# Patient Record
Sex: FEMALE | Race: BLACK OR AFRICAN AMERICAN | Employment: UNEMPLOYED | URBAN - METROPOLITAN AREA
[De-identification: names, ages, dates, MRNs, and addresses within clinical notes are randomized per-mention and may not be internally consistent; named-entity substitution may affect disease eponyms.]

---

## 2017-01-26 PROBLEM — M47.816 SPONDYLOSIS OF LUMBAR REGION WITHOUT MYELOPATHY OR RADICULOPATHY: Status: ACTIVE | Noted: 2017-01-26

## 2017-01-28 ENCOUNTER — HOSPITAL ENCOUNTER (EMERGENCY)
Age: 32
Discharge: HOME | End: 2017-01-28
Attending: EMERGENCY MEDICINE
Payer: COMMERCIAL

## 2017-01-28 ENCOUNTER — APPOINTMENT (OUTPATIENT)
Dept: GENERAL RADIOLOGY | Age: 32
End: 2017-01-28
Payer: COMMERCIAL

## 2017-01-28 VITALS
BODY MASS INDEX: 43.19 KG/M2 | RESPIRATION RATE: 16 BRPM | DIASTOLIC BLOOD PRESSURE: 78 MMHG | OXYGEN SATURATION: 100 % | TEMPERATURE: 97.4 F | HEART RATE: 87 BPM | HEIGHT: 68 IN | SYSTOLIC BLOOD PRESSURE: 116 MMHG | WEIGHT: 285 LBS

## 2017-01-28 DIAGNOSIS — R07.89 CHEST WALL PAIN: ICD-10-CM

## 2017-01-28 DIAGNOSIS — Z76.0 ENCOUNTER FOR MEDICATION REFILL: ICD-10-CM

## 2017-01-28 DIAGNOSIS — J45.21 MILD INTERMITTENT ASTHMA WITH ACUTE EXACERBATION: Primary | ICD-10-CM

## 2017-01-28 PROCEDURE — 6370000000 HC RX 637 (ALT 250 FOR IP): Performed by: PHYSICIAN ASSISTANT

## 2017-01-28 PROCEDURE — 99285 EMERGENCY DEPT VISIT HI MDM: CPT

## 2017-01-28 PROCEDURE — 94640 AIRWAY INHALATION TREATMENT: CPT

## 2017-01-28 PROCEDURE — 93005 ELECTROCARDIOGRAM TRACING: CPT

## 2017-01-28 PROCEDURE — 71020 XR CHEST STANDARD TWO VW: CPT

## 2017-01-28 RX ORDER — IPRATROPIUM BROMIDE AND ALBUTEROL SULFATE 2.5; .5 MG/3ML; MG/3ML
1 SOLUTION RESPIRATORY (INHALATION) CONTINUOUS PRN
Status: DISCONTINUED | OUTPATIENT
Start: 2017-01-28 | End: 2017-01-29 | Stop reason: HOSPADM

## 2017-01-28 RX ORDER — ALBUTEROL SULFATE 90 UG/1
AEROSOL, METERED RESPIRATORY (INHALATION)
Qty: 1 INHALER | Refills: 1 | Status: SHIPPED | OUTPATIENT
Start: 2017-01-28 | End: 2018-02-05

## 2017-01-28 RX ORDER — PREDNISONE 20 MG/1
40 TABLET ORAL ONCE
Status: COMPLETED | OUTPATIENT
Start: 2017-01-28 | End: 2017-01-28

## 2017-01-28 RX ORDER — ALBUTEROL SULFATE 2.5 MG/3ML
2.5 SOLUTION RESPIRATORY (INHALATION) EVERY 6 HOURS PRN
Qty: 20 EACH | Refills: 0 | Status: SHIPPED | OUTPATIENT
Start: 2017-01-28 | End: 2017-08-03 | Stop reason: SDUPTHER

## 2017-01-28 RX ORDER — PREDNISONE 10 MG/1
40 TABLET ORAL DAILY
Qty: 16 TABLET | Refills: 0 | Status: SHIPPED | OUTPATIENT
Start: 2017-01-28 | End: 2017-02-01

## 2017-01-28 RX ADMIN — PREDNISONE 40 MG: 20 TABLET ORAL at 23:13

## 2017-01-28 RX ADMIN — IPRATROPIUM BROMIDE AND ALBUTEROL SULFATE 1 AMPULE: 2.5; .5 SOLUTION RESPIRATORY (INHALATION) at 21:55

## 2017-01-28 ASSESSMENT — PAIN DESCRIPTION - DESCRIPTORS: DESCRIPTORS: TIGHTNESS

## 2017-01-28 ASSESSMENT — ENCOUNTER SYMPTOMS
VOICE CHANGE: 0
VOMITING: 0
PHOTOPHOBIA: 0
ANAL BLEEDING: 0
ABDOMINAL DISTENTION: 0
CHEST TIGHTNESS: 1
COUGH: 1
SHORTNESS OF BREATH: 1
ABDOMINAL PAIN: 0
EYE DISCHARGE: 0
APNEA: 0
NAUSEA: 0

## 2017-01-28 ASSESSMENT — PAIN DESCRIPTION - FREQUENCY: FREQUENCY: INTERMITTENT

## 2017-01-28 ASSESSMENT — PAIN DESCRIPTION - PAIN TYPE: TYPE: ACUTE PAIN

## 2017-01-28 ASSESSMENT — PAIN DESCRIPTION - LOCATION: LOCATION: CHEST

## 2017-01-28 ASSESSMENT — PAIN SCALES - GENERAL: PAINLEVEL_OUTOF10: 5

## 2017-01-28 ASSESSMENT — PULMONARY FUNCTION TESTS: PEFR_L/MIN: 250

## 2017-01-30 LAB
EKG ATRIAL RATE: 99 BPM
EKG P AXIS: 45 DEGREES
EKG P-R INTERVAL: 154 MS
EKG Q-T INTERVAL: 344 MS
EKG QRS DURATION: 74 MS
EKG QTC CALCULATION (BAZETT): 441 MS
EKG R AXIS: 18 DEGREES
EKG T AXIS: -9 DEGREES
EKG VENTRICULAR RATE: 99 BPM

## 2017-02-02 DIAGNOSIS — Z11.4 SCREENING FOR HIV (HUMAN IMMUNODEFICIENCY VIRUS): ICD-10-CM

## 2017-02-04 LAB — HIV-1 AND HIV-2 ANTIBODIES: NEGATIVE

## 2017-02-15 ENCOUNTER — HOSPITAL ENCOUNTER (OUTPATIENT)
Dept: SLEEP CENTER | Age: 32
Discharge: HOME | End: 2017-02-15
Payer: COMMERCIAL

## 2017-02-15 PROCEDURE — 95810 POLYSOM 6/> YRS 4/> PARAM: CPT

## 2017-02-22 PROBLEM — F40.240 CLAUSTROPHOBIA: Status: ACTIVE | Noted: 2017-02-22

## 2017-02-22 PROBLEM — Q76.49 TRANSITIONAL VERTEBRA: Status: ACTIVE | Noted: 2017-02-22

## 2017-02-24 ENCOUNTER — HOSPITAL ENCOUNTER (OUTPATIENT)
Dept: NUTRITION | Age: 32
Discharge: HOME | End: 2017-02-24
Payer: COMMERCIAL

## 2017-02-24 ENCOUNTER — HOSPITAL ENCOUNTER (OUTPATIENT)
Dept: GENERAL RADIOLOGY | Age: 32
Discharge: HOME | End: 2017-02-24
Payer: COMMERCIAL

## 2017-02-24 DIAGNOSIS — M54.2 NECK PAIN: ICD-10-CM

## 2017-02-24 PROCEDURE — 97802 MEDICAL NUTRITION INDIV IN: CPT

## 2017-02-24 PROCEDURE — 72052 X-RAY EXAM NECK SPINE 6/>VWS: CPT

## 2017-03-02 ENCOUNTER — HOSPITAL ENCOUNTER (OUTPATIENT)
Dept: MRI IMAGING | Age: 32
Discharge: HOME | End: 2017-03-02
Payer: COMMERCIAL

## 2017-03-02 DIAGNOSIS — Q76.49 TRANSITIONAL VERTEBRA: ICD-10-CM

## 2017-03-02 DIAGNOSIS — M54.50 CHRONIC BILATERAL LOW BACK PAIN WITHOUT SCIATICA: ICD-10-CM

## 2017-03-02 DIAGNOSIS — G89.29 CHRONIC BILATERAL LOW BACK PAIN WITHOUT SCIATICA: ICD-10-CM

## 2017-03-02 PROCEDURE — 72148 MRI LUMBAR SPINE W/O DYE: CPT

## 2017-03-14 PROBLEM — M48.061 LUMBAR CANAL STENOSIS: Status: ACTIVE | Noted: 2017-03-14

## 2017-03-27 ENCOUNTER — OFFICE VISIT (OUTPATIENT)
Dept: INTERNAL MEDICINE | Age: 32
End: 2017-03-27

## 2017-03-27 VITALS
WEIGHT: 284.2 LBS | TEMPERATURE: 98.1 F | DIASTOLIC BLOOD PRESSURE: 82 MMHG | SYSTOLIC BLOOD PRESSURE: 118 MMHG | HEART RATE: 84 BPM | HEIGHT: 68 IN | BODY MASS INDEX: 43.07 KG/M2 | RESPIRATION RATE: 12 BRPM | OXYGEN SATURATION: 97 %

## 2017-03-27 DIAGNOSIS — E66.01 MORBID (SEVERE) OBESITY DUE TO EXCESS CALORIES (HCC): Primary | ICD-10-CM

## 2017-03-27 DIAGNOSIS — D22.9 ATYPICAL MOLE: ICD-10-CM

## 2017-03-27 DIAGNOSIS — E66.01 MORBID OBESITY DUE TO EXCESS CALORIES (HCC): Primary | ICD-10-CM

## 2017-03-27 PROCEDURE — 99213 OFFICE O/P EST LOW 20 MIN: CPT | Performed by: FAMILY MEDICINE

## 2017-03-27 RX ORDER — DIAZEPAM 5 MG/1
TABLET ORAL
Refills: 0 | COMMUNITY
Start: 2017-02-28 | End: 2017-06-06 | Stop reason: ALTCHOICE

## 2017-05-18 DIAGNOSIS — E66.9 OBESITY, UNSPECIFIED OBESITY SEVERITY, UNSPECIFIED OBESITY TYPE: ICD-10-CM

## 2017-05-18 DIAGNOSIS — E61.1 IRON DEFICIENCY: ICD-10-CM

## 2017-05-18 DIAGNOSIS — E55.9 VITAMIN D DEFICIENCY: ICD-10-CM

## 2017-05-18 LAB
BASOPHILS ABSOLUTE: 0 K/UL (ref 0–0.2)
BASOPHILS RELATIVE PERCENT: 0.3 %
EOSINOPHILS ABSOLUTE: 0.2 K/UL (ref 0–0.7)
EOSINOPHILS RELATIVE PERCENT: 1.5 %
FERRITIN: 84.7 NG/ML (ref 13–150)
HBA1C MFR BLD: 6.2 % (ref 4.8–5.9)
HCT VFR BLD CALC: 40.2 % (ref 37–47)
HEMOGLOBIN: 13.1 G/DL (ref 12–16)
IRON SATURATION: 12 % (ref 11–46)
IRON: 38 UG/DL (ref 37–145)
LYMPHOCYTES ABSOLUTE: 2.3 K/UL (ref 1–4.8)
LYMPHOCYTES RELATIVE PERCENT: 21.2 %
MCH RBC QN AUTO: 28 PG (ref 27–31.3)
MCHC RBC AUTO-ENTMCNC: 32.7 % (ref 33–37)
MCV RBC AUTO: 85.7 FL (ref 82–100)
MONOCYTES ABSOLUTE: 0.4 K/UL (ref 0.2–0.8)
MONOCYTES RELATIVE PERCENT: 3.7 %
NEUTROPHILS ABSOLUTE: 8 K/UL (ref 1.4–6.5)
NEUTROPHILS RELATIVE PERCENT: 73.3 %
PDW BLD-RTO: 14.6 % (ref 11.5–14.5)
PLATELET # BLD: 283 K/UL (ref 130–400)
RBC # BLD: 4.69 M/UL (ref 4.2–5.4)
TOTAL IRON BINDING CAPACITY: 315 UG/DL (ref 178–450)
VITAMIN D 25-HYDROXY: 24.9 NG/ML (ref 30–100)
WBC # BLD: 10.9 K/UL (ref 4.8–10.8)

## 2017-07-10 ENCOUNTER — TELEPHONE (OUTPATIENT)
Dept: ADMINISTRATIVE | Age: 32
End: 2017-07-10

## 2017-09-07 DIAGNOSIS — R73.09 ABNORMAL BLOOD SUGAR: ICD-10-CM

## 2017-09-07 DIAGNOSIS — N93.9 ABNORMAL VAGINAL BLEEDING: ICD-10-CM

## 2017-09-07 LAB
BASOPHILS ABSOLUTE: 0.1 K/UL (ref 0–0.2)
BASOPHILS RELATIVE PERCENT: 0.5 %
EOSINOPHILS ABSOLUTE: 0.2 K/UL (ref 0–0.7)
EOSINOPHILS RELATIVE PERCENT: 2.3 %
FOLLICLE STIMULATING HORMONE: 6.6 MIU/ML
GONADOTROPIN, CHORIONIC (HCG) QUANT: <0.1 MIU/ML
HBA1C MFR BLD: 5.7 % (ref 4.8–5.9)
HCT VFR BLD CALC: 40.5 % (ref 37–47)
HEMOGLOBIN: 13.4 G/DL (ref 12–16)
LUTEINIZING HORMONE: 8.6 MIU/ML
LYMPHOCYTES ABSOLUTE: 2.5 K/UL (ref 1–4.8)
LYMPHOCYTES RELATIVE PERCENT: 25.2 %
MCH RBC QN AUTO: 28 PG (ref 27–31.3)
MCHC RBC AUTO-ENTMCNC: 33 % (ref 33–37)
MCV RBC AUTO: 84.8 FL (ref 82–100)
MONOCYTES ABSOLUTE: 0.3 K/UL (ref 0.2–0.8)
MONOCYTES RELATIVE PERCENT: 3.3 %
NEUTROPHILS ABSOLUTE: 6.8 K/UL (ref 1.4–6.5)
NEUTROPHILS RELATIVE PERCENT: 68.7 %
PDW BLD-RTO: 15.6 % (ref 11.5–14.5)
PLATELET # BLD: 287 K/UL (ref 130–400)
RBC # BLD: 4.78 M/UL (ref 4.2–5.4)
WBC # BLD: 9.9 K/UL (ref 4.8–10.8)

## 2017-09-19 ENCOUNTER — HOSPITAL ENCOUNTER (OUTPATIENT)
Dept: ULTRASOUND IMAGING | Age: 32
Discharge: HOME | End: 2017-09-19
Payer: COMMERCIAL

## 2017-09-19 DIAGNOSIS — N93.9 ABNORMAL VAGINAL BLEEDING: ICD-10-CM

## 2017-09-19 PROCEDURE — 76830 TRANSVAGINAL US NON-OB: CPT

## 2017-09-19 PROCEDURE — 76856 US EXAM PELVIC COMPLETE: CPT

## 2017-10-01 ENCOUNTER — APPOINTMENT (OUTPATIENT)
Dept: GENERAL RADIOLOGY | Age: 32
DRG: 059 | End: 2017-10-01
Payer: COMMERCIAL

## 2017-10-01 ENCOUNTER — HOSPITAL ENCOUNTER (INPATIENT)
Age: 32
LOS: 1 days | Discharge: HOME | DRG: 059 | End: 2017-10-04
Attending: INTERNAL MEDICINE | Admitting: INTERNAL MEDICINE
Payer: COMMERCIAL

## 2017-10-01 DIAGNOSIS — G89.29 CHRONIC LEFT-SIDED LOW BACK PAIN WITH LEFT-SIDED SCIATICA: ICD-10-CM

## 2017-10-01 DIAGNOSIS — M54.5 CHRONIC MIDLINE LOW BACK PAIN, WITH SCIATICA PRESENCE UNSPECIFIED: ICD-10-CM

## 2017-10-01 DIAGNOSIS — G89.29 CHRONIC MIDLINE LOW BACK PAIN, WITH SCIATICA PRESENCE UNSPECIFIED: ICD-10-CM

## 2017-10-01 DIAGNOSIS — R47.81 SLURRED SPEECH: ICD-10-CM

## 2017-10-01 DIAGNOSIS — R20.2 PARESTHESIAS: ICD-10-CM

## 2017-10-01 DIAGNOSIS — M54.42 CHRONIC LEFT-SIDED LOW BACK PAIN WITH LEFT-SIDED SCIATICA: ICD-10-CM

## 2017-10-01 DIAGNOSIS — G45.9 TRANSIENT CEREBRAL ISCHEMIA, UNSPECIFIED TYPE: Primary | ICD-10-CM

## 2017-10-01 LAB
BASOPHILS ABSOLUTE: 0 K/UL (ref 0–0.2)
BASOPHILS RELATIVE PERCENT: 0.3 %
BILIRUBIN URINE: NEGATIVE
BLOOD, URINE: NEGATIVE
CHP ED QC CHECK: YES
CLARITY: CLEAR
COLOR: YELLOW
EOSINOPHILS ABSOLUTE: 0.3 K/UL (ref 0–0.7)
EOSINOPHILS RELATIVE PERCENT: 2.3 %
GLUCOSE URINE: NEGATIVE MG/DL
HCT VFR BLD CALC: 40 % (ref 37–47)
HEMOGLOBIN: 12.9 G/DL (ref 12–16)
KETONES, URINE: NEGATIVE MG/DL
LEUKOCYTE ESTERASE, URINE: NEGATIVE
LYMPHOCYTES ABSOLUTE: 4.9 K/UL (ref 1–4.8)
LYMPHOCYTES RELATIVE PERCENT: 36.4 %
MCH RBC QN AUTO: 27.8 PG (ref 27–31.3)
MCHC RBC AUTO-ENTMCNC: 32.2 % (ref 33–37)
MCV RBC AUTO: 86.4 FL (ref 82–100)
MONOCYTES ABSOLUTE: 0.7 K/UL (ref 0.2–0.8)
MONOCYTES RELATIVE PERCENT: 5 %
NEUTROPHILS ABSOLUTE: 7.6 K/UL (ref 1.4–6.5)
NEUTROPHILS RELATIVE PERCENT: 56 %
NITRITE, URINE: NEGATIVE
PDW BLD-RTO: 15.6 % (ref 11.5–14.5)
PH UA: 5.5 (ref 5–9)
PLATELET # BLD: 274 K/UL (ref 130–400)
PREGNANCY TEST URINE, POC: NEGATIVE
PROTEIN UA: NEGATIVE MG/DL
RBC # BLD: 4.63 M/UL (ref 4.2–5.4)
SPECIFIC GRAVITY UA: 1.02 (ref 1–1.03)
URINE REFLEX TO CULTURE: NORMAL
UROBILINOGEN, URINE: 0.2 E.U./DL
WBC # BLD: 13.5 K/UL (ref 4.8–10.8)

## 2017-10-01 PROCEDURE — 82553 CREATINE MB FRACTION: CPT

## 2017-10-01 PROCEDURE — G0480 DRUG TEST DEF 1-7 CLASSES: HCPCS

## 2017-10-01 PROCEDURE — 80053 COMPREHEN METABOLIC PANEL: CPT

## 2017-10-01 PROCEDURE — 71010 XR CHEST PORTABLE: CPT

## 2017-10-01 PROCEDURE — 2580000003 HC RX 258: Performed by: PERSONAL EMERGENCY RESPONSE ATTENDANT

## 2017-10-01 PROCEDURE — 80307 DRUG TEST PRSMV CHEM ANLYZR: CPT

## 2017-10-01 PROCEDURE — 99285 EMERGENCY DEPT VISIT HI MDM: CPT

## 2017-10-01 PROCEDURE — 93005 ELECTROCARDIOGRAM TRACING: CPT

## 2017-10-01 PROCEDURE — 83880 ASSAY OF NATRIURETIC PEPTIDE: CPT

## 2017-10-01 PROCEDURE — 85025 COMPLETE CBC W/AUTO DIFF WBC: CPT

## 2017-10-01 PROCEDURE — 36415 COLL VENOUS BLD VENIPUNCTURE: CPT

## 2017-10-01 PROCEDURE — 84443 ASSAY THYROID STIM HORMONE: CPT

## 2017-10-01 PROCEDURE — 84484 ASSAY OF TROPONIN QUANT: CPT

## 2017-10-01 PROCEDURE — 82550 ASSAY OF CK (CPK): CPT

## 2017-10-01 PROCEDURE — 81003 URINALYSIS AUTO W/O SCOPE: CPT

## 2017-10-01 RX ORDER — 0.9 % SODIUM CHLORIDE 0.9 %
1000 INTRAVENOUS SOLUTION INTRAVENOUS ONCE
Status: COMPLETED | OUTPATIENT
Start: 2017-10-01 | End: 2017-10-02

## 2017-10-01 RX ADMIN — SODIUM CHLORIDE 1000 ML: 9 INJECTION, SOLUTION INTRAVENOUS at 23:57

## 2017-10-01 ASSESSMENT — ENCOUNTER SYMPTOMS
DIARRHEA: 0
SHORTNESS OF BREATH: 0
SORE THROAT: 0
VOMITING: 0
ABDOMINAL PAIN: 0
NAUSEA: 0
RHINORRHEA: 0
BLOOD IN STOOL: 0
COUGH: 0
COLOR CHANGE: 0

## 2017-10-01 NOTE — IP AVS SNAPSHOT
After Visit Summary  (Discharge Instructions)    Medication List for Home    Based on the information you provided to us as well as any changes during this visit, the following is your updated medication list.  Compare this with your prescription bottles at home. If you have any questions or concerns, contact your primary care physician's office. Daily Medication List (This medication list can be shared with any healthcare provider who is helping you manage your medications)      There are NEW medications for you. START taking them after you leave the hospital        Last Dose    Next Dose Due AM NOON PM NIGHT    aspirin 81 MG EC tablet   Take 1 tablet by mouth daily                81 mg on 10/4/2017 10:56 AM     10/5/17                            You told us you were taking these medications at home, but the amount or how often you take this medication has CHANGED        Last Dose    Next Dose Due AM NOON PM NIGHT    naproxen 500 MG tablet   Commonly known as:  NAPROSYN   Take 1 tablet by mouth 2 times daily as needed for Pain As needed   What changed:    - when to take this  - reasons to take this                  Take as directed                         These are medications you told us you were taking at home, CONTINUE taking them after you leave the hospital        Last Dose    Next Dose Due AM NOON PM NIGHT    albuterol sulfate  (90 Base) MCG/ACT inhaler   Commonly known as:  PROAIR HFA   Use every 4 hours while awake for 7-10 days then PRN wheezing  May sub Ventolin or Proventil as needed per [employer].                   Take as directed                       albuterol (2.5 MG/3ML) 0.083% nebulizer solution   Commonly known as:  PROVENTIL   Take 3 mLs by nebulization every 6 hours as needed for Wheezing or Shortness of Breath                  Take as directed                       clonazePAM 0.5 MG tablet   Commonly known as:  Iesha Goncalves · Instructions about your medications including a list of your home medications  · A summary of your hospital visit  · Follow-up appointments once you have left the hospital  · Your care plan at home      You may receive a survey regarding the care you received during your stay. Your input is valuable to us. We encourage you to complete and return your survey in the envelope provided. We hope you will choose us in the future for your healthcare needs. Patient Information     Patient Name KAUSHIK Rdzs 1985      Care Provided at:     Name Address Phone       [address]  [place] [place] [address] [phone]            Your Visit    Here you will find information about your visit, including the reason for your visit. Please take this sheet with you when you visit your doctor or other health care provider in the future. It will help determine the best possible medical care for you at that time. If you have any questions once you leave the hospital, please call the department phone number listed below. Why you were here     Your primary diagnosis was:  Not on File      Visit Information     Date & Time Provider Department Dept. Phone    10/1/2017 MD SOBIA Montes Miriam.[facility] [phone]       Follow-up Appointments    Below is a list of your follow-up and future appointments. This may not be a complete list as you may have made appointments directly with providers that we are not aware of or your providers may have made some for you. Please call your providers to confirm appointments. It is important to keep your appointments. Please bring your current insurance card, photo ID, co-pay, and all medication bottles to your appointment. If self-pay, payment is expected at the time of service. Follow-up Information     Follow up with Rivas Odom PA-C .     Specialty:  Family Medicine    Contact information:    [address] [facility] [address]  [phone]          Follow up with Golden Long MD.    Specialty:  Neurology    Why:  November 6th 10.45, has appt    Contact information:    [address] [place] [id number] [facility] [place]        Future Appointments     10/6/2017 8:30 AM     Appointment with Aide Hodges PA-C at [address] [facility] ([phone])   Please arrive 15 minutes prior to appointment, bring photo ID and insurance card. [address]  [address] [facility]         Preventive Care        Date Due    Tetanus Combination Vaccine (1 - Tdap) 9/8/2004    Pap Smear 9/8/2006    Yearly Flu Vaccine (1) 9/7/2018 (Originally 9/1/2017)    Pneumococcal Vaccine - Pneumovax for adults aged 19-64 years with: chronic heart disease, chronic lung disease, diabetes mellitus, alcoholism, chronic liver disease, or cigarette smoking. (1 of 1 - PPSV23) 9/7/2018 (Originally 9/8/2004)                 Care Plan Once You Return Home    This section includes instructions you will need to follow once you leave the hospital.  Your care team will discuss these with you, so you and those caring for you know how to best care for your health needs at home. This section may also include educational information about certain health topics that may be of help to you. Discharge Instructions       Follow up with Dr. Aide Hodges PA-C in the next 7 days or sooner if needed. Please return to ER or call 911 if you develop any significant signs or symptoms. I may not have addressed all of your medical illnesses or the abnormal blood work or imaging therefore please ask your PCP to obtain [phone] [facility] record to follow up on all of the abnormal labs, imaging and findings that I have and have not addressed during your hospitalization. Discharging you from the hospital does not mean that your medical care ends here and now.  You may still need additional work up, investigation, If you use naproxen long-term, you may need frequent medical tests. This medicine can cause unusual results with certain medical tests. Tell any doctor who treats you that you are using naproxen. Store at room temperature away from moisture, heat, and light. Keep the bottle tightly closed when not in use. Read all patient information, medication guides, and instruction sheets provided to you. Ask your doctor or pharmacist if you have any questions. What happens if I miss a dose? Since naproxen is sometimes used only when needed, you may not be on a dosing schedule. If you are on a schedule, use the missed dose as soon as you remember. Skip the missed dose if it is almost time for your next scheduled dose. Do not use extra medicine to make up the missed dose. What happens if I overdose? Seek emergency medical attention or call the Poison Help line at 1-657.418.6047. What should I avoid while taking naproxen? Avoid drinking alcohol. It may increase your risk of stomach bleeding. Avoid taking aspirin while you are taking naproxen. Ask a doctor or pharmacist before using any cold, allergy, or pain medicine. Many medicines available over the counter contain aspirin or other medicines similar to naproxen. Taking certain products together can cause you to get too much of this type of medication. Check the label to see if a medicine contains aspirin, ibuprofen, ketoprofen, or naproxen. Ask your doctor before using an antacid, and use only the type your doctor recommends. Some antacids can make it harder for your body to absorb naproxen. What are the possible side effects of naproxen? Get emergency medical help if you have signs of an allergic reaction: sneezing, runny or stuffy nose; wheezing or trouble breathing; hives; swelling of your face, lips, tongue, or throat.   Get emergency medical help if you have signs of a heart attack or stroke: patient. [facility] does not assume any responsibility for any aspect of healthcare administered with the aid of information [facility] provides. The information contained herein is not intended to cover all possible uses, directions, precautions, warnings, drug interactions, allergic reactions, or adverse effects. If you have questions about the drugs you are taking, check with your doctor, nurse or pharmacist.  Copyright 2058-1477 [address]: 13.02. Revision date: 9/23/2015. Care instructions adapted under license by [facility] ([facility]). If you have questions about a medical condition or this instruction, always ask your healthcare professional. [place] [address] any warranty or liability for your use of this information.

## 2017-10-01 NOTE — IP AVS SNAPSHOT
Patient Information     Patient Name DOB Charmayne Scrape 1985         This is your updated medication list to keep with you all times      TAKE these medications     * albuterol sulfate  (90 Base) MCG/ACT inhaler   Commonly known as:  PROAIR HFA   Use every 4 hours while awake for 7-10 days then PRN wheezing  May sub Ventolin or Proventil as needed per [employer]. * albuterol (2.5 MG/3ML) 0.083% nebulizer solution   Commonly known as:  PROVENTIL   Take 3 mLs by nebulization every 6 hours as needed for Wheezing or Shortness of Breath       aspirin 81 MG EC tablet   Take 1 tablet by mouth daily       clonazePAM 0.5 MG tablet   Commonly known as:  KLONOPIN   Take 1 tablet by mouth 2 times daily as needed for Anxiety       lidocaine-prilocaine 2.5-2.5 % cream   Commonly known as:  EMLA   Apply half dollar sized amount topically up to twice a day to intact skin as needed for pain. metFORMIN 500 MG tablet   Commonly known as:  GLUCOPHAGE   Take 1 tablet by mouth 2 times daily (with meals)       naproxen 500 MG tablet   Commonly known as:  NAPROSYN   Take 1 tablet by mouth 2 times daily as needed for Pain As needed       vitamin D 46598 units Caps capsule   Commonly known as:  ERGOCALCIFEROL   Take 1 capsule by mouth once a week for 8 doses       * Notice: This list has 2 medication(s) that are the same as other medications prescribed for you. Read the directions carefully, and ask your doctor or other care provider to review them with you.

## 2017-10-02 ENCOUNTER — APPOINTMENT (OUTPATIENT)
Dept: MRI IMAGING | Age: 32
DRG: 059 | End: 2017-10-02
Payer: COMMERCIAL

## 2017-10-02 ENCOUNTER — APPOINTMENT (OUTPATIENT)
Dept: CT IMAGING | Age: 32
DRG: 059 | End: 2017-10-02
Payer: COMMERCIAL

## 2017-10-02 LAB
ALBUMIN SERPL-MCNC: 3.3 G/DL (ref 3.9–4.9)
ALBUMIN SERPL-MCNC: 3.6 G/DL (ref 3.9–4.9)
ALP BLD-CCNC: 108 U/L (ref 40–130)
ALP BLD-CCNC: 95 U/L (ref 40–130)
ALT SERPL-CCNC: 10 U/L (ref 0–33)
ALT SERPL-CCNC: 9 U/L (ref 0–33)
AMPHETAMINE SCREEN, URINE: NORMAL
ANION GAP SERPL CALCULATED.3IONS-SCNC: 13 MEQ/L (ref 7–13)
ANION GAP SERPL CALCULATED.3IONS-SCNC: 18 MEQ/L (ref 7–13)
AST SERPL-CCNC: 18 U/L (ref 0–35)
AST SERPL-CCNC: 20 U/L (ref 0–35)
BARBITURATE SCREEN URINE: NORMAL
BASOPHILS ABSOLUTE: 0 K/UL (ref 0–0.2)
BASOPHILS RELATIVE PERCENT: 0.3 %
BENZODIAZEPINE SCREEN, URINE: NORMAL
BILIRUB SERPL-MCNC: 0.2 MG/DL (ref 0–1.2)
BILIRUB SERPL-MCNC: 0.3 MG/DL (ref 0–1.2)
BUN BLDV-MCNC: 8 MG/DL (ref 6–20)
BUN BLDV-MCNC: 9 MG/DL (ref 6–20)
CALCIUM SERPL-MCNC: 8.6 MG/DL (ref 8.6–10.2)
CALCIUM SERPL-MCNC: 8.9 MG/DL (ref 8.6–10.2)
CANNABINOID SCREEN URINE: NORMAL
CHLORIDE BLD-SCNC: 100 MEQ/L (ref 98–107)
CHLORIDE BLD-SCNC: 103 MEQ/L (ref 98–107)
CHOLESTEROL, TOTAL: 151 MG/DL (ref 0–199)
CK MB: 2.8 NG/ML (ref 0–3.8)
CO2: 23 MEQ/L (ref 22–29)
CO2: 24 MEQ/L (ref 22–29)
COCAINE METABOLITE SCREEN URINE: NORMAL
CREAT SERPL-MCNC: 0.64 MG/DL (ref 0.5–0.9)
CREAT SERPL-MCNC: 0.7 MG/DL (ref 0.5–0.9)
CREATINE KINASE-MB INDEX: 1 % (ref 0–3.5)
EOSINOPHILS ABSOLUTE: 0.3 K/UL (ref 0–0.7)
EOSINOPHILS RELATIVE PERCENT: 2.7 %
ETHANOL PERCENT: NORMAL G/DL
ETHANOL: <10 MG/DL (ref 0–0.08)
GFR AFRICAN AMERICAN: >60
GFR AFRICAN AMERICAN: >60
GFR NON-AFRICAN AMERICAN: >60
GFR NON-AFRICAN AMERICAN: >60
GLOBULIN: 2.2 G/DL (ref 2.3–3.5)
GLOBULIN: 2.5 G/DL (ref 2.3–3.5)
GLUCOSE BLD-MCNC: 105 MG/DL (ref 60–115)
GLUCOSE BLD-MCNC: 107 MG/DL (ref 60–115)
GLUCOSE BLD-MCNC: 111 MG/DL (ref 60–115)
GLUCOSE BLD-MCNC: 130 MG/DL (ref 74–109)
GLUCOSE BLD-MCNC: 131 MG/DL (ref 74–109)
GLUCOSE BLD-MCNC: 91 MG/DL (ref 60–115)
HCT VFR BLD CALC: 37.7 % (ref 37–47)
HDLC SERPL-MCNC: 48 MG/DL (ref 40–59)
HEMOGLOBIN: 12 G/DL (ref 12–16)
LDL CHOLESTEROL CALCULATED: 81 MG/DL (ref 0–129)
LYMPHOCYTES ABSOLUTE: 3.9 K/UL (ref 1–4.8)
LYMPHOCYTES RELATIVE PERCENT: 33.7 %
Lab: NORMAL
MCH RBC QN AUTO: 27.5 PG (ref 27–31.3)
MCHC RBC AUTO-ENTMCNC: 31.8 % (ref 33–37)
MCV RBC AUTO: 86.2 FL (ref 82–100)
MONOCYTES ABSOLUTE: 0.7 K/UL (ref 0.2–0.8)
MONOCYTES RELATIVE PERCENT: 5.7 %
NEUTROPHILS ABSOLUTE: 6.7 K/UL (ref 1.4–6.5)
NEUTROPHILS RELATIVE PERCENT: 57.6 %
OPIATE SCREEN URINE: NORMAL
PDW BLD-RTO: 15.7 % (ref 11.5–14.5)
PERFORMED ON: NORMAL
PHENCYCLIDINE SCREEN URINE: NORMAL
PLATELET # BLD: 251 K/UL (ref 130–400)
POTASSIUM SERPL-SCNC: 4.2 MEQ/L (ref 3.5–5.1)
POTASSIUM SERPL-SCNC: 4.2 MEQ/L (ref 3.5–5.1)
PRO-BNP: 13 PG/ML
RBC # BLD: 4.38 M/UL (ref 4.2–5.4)
SODIUM BLD-SCNC: 137 MEQ/L (ref 132–144)
SODIUM BLD-SCNC: 144 MEQ/L (ref 132–144)
TOTAL CK: 288 U/L (ref 0–170)
TOTAL PROTEIN: 5.5 G/DL (ref 6.4–8.1)
TOTAL PROTEIN: 6.1 G/DL (ref 6.4–8.1)
TRIGL SERPL-MCNC: 108 MG/DL (ref 0–200)
TROPONIN: <0.01 NG/ML (ref 0–0.01)
TSH SERPL DL<=0.05 MIU/L-ACNC: 1.78 UIU/ML (ref 0.27–4.2)
WBC # BLD: 11.6 K/UL (ref 4.8–10.8)

## 2017-10-02 PROCEDURE — G0378 HOSPITAL OBSERVATION PER HR: HCPCS

## 2017-10-02 PROCEDURE — G8979 MOBILITY GOAL STATUS: HCPCS

## 2017-10-02 PROCEDURE — 6360000002 HC RX W HCPCS: Performed by: INTERNAL MEDICINE

## 2017-10-02 PROCEDURE — 2580000003 HC RX 258: Performed by: INTERNAL MEDICINE

## 2017-10-02 PROCEDURE — 97165 OT EVAL LOW COMPLEX 30 MIN: CPT

## 2017-10-02 PROCEDURE — 6370000000 HC RX 637 (ALT 250 FOR IP): Performed by: INTERNAL MEDICINE

## 2017-10-02 PROCEDURE — 70450 CT HEAD/BRAIN W/O DYE: CPT

## 2017-10-02 PROCEDURE — 36415 COLL VENOUS BLD VENIPUNCTURE: CPT

## 2017-10-02 PROCEDURE — 80061 LIPID PANEL: CPT

## 2017-10-02 PROCEDURE — G8978 MOBILITY CURRENT STATUS: HCPCS

## 2017-10-02 PROCEDURE — 96374 THER/PROPH/DIAG INJ IV PUSH: CPT

## 2017-10-02 PROCEDURE — 6360000002 HC RX W HCPCS: Performed by: PSYCHIATRY & NEUROLOGY

## 2017-10-02 PROCEDURE — 80053 COMPREHEN METABOLIC PANEL: CPT

## 2017-10-02 PROCEDURE — 96372 THER/PROPH/DIAG INJ SC/IM: CPT

## 2017-10-02 PROCEDURE — 93010 ELECTROCARDIOGRAM REPORT: CPT | Performed by: INTERNAL MEDICINE

## 2017-10-02 PROCEDURE — 97161 PT EVAL LOW COMPLEX 20 MIN: CPT

## 2017-10-02 PROCEDURE — G8988 SELF CARE GOAL STATUS: HCPCS

## 2017-10-02 PROCEDURE — A9579 GAD-BASE MR CONTRAST NOS,1ML: HCPCS | Performed by: RADIOLOGY

## 2017-10-02 PROCEDURE — G8987 SELF CARE CURRENT STATUS: HCPCS

## 2017-10-02 PROCEDURE — 6360000004 HC RX CONTRAST MEDICATION: Performed by: RADIOLOGY

## 2017-10-02 PROCEDURE — 85025 COMPLETE CBC W/AUTO DIFF WBC: CPT

## 2017-10-02 PROCEDURE — 70553 MRI BRAIN STEM W/O & W/DYE: CPT

## 2017-10-02 RX ORDER — LORAZEPAM 2 MG/ML
1 INJECTION INTRAMUSCULAR
Status: COMPLETED | OUTPATIENT
Start: 2017-10-02 | End: 2017-10-02

## 2017-10-02 RX ORDER — NICOTINE POLACRILEX 4 MG
15 LOZENGE BUCCAL PRN
Status: DISCONTINUED | OUTPATIENT
Start: 2017-10-02 | End: 2017-10-04 | Stop reason: HOSPADM

## 2017-10-02 RX ORDER — ONDANSETRON 2 MG/ML
4 INJECTION INTRAMUSCULAR; INTRAVENOUS EVERY 6 HOURS PRN
Status: DISCONTINUED | OUTPATIENT
Start: 2017-10-02 | End: 2017-10-04 | Stop reason: HOSPADM

## 2017-10-02 RX ORDER — DEXTROSE MONOHYDRATE 25 G/50ML
12.5 INJECTION, SOLUTION INTRAVENOUS PRN
Status: DISCONTINUED | OUTPATIENT
Start: 2017-10-02 | End: 2017-10-04 | Stop reason: HOSPADM

## 2017-10-02 RX ORDER — SODIUM CHLORIDE 0.9 % (FLUSH) 0.9 %
10 SYRINGE (ML) INJECTION PRN
Status: DISCONTINUED | OUTPATIENT
Start: 2017-10-02 | End: 2017-10-04 | Stop reason: HOSPADM

## 2017-10-02 RX ORDER — LORAZEPAM 2 MG/ML
0.5 INJECTION INTRAMUSCULAR ONCE
Status: DISCONTINUED | OUTPATIENT
Start: 2017-10-02 | End: 2017-10-04 | Stop reason: HOSPADM

## 2017-10-02 RX ORDER — FAMOTIDINE 20 MG/1
20 TABLET, FILM COATED ORAL 2 TIMES DAILY
Status: DISCONTINUED | OUTPATIENT
Start: 2017-10-02 | End: 2017-10-04 | Stop reason: HOSPADM

## 2017-10-02 RX ORDER — CLONAZEPAM 0.5 MG/1
0.5 TABLET ORAL 2 TIMES DAILY PRN
Status: DISCONTINUED | OUTPATIENT
Start: 2017-10-02 | End: 2017-10-04 | Stop reason: HOSPADM

## 2017-10-02 RX ORDER — SODIUM CHLORIDE 0.9 % (FLUSH) 0.9 %
10 SYRINGE (ML) INJECTION EVERY 12 HOURS SCHEDULED
Status: DISCONTINUED | OUTPATIENT
Start: 2017-10-02 | End: 2017-10-04 | Stop reason: HOSPADM

## 2017-10-02 RX ORDER — ACETAMINOPHEN 325 MG/1
650 TABLET ORAL EVERY 4 HOURS PRN
Status: DISCONTINUED | OUTPATIENT
Start: 2017-10-02 | End: 2017-10-04 | Stop reason: HOSPADM

## 2017-10-02 RX ORDER — DEXTROSE MONOHYDRATE 50 MG/ML
100 INJECTION, SOLUTION INTRAVENOUS PRN
Status: DISCONTINUED | OUTPATIENT
Start: 2017-10-02 | End: 2017-10-04 | Stop reason: HOSPADM

## 2017-10-02 RX ADMIN — ASPIRIN 325 MG: 325 TABLET, DELAYED RELEASE ORAL at 18:39

## 2017-10-02 RX ADMIN — ENOXAPARIN SODIUM 40 MG: 40 INJECTION SUBCUTANEOUS at 10:07

## 2017-10-02 RX ADMIN — FAMOTIDINE 20 MG: 20 TABLET ORAL at 21:03

## 2017-10-02 RX ADMIN — Medication 10 ML: at 21:03

## 2017-10-02 RX ADMIN — GADOTERIDOL 24 ML: 279.3 INJECTION, SOLUTION INTRAVENOUS at 14:59

## 2017-10-02 RX ADMIN — LORAZEPAM 1 MG: 2 INJECTION INTRAMUSCULAR; INTRAVENOUS at 13:51

## 2017-10-02 RX ADMIN — FAMOTIDINE 20 MG: 20 TABLET ORAL at 10:06

## 2017-10-02 ASSESSMENT — PAIN DESCRIPTION - LOCATION: LOCATION: BACK

## 2017-10-02 ASSESSMENT — PAIN SCALES - GENERAL
PAINLEVEL_OUTOF10: 6
PAINLEVEL_OUTOF10: 0
PAINLEVEL_OUTOF10: 0

## 2017-10-02 ASSESSMENT — PAIN DESCRIPTION - ORIENTATION: ORIENTATION: LOWER

## 2017-10-02 ASSESSMENT — PAIN DESCRIPTION - PAIN TYPE: TYPE: CHRONIC PAIN

## 2017-10-02 NOTE — PROGRESS NOTES
A consult was placed by the patient's doctor regarding Advanced Directives. The patient expressed no desire for these documents. This  explained the documents to the patient and gave them to her. This  recommended contacting [facility] if further assistance is required.

## 2017-10-02 NOTE — PROGRESS NOTES
[facility] OCCUPATIONAL THERAPY EVALUATION - ACUTE     Date: 10/2/2017  Patient Name: Suze Damon        MRN: [id number]  Account: [de-identified]   : 1985  (28 y.o.)  Room: [place] [address]    Chart Review:  Diagnosis:  The primary encounter diagnosis was Transient cerebral ischemia, unspecified type. Diagnoses of Paresthesias and Slurred speech were also pertinent to this visit. Past Medical History:   Diagnosis Date    Asthma     Chronic back pain     Headache     Osteoarthritis      Past Surgical History:   Procedure Laterality Date    CHOLECYSTECTOMY       Precautions:  N/A       Evaluation and Pt. rights have been reviewed: [x]Yes   [] No   If no why not:   Falls safety interventions in place  []Yes   [x] No    Comments:     Subjective: Pt seen alone    Prior living arrangement:      Support contact: Self    Pt lives: [] Alone   [] With spouse   [x] Other   Comment:  Family  Home: [] Single level   []  Two level   []  Split level     []  Apartment:   3 level home  Entrance:  Stairs: 7-8 Hand rails 1,    Inside: Stairs: 10+10 Hand rails: 1  Bathroom: [] Bath tub   [x] Tub/Shower combo   []  Shower stall    Location:      DME: [] W/W   [] Chloe España   [] Rollator   []  W/C   [] Cele Foss   [] Shower Chair   [] [facility]  [] Dressing  AE  [] Other:      Previous Functional Status:  Pt reports that she was independent with adl and homemaking tasks. Pt reports that she was also driving.     Pain:   Start of session: 6/10  Description: ache  Location: back  End of session: 6/10  Action: [] No Action Necessary    [x] Patient reports pain at acceptable level for treatment  [] Nursing notified    [] Other      Objective:  Observation:  Pt seated on edge of bed    Orientation: Oriented to  [x] Person   [x] Place  [x]Time    Vision:   [x]  [facility]   [] Impaired  Comments:      Hearing:  [x] WFL   [] Impaired  Comments:    Sensation:   [x] WFL   []  Impaired   Comments:      Cognition:   [] WFL   [x] Impaired  Comments: Decreased problem solving  Communication:   [] WFL   [x] Impaired  Comments:Slurred speech  Range of Motion:  R UE AROM/PROM: [x]  WFL [] Impaired  Comments:   L UE AROM/PROM:  [x]  WFL [] Impaired  Comments:     Strength:   R UE Strength: []1    [] 2   [] 2+   [] 3   [] 3+   [] 4   [] 4+  [x] 5  Comments:   L UE Strength:  []1    [] 2   [] 2+   [] 3   [] 3+  [x] 4   [] 4+   [] 5  Comments:     Quality of Movement:  [x] Good   [] Fair   [] Poor     Coordination:  Gross motor: [x] WFL   [] Impaired   Fine motor: [x] WFL   [] Impaired     Functional Mobility:  Toilet Transfers:  N/T  Bed Transfer:  independent  Sit to stand: supervision  Bed to Chair:  Supervision    Seated Balance:      Static: [x] Good  [] Fair   [] Poor   Dynamic: [x]  Good  [] Fair   [] Poor     Standing Balance:     Static: [x] Good   [] Fair  [] Poor   Dynamic: [x] Good -  [] Fair   [] Poor     Functional Endurance: [] Good  [x] Fair  [] Poor     ADLs  Feeding:   Positive hand to mouth  UE Dressing:  independent  LB Dressing:  independent  Bathing:  N/T  Toileting: N/T  Grooming: independent    Treatment Plan will consist of:   [] ADL Training   [x] Strengthening   [] Endurance   [] Transfer Training   []  DME ed      [x] HEP  [] Manual Therapy   [] AROM/PROM    [] Coordination   [] Cognitive Training   []Safety training   [] Other :    Goals:   Patient will:   [x]  Improve functional endurance to tolerate/complete 20-30 mins of ADL's   []  Be   in UB ADLs    []  Be   in LB ADLs   [x]  Be independent in ADL transfers without LOB   []  Be   in toileting tasks   []  Improve   hand fine motor coordination to   in order to manage clothing fasteners/self-care containers in a timely manner   []  Improve   UE Function (AROM, strength, motor control, tone normalization) to complete ADLs as projected. [x]  Improve left UE strength and endurance to Fair+ in order to participate in self-care activities as projected.    [x]  Access appropriate D/C site

## 2017-10-02 NOTE — ED TRIAGE NOTES
Pt presents from home with c/o facial numbness. Onset approx 7am. Pt reports hx anxiety. Pt reports self admin clonopin 830pm with relief from anxiety, no change in facial numbness. Pt a&o x4. Pt self amb to room with steady gait. All neuro checks intact. Strong/equal bilaterally. Pt reports slurred speech with facial numbness. Pupils equal reactive. Smile symmetrical, tongue midline.

## 2017-10-02 NOTE — H&P
History and Physical    Patient's Name/Date of Birth: Armin Pitts / 1985 (69 y.o.)    Date: October 2, 2017     Chief Complaint: Numbness and tingling    HPI:   48years old female with past medical history of diabetes on metformin, anxiety, hyperlipidemia, presented to the ED because of numbness and tingling. Patient's symptoms started on the left side of her body. However, she told the nursing staff that her symptoms started on the right side of the body. She said she was anxious when her symptoms started and she also felt some heaviness in her chest. She denied any weakness or any other neurological symptoms. Patient mother was in the room and she mentioned that in the family had the mother and the grandmother has history of stroke. Patient never had a history of stroke. Patient had slurred speech in the ER however her symptoms resolved. She was admitted for observation for concern of TIA. Past Medical History:   Diagnosis Date    Asthma     Chronic back pain     Headache     Osteoarthritis        Past Surgical History:   Procedure Laterality Date    CHOLECYSTECTOMY         Prior to Admission medications    Medication Sig Start Date End Date Taking? Authorizing Provider   ibuprofen (ADVIL;MOTRIN) 800 MG tablet TAKE 1 T PO TID WITH FOOD 6/20/17  Yes Historical Provider, MD   metFORMIN (GLUCOPHAGE) 500 MG tablet Take 1 tablet by mouth 2 times daily (with meals) 9/7/17  Yes Leticia Villa PA-C   albuterol (PROVENTIL) (2.5 MG/3ML) 0.083% nebulizer solution Take 3 mLs by nebulization every 6 hours as needed for Wheezing or Shortness of Breath 8/3/17  Yes Leticia Villa PA-C   clonazePAM (KLONOPIN) 0.5 MG tablet Take 1 tablet by mouth 2 times daily as needed for Anxiety 6/6/17  Yes Leticia Villa PA-C   albuterol sulfate HFA (PROAIR HFA) 108 (90 BASE) MCG/ACT inhaler Use every 4 hours while awake for 7-10 days then PRN wheezing  May sub Ventolin or Proventil as needed per [employer].  1/28/17  Yes Hakeem Brewer PA-C   naproxen (NAPROSYN) 500 MG tablet Take 1 tablet by mouth 2 times daily (with meals) As needed 11/30/16  Yes Jesus Manuel Medina Sr., RICHARD   lidocaine-prilocaine (EMLA) 2.5-2.5 % cream Apply half dollar sized amount topically up to twice a day to intact skin as needed for pain. 11/11/16  Yes Charlie Hernandez MD   ibuprofen (ADVIL;MOTRIN) 800 MG tablet Take 0.5 tablets by mouth every 8 hours as needed for Pain 5/18/17 6/6/17  Yusef Rock MD   vitamin D (ERGOCALCIFEROL) 18613 UNITS CAPS capsule Take 1 capsule by mouth once a week for 8 doses 5/18/17 7/7/17  Yusef Rock MD       Allergies   Allergen Reactions    Pcn [Penicillins] Hives       Family History   Problem Relation Age of Onset    Cancer Mother     Arthritis Mother     Diabetes Father     Heart Disease Father     Stroke Father     High Blood Pressure Father        Social History     Social History    Marital status: Single     Spouse name: N/A    Number of children: N/A    Years of education: N/A     Occupational History    Not on file.      Social History Main Topics    Smoking status: Former Smoker     Packs/day: 0.50     Years: 10.00     Types: Cigarettes     Quit date: 1/27/2017    Smokeless tobacco: Never Used    Alcohol use Yes      Comment: socially    Drug use: No    Sexual activity: Yes     Partners: Male     Other Topics Concern    Not on file     Social History Narrative       Review of Systems:   CONSTITUTIONAL:  negative for  fevers, chills, sweats, fatigue, malaise, anorexia and weight loss  EYES:  negative for  contacts, glasses, double vision, blurred vision, dry eyes, blind spots, eye discharge, visual disturbance, irritation, redness, icterus and color blindness  HEENT:  negative for  hearing loss, tinnitus, ear drainage, earaches, nasal congestion, epistaxis, snoring, sore mouth, sore throat, hoarseness and voice change  RESPIRATORY:  negative for  dry cough, cough with sputum, dyspnea, wheezing, hemoptysis, chest pain, pleuritic pain and cyanosis  CARDIOVASCULAR:  negative for  chest pain, dyspnea, palpitations, orthopnea, PND, exertional chest pressure/discomfort, fatigue, early saiety, edema, syncope  GASTROINTESTINAL:  negative for nausea, vomiting, change in bowel habits, diarrhea, constipation, abdominal pain, pruritus, abdominal mass, abdominal distention, jaundice, dysphagia, reflux, regurgitation, odynophagia, hematemesis and hemtochezia  GENITOURINARY:  negative for frequency, dysuria, nocturia, urinary incontinence, hesitancy, decreased stream and hematuria  INTEGUMENT/BREAST:  negative for rash, skin lesion(s), dryness, skin color change, changes in lesion, pruritus, changes in hair, changes in nails, breast lump, nipple discharge and breast tenderness  HEMATOLOGIC/LYMPHATIC:  negative for easy bruising, bleeding, lymphadenopathy, petechiae and swelling/edema  ALLERGIC/IMMUNOLOGIC:  negative for recurrent infections, urticaria, hay fever, angioedema, anaphylaxis and drug reactions  ENDOCRINE:  negative for heat intolerance, cold intolerance, tremor, weight changes, change in bowel habits, hair loss and diabetic symptoms including neither polyuria nor polydipsia nor blurred vision nor foot ulcerations nor polyphagia nor skin dryness nor pruritus nor neuropathy nor nausea nor vomiting nor diarrhea  MUSCULOSKELETAL:  negative for  myalgias, arthralgias, pain, joint swelling, stiff joints, decreased range of motion, muscle weakness and bone pain  NEUROLOGICAL: Tingling and numbness left arm mainly now with some residual in the left leg.   BEHAVIOR/PSYCH:  negative for poor appetite, increased appetite, decreased sleep, increased sleep, decreased energy level, increased energy level, poor concentration, depressed mood, elated mood, fatigue, agitated, increased agitation and anxiety    Physical Exam:  Vitals:    10/01/17 2322 10/02/17 0124 10/02/17 0212   BP: 109/69 119/83 114/66   Pulse: 94 67 73 Resp: 18 17 16   Temp: 97.8 °F (36.6 °C)  97.6 °F (36.4 °C)   TempSrc: Oral  Oral   SpO2: 96% 99%    Weight: 267 lb (121.1 kg)  264 lb 1.8 oz (119.8 kg)   Height:   5' 8\" (1.727 m)       CONSTITUTIONAL:  awake, alert, cooperative, no apparent distress, and appears stated age  EYES:  Lids and lashes normal, pupils equal, round and reactive to light, extra ocular muscles intact, sclera clear, conjunctiva normal  ENT:  Normocephalic, without obvious abnormality, atraumatic, sinuses nontender on palpation, external ears without lesions, oral pharynx with moist mucus membranes, tonsils without erythema or exudates, gums normal and good dentition. NECK:  Supple, symmetrical, trachea midline, no adenopathy, thyroid symmetric, not enlarged and no tenderness, skin normal  HEMATOLOGIC/LYMPHATICS:  no cervical lymphadenopathy and no supraclavicular lymphadenopathy  BACK:  Symmetric, no curvature, spinous processes are non-tender on palpation, paraspinous muscles are non-tender on palpation, no costal vertebral tenderness  LUNGS:  No increased work of breathing, good air exchange, clear to auscultation bilaterally, no crackles or wheezing  CARDIOVASCULAR:  Normal apical impulse, regular rate and rhythm, normal S1 and S2, no S3 or S4, and no murmur noted  ABDOMEN:  No scars, normal bowel sounds, soft, non-distended, non-tender, no masses palpated, no hepatosplenomegally  CHEST: no masses palpated, no axillary or supraclavicular adenopathy  MUSCULOSKELETAL:  There is no redness, warmth, or swelling of the joints. Full range of motion noted. Motor strength is 5 out of 5 all extremities bilaterally. Tone is normal.  NEUROLOGIC:  Awake, alert, oriented to name, place and time. Cranial nerves II-XII are grossly intact. Motor is 5 out of 5 bilaterally. Cerebellar finger to nose, heel to shin intact. Sensory is intact.     SKIN:  no bruising or bleeding, normal skin color, texture, turgor, no redness, warmth, or swelling, no rashes, no lesions, no abnormal moles, nails normal without discoloration or clubbing and no jaundice    Labs:  Recent Results (from the past 24 hour(s))   Brain Natriuretic Peptide    Collection Time: 10/01/17 11:45 PM   Result Value Ref Range    Pro-BNP 13 pg/mL   CBC Auto Differential    Collection Time: 10/01/17 11:45 PM   Result Value Ref Range    WBC 13.5 (H) 4.8 - 10.8 K/uL    RBC 4.63 4.20 - 5.40 M/uL    Hemoglobin 12.9 12.0 - 16.0 g/dL    Hematocrit 40.0 37.0 - 47.0 %    MCV 86.4 82.0 - 100.0 fL    MCH 27.8 27.0 - 31.3 pg    MCHC 32.2 (L) 33.0 - 37.0 %    RDW 15.6 (H) 11.5 - 14.5 %    Platelets 829 655 - 944 K/uL    Neutrophils % 56.0 %    Lymphocytes % 36.4 %    Monocytes % 5.0 %    Eosinophils % 2.3 %    Basophils % 0.3 %    Neutrophils # 7.6 (H) 1.4 - 6.5 K/uL    Lymphocytes # 4.9 (H) 1.0 - 4.8 K/uL    Monocytes # 0.7 0.2 - 0.8 K/uL    Eosinophils # 0.3 0.0 - 0.7 K/uL    Basophils # 0.0 0.0 - 0.2 K/uL   CK    Collection Time: 10/01/17 11:45 PM   Result Value Ref Range    Total  (H) 0 - 170 U/L   Comprehensive Metabolic Panel    Collection Time: 10/01/17 11:45 PM   Result Value Ref Range    Sodium 137 132 - 144 mEq/L    Potassium 4.2 3.5 - 5.1 mEq/L    Chloride 100 98 - 107 mEq/L    CO2 24 22 - 29 mEq/L    Anion Gap 13 7 - 13 mEq/L    Glucose 130 (H) 74 - 109 mg/dL    BUN 9 6 - 20 mg/dL    CREATININE 0.64 0.50 - 0.90 mg/dL    GFR Non-African American >60.0 >60    GFR  >60.0 >60    Calcium 8.9 8.6 - 10.2 mg/dL    Total Protein 6.1 (L) 6.4 - 8.1 g/dL    Alb 3.6 (L) 3.9 - 4.9 g/dL    Total Bilirubin 0.3 0.0 - 1.2 mg/dL    Alkaline Phosphatase 108 40 - 130 U/L    ALT 10 0 - 33 U/L    AST 20 0 - 35 U/L    Globulin 2.5 2.3 - 3.5 g/dL   Ethanol    Collection Time: 10/01/17 11:45 PM   Result Value Ref Range    Ethanol Lvl <10 mg/dL    Ethanol percent Not indicated G/dL   Troponin    Collection Time: 10/01/17 11:45 PM   Result Value Ref Range    Troponin <0.010 0.000 - 0.010 ng/mL   TSH without Reflex    Collection Time: 10/01/17 11:45 PM   Result Value Ref Range    TSH 1.780 0.270 - 4.200 uIU/mL   CKMB & RELATIVE PERCENT    Collection Time: 10/01/17 11:45 PM   Result Value Ref Range    CK-MB 2.8 0.0 - 3.8 ng/mL    CK-MB Index 1.0 0.0 - 3.5 %   EKG 12 Lead    Collection Time: 10/01/17 11:51 PM   Result Value Ref Range    Ventricular Rate 83 BPM    Atrial Rate 83 BPM    P-R Interval 150 ms    QRS Duration 80 ms    Q-T Interval 370 ms    QTc Calculation (Bazett) 434 ms    P Axis 32 degrees    R Axis 10 degrees    T Axis -1 degrees   Urine Drug Screen    Collection Time: 10/01/17 11:53 PM   Result Value Ref Range    Amphetamine Screen, Urine Neg Negative <1000 ng/mL    Barbiturate Screen, Ur Neg Negative < 200 ng/mL    Benzodiazepine Screen, Urine Neg Negative < 200 ng/mL    Cannabinoid Scrn, Ur Neg Negative < 50 ng/mL    COCAINE METABOLITE SCREEN URINE Neg Negative < 300 ng/mL    Opiate Scrn, Ur Neg Negative < 300 ng/mL    PCP Scrn, Ur Neg Negative < 25 ng/mL    Drug Screen Comment: see below    Urinalysis Reflex to Culture    Collection Time: 10/01/17 11:53 PM   Result Value Ref Range    Color, UA Yellow Straw/Yellow    Clarity, UA Clear Clear    Glucose, Ur Negative Negative mg/dL    Bilirubin Urine Negative Negative    Ketones, Urine Negative Negative mg/dL    Specific Gravity, UA 1.019 1.005 - 1.030    Blood, Urine Negative Negative    pH, UA 5.5 5.0 - 9.0    Protein, UA Negative Negative mg/dL    Urobilinogen, Urine 0.2 <2.0 E.U./dL    Nitrite, Urine Negative Negative    Leukocyte Esterase, Urine Negative Negative    Urine Reflex to Culture Not Indicated    POCT urine pregnancy    Collection Time: 10/01/17 11:53 PM   Result Value Ref Range    Preg Test, Ur negative     QC OK? yes      Radiology:  CT Head WO Contrast    (Results Pending)   XR Chest Portable    (Results Pending)       Assessment/Plan:  1.  Tingling and numbness   CT scan negative   Symptoms questionable given changes in the story

## 2017-10-02 NOTE — CONSULTS
[facility] [address]                     [address], [address] [facility]                                 CONSULTATION    PATIENT NAME: Ayan Rosas                    :             1985  MED REC NO:   [id number]                           ROOM:           S943  ACCOUNT NO:   [de-identified]                          ADMISSION DATE:  10/01/2017  PROVIDER:     Imelda Delacruz MD      DATE OF CONSULTATION:  10/02/2017    ATTENDING PROVIDER:  Dr. Yue De Los Santos. HISTORY OF PRESENT ILLNESS:  This is a 80-year-old right-handed  female, admitted with a history of feeling weird. She was not exactly  able to describe what specifically the feeling was, but felt tingling  all over. She had intermittent slurred speech throughout the day. She was anxious, took Klonopin that made her relax. She was  lightheaded. There were no chills or rigors. She does complain of  back pain. She is supposed to have surgical intervention done, she is  not quite sure regarding the same. She did not have any shortness of  breath. No chest pain, vomiting, diarrhea, recent fever, falls,  injuries, trauma, bleeding, bruising, choking, drooling. The patient  had alcohol use prior to this, but did not have hangover. She has not  had any recent head injury or trauma. PAST MEDICAL HISTORY:  Notable for asthma, chronic back pain,  headache, osteoarthritis. PAST SURGICAL HISTORY:  She has history of cholecystectomy. MEDICATIONS:  Albuterol nebulizer, clonazepam, likely representing  that she has anxiety. She is on metformin, Naprosyn. PHYSICAL EXAMINATION:  GENERAL:  She is in no acute distress. General examination reveals no  skin lesions or skin carlos. There is no pedal edema. There is no  cyanosis or clubbing. NECK:  Supple. There are no meningeal signs. CARDIOVASCULAR SYSTEM:  S1 and S2 are normal.  No murmurs appreciated. No carotid bruits. RESPIRATORY SYSTEM:  Clear to auscultation.   CNS:  She is awake and alert, and oriented to self, place, month, and  year. Pupils are equal and reactive. Eye movements are conjugate. There is no facial asymmetry. Speech is normal.  Tongue is midline. Palate moves symmetrically. She is hard of hearing. Examination of  extremities, no pronator drift. Fine finger movements are  symmetrical.  Strength is 5/5. Reflexes are 2+. Gait and stance are  normal.    LABORATORY DATA:  Initial CT scan did not reveal anything significant. Her laboratory examination is therefore reviewed. Her comprehensive  metabolic profile, sodium 005, potassium 4.2, BUN of 8, creatinine  0.70. Her WBC count 11.6, hemoglobin 12.0, hematocrit 37.5, platelets  of 621,188. Urine drug screen is negative. Thyroid function tests  are normal.    IMPRESSION:  Paresthesias of unclear etiology. This may represent  hyperventilation anxiety syndrome causing paresthesias. She feels  weird but was not able to explain more than this. She continues to  have chronic low back pain. She has moderate spinal stenosis. She is  followed by Dr. Louisa Armijo of Pain Management. PLAN:  Given her findings and her age, we will arrange for an MRI of  the brain to rule out demyelination. We will arrange for an EEG as  well. Other laboratory tests will be done. She takes Klonopin for  anxiety, but anxiety was not listed as a diagnosis. We will follow her with you. Thank you Dr. Jalyn Katz for asking us to participate in the care of the  patient.         Liza Nair MD    D: 10/02/2017 12:42:05       T: 10/02/2017 14:12:09     [email or web address]  Job#: [id number]     Doc#: [id number]

## 2017-10-02 NOTE — PLAN OF CARE
Problem: NEUROLOGICAL DEFICIT  Goal: Neurological status is stable or improving  Outcome: Ongoing    Problem: HEMODYNAMIC STATUS  Goal: Patient has stable vital signs and fluid balance  Outcome: Ongoing    Problem: ACTIVITY INTOLERANCE/IMPAIRED MOBILITY  Goal: Mobility/activity is maintained at optimum level for patient  Outcome: Ongoing    Problem: COMMUNICATION IMPAIRMENT  Goal: Ability to express needs and understand communication  Outcome: Ongoing    Problem: NUTRITION  Goal: Nutritional status is improving  Outcome: Ongoing    Problem: ASPIRATION PRECAUTIONS  Goal: Patients risk of aspiration is minimized  Outcome: Ongoing

## 2017-10-02 NOTE — PROGRESS NOTES
WFL    Strength:  Strength RLE  Comment: 4-/5 strength hip, knee, and ankle  Strength LLE  Comment: 4-/5 strength hip, knee, and ankle    Neuro:  Balance  Sitting - Static: Good  Sitting - Dynamic: Good  Standing - Static: Good  Standing - Dynamic: Good;-        Sensation  Overall Sensation Status: WNL    Bed mobility  Supine to Sit: Independent  Sit to Supine: Independent    Transfers  Sit to Stand: Supervision  Stand to sit: Supervision    Ambulation  Ambulation?: Yes  Ambulation 1  Surface: level tile  Device: No Device  Assistance: Supervision  Quality of Gait: decreased bilateral step length, decreased pelvic rotation during gait  Distance: 50 ft         ASSESSMENT:   Body structures, Functions, Activity limitations: Decreased balance;Decreased strength;Decreased endurance  Decision Making: Low Complexity  History: asthma, back pain, OA  Exam: see above (low)  Clinical Presentation: stable    Prognosis: Excellent  Patient Education: goals of PT    DISCHARGE RECOMMENDATIONS:  Discharge Recommendations: Continue to assess pending progress    Assessment: Further inpatient PT recommended for 1-2 visit to keep working on balance and ambulation for return to prior level of function. REQUIRES PT FOLLOW UP: Yes      PLAN OF CARE:  Plan  Times per week: 1-2 visits  Current Treatment Recommendations: Strengthening, Balance Training, Gait Training, Stair training, Neuromuscular Re-education, Home Exercise Program, Safety Education & Training, Patient/Caregiver Education & Training, Equipment Evaluation, Education, & procurement  Safety Devices  Type of devices: All fall risk precautions in place, Call light within reach    G-Code:  PT G-Codes  Functional Assessment Tool Used: clinical judgement  Functional Limitation: Mobility: Walking and moving around  Mobility: Walking and Moving Around Current Status ():  At least 1 percent but less than 20 percent impaired, limited or restricted  Mobility: Walking and Moving

## 2017-10-02 NOTE — PLAN OF CARE
Problem: IP BALANCE  Goal: LTG - Patient will maintain balance to allow for safe/functional mobility  Outcome: Ongoing  Therapy evaluation completed. Please see daily notes and/or progress notes for details related to planned treatment interventions, goals and functional abilities.

## 2017-10-02 NOTE — PLAN OF CARE
Problem: IP DRESSINGS LOWER EXTREMITIES  Goal: LTG - patient will dress lower body with or without assistive device  Outcome: Ongoing  See OT evaluation

## 2017-10-02 NOTE — ED PROVIDER NOTES
Negative for dizziness, syncope, weakness and headaches. PAST MEDICAL HISTORY     Past Medical History:   Diagnosis Date    Asthma     Chronic back pain     Headache     Osteoarthritis          SURGICAL HISTORY       Past Surgical History:   Procedure Laterality Date    CHOLECYSTECTOMY           CURRENT MEDICATIONS       Previous Medications    ALBUTEROL (PROVENTIL) (2.5 MG/3ML) 0.083% NEBULIZER SOLUTION    Take 3 mLs by nebulization every 6 hours as needed for Wheezing or Shortness of Breath    ALBUTEROL SULFATE HFA (PROAIR HFA) 108 (90 BASE) MCG/ACT INHALER    Use every 4 hours while awake for 7-10 days then PRN wheezing  May sub Ventolin or Proventil as needed per [employer]. CLONAZEPAM (KLONOPIN) 0.5 MG TABLET    Take 1 tablet by mouth 2 times daily as needed for Anxiety    IBUPROFEN (ADVIL;MOTRIN) 800 MG TABLET    Take 0.5 tablets by mouth every 8 hours as needed for Pain    IBUPROFEN (ADVIL;MOTRIN) 800 MG TABLET    TAKE 1 T PO TID WITH FOOD    LIDOCAINE-PRILOCAINE (EMLA) 2.5-2.5 % CREAM    Apply half dollar sized amount topically up to twice a day to intact skin as needed for pain.     METFORMIN (GLUCOPHAGE) 500 MG TABLET    Take 1 tablet by mouth 2 times daily (with meals)    NAPROXEN (NAPROSYN) 500 MG TABLET    Take 1 tablet by mouth 2 times daily (with meals) As needed    VITAMIN D (ERGOCALCIFEROL) 74939 UNITS CAPS CAPSULE    Take 1 capsule by mouth once a week for 8 doses       ALLERGIES     Pcn [penicillins]    FAMILY HISTORY       Family History   Problem Relation Age of Onset    Cancer Mother     Arthritis Mother     Diabetes Father     Heart Disease Father     Stroke Father     High Blood Pressure Father           SOCIAL HISTORY       Social History     Social History    Marital status: Single     Spouse name: N/A    Number of children: N/A    Years of education: N/A     Social History Main Topics    Smoking status: Former Smoker     Packs/day: 0.50     Years: 10.00

## 2017-10-02 NOTE — PROGRESS NOTES
103   CO2  24  23   BUN  9  8   CREATININE  0.64  0.70   CALCIUM  8.9  8.6     Recent Labs      10/01/17   2345  10/02/17   0652   AST  20  18   ALT  10  9   BILITOT  0.3  0.2   ALKPHOS  108  95     No results for input(s): INR in the last 72 hours. Recent Labs      10/01/17   2345   CKTOTAL  288*   TROPONINI  <0.010       Urinalysis:      Lab Results   Component Value Date    NITRU Negative 10/01/2017    WBCUA 3-5 01/07/2016    BACTERIA Few 01/07/2016    RBCUA 0-2 01/07/2016    BLOODU Negative 10/01/2017    SPECGRAV 1.019 10/01/2017    GLUCOSEU Negative 10/01/2017    GLUCOSEU NEG 09/17/2011       Radiology:  XR Chest Portable   Final Result   NO ACUTE ACTIVE CARDIOPULMONARY PROCESS      CT Head WO Contrast   Final Result      MRI BRAIN W WO CONTRAST    (Results Pending)           Assessment/Plan:    #1 episode of tingling and the numbing sensation involving her face, trunk, upper and lower extremities indeterminate etiology. #2 diabetes mellitus. #3 obesity. Plan continue observation. MRI of the brain with and without contrast to rule out any vascular accident or demyelination. Any sliding scale coverage. Aspirin 325 mg daily orally. Check lipid profile. Further workup and the plan will be determined based on the clinical progression and a follow-up test result. Additional work up or/and treatment plan may be added today or then after based on clinical progression. I am managing a portion of pt care. Some medical issues are handled by other specialists. Additional work up and treatment should be done in out pt setting by pt PCP and other out pt providers. In addition to examining and evaluating pt, I spent additional time explaining care, normal and abnormal findings, and treatment plan. All of pt questions were answered. Counseling, diet and education were  provided. Case will be discussed with nursing staff when appropriate. Family will be updated if and when appropriate.       Diet: DIET CARB CONTROL;    Code Status: Full Code    PT/OT Eval           Electronically signed by Aura Gleason MD on 10/2/2017 at 10:59 AM

## 2017-10-02 NOTE — PROGRESS NOTES
[facility] Occupational Therapy      Date: 10/2/2017  Patient Name: Ramirez Bean        MRN: [id number]  Account: [de-identified]   : 1985  (28 y.o.)  Room: [place] [address]    Attempted OT tx at 2:09 PM, but pt. unavailable 2° to:    [] Hold per nsg request    [] Pt declined     [x] Pt. . off floor for test/procedure. MRI  Will attempt again when able.     Electronically signed by GABY Monatnez on 10/2/2017 at 2:09 PM

## 2017-10-03 LAB
ANION GAP SERPL CALCULATED.3IONS-SCNC: 15 MEQ/L (ref 7–13)
BASOPHILS ABSOLUTE: 0 K/UL (ref 0–0.2)
BASOPHILS RELATIVE PERCENT: 0.3 %
BUN BLDV-MCNC: 10 MG/DL (ref 6–20)
CALCIUM SERPL-MCNC: 8.7 MG/DL (ref 8.6–10.2)
CHLORIDE BLD-SCNC: 105 MEQ/L (ref 98–107)
CO2: 21 MEQ/L (ref 22–29)
CREAT SERPL-MCNC: 0.73 MG/DL (ref 0.5–0.9)
EOSINOPHILS ABSOLUTE: 0.3 K/UL (ref 0–0.7)
EOSINOPHILS RELATIVE PERCENT: 2.6 %
GFR AFRICAN AMERICAN: >60
GFR NON-AFRICAN AMERICAN: >60
GLUCOSE BLD-MCNC: 103 MG/DL (ref 60–115)
GLUCOSE BLD-MCNC: 104 MG/DL (ref 60–115)
GLUCOSE BLD-MCNC: 106 MG/DL (ref 74–109)
GLUCOSE BLD-MCNC: 109 MG/DL (ref 60–115)
GLUCOSE BLD-MCNC: 119 MG/DL (ref 60–115)
HCT VFR BLD CALC: 38 % (ref 37–47)
HEMOGLOBIN: 12.4 G/DL (ref 12–16)
LYMPHOCYTES ABSOLUTE: 3.1 K/UL (ref 1–4.8)
LYMPHOCYTES RELATIVE PERCENT: 26.7 %
MCH RBC QN AUTO: 28.2 PG (ref 27–31.3)
MCHC RBC AUTO-ENTMCNC: 32.6 % (ref 33–37)
MCV RBC AUTO: 86.5 FL (ref 82–100)
MONOCYTES ABSOLUTE: 0.6 K/UL (ref 0.2–0.8)
MONOCYTES RELATIVE PERCENT: 5.2 %
NEUTROPHILS ABSOLUTE: 7.6 K/UL (ref 1.4–6.5)
NEUTROPHILS RELATIVE PERCENT: 65.2 %
PDW BLD-RTO: 15.7 % (ref 11.5–14.5)
PERFORMED ON: ABNORMAL
PERFORMED ON: NORMAL
PLATELET # BLD: 257 K/UL (ref 130–400)
POTASSIUM SERPL-SCNC: 4.6 MEQ/L (ref 3.5–5.1)
RBC # BLD: 4.39 M/UL (ref 4.2–5.4)
SODIUM BLD-SCNC: 141 MEQ/L (ref 132–144)
WBC # BLD: 11.6 K/UL (ref 4.8–10.8)

## 2017-10-03 PROCEDURE — 96372 THER/PROPH/DIAG INJ SC/IM: CPT

## 2017-10-03 PROCEDURE — 2580000003 HC RX 258: Performed by: INTERNAL MEDICINE

## 2017-10-03 PROCEDURE — 1210000000 HC MED SURG R&B

## 2017-10-03 PROCEDURE — 6370000000 HC RX 637 (ALT 250 FOR IP): Performed by: INTERNAL MEDICINE

## 2017-10-03 PROCEDURE — 97116 GAIT TRAINING THERAPY: CPT

## 2017-10-03 PROCEDURE — 6360000002 HC RX W HCPCS: Performed by: INTERNAL MEDICINE

## 2017-10-03 PROCEDURE — 85025 COMPLETE CBC W/AUTO DIFF WBC: CPT

## 2017-10-03 PROCEDURE — 36415 COLL VENOUS BLD VENIPUNCTURE: CPT

## 2017-10-03 PROCEDURE — 80048 BASIC METABOLIC PNL TOTAL CA: CPT

## 2017-10-03 RX ORDER — ASPIRIN 81 MG/1
81 TABLET ORAL DAILY
Status: DISCONTINUED | OUTPATIENT
Start: 2017-10-04 | End: 2017-10-04 | Stop reason: HOSPADM

## 2017-10-03 RX ADMIN — CLONAZEPAM 0.5 MG: 0.5 TABLET ORAL at 14:51

## 2017-10-03 RX ADMIN — FAMOTIDINE 20 MG: 20 TABLET ORAL at 21:07

## 2017-10-03 RX ADMIN — Medication 10 ML: at 21:00

## 2017-10-03 RX ADMIN — ASPIRIN 325 MG: 325 TABLET, DELAYED RELEASE ORAL at 10:53

## 2017-10-03 RX ADMIN — ENOXAPARIN SODIUM 40 MG: 40 INJECTION SUBCUTANEOUS at 10:53

## 2017-10-03 ASSESSMENT — PAIN SCALES - GENERAL
PAINLEVEL_OUTOF10: 2
PAINLEVEL_OUTOF10: 0

## 2017-10-03 ASSESSMENT — PAIN DESCRIPTION - DESCRIPTORS: DESCRIPTORS: TIGHTNESS

## 2017-10-03 ASSESSMENT — PAIN DESCRIPTION - ORIENTATION: ORIENTATION: RIGHT

## 2017-10-03 ASSESSMENT — PAIN DESCRIPTION - LOCATION: LOCATION: FACE

## 2017-10-03 NOTE — PROGRESS NOTES
Neurology Follow up    SUBJECTIVE:  NO Headache, double vision,blurry vision,difficulty with speech,difficulty with swallowing,weakness,pain,nausea,vomitting,chocking,neck pain,dizziness,  fels better but still some numbness,    PHYSICAL EXAM:    BP 99/68  Pulse 73  Temp 97.7 °F (36.5 °C) (Oral)   Resp 16  Ht 5' 8\" (1.727 m)  Wt 264 lb 1.8 oz (119.8 kg)  LMP 06/30/2017 (Approximate)  SpO2 98%  BMI 40.16 kg/m2  General Appearance:      Mental Status Exam:             Level of Alertness:   awake            Orientation:   person, place, time            Memory:   normal                    Attention/Concentration:  normal            Language:  normal      Funduscopic Exam:     Cranial Nerves        Cranial nerve II           Visual acuity:  normal           Visual fields:  normal      Cranial nerve III           Pupils:  equal, round, reactive to light      Cranial nerves III, IV, VI           Extraocular Movements: intact      Cranial nerve V           Facial sensation:  intact      Cranial nerve VII           Facial strength: intact      Cranial nerve VIII           Hearing:  intact      Cranial nerve IX           Palate:  intact      Cranial nerve XI         Shoulder shrug:  intact      Cranial nerve XII          Tongue movement:  normal    Motor:    Drift:  absent  Motor exam is symmetrical 5 out of 5 all extremities bilaterally  Tone:  normal  Abnormal Movements:  absent            Sensory:        Pinprick             Right Upper Extremity:  normal             Left Upper Extremity:  normal             Right Lower Extremity:  normal             Left Lower Extremity:  normal           Vibration                         Touch            Proprioception                 Coordination:           Finger/Nose   Right:  normal              Left:  normal          Heel-Knee-Shin                Right:  normal              Left:  normal          Rapid Alternating Movements              Right:  normal              Left: normal          Gait:                       Casual:  normal                         Romberg:  normal            Reflexes:             Deep Tendon Reflexes:             Reflexes are 2 +             Plantar response:                Right:  downgoing               Left:  downgoing    Vascular:  Cardiac Exam:  normal         Ct Head Wo Contrast    Result Date: 10/2/2017  CT HEAD WO CONTRAST CLINICAL HISTORY:   bleed COMPARISON:  JANUARY 7, 2016 TECHNIQUE: Multiple unenhanced serial axial images of the brain from the vertex of the skull to the base of the skull were performed. FINDINGS:  The ventricles are of normal size and configuration. No mass or midline shift. The cisterns are unremarkable. No acute intra-axial or extra-axial findings. The visualized osseous structures are unremarkable. The visualized paranasal sinuses and mastoids are unremarkable. IMPRESSION NO ACUTE INTRA-AXIAL OR EXTRA AXIAL FINDINGS. All CT scans at this facility use dose modulation, iterative reconstruction, and/or weight based dosing when appropriate to reduce radiation dose to as low as reasonably achievable. Xr Chest Portable    Result Date: 10/2/2017  EXAMINATION: CHEST PORTABLE VIEW  CLINICAL HISTORY: Facial numbness COMPARISONS: [facility] 20/8/2017  FINDINGS: Single  views of the chest is submitted. The cardiac silhouette is of normal size configuration. The mediastinum is unremarkable. Pulmonary vascular unremarkable. Right sided trachea. No focal infiltrates. No Pneumothoraces.                                                                                    NO ACUTE ACTIVE CARDIOPULMONARY PROCESS      Recent Labs      10/01/17   2345  10/02/17   0652  10/03/17   0627   WBC  13.5*  11.6*  11.6*   HGB  12.9  12.0  12.4   PLT  274  251  257     Recent Labs      10/01/17   2345  10/02/17   0652  10/03/17   0627   NA  137  144  141   K  4.2  4.2  4.6   CL  100  103  105   CO2  24  23  21*   BUN  9  8  10   CREATININE  0.64  0.70 0. 73   GLUCOSE  130*  131*  106     Recent Labs      10/01/17   2345  10/02/17   0652   BILITOT  0.3  0.2   ALKPHOS  108  95   AST  20  18   ALT  10  9     Lab Results   Component Value Date    PROTIME 11.5 01/07/2016    INR 1.1 01/07/2016     No results found for: LITHIUM, DILFRTOT, VALPROATE    ASSESSMENT AND PLAN  Paresthesia,   Non specific  MRI for demyelination pending  If negative , ok d/c  Lumbar stenosis, seen neurosurgery  Labs normal

## 2017-10-03 NOTE — PROGRESS NOTES
The patient is doing much better. Her scattered tingling and numbing sensation involving her face, trunk and the extremities have significantly subsided. She denies any focal weakness. She denies any chest pain, palpitation, abdominal pain, nausea or vomiting. No reported this seizure or loss of consciousness. Review of system: A 12 system review is negative for acute signs or symptoms otherwise THE LAST 24 HOURS. Exam  Patient is awake and oriented in no apparent distress. She is morbidly obese. She is calmer today than yesterday. HEENT: Pupils equal, round, and reactive to light. Conjunctivae/corneas clear. Neck: Supple, with full range of motion. No jugular venous distention. Trachea midline. Respiratory:  Normal respiratory effort. Clear to auscultation, bilaterally without Rales/Wheezes/Rhonchi. Cardiovascular: Regular rate and rhythm with normal S1/S2 without murmurs, rubs or gallops. Abdomen: Soft, non-tender, non-distended with normal bowel sounds. Musculoskeletal: No clubbing, cyanosis or edema bilaterally. Full range of motion without deformity. Skin: Skin color, texture, turgor normal.  No rashes or lesions. Neuro: Non Focal. Symetrical motor and tone. Nl Comprehension, Alert,awake and oriented. NL CN. Symetrical tone and reflexes. Indeterminate but there are scattered sensory loss to palpation throughout her face, trunk and extremities. Psychiatric: Alert and oriented, thought content appropriate, normal insight    #1 episode of tingling and the numbing sensation involving her face, trunk, upper and lower extremities indeterminate etiology. MRI of the head that showed the scattered area of the demyelination suggestive of multiple sclerosis.     #2 diabetes mellitus. Fair control, continue metformin.     #3 obesity. Diet and lifestyle modification including but not limited to exercise. I had discussed her case with the neurologist Dr. Екатерина Neumann.   He recommended for patient to stay another 24 hours. He will again evaluated the patient tomorrow morning and provide her with additional advice and recommendations as far as workup and treatment options.   Subsequently the patient will be discharged tomorrow if cleared by Dr. Jerzy Price.

## 2017-10-03 NOTE — PROGRESS NOTES
Physical Therapy Med Surg Daily Treatment Note  Facility/Department: [facility]  Room: [place] [id number]       NAME: Froilan Delgado  : 1985 (28 y.o.)  MRN: [id number]  CODE STATUS: Full Code    Date of Service: 10/3/2017    Patient Diagnosis(es): Numbness [R20.0]   Chief Complaint   Patient presents with    Numbness     Patient Active Problem List    Diagnosis Date Noted    Lumbar canal stenosis 2017    Transitional vertebra 2017    Claustrophobia 2017    Spondylosis of lumbar region without myelopathy or radiculopathy 2017    Osteoarthritis 10/18/2015    Obesity 10/18/2015        Past Medical History:   Diagnosis Date    Asthma     Chronic back pain     Headache     Osteoarthritis      Past Surgical History:   Procedure Laterality Date    CHOLECYSTECTOMY           Restrictions  Restrictions/Precautions: General Precautions    Subjective   General  Chart Reviewed: Yes  Family / Caregiver Present: Yes  Pre Treatment Pain Screening  Pain at present: 0  Scale Used: Numeric Score  Intervention List: Patient able to continue with treatment    Pain Screening  Patient Currently in Pain: Yes     Pain Reassessment:   Pain Assessment  Pain Assessment: 0-10  Pain Level:  (no pain just)  Pain Location: Face  Pain Orientation: Right  Pain Descriptors: Tightness       Orientation  Orientation  Overall Orientation Status: Within Normal Limits    Objective   Bed mobility  Supine to Sit: Independent  Sit to Supine: Independent    Transfers  Sit to Stand: Independent  Stand to sit:  Independent    Ambulation  Ambulation?: Yes  Ambulation 1  Surface: level tile  Device: No Device  Assistance: Supervision  Quality of Gait: reciprocal flat steps, decreased pelvic rotation, no LOB  Distance: 200'  Comments: no LOB  Stairs/Curb  Stairs?: No       Balance  Standing - Static: Fair;Good  Standing - Dynamic: Good  Comments: pt able to reach outside of BALTAZAR in standing with no LOB

## 2017-10-04 VITALS
RESPIRATION RATE: 16 BRPM | HEIGHT: 68 IN | DIASTOLIC BLOOD PRESSURE: 71 MMHG | TEMPERATURE: 97.7 F | WEIGHT: 264.11 LBS | SYSTOLIC BLOOD PRESSURE: 119 MMHG | BODY MASS INDEX: 40.03 KG/M2 | OXYGEN SATURATION: 93 % | HEART RATE: 88 BPM

## 2017-10-04 PROCEDURE — 6370000000 HC RX 637 (ALT 250 FOR IP): Performed by: INTERNAL MEDICINE

## 2017-10-04 RX ORDER — ASPIRIN 81 MG/1
81 TABLET ORAL DAILY
Qty: 30 TABLET | Refills: 3 | Status: SHIPPED | OUTPATIENT
Start: 2017-10-04 | End: 2017-11-06 | Stop reason: SDUPTHER

## 2017-10-04 RX ORDER — NAPROXEN 500 MG/1
500 TABLET ORAL 2 TIMES DAILY PRN
Refills: 0 | COMMUNITY
Start: 2017-10-04 | End: 2017-10-19 | Stop reason: ALTCHOICE

## 2017-10-04 RX ADMIN — FAMOTIDINE 20 MG: 20 TABLET ORAL at 10:56

## 2017-10-04 RX ADMIN — ASPIRIN 81 MG: 81 TABLET, COATED ORAL at 10:56

## 2017-10-04 RX ADMIN — METFORMIN HYDROCHLORIDE 500 MG: 500 TABLET ORAL at 10:56

## 2017-10-04 NOTE — PROGRESS NOTES
Pt A&Ox4. Pt denies pain, sob or N&V. Pt states that she does feel tightness in face but not painful. Pt denies any needs. Friend at bedside. Will continue to monitor.  Electronically signed by Ahmet Baumann RN on 10/4/2017 at 12:14 PM

## 2017-10-04 NOTE — PROGRESS NOTES
normal          Gait:                       Casual:  normal                         Romberg:  normal            Reflexes:             Deep Tendon Reflexes:             Reflexes are 2 +             Plantar response:                Right:  downgoing               Left:  downgoing    Vascular:  Cardiac Exam:  normal         Ct Head Wo Contrast    Result Date: 10/2/2017  CT HEAD WO CONTRAST CLINICAL HISTORY:   bleed COMPARISON:  JANUARY 7, 2016 TECHNIQUE: Multiple unenhanced serial axial images of the brain from the vertex of the skull to the base of the skull were performed. FINDINGS:  The ventricles are of normal size and configuration. No mass or midline shift. The cisterns are unremarkable. No acute intra-axial or extra-axial findings. The visualized osseous structures are unremarkable. The visualized paranasal sinuses and mastoids are unremarkable. IMPRESSION NO ACUTE INTRA-AXIAL OR EXTRA AXIAL FINDINGS. All CT scans at this facility use dose modulation, iterative reconstruction, and/or weight based dosing when appropriate to reduce radiation dose to as low as reasonably achievable. Xr Chest Portable    Result Date: 10/2/2017  EXAMINATION: CHEST PORTABLE VIEW  CLINICAL HISTORY: Facial numbness COMPARISONS: [facility] 20/8/2017  FINDINGS: Single  views of the chest is submitted. The cardiac silhouette is of normal size configuration. The mediastinum is unremarkable. Pulmonary vascular unremarkable. Right sided trachea. No focal infiltrates. No Pneumothoraces.                                                                                    NO ACUTE ACTIVE CARDIOPULMONARY PROCESS      Recent Labs      10/01/17   2345  10/02/17   0652  10/03/17   0627   WBC  13.5*  11.6*  11.6*   HGB  12.9  12.0  12.4   PLT  274  251  257     Recent Labs      10/01/17   2345  10/02/17   0652  10/03/17   0627   NA  137  144  141   K  4.2  4.2  4.6   CL  100  103  105   CO2  24  23  21*   BUN  9  8  10   CREATININE  0.64  0.70 0. 73   GLUCOSE  130*  131*  106     Recent Labs      10/01/17   2345  10/02/17   0652   BILITOT  0.3  0.2   ALKPHOS  108  95   AST  20  18   ALT  10  9     Lab Results   Component Value Date    PROTIME 11.5 01/07/2016    INR 1.1 01/07/2016     No results found for: LITHIUM, DILFRTOT, VALPROATE    ASSESSMENT AND PLAN  Paresthesia,   Non specific  MRI  Shows evidence c/w MS  ILumbar stenosis, seen neurosurgery  Labs normal  Discussed with Pt In details  and will f/u OP for trreatments  Has leonardo November 6th 10.45

## 2017-10-04 NOTE — DISCHARGE SUMMARY
Follow up with Dr. Sonia Sinha PA-C in the next 7 days or sooner if needed. Please return to ER or call 911 if you develop any significant signs or symptoms. I may not have addressed all of your medical illnesses or the abnormal blood work or imaging therefore please ask your PCP to obtain [facility] record to follow up on all of the abnormal labs, imaging and findings that I have and have not addressed during your hospitalization. Discharging you from the hospital does not mean that your medical care ends here and now. You may still need additional work up, investigation, monitoring, and treatment to be handled from this point on by outside providers including your PCP, Specialists and other healthcare providers. For medication questions, contact your retail pharmacy and your PCP. Your medical team at [facility] ([facility]) appreciates the opportunity to work with you to get well! Montse Singh MD   [facility] Discharge Summary    Jayleen Gauthier  :  1985  MRN:  [id number]    Admit date:  10/1/2017  Discharge date:  10/4/2017    Admitting Physician: Tigist Jordan MD  Primary Care Physician:  Sonia Sinha PA-C      Discharge Diagnoses: 1.MS suspected. 2.  Diabetes mellitus type 2.  3.  Morbid obesity. Hospital Course: Jayleen Gauthier is a 28 y.o. female that was admitted and treated at [facility] for the following medical issues: Patient came in with the numbing sensation involving her face, trunk and extremities. Her symptoms resolved. The patient had an MRI which showed evidence of the probable demyelination. Patient was seen by Dr. Ruba Santos who feels that the patient does have radiological evidence of multiple sclerosis. He recommended the patient to be discharged home and follow-up the with him in the office to discuss treatment option and initiate that. Today the patient is essentially asymptomatic. Vital signs are stable. She is in no distress.   She is very anxious to go home. Her chest is clear and her heart is regular. Abdomen is soft and benign. Patient was seen by the following consultants while admitted to [facility]:   Consults:  Nikole Arrieta HOSPITALIST  IP CONSULT TO [address] TO NEUROLOGY    Significant Diagnostic Studies:    Ct Head Wo Contrast    Result Date: 10/2/2017  CT HEAD WO CONTRAST CLINICAL HISTORY:   bleed COMPARISON:  JANUARY 7, 2016 TECHNIQUE: Multiple unenhanced serial axial images of the brain from the vertex of the skull to the base of the skull were performed. FINDINGS:  The ventricles are of normal size and configuration. No mass or midline shift. The cisterns are unremarkable. No acute intra-axial or extra-axial findings. The visualized osseous structures are unremarkable. The visualized paranasal sinuses and mastoids are unremarkable. IMPRESSION NO ACUTE INTRA-AXIAL OR EXTRA AXIAL FINDINGS. All CT scans at this facility use dose modulation, iterative reconstruction, and/or weight based dosing when appropriate to reduce radiation dose to as low as reasonably achievable. Xr Chest Portable    Result Date: 10/2/2017  EXAMINATION: CHEST PORTABLE VIEW  CLINICAL HISTORY: Facial numbness COMPARISONS: [facility] 20/8/2017  FINDINGS: Single  views of the chest is submitted. The cardiac silhouette is of normal size configuration. The mediastinum is unremarkable. Pulmonary vascular unremarkable. Right sided trachea. No focal infiltrates. No Pneumothoraces. NO ACUTE ACTIVE CARDIOPULMONARY PROCESS    Mri Brain W Wo Contrast    Result Date: 10/3/2017  HEAD MRI: 10/2/2017 CLINICAL HISTORY: Lightheadedness, whole body tingling, blurred vision, slurred speech, right facial numbness, left lower and upper extremity numbness and tightness. COMPARISON: Noncontrast head CT from earlier 10/2/2017.  TECHNIQUE: Multiplanar MR imaging of the head was performed before and after intravenous administration of approximately 24 mL of ProHance gadolinium contrast. FINDINGS: Several small periventricular and subcortical supratentorial white matter changes are present, as well as minimal patchy change in the patrick. A small white matter lesion in the posterior left frontal lobe extends into the corpus callosum, which is suggestive of multiple sclerosis. No discrete lesions are identified within the cerebral peduncles or visualized superior cervical spinal cord. There is no abnormal enhancement identified. There is no infarct, intracranial hemorrhage, mass effect, midline shift, extra-axial collection, or hydrocephalus. SEVERAL SMALL NONSPECIFIC WHITE MATTER LESIONS WITH INVOLVEMENT OF THE CORPUS CALLOSUM IN THE DEEP LEFT FRONTAL LOBE SUGGESTING MULTIPLE SCLEROSIS. NO ABNORMAL ENHANCEMENT, OR OTHER ACUTE INTRACRANIAL PROCESS IDENTIFIED. Discharge Medications:       [facility]   Home Medication Instructions ATX:[id number]    Printed on:10/04/17 1027   Medication Information                      albuterol (PROVENTIL) (2.5 MG/3ML) 0.083% nebulizer solution  Take 3 mLs by nebulization every 6 hours as needed for Wheezing or Shortness of Breath             albuterol sulfate HFA (PROAIR HFA) 108 (90 BASE) MCG/ACT inhaler  Use every 4 hours while awake for 7-10 days then PRN wheezing  May sub Ventolin or Proventil as needed per [employer]. clonazePAM (KLONOPIN) 0.5 MG tablet  Take 1 tablet by mouth 2 times daily as needed for Anxiety             ibuprofen (ADVIL;MOTRIN) 800 MG tablet  Take 0.5 tablets by mouth every 8 hours as needed for Pain             ibuprofen (ADVIL;MOTRIN) 800 MG tablet  TAKE 1 T PO TID WITH FOOD             lidocaine-prilocaine (EMLA) 2.5-2.5 % cream  Apply half dollar sized amount topically up to twice a day to intact skin as needed for pain.              metFORMIN (GLUCOPHAGE) 500 MG tablet  Take 1 tablet by mouth 2 times daily (with meals)             naproxen (NAPROSYN) 500 MG tablet  Take 1 tablet by mouth 2 times daily (with meals) As needed             vitamin D (ERGOCALCIFEROL) 52582 UNITS CAPS capsule  Take 1 capsule by mouth once a week for 8 doses                 Disposition:   Discharged to Home. Any [facility] needs that were indicated and/or required as been addressed and set up by Social Work. Condition at discharge: Pt was medically stable at the time of discharge. Activity: activity as tolerated    Total time taken for discharging this patient: 40 minutes. Greater than 70% of time was spent focused exclusively on this patient. Time was taken to review chart, discuss plans with consultants, reconciling medications, discussing plan answering questions with patient. Jd Hernándezr  10/4/2017, 10:29 AM  ----------------------------------------------------------------------------------------------------------------------    Gertrudis Portillo,     Please return to ER or call 911 if you develop any significant signs or symptoms.     I may not have addressed all of your medical illnesses or the abnormal blood work or imaging therefore please ask your PCP, Kahlil Ramirez PA-C ,  to obtain [facility] record to follow up on all of the abnormal labs, imaging and findings that I have and have not addressed during your hospitalization.      Discharging you from the hospital does not mean that your medical care ends here and now. You may still need additional work up, investigation, monitoring, and treatment to be handled from this point on by outside providers including your PCP, Kahlil Ramirez PA-C , Specialists and other healthcare providers.      Please review your list of discharge medications prior to resuming medications you might still have at home, as the medications you need to be taking, dosages or how often you must take them may have changed.  For medication questions, contact your retail pharmacy and your PCP, Dori Wilson PA-C .     ** I STRONGLY RECOMMEND that you follow up with Vick Villa PA-C within 3 to 5 days for a post hospitalization evaluation. This specific office visit is covered by your insurance, and is not the same as your annual doctor visit/ check up. This office visit is important, as it may prevent need for repeat and/or future hospitalizations. **    Your medical team at [facility] ([facility]) appreciates the opportunity to work with you to get well!     Sincerely,  Shahab Wright

## 2017-10-05 ENCOUNTER — TELEPHONE (OUTPATIENT)
Dept: FAMILY MEDICINE CLINIC | Age: 32
End: 2017-10-05

## 2017-10-05 LAB
EKG ATRIAL RATE: 83 BPM
EKG P AXIS: 32 DEGREES
EKG P-R INTERVAL: 150 MS
EKG Q-T INTERVAL: 370 MS
EKG QRS DURATION: 80 MS
EKG QTC CALCULATION (BAZETT): 434 MS
EKG R AXIS: 10 DEGREES
EKG T AXIS: -1 DEGREES
EKG VENTRICULAR RATE: 83 BPM

## 2017-10-05 NOTE — PROGRESS NOTES
Physical Therapy  Facility/Department: [facility] [id number]  Physical Therapy Discharge      NAME: Zoila Garcia    : 1985 (28 y.o.)  MRN: [id number]    Account: [de-identified]  Gender: female      Patient has been discharged from acute care hospital. DC patient from current PT program.      Electronically signed by Freeman Garnica PT on 10/5/17 at 4:40 PM

## 2017-10-14 ENCOUNTER — HOSPITAL ENCOUNTER (EMERGENCY)
Age: 32
Discharge: HOME | End: 2017-10-14
Attending: EMERGENCY MEDICINE
Payer: COMMERCIAL

## 2017-10-14 VITALS
OXYGEN SATURATION: 100 % | SYSTOLIC BLOOD PRESSURE: 126 MMHG | BODY MASS INDEX: 40.01 KG/M2 | HEIGHT: 68 IN | WEIGHT: 264 LBS | HEART RATE: 88 BPM | RESPIRATION RATE: 16 BRPM | DIASTOLIC BLOOD PRESSURE: 79 MMHG | TEMPERATURE: 97.5 F

## 2017-10-14 DIAGNOSIS — G35 MULTIPLE SCLEROSIS (HCC): Primary | ICD-10-CM

## 2017-10-14 LAB
CHP ED QC CHECK: NORMAL
EKG ATRIAL RATE: 75 BPM
EKG P AXIS: 36 DEGREES
EKG P-R INTERVAL: 156 MS
EKG Q-T INTERVAL: 378 MS
EKG QRS DURATION: 72 MS
EKG QTC CALCULATION (BAZETT): 422 MS
EKG R AXIS: 17 DEGREES
EKG T AXIS: 7 DEGREES
EKG VENTRICULAR RATE: 75 BPM
GLUCOSE BLD-MCNC: 114 MG/DL
GLUCOSE BLD-MCNC: 114 MG/DL (ref 60–115)
PERFORMED ON: NORMAL

## 2017-10-14 PROCEDURE — 93005 ELECTROCARDIOGRAM TRACING: CPT

## 2017-10-14 PROCEDURE — 2580000003 HC RX 258

## 2017-10-14 PROCEDURE — 6360000002 HC RX W HCPCS: Performed by: EMERGENCY MEDICINE

## 2017-10-14 PROCEDURE — 2580000003 HC RX 258: Performed by: EMERGENCY MEDICINE

## 2017-10-14 PROCEDURE — 96374 THER/PROPH/DIAG INJ IV PUSH: CPT

## 2017-10-14 PROCEDURE — 99284 EMERGENCY DEPT VISIT MOD MDM: CPT

## 2017-10-14 RX ORDER — SODIUM CHLORIDE 0.9 % (FLUSH) 0.9 %
3 SYRINGE (ML) INJECTION EVERY 8 HOURS
Status: DISCONTINUED | OUTPATIENT
Start: 2017-10-14 | End: 2017-10-14 | Stop reason: HOSPADM

## 2017-10-14 RX ORDER — METHYLPREDNISOLONE SODIUM SUCCINATE 500 MG/8ML
500 INJECTION INTRAMUSCULAR; INTRAVENOUS ONCE
Status: COMPLETED | OUTPATIENT
Start: 2017-10-14 | End: 2017-10-14

## 2017-10-14 RX ORDER — LORAZEPAM 2 MG/ML
0.5 INJECTION INTRAMUSCULAR ONCE
Status: DISCONTINUED | OUTPATIENT
Start: 2017-10-14 | End: 2017-10-14 | Stop reason: HOSPADM

## 2017-10-14 RX ORDER — PREDNISONE 10 MG/1
60 TABLET ORAL DAILY
Qty: 30 TABLET | Refills: 0 | Status: SHIPPED | OUTPATIENT
Start: 2017-10-14 | End: 2017-10-19

## 2017-10-14 RX ADMIN — SODIUM CHLORIDE, PRESERVATIVE FREE 3 ML: 5 INJECTION INTRAVENOUS at 03:04

## 2017-10-14 RX ADMIN — METHYLPREDNISOLONE SODIUM SUCCINATE 500 MG: 500 INJECTION, POWDER, FOR SOLUTION INTRAMUSCULAR; INTRAVENOUS at 03:03

## 2017-10-14 RX ADMIN — WATER 8 ML: 1 INJECTION INTRAMUSCULAR; INTRAVENOUS; SUBCUTANEOUS at 03:03

## 2017-10-14 ASSESSMENT — PAIN SCALES - GENERAL: PAINLEVEL_OUTOF10: 4

## 2017-10-14 ASSESSMENT — PAIN DESCRIPTION - LOCATION: LOCATION: ABDOMEN;SHOULDER

## 2017-10-14 ASSESSMENT — PAIN DESCRIPTION - FREQUENCY: FREQUENCY: CONTINUOUS

## 2017-10-14 ASSESSMENT — PAIN DESCRIPTION - ORIENTATION: ORIENTATION: LEFT

## 2017-10-14 ASSESSMENT — PAIN DESCRIPTION - DESCRIPTORS: DESCRIPTORS: ACHING

## 2017-10-14 NOTE — ED PROVIDER NOTES
-Organomegaly palpable: No           -Abnormal masses: No    EXT: Gross appearance and use of all four extremities: Normal     SKIN: -Good turgor warm and dry. -Apparent lesions or rashes: No    NEURO: Patient is alert and oriented x 3, fluent appropriate speech, calculation, concentration, memory intact. Cranial nerve two through 12 are intact, there is no motor or sensory deficit, reflexes symmetrical.    DIAGNOSTIC RESULTS     EKG: All EKG's are interpreted by the Emergency Department Physician who either signs or Co-signs this chart in the absence of a cardiologist.    EKG was revewed  and revealed normal sinus rhythm, rate is 70-85. no acute ST elevations,no flipped T waves , no Q waves. HI interval is normal.QRS duration is normal. Axes are normal. There are no PVCs    RADIOLOGY:   Non-plain film images such as CT, Ultrasound and MRI are read by the radiologist. Plain radiographic images are visualized and preliminarily interpreted by the emergency physician with the below findings:    Accu-Cheks is 114. Patient return for weakening. She insists on going back home to her daughter who is 15years old, she refused Ativan. She refused further workup. Dr. Margarito Cannon follow-up in 2-4 days. She was given copious instructions to return for severe headache, further problems    Interpretation per the Radiologist below, if available at the time of this note:    No orders to display         ED BEDSIDE ULTRASOUND:   Performed by ED Physician - none    LABS:  Labs Reviewed   POCT GLUCOSE - Normal       All other labs were within normal range or not returned as of this dictation.     EMERGENCY DEPARTMENT COURSE and DIFFERENTIAL DIAGNOSIS/MDM:   Vitals:    Vitals:    10/14/17 0146 10/14/17 0237   BP: 102/71 126/79   Pulse: 83 88   Resp: 20 16   Temp: 97.5 °F (36.4 °C)    TempSrc: Oral    SpO2: 95% 100%   Weight: 264 lb (119.7 kg)    Height: 5' 8\" (1.727 m)            MDM    CRITICAL CARE TIME   Total Critical Care time was  minutes, excluding separately reportable procedures. There was a high probability of clinically significant/life threatening deterioration in the patient's condition which required my urgent intervention. CONSULTS:  None    PROCEDURES:  Unless otherwise noted below, none     Procedures    FINAL IMPRESSION      1.  Multiple sclerosis [facility])          DISPOSITION/PLAN   DISPOSITION Decision to Discharge    PATIENT REFERRED TO:  Carlo Smalls MD  [facility] [id number] [phone]    In 3 days  Return for weakness, numbness, urine problems      DISCHARGE MEDICATIONS:  New Prescriptions    PREDNISONE (DELTASONE) 10 MG TABLET    Take 6 tablets by mouth daily for 5 doses          (Please note that portions of this note were completed with a voice recognition program.  Efforts were made to edit the dictations but occasionally words are mis-transcribed.)    Deborah Stokes MD (electronically signed)  Attending Emergency Physician          Deborah Stokes MD  10/14/17 8540

## 2017-10-14 NOTE — ED TRIAGE NOTES
Pt to ED with multiple complaints. Pt states she was in hospital last week and diagnosed with MS. Pt states she has been having symptoms for a while but tonight she said her symptoms became worse. Pt c/o metallic taste in mouth, facial tingling, nausea, and abdominal pain. During triage pt stated she began having chest pain as well. Pt was falling asleep in triage during questioning. Pt states she gave someone a ride home tonight and they smoked something she thinks may have been weed in her car. Pt denies drug use or smoking anything herself.

## 2017-10-18 ENCOUNTER — HOSPITAL ENCOUNTER (EMERGENCY)
Age: 32
Discharge: HOME | End: 2017-10-19
Payer: COMMERCIAL

## 2017-10-18 ENCOUNTER — APPOINTMENT (OUTPATIENT)
Dept: GENERAL RADIOLOGY | Age: 32
End: 2017-10-18
Payer: COMMERCIAL

## 2017-10-18 DIAGNOSIS — R31.9 URINARY TRACT INFECTION WITH HEMATURIA, SITE UNSPECIFIED: Primary | ICD-10-CM

## 2017-10-18 DIAGNOSIS — R05.9 COUGH: ICD-10-CM

## 2017-10-18 DIAGNOSIS — N39.0 URINARY TRACT INFECTION WITH HEMATURIA, SITE UNSPECIFIED: Primary | ICD-10-CM

## 2017-10-18 DIAGNOSIS — R51.9 NONINTRACTABLE HEADACHE, UNSPECIFIED CHRONICITY PATTERN, UNSPECIFIED HEADACHE TYPE: ICD-10-CM

## 2017-10-18 DIAGNOSIS — R07.89 CHEST WALL PAIN: ICD-10-CM

## 2017-10-18 LAB
ALBUMIN SERPL-MCNC: 3.4 G/DL (ref 3.9–4.9)
ALP BLD-CCNC: 114 U/L (ref 40–130)
ALT SERPL-CCNC: 14 U/L (ref 0–33)
ANION GAP SERPL CALCULATED.3IONS-SCNC: 14 MEQ/L (ref 7–13)
AST SERPL-CCNC: 21 U/L (ref 0–35)
BILIRUB SERPL-MCNC: 0.1 MG/DL (ref 0–1.2)
BILIRUBIN URINE: NEGATIVE
BLOOD, URINE: ABNORMAL
BUN BLDV-MCNC: 21 MG/DL (ref 6–20)
CALCIUM SERPL-MCNC: 8.8 MG/DL (ref 8.6–10.2)
CHLORIDE BLD-SCNC: 99 MEQ/L (ref 98–107)
CHP ED QC CHECK: YES
CLARITY: CLEAR
CO2: 25 MEQ/L (ref 22–29)
COLOR: YELLOW
CREAT SERPL-MCNC: 0.71 MG/DL (ref 0.5–0.9)
EKG ATRIAL RATE: 76 BPM
EKG P AXIS: 20 DEGREES
EKG P-R INTERVAL: 132 MS
EKG Q-T INTERVAL: 372 MS
EKG QRS DURATION: 66 MS
EKG QTC CALCULATION (BAZETT): 418 MS
EKG R AXIS: 16 DEGREES
EKG T AXIS: 16 DEGREES
EKG VENTRICULAR RATE: 76 BPM
GFR AFRICAN AMERICAN: >60
GFR NON-AFRICAN AMERICAN: >60
GLOBULIN: 2.5 G/DL (ref 2.3–3.5)
GLUCOSE BLD-MCNC: 94 MG/DL (ref 74–109)
GLUCOSE URINE: NEGATIVE MG/DL
KETONES, URINE: NEGATIVE MG/DL
LEUKOCYTE ESTERASE, URINE: ABNORMAL
MAGNESIUM: 2.1 MG/DL (ref 1.7–2.3)
NITRITE, URINE: NEGATIVE
PH UA: 6.5 (ref 5–9)
POTASSIUM SERPL-SCNC: 4.6 MEQ/L (ref 3.5–5.1)
PREGNANCY TEST URINE, POC: NEGATIVE
PROTEIN UA: NEGATIVE MG/DL
SODIUM BLD-SCNC: 138 MEQ/L (ref 132–144)
SPECIFIC GRAVITY UA: 1.02 (ref 1–1.03)
TOTAL PROTEIN: 5.9 G/DL (ref 6.4–8.1)
URINE REFLEX TO CULTURE: YES
UROBILINOGEN, URINE: 0.2 E.U./DL

## 2017-10-18 PROCEDURE — 6360000002 HC RX W HCPCS: Performed by: PHYSICIAN ASSISTANT

## 2017-10-18 PROCEDURE — 99284 EMERGENCY DEPT VISIT MOD MDM: CPT

## 2017-10-18 PROCEDURE — 71020 XR CHEST STANDARD TWO VW: CPT

## 2017-10-18 PROCEDURE — 2580000003 HC RX 258: Performed by: PHYSICIAN ASSISTANT

## 2017-10-18 PROCEDURE — 96374 THER/PROPH/DIAG INJ IV PUSH: CPT

## 2017-10-18 PROCEDURE — 81001 URINALYSIS AUTO W/SCOPE: CPT

## 2017-10-18 PROCEDURE — 83735 ASSAY OF MAGNESIUM: CPT

## 2017-10-18 PROCEDURE — 87086 URINE CULTURE/COLONY COUNT: CPT

## 2017-10-18 PROCEDURE — 36415 COLL VENOUS BLD VENIPUNCTURE: CPT

## 2017-10-18 PROCEDURE — 80053 COMPREHEN METABOLIC PANEL: CPT

## 2017-10-18 PROCEDURE — 93005 ELECTROCARDIOGRAM TRACING: CPT

## 2017-10-18 PROCEDURE — 85025 COMPLETE CBC W/AUTO DIFF WBC: CPT

## 2017-10-18 RX ORDER — KETOROLAC TROMETHAMINE 30 MG/ML
30 INJECTION, SOLUTION INTRAMUSCULAR; INTRAVENOUS ONCE
Status: COMPLETED | OUTPATIENT
Start: 2017-10-18 | End: 2017-10-18

## 2017-10-18 RX ORDER — 0.9 % SODIUM CHLORIDE 0.9 %
500 INTRAVENOUS SOLUTION INTRAVENOUS ONCE
Status: COMPLETED | OUTPATIENT
Start: 2017-10-18 | End: 2017-10-19

## 2017-10-18 RX ADMIN — KETOROLAC TROMETHAMINE 30 MG: 30 INJECTION, SOLUTION INTRAMUSCULAR at 23:05

## 2017-10-18 RX ADMIN — SODIUM CHLORIDE 500 ML: 9 INJECTION, SOLUTION INTRAVENOUS at 23:04

## 2017-10-18 ASSESSMENT — PAIN DESCRIPTION - PAIN TYPE
TYPE: ACUTE PAIN
TYPE: ACUTE PAIN

## 2017-10-18 ASSESSMENT — PAIN SCALES - GENERAL
PAINLEVEL_OUTOF10: 6
PAINLEVEL_OUTOF10: 3
PAINLEVEL_OUTOF10: 6

## 2017-10-18 ASSESSMENT — PAIN DESCRIPTION - LOCATION
LOCATION: CHEST;HEAD
LOCATION: HEAD

## 2017-10-19 VITALS
OXYGEN SATURATION: 98 % | HEIGHT: 68 IN | DIASTOLIC BLOOD PRESSURE: 82 MMHG | BODY MASS INDEX: 41.52 KG/M2 | SYSTOLIC BLOOD PRESSURE: 121 MMHG | TEMPERATURE: 97.9 F | WEIGHT: 274 LBS | RESPIRATION RATE: 18 BRPM | HEART RATE: 76 BPM

## 2017-10-19 LAB
ANISOCYTOSIS: ABNORMAL
BACTERIA: NORMAL /HPF
BASOPHILS ABSOLUTE: 0 K/UL (ref 0–0.2)
BASOPHILS RELATIVE PERCENT: 0.3 %
EOSINOPHILS ABSOLUTE: 0.4 K/UL (ref 0–0.7)
EOSINOPHILS RELATIVE PERCENT: 3 %
EPITHELIAL CELLS, UA: NORMAL /HPF
HCT VFR BLD CALC: 39 % (ref 37–47)
HEMOGLOBIN: 12.7 G/DL (ref 12–16)
LYMPHOCYTES ABSOLUTE: 5.9 K/UL (ref 1–4.8)
LYMPHOCYTES RELATIVE PERCENT: 42 %
MCH RBC QN AUTO: 28.3 PG (ref 27–31.3)
MCHC RBC AUTO-ENTMCNC: 32.5 % (ref 33–37)
MCV RBC AUTO: 87 FL (ref 82–100)
MONOCYTES ABSOLUTE: 0.4 K/UL (ref 0.2–0.8)
MONOCYTES RELATIVE PERCENT: 3.3 %
NEUTROPHILS ABSOLUTE: 7.1 K/UL (ref 1.4–6.5)
NEUTROPHILS RELATIVE PERCENT: 51 %
PDW BLD-RTO: 16.4 % (ref 11.5–14.5)
PLATELET # BLD: 300 K/UL (ref 130–400)
PLATELET SLIDE REVIEW: NORMAL
RBC # BLD: 4.49 M/UL (ref 4.2–5.4)
RBC UA: NORMAL /HPF (ref 0–2)
SMUDGE CELLS: 0.8
WBC # BLD: 14 K/UL (ref 4.8–10.8)
WBC UA: NORMAL /HPF (ref 0–5)

## 2017-10-19 PROCEDURE — 6370000000 HC RX 637 (ALT 250 FOR IP): Performed by: PHYSICIAN ASSISTANT

## 2017-10-19 RX ORDER — SULFAMETHOXAZOLE AND TRIMETHOPRIM 800; 160 MG/1; MG/1
1 TABLET ORAL 2 TIMES DAILY
Qty: 14 TABLET | Refills: 0 | Status: SHIPPED | OUTPATIENT
Start: 2017-10-19 | End: 2017-10-26

## 2017-10-19 RX ORDER — NAPROXEN 500 MG/1
500 TABLET ORAL 2 TIMES DAILY
Qty: 20 TABLET | Refills: 0 | Status: SHIPPED | OUTPATIENT
Start: 2017-10-19 | End: 2017-11-06

## 2017-10-19 RX ORDER — SULFAMETHOXAZOLE AND TRIMETHOPRIM 800; 160 MG/1; MG/1
1 TABLET ORAL ONCE
Status: COMPLETED | OUTPATIENT
Start: 2017-10-19 | End: 2017-10-19

## 2017-10-19 RX ADMIN — SULFAMETHOXAZOLE AND TRIMETHOPRIM 1 TABLET: 800; 160 TABLET ORAL at 01:19

## 2017-10-19 ASSESSMENT — ENCOUNTER SYMPTOMS
VOICE CHANGE: 0
EYE DISCHARGE: 0
ABDOMINAL DISTENTION: 0
PHOTOPHOBIA: 0
NAUSEA: 0
APNEA: 0
CHEST TIGHTNESS: 1
SHORTNESS OF BREATH: 0
ANAL BLEEDING: 0
COUGH: 1
EYE PAIN: 0
VOMITING: 0

## 2017-10-19 NOTE — ED TRIAGE NOTES
Pt c/o tightness and pressure behind right eye with nause, dizziness. Pt dx with MS last week. PERRLA,  Hand grasp equal but weak. Pt ambulate in triage.

## 2017-10-19 NOTE — ED PROVIDER NOTES
Hematological: Does not bruise/bleed easily. Psychiatric/Behavioral: Negative for behavioral problems, self-injury and sleep disturbance. All other systems reviewed and are negative. Except as noted above the remainder of the review of systems was reviewed and negative. PAST MEDICAL HISTORY     Past Medical History:   Diagnosis Date    Asthma     Chronic back pain     Headache     Multiple sclerosis ([facility] [address]     Osteoarthritis          SURGICAL HISTORY       Past Surgical History:   Procedure Laterality Date    CHOLECYSTECTOMY           CURRENT MEDICATIONS       Previous Medications    ALBUTEROL (PROVENTIL) (2.5 MG/3ML) 0.083% NEBULIZER SOLUTION    Take 3 mLs by nebulization every 6 hours as needed for Wheezing or Shortness of Breath    ALBUTEROL SULFATE HFA (PROAIR HFA) 108 (90 BASE) MCG/ACT INHALER    Use every 4 hours while awake for 7-10 days then PRN wheezing  May sub Ventolin or Proventil as needed per [employer]. ASPIRIN 81 MG EC TABLET    Take 1 tablet by mouth daily    CLONAZEPAM (KLONOPIN) 0.5 MG TABLET    Take 1 tablet by mouth 2 times daily as needed for Anxiety    LIDOCAINE-PRILOCAINE (EMLA) 2.5-2.5 % CREAM    Apply half dollar sized amount topically up to twice a day to intact skin as needed for pain.     METFORMIN (GLUCOPHAGE) 500 MG TABLET    Take 1 tablet by mouth 2 times daily (with meals)    POLYETHYLENE GLYCOL (MIRALAX) POWDER    Take 17 g by mouth daily    PREDNISONE (DELTASONE) 10 MG TABLET    Take 6 tablets by mouth daily for 5 doses    VITAMIN D (ERGOCALCIFEROL) 90387 UNITS CAPS CAPSULE    Take 1 capsule by mouth once a week for 8 doses       ALLERGIES     Pcn [penicillins]    FAMILY HISTORY       Family History   Problem Relation Age of Onset    Cancer Mother     Arthritis Mother     Diabetes Father     Heart Disease Father     Stroke Father     High Blood Pressure Father           SOCIAL HISTORY       Social History     Social History    Marital status: Single Spouse name: N/A    Number of children: N/A    Years of education: N/A     Social History Main Topics    Smoking status: Former Smoker     Packs/day: 0.50     Years: 10.00     Types: Cigarettes     Quit date: 1/27/2017    Smokeless tobacco: Never Used    Alcohol use Yes      Comment: socially    Drug use: No    Sexual activity: Yes     Partners: Male     Other Topics Concern    None     Social History Narrative    None       SCREENINGS    Obi Coma Scale  Eye Opening: Spontaneous  Best Verbal Response: Oriented  Best Motor Response: Obeys commands  Obi Coma Scale Score: 15        PHYSICAL EXAM    (up to 7 for level 4, 8 or more for level 5)     ED Triage Vitals [10/18/17 2203]   BP Temp Temp Source Pulse Resp SpO2 Height Weight   117/79 97.9 °F (36.6 °C) Oral 77 18 98 % 5' 8\" (1.727 m) 274 lb (124.3 kg)       Physical Exam   Constitutional: She is oriented to person, place, and time. She appears well-developed and well-nourished. No distress. HENT:   Head: Normocephalic and atraumatic. Eyes: Pupils are equal, round, and reactive to light. Right eye exhibits no discharge. Left eye exhibits no discharge. Neck: Normal range of motion. Neck supple. Cardiovascular: Normal rate, regular rhythm, normal heart sounds and intact distal pulses. Pulmonary/Chest: Effort normal and breath sounds normal. No stridor. No respiratory distress. She exhibits tenderness. Left anterior posterior chest tenderness   Abdominal: Soft. Bowel sounds are normal. She exhibits no distension. Musculoskeletal: Normal range of motion. Neurological: She is alert and oriented to person, place, and time. Skin: Skin is warm. No erythema. Psychiatric: She has a normal mood and affect. Nursing note and vitals reviewed.       DIAGNOSTIC RESULTS     EKG: All EKG's are interpreted by the Emergency Department Physician who either signs or Co-signs this chart in the absence of a cardiologist.     EKG normal sinus rhythm file with this patient discussed lab work chest x-ray will treat for infectious process with positive leukocyte esterase and urine. He does not want us to start any prednisone at this point in time patient to follow-up with him and  primary care. Patient was recently on prednisone finished 2 days ago. Does note some swelling of face. Amount and/or Complexity of Data Reviewed  Clinical lab tests: reviewed and ordered  Tests in the radiology section of CPT®: ordered and reviewed  Discuss the patient with other providers: yes            CONSULTS:  IP CONSULT TO NEUROLOGY    PROCEDURES:  Unless otherwise noted below, none     Procedures    FINAL IMPRESSION      1. Urinary tract infection with hematuria, site unspecified    2. Cough    3. Chest wall pain    4.  Nonintractable headache, unspecified chronicity pattern, unspecified headache type          DISPOSITION/PLAN   DISPOSITION Decision to Discharge    PATIENT REFERRED TO:  Rivas Odom, [address]  [phone]    Schedule an appointment as soon as possible for a visit in 2 days      [facility]) ED  [facility] [place] [address]  [address] [facility] [address]  [phone]    If symptoms worsen    Ashley Brooks MD  [address] [facility]  [phone]    Schedule an appointment as soon as possible for a visit in 1 week        DISCHARGE MEDICATIONS:  New Prescriptions    NAPROXEN (NAPROSYN) 500 MG TABLET    Take 1 tablet by mouth 2 times daily for 20 doses    SULFAMETHOXAZOLE-TRIMETHOPRIM (BACTRIM DS) 800-160 MG PER TABLET    Take 1 tablet by mouth 2 times daily for 7 days          (Please note that portions of this note were completed with a voice recognition program.  Efforts were made to edit the dictations but occasionally words are mis-transcribed.)    Hakeem Brewer PA-C (electronically signed)  Attending Emergency Physician         Hakeem Brewer PA-C  10/23/17 [id number]

## 2017-10-20 LAB — URINE CULTURE, ROUTINE: NORMAL

## 2017-12-11 ENCOUNTER — OFFICE VISIT (OUTPATIENT)
Dept: OBGYN | Age: 32
End: 2017-12-11

## 2017-12-11 VITALS
BODY MASS INDEX: 40.16 KG/M2 | DIASTOLIC BLOOD PRESSURE: 66 MMHG | WEIGHT: 265 LBS | HEIGHT: 68 IN | SYSTOLIC BLOOD PRESSURE: 124 MMHG | HEART RATE: 72 BPM

## 2017-12-11 DIAGNOSIS — N92.1 BREAKTHROUGH BLEEDING: ICD-10-CM

## 2017-12-11 DIAGNOSIS — L04.9 ACUTE LYMPHADENITIS: ICD-10-CM

## 2017-12-11 DIAGNOSIS — N89.8 VAGINAL ODOR: ICD-10-CM

## 2017-12-11 DIAGNOSIS — N93.8 DUB (DYSFUNCTIONAL UTERINE BLEEDING): Primary | ICD-10-CM

## 2017-12-11 DIAGNOSIS — E66.01 OBESITY, CLASS III, BMI 40-49.9 (MORBID OBESITY) (HCC): ICD-10-CM

## 2017-12-11 DIAGNOSIS — Z01.419 VISIT FOR GYNECOLOGIC EXAMINATION: ICD-10-CM

## 2017-12-11 DIAGNOSIS — N93.8 DUB (DYSFUNCTIONAL UTERINE BLEEDING): ICD-10-CM

## 2017-12-11 PROBLEM — E66.813 OBESITY, CLASS III, BMI 40-49.9 (MORBID OBESITY) (HCC): Status: ACTIVE | Noted: 2017-12-11

## 2017-12-11 LAB
BASOPHILS ABSOLUTE: 0.1 K/UL (ref 0–0.2)
BASOPHILS RELATIVE PERCENT: 1.2 %
EOSINOPHILS ABSOLUTE: 0.3 K/UL (ref 0–0.7)
EOSINOPHILS RELATIVE PERCENT: 3 %
GONADOTROPIN, CHORIONIC (HCG) QUANT: <0.1 MIU/ML
HCT VFR BLD CALC: 39.6 % (ref 37–47)
HEMOGLOBIN: 12.9 G/DL (ref 12–16)
LYMPHOCYTES ABSOLUTE: 2.8 K/UL (ref 1–4.8)
LYMPHOCYTES RELATIVE PERCENT: 30.7 %
MCH RBC QN AUTO: 29.3 PG (ref 27–31.3)
MCHC RBC AUTO-ENTMCNC: 32.6 % (ref 33–37)
MCV RBC AUTO: 89.7 FL (ref 82–100)
MONOCYTES ABSOLUTE: 0.5 K/UL (ref 0.2–0.8)
MONOCYTES RELATIVE PERCENT: 5.6 %
NEUTROPHILS ABSOLUTE: 5.4 K/UL (ref 1.4–6.5)
NEUTROPHILS RELATIVE PERCENT: 59.5 %
PDW BLD-RTO: 15.7 % (ref 11.5–14.5)
PLATELET # BLD: 302 K/UL (ref 130–400)
RBC # BLD: 4.42 M/UL (ref 4.2–5.4)
TSH SERPL DL<=0.05 MIU/L-ACNC: 2.21 UIU/ML (ref 0.27–4.2)
WBC # BLD: 9 K/UL (ref 4.8–10.8)

## 2017-12-11 PROCEDURE — 1036F TOBACCO NON-USER: CPT | Performed by: OBSTETRICS & GYNECOLOGY

## 2017-12-11 PROCEDURE — G8427 DOCREV CUR MEDS BY ELIG CLIN: HCPCS | Performed by: OBSTETRICS & GYNECOLOGY

## 2017-12-11 PROCEDURE — G8417 CALC BMI ABV UP PARAM F/U: HCPCS | Performed by: OBSTETRICS & GYNECOLOGY

## 2017-12-11 PROCEDURE — 99203 OFFICE O/P NEW LOW 30 MIN: CPT | Performed by: OBSTETRICS & GYNECOLOGY

## 2017-12-11 PROCEDURE — G8484 FLU IMMUNIZE NO ADMIN: HCPCS | Performed by: OBSTETRICS & GYNECOLOGY

## 2017-12-11 RX ORDER — OXYCODONE HYDROCHLORIDE AND ACETAMINOPHEN 5; 325 MG/1; MG/1
TABLET ORAL
Qty: 2 TABLET | Refills: 0 | Status: SHIPPED | OUTPATIENT
Start: 2017-12-11 | End: 2018-02-05 | Stop reason: ALTCHOICE

## 2017-12-11 RX ORDER — METRONIDAZOLE 500 MG/1
500 TABLET ORAL 2 TIMES DAILY
Qty: 14 TABLET | Refills: 0 | Status: SHIPPED | OUTPATIENT
Start: 2017-12-11 | End: 2017-12-18

## 2017-12-11 RX ORDER — LORAZEPAM 1 MG/1
TABLET ORAL
Qty: 1 TABLET | Refills: 0 | Status: SHIPPED | OUTPATIENT
Start: 2017-12-11 | End: 2018-02-05 | Stop reason: ALTCHOICE

## 2017-12-11 RX ORDER — DOXYCYCLINE HYCLATE 100 MG
100 TABLET ORAL 2 TIMES DAILY
Qty: 20 TABLET | Refills: 0 | Status: SHIPPED | OUTPATIENT
Start: 2017-12-11 | End: 2017-12-21

## 2017-12-11 NOTE — PROGRESS NOTES
F/u for results in 2 weeks    Obesity Counseling:  Given  Smoking Counseling:  Given      Orders Placed This Encounter   Procedures    Candida DNA probe     Candida Vaginitis Panel-MDL Test Code 560     Standing Status:   Future     Number of Occurrences:   1     Standing Expiration Date:   12/11/2018    Bacterial Vaginosis Panel     Standing Status:   Future     Number of Occurrences:   1     Standing Expiration Date:   12/11/2018    C. Trachomatis / N. Gonorrhoeae, DNA     Standing Status:   Future     Number of Occurrences:   1     Standing Expiration Date:   12/11/2018    Trichomonas Screen     Standing Status:   Future     Number of Occurrences:   1     Standing Expiration Date:   12/11/2018    US Non OB Transvaginal     Standing Status:   Future     Number of Occurrences:   1     Standing Expiration Date:   12/11/2018    US Pelvis Complete     Standing Status:   Future     Number of Occurrences:   1     Standing Expiration Date:   12/11/2018     Order Specific Question:   Reason for exam:     Answer:   AUB    CBC Auto Differential     Standing Status:   Future     Number of Occurrences:   1     Standing Expiration Date:   12/11/2018    TSH without Reflex     Standing Status:   Future     Number of Occurrences:   1     Standing Expiration Date:   12/11/2018    HCG, Quantitative, Pregnancy     Standing Status:   Future     Number of Occurrences:   1     Standing Expiration Date:   12/11/2018    PAP SMEAR     Patient History:    Patient's last menstrual period was 11/22/2017 (approximate).   OBGYN Status: Implant  Past Surgical History:  No date: CHOLECYSTECTOMY      Smoking status: Former Smoker                                                              Packs/day: 0.50      Years: 10.00        Types: Cigarettes     Quit date: 1/27/2017  Smokeless tobacco: Never Used                           Standing Status:   Future     Number of Occurrences:   1     Standing Expiration Date:   12/11/2018

## 2017-12-14 ENCOUNTER — HOSPITAL ENCOUNTER (OUTPATIENT)
Dept: ULTRASOUND IMAGING | Age: 32
Discharge: HOME | End: 2017-12-14
Payer: COMMERCIAL

## 2017-12-14 DIAGNOSIS — N92.1 BREAKTHROUGH BLEEDING: ICD-10-CM

## 2017-12-14 DIAGNOSIS — N93.8 DUB (DYSFUNCTIONAL UTERINE BLEEDING): ICD-10-CM

## 2017-12-14 PROCEDURE — 76856 US EXAM PELVIC COMPLETE: CPT

## 2017-12-14 PROCEDURE — 76830 TRANSVAGINAL US NON-OB: CPT

## 2017-12-15 LAB — PAP SMEAR: NORMAL

## 2017-12-20 ENCOUNTER — PROCEDURE VISIT (OUTPATIENT)
Dept: OBGYN | Age: 32
End: 2017-12-20

## 2017-12-20 VITALS
HEART RATE: 88 BPM | WEIGHT: 260 LBS | HEIGHT: 68 IN | DIASTOLIC BLOOD PRESSURE: 74 MMHG | BODY MASS INDEX: 39.4 KG/M2 | SYSTOLIC BLOOD PRESSURE: 108 MMHG

## 2017-12-20 DIAGNOSIS — N93.8 DUB (DYSFUNCTIONAL UTERINE BLEEDING): Primary | ICD-10-CM

## 2017-12-20 LAB
CONTROL: YES
PREGNANCY TEST URINE, POC: NORMAL

## 2017-12-20 PROCEDURE — 81025 URINE PREGNANCY TEST: CPT | Performed by: OBSTETRICS & GYNECOLOGY

## 2017-12-20 PROCEDURE — 99999 PR OFFICE/OUTPT VISIT,PROCEDURE ONLY: CPT | Performed by: OBSTETRICS & GYNECOLOGY

## 2017-12-20 PROCEDURE — 58558 HYSTEROSCOPY BIOPSY: CPT | Performed by: OBSTETRICS & GYNECOLOGY

## 2017-12-22 RX ORDER — METRONIDAZOLE 500 MG/1
500 TABLET ORAL 2 TIMES DAILY
Qty: 14 TABLET | Refills: 0 | Status: SHIPPED | OUTPATIENT
Start: 2017-12-22 | End: 2017-12-29

## 2017-12-25 PROBLEM — N89.8 VAGINAL ODOR: Status: ACTIVE | Noted: 2017-12-25

## 2017-12-25 PROBLEM — Z01.419 VISIT FOR GYNECOLOGIC EXAMINATION: Status: ACTIVE | Noted: 2017-12-25

## 2017-12-25 PROBLEM — L04.9 ACUTE LYMPHADENITIS: Status: ACTIVE | Noted: 2017-12-25

## 2017-12-25 ASSESSMENT — ENCOUNTER SYMPTOMS
SHORTNESS OF BREATH: 0
VOMITING: 0
NAUSEA: 0
COUGH: 0

## 2018-01-03 ENCOUNTER — OFFICE VISIT (OUTPATIENT)
Dept: OBGYN | Age: 33
End: 2018-01-03

## 2018-01-03 VITALS — HEIGHT: 68 IN | HEART RATE: 88 BPM | SYSTOLIC BLOOD PRESSURE: 120 MMHG | DIASTOLIC BLOOD PRESSURE: 74 MMHG

## 2018-01-03 DIAGNOSIS — N92.1 BREAKTHROUGH BLEEDING: Primary | ICD-10-CM

## 2018-01-03 DIAGNOSIS — Z97.5 NEXPLANON IN PLACE: ICD-10-CM

## 2018-01-03 PROCEDURE — 99213 OFFICE O/P EST LOW 20 MIN: CPT | Performed by: OBSTETRICS & GYNECOLOGY

## 2018-01-03 PROCEDURE — G8484 FLU IMMUNIZE NO ADMIN: HCPCS | Performed by: OBSTETRICS & GYNECOLOGY

## 2018-01-03 PROCEDURE — 1036F TOBACCO NON-USER: CPT | Performed by: OBSTETRICS & GYNECOLOGY

## 2018-01-03 PROCEDURE — G8427 DOCREV CUR MEDS BY ELIG CLIN: HCPCS | Performed by: OBSTETRICS & GYNECOLOGY

## 2018-01-03 PROCEDURE — G8417 CALC BMI ABV UP PARAM F/U: HCPCS | Performed by: OBSTETRICS & GYNECOLOGY

## 2018-01-03 ASSESSMENT — ENCOUNTER SYMPTOMS
NAUSEA: 0
COUGH: 0
SHORTNESS OF BREATH: 0
VOMITING: 0

## 2018-02-05 ENCOUNTER — OFFICE VISIT (OUTPATIENT)
Dept: FAMILY MEDICINE CLINIC | Age: 33
End: 2018-02-05
Payer: COMMERCIAL

## 2018-02-05 VITALS
SYSTOLIC BLOOD PRESSURE: 112 MMHG | OXYGEN SATURATION: 98 % | HEART RATE: 90 BPM | RESPIRATION RATE: 18 BRPM | DIASTOLIC BLOOD PRESSURE: 78 MMHG | WEIGHT: 263 LBS | HEIGHT: 68 IN | TEMPERATURE: 97.2 F | BODY MASS INDEX: 39.86 KG/M2

## 2018-02-05 DIAGNOSIS — E55.9 VITAMIN D DEFICIENCY: ICD-10-CM

## 2018-02-05 DIAGNOSIS — J01.00 ACUTE NON-RECURRENT MAXILLARY SINUSITIS: Primary | ICD-10-CM

## 2018-02-05 DIAGNOSIS — E66.01 MORBID OBESITY DUE TO EXCESS CALORIES (HCC): ICD-10-CM

## 2018-02-05 DIAGNOSIS — J34.89 SINUS PAIN: ICD-10-CM

## 2018-02-05 DIAGNOSIS — R73.09 ABNORMAL BLOOD SUGAR: ICD-10-CM

## 2018-02-05 DIAGNOSIS — R93.0 ABNORMAL MRI OF THE HEAD: ICD-10-CM

## 2018-02-05 LAB
HBA1C MFR BLD: 5.5 % (ref 4.8–5.9)
VITAMIN D 25-HYDROXY: 35.8 NG/ML (ref 30–100)

## 2018-02-05 PROCEDURE — G8427 DOCREV CUR MEDS BY ELIG CLIN: HCPCS | Performed by: PHYSICIAN ASSISTANT

## 2018-02-05 PROCEDURE — G8484 FLU IMMUNIZE NO ADMIN: HCPCS | Performed by: PHYSICIAN ASSISTANT

## 2018-02-05 PROCEDURE — G8417 CALC BMI ABV UP PARAM F/U: HCPCS | Performed by: PHYSICIAN ASSISTANT

## 2018-02-05 PROCEDURE — 1036F TOBACCO NON-USER: CPT | Performed by: PHYSICIAN ASSISTANT

## 2018-02-05 PROCEDURE — 99214 OFFICE O/P EST MOD 30 MIN: CPT | Performed by: PHYSICIAN ASSISTANT

## 2018-02-05 RX ORDER — CEFDINIR 300 MG/1
300 CAPSULE ORAL 2 TIMES DAILY
Qty: 20 CAPSULE | Refills: 0 | Status: SHIPPED | OUTPATIENT
Start: 2018-02-05 | End: 2018-03-19

## 2018-02-05 RX ORDER — ERGOCALCIFEROL 1.25 MG/1
CAPSULE ORAL
COMMUNITY
Start: 2017-10-06 | End: 2018-02-05 | Stop reason: SDUPTHER

## 2018-02-05 RX ORDER — ALBUTEROL SULFATE 2.5 MG/3ML
2.5 SOLUTION RESPIRATORY (INHALATION)
COMMUNITY
Start: 2017-08-03 | End: 2018-02-05

## 2018-02-05 RX ORDER — ALBUTEROL SULFATE 90 UG/1
AEROSOL, METERED RESPIRATORY (INHALATION)
COMMUNITY
Start: 2017-01-28 | End: 2018-02-05

## 2018-02-05 ASSESSMENT — ENCOUNTER SYMPTOMS
CHEST TIGHTNESS: 0
COLOR CHANGE: 0
WHEEZING: 0
VOMITING: 0
RHINORRHEA: 1
SHORTNESS OF BREATH: 0
SINUS PRESSURE: 1
SORE THROAT: 1
SINUS PAIN: 1
ABDOMINAL PAIN: 0
COUGH: 0

## 2018-02-05 NOTE — PROGRESS NOTES
Subjective  Dane Angulo, 28 y.o. female presents today with:  Chief Complaint   Patient presents with    Congestion     headache, sore throat, ear pain      HPI  Bibiana presents today for routine follow up. Last OV with me: 12/11/2018  Presents today for same day complaint. URI    This is a new problem. Episode onset: 5 days. The problem has been gradually worsening. There has been no fever. Associated symptoms include congestion, headaches, a plugged ear sensation (Right), rhinorrhea, sinus pain and a sore throat. Pertinent negatives include no abdominal pain, chest pain, coughing, ear pain, rash, sneezing, vomiting or wheezing. She has tried increased fluids (rest) for the symptoms. The treatment provided no relief. Patient has been seeing Dr. Sony Ames for DUB. Had an endometrial biopsy (normal). He thinks she is having breakthrough bleeding from the Nexplanon. Instructed to avoid NSAIDs and monitor. She has had not episodes of abnormal vaginal bleeding for the past 2 weeks. Follows up with Dr. Castillo Patient today concerning abnormal MRI. She has also been seeing Dr. Justus Olsen at [facility]. At this time, they would like to monitor imaging and progression of abnormalities with repeat MRI of brain in May. Recently had a LP due to migraines and abnormal imaging of brain. Opening pressure was 25 cm H20. Fluid analysis was negative for oligoclonal bands  It has been recommended that she see a migraine, HERNANDEZ specialist.  Dr. Alban Hughes does no recommend that patient start Tecfidera at this time. Bibiana is scheduled to follow up with Dr. Castillo Patient today. Stopped metformin, trying to managing blood sugars through diet and exercise. Due for HgbA1c today. Review of Systems   Constitutional: Positive for activity change. HENT: Positive for congestion, postnasal drip, rhinorrhea, sinus pain, sinus pressure and sore throat. Negative for ear pain and sneezing.     Eyes: Negative for visual Neck: Normal range of motion. Neck supple. Cardiovascular: Normal rate, regular rhythm and normal heart sounds. No murmur heard. Pulmonary/Chest: Effort normal and breath sounds normal. No respiratory distress. She has no wheezes. She has no rales. Musculoskeletal: Normal range of motion. Lymphadenopathy:     She has no cervical adenopathy. Neurological: She is alert and oriented to person, place, and time. Skin: Skin is warm and dry. No rash noted. She is not diaphoretic. No erythema. Psychiatric: She has a normal mood and affect. Her behavior is normal. Judgment and thought content normal.   Follows up with Dr. Celso Mejia today regarding MRI imaging. Would ideally like to be on NO medications. Motivated to continue to lose weight. Assessment & Plan   1. Acute non-recurrent maxillary sinusitis  cefdinir (OMNICEF) 300 MG capsule   2. Morbid obesity due to excess calories (HCC)  Hemoglobin A1C   3. Abnormal blood sugar  Hemoglobin A1C   4. Sinus pain     5. Vitamin D deficiency  Vitamin D 25 Hydroxy   -Recheck vit D and HgbA1c today.  -Discussed 1/10 chance of having cross-sensitivity to cephalosporin (as she has PCN allergy). Patient is willing to try.  -Follow up with Dr. Celso Mejia today as scheduled. -Follow up with Dr. Ivonne Ryan as scheduled.   -Call with any concerns.  -Follow up will be determined after lab results are reviewed. >50% of 25 minutes was spent spent on counseling, answering questions, instructions on meds, examining, coordinating the care based on my plan and assessment as noted.     Orders Placed This Encounter   Procedures    Hemoglobin A1C     Standing Status:   Future     Number of Occurrences:   1     Standing Expiration Date:   2/5/2019    Vitamin D 25 Hydroxy     Standing Status:   Future     Number of Occurrences:   1     Standing Expiration Date:   2/5/2019     Orders Placed This Encounter   Medications    cefdinir (OMNICEF) 300 MG capsule     Sig: Take 1 capsule by

## 2018-03-19 ENCOUNTER — HOSPITAL ENCOUNTER (EMERGENCY)
Age: 33
Discharge: HOME | End: 2018-03-19
Attending: EMERGENCY MEDICINE
Payer: COMMERCIAL

## 2018-03-19 ENCOUNTER — APPOINTMENT (OUTPATIENT)
Dept: CT IMAGING | Age: 33
End: 2018-03-19
Payer: COMMERCIAL

## 2018-03-19 VITALS
BODY MASS INDEX: 40.92 KG/M2 | SYSTOLIC BLOOD PRESSURE: 124 MMHG | RESPIRATION RATE: 17 BRPM | TEMPERATURE: 97.4 F | OXYGEN SATURATION: 98 % | HEART RATE: 72 BPM | DIASTOLIC BLOOD PRESSURE: 82 MMHG | WEIGHT: 270 LBS | HEIGHT: 68 IN

## 2018-03-19 DIAGNOSIS — R51.9 NONINTRACTABLE HEADACHE, UNSPECIFIED CHRONICITY PATTERN, UNSPECIFIED HEADACHE TYPE: Primary | ICD-10-CM

## 2018-03-19 PROCEDURE — 70450 CT HEAD/BRAIN W/O DYE: CPT

## 2018-03-19 PROCEDURE — 96372 THER/PROPH/DIAG INJ SC/IM: CPT

## 2018-03-19 PROCEDURE — 6360000002 HC RX W HCPCS: Performed by: EMERGENCY MEDICINE

## 2018-03-19 PROCEDURE — 99284 EMERGENCY DEPT VISIT MOD MDM: CPT

## 2018-03-19 RX ORDER — SUMATRIPTAN 6 MG/.5ML
6 INJECTION, SOLUTION SUBCUTANEOUS ONCE
Status: COMPLETED | OUTPATIENT
Start: 2018-03-19 | End: 2018-03-19

## 2018-03-19 RX ADMIN — SUMATRIPTAN SUCCINATE 6 MG: 6 INJECTION SUBCUTANEOUS at 00:42

## 2018-03-19 ASSESSMENT — ENCOUNTER SYMPTOMS
SHORTNESS OF BREATH: 0
DIARRHEA: 0
ABDOMINAL PAIN: 0
VOMITING: 0
NAUSEA: 0
BACK PAIN: 0
SORE THROAT: 0

## 2018-03-19 ASSESSMENT — PAIN DESCRIPTION - LOCATION: LOCATION: HEAD

## 2018-03-19 ASSESSMENT — PAIN SCALES - GENERAL: PAINLEVEL_OUTOF10: 2

## 2018-03-19 ASSESSMENT — PAIN DESCRIPTION - DESCRIPTORS: DESCRIPTORS: DULL;ACHING

## 2018-03-19 ASSESSMENT — PAIN DESCRIPTION - PAIN TYPE: TYPE: ACUTE PAIN

## 2018-03-19 NOTE — ED PROVIDER NOTES
[facility] ED  eMERGENCY dEPARTMENT eNCOUnter      Pt Name: Neelima Bull  MRN: [id number]  Izzygffortunato 1985  Date of evaluation: 3/19/2018  Provider: Ulises Boo MD     [address] [place] [address]       Chief Complaint   Patient presents with    Headache     and numbness on left side of face and arm       HISTORY OF PRESENT ILLNESS   (Location/Symptom, Timing/Onset, Context/Setting, Quality, Duration, Modifying Factors, Severity) Note limiting factors. 28 old female with a history of migraines presents with headache. She states that she has been under a lot of stress recently and having a lot of anxiety which typically triggers her migraines labs. She started having headache around 1 PM yesterday-11 after hours ago. She was at work having some anxiety when it started. It feels like a typical migraine but it is somewhat atypical because it was more sudden onset then usual.  Denies recent head trauma or falls. Denies depression or suicidal thoughts/ideation. She is not nauseated or vomiting. She was having tingling on her face and in her upper extremities earlier but this seems to have resolved at this point. No difficulty speaking. She did not take her anxiety medicine yet. Nursing Notes were reviewed. REVIEW OF SYSTEMS    (2+ for level 4; 10+ for level 5)     Review of Systems   Constitutional: Negative for chills and fever. HENT: Negative for sore throat. Eyes: Negative for visual disturbance. Respiratory: Negative for shortness of breath. Cardiovascular: Negative for chest pain. Gastrointestinal: Negative for abdominal pain, diarrhea, nausea and vomiting. Endocrine: Negative for polyuria. Genitourinary: Negative for dysuria. Musculoskeletal: Negative for back pain and neck pain. Skin: Negative for rash. Neurological: Positive for headaches. Negative for dizziness, tremors, syncope, speech difficulty, weakness and numbness.    Hematological: Negative for for level 5)     ED Triage Vitals [03/19/18 0024]   BP Temp Temp Source Pulse Resp SpO2 Height Weight   124/82 97.4 °F (36.3 °C) Oral 72 17 98 % 5' 8\" (1.727 m) 270 lb (122.5 kg)       Physical Exam   Constitutional: She is oriented to person, place, and time. She appears well-developed and well-nourished. No distress. HENT:   Head: Normocephalic and atraumatic. Nose: Nose normal.   Mouth/Throat: No oropharyngeal exudate. Eyes: Conjunctivae are normal. Pupils are equal, round, and reactive to light. Right eye exhibits no discharge. Left eye exhibits no discharge. Neck: Normal range of motion. Neck supple. Cardiovascular: Normal rate, regular rhythm and normal heart sounds. Exam reveals no friction rub. No murmur heard. Pulmonary/Chest: Effort normal and breath sounds normal. No stridor. No respiratory distress. She has no wheezes. Abdominal: Soft. Bowel sounds are normal. She exhibits no distension. There is no rebound and no guarding. Musculoskeletal: Normal range of motion. She exhibits no edema or tenderness. Lymphadenopathy:     She has no cervical adenopathy. Neurological: She is alert and oriented to person, place, and time. She displays normal reflexes. No cranial nerve deficit. Coordination normal.   Skin: Skin is warm and dry. No rash noted. Psychiatric: She has a normal mood and affect. Her behavior is normal. Thought content normal.   Nursing note and vitals reviewed.       DIAGNOSTIC RESULTS     EKG: All EKG's are interpreted by the Emergency Department Physician who either signs or Co-signs this chart in the absence of a cardiologist.        RADIOLOGY:   Non-plain film images such as CT, Ultrasound and MRI are read by the radiologist. Plain radiographic images are visualized and preliminarily interpreted by the emergency physician with the below findings:        Interpretation per the Radiologist below (if available at the time of note entry):    CT Head WO Contrast    (Results of this note were completed with a voice recognition program.  Efforts were made to edit the dictations but occasionally words and phrases are mis-transcribed.)    Shirley Kwan MD (electronically signed)  Attending Emergency Physician              Sea Bustos MD  03/19/18 011

## 2018-04-03 ENCOUNTER — OFFICE VISIT (OUTPATIENT)
Dept: OBGYN CLINIC | Age: 33
End: 2018-04-03
Payer: COMMERCIAL

## 2018-04-03 VITALS
BODY MASS INDEX: 38.8 KG/M2 | SYSTOLIC BLOOD PRESSURE: 112 MMHG | DIASTOLIC BLOOD PRESSURE: 74 MMHG | WEIGHT: 256 LBS | HEART RATE: 76 BPM | HEIGHT: 68 IN

## 2018-04-03 DIAGNOSIS — Z11.3 SCREENING FOR STD (SEXUALLY TRANSMITTED DISEASE): Primary | ICD-10-CM

## 2018-04-03 DIAGNOSIS — Z11.3 SCREENING FOR STD (SEXUALLY TRANSMITTED DISEASE): ICD-10-CM

## 2018-04-03 LAB
HEPATITIS B SURFACE ANTIGEN INTERPRETATION: NORMAL
HEPATITIS C ANTIBODY INTERPRETATION: NORMAL

## 2018-04-03 PROCEDURE — G8417 CALC BMI ABV UP PARAM F/U: HCPCS | Performed by: OBSTETRICS & GYNECOLOGY

## 2018-04-03 PROCEDURE — G8427 DOCREV CUR MEDS BY ELIG CLIN: HCPCS | Performed by: OBSTETRICS & GYNECOLOGY

## 2018-04-03 PROCEDURE — 1036F TOBACCO NON-USER: CPT | Performed by: OBSTETRICS & GYNECOLOGY

## 2018-04-03 PROCEDURE — 99213 OFFICE O/P EST LOW 20 MIN: CPT | Performed by: OBSTETRICS & GYNECOLOGY

## 2018-04-05 LAB — HIV-1 AND HIV-2 ANTIBODIES: NEGATIVE

## 2018-04-11 PROBLEM — Z01.419 VISIT FOR GYNECOLOGIC EXAMINATION: Status: RESOLVED | Noted: 2017-12-25 | Resolved: 2018-04-11

## 2018-04-13 ENCOUNTER — OFFICE VISIT (OUTPATIENT)
Dept: OBGYN CLINIC | Age: 33
End: 2018-04-13
Payer: COMMERCIAL

## 2018-04-13 VITALS
DIASTOLIC BLOOD PRESSURE: 62 MMHG | HEART RATE: 84 BPM | WEIGHT: 264 LBS | HEIGHT: 68 IN | SYSTOLIC BLOOD PRESSURE: 112 MMHG | BODY MASS INDEX: 40.01 KG/M2

## 2018-04-13 DIAGNOSIS — N92.1 BREAKTHROUGH BLEEDING: ICD-10-CM

## 2018-04-13 DIAGNOSIS — Z97.5 NEXPLANON IN PLACE: ICD-10-CM

## 2018-04-13 DIAGNOSIS — E28.2 PCOS (POLYCYSTIC OVARIAN SYNDROME): Primary | ICD-10-CM

## 2018-04-13 PROCEDURE — 99213 OFFICE O/P EST LOW 20 MIN: CPT | Performed by: OBSTETRICS & GYNECOLOGY

## 2018-04-13 PROCEDURE — G8427 DOCREV CUR MEDS BY ELIG CLIN: HCPCS | Performed by: OBSTETRICS & GYNECOLOGY

## 2018-04-13 PROCEDURE — 1036F TOBACCO NON-USER: CPT | Performed by: OBSTETRICS & GYNECOLOGY

## 2018-04-13 PROCEDURE — G8417 CALC BMI ABV UP PARAM F/U: HCPCS | Performed by: OBSTETRICS & GYNECOLOGY

## 2018-04-13 ASSESSMENT — ENCOUNTER SYMPTOMS
VOMITING: 0
SHORTNESS OF BREATH: 0
NAUSEA: 0
COUGH: 0

## 2018-04-19 DIAGNOSIS — E28.2 PCOS (POLYCYSTIC OVARIAN SYNDROME): ICD-10-CM

## 2018-04-19 LAB
BASOPHILS ABSOLUTE: 0 K/UL (ref 0–0.2)
BASOPHILS RELATIVE PERCENT: 0.3 %
EOSINOPHILS ABSOLUTE: 0.1 K/UL (ref 0–0.7)
EOSINOPHILS RELATIVE PERCENT: 1.5 %
FOLLICLE STIMULATING HORMONE: 1.5 MIU/ML
GONADOTROPIN, CHORIONIC (HCG) QUANT: <0.1 MIU/ML
HCT VFR BLD CALC: 39 % (ref 37–47)
HEMOGLOBIN: 13 G/DL (ref 12–16)
LYMPHOCYTES ABSOLUTE: 2.1 K/UL (ref 1–4.8)
LYMPHOCYTES RELATIVE PERCENT: 22.5 %
MCH RBC QN AUTO: 29.3 PG (ref 27–31.3)
MCHC RBC AUTO-ENTMCNC: 33.3 % (ref 33–37)
MCV RBC AUTO: 87.8 FL (ref 82–100)
MONOCYTES ABSOLUTE: 0.3 K/UL (ref 0.2–0.8)
MONOCYTES RELATIVE PERCENT: 3.7 %
NEUTROPHILS ABSOLUTE: 6.6 K/UL (ref 1.4–6.5)
NEUTROPHILS RELATIVE PERCENT: 72 %
PDW BLD-RTO: 14.7 % (ref 11.5–14.5)
PLATELET # BLD: 254 K/UL (ref 130–400)
PROLACTIN: 10 NG/ML
RBC # BLD: 4.44 M/UL (ref 4.2–5.4)
T4 FREE: 1.04 NG/DL (ref 0.93–1.7)
TSH REFLEX: 1.91 UIU/ML (ref 0.27–4.2)
WBC # BLD: 9.2 K/UL (ref 4.8–10.8)

## 2018-04-20 ENCOUNTER — HOSPITAL ENCOUNTER (EMERGENCY)
Age: 33
Discharge: HOME | End: 2018-04-20
Attending: EMERGENCY MEDICINE
Payer: COMMERCIAL

## 2018-04-20 VITALS
BODY MASS INDEX: 39.99 KG/M2 | HEIGHT: 69 IN | OXYGEN SATURATION: 99 % | TEMPERATURE: 98 F | RESPIRATION RATE: 18 BRPM | SYSTOLIC BLOOD PRESSURE: 125 MMHG | DIASTOLIC BLOOD PRESSURE: 77 MMHG | WEIGHT: 270 LBS | HEART RATE: 74 BPM

## 2018-04-20 DIAGNOSIS — T50.905A ADVERSE DRUG EFFECT, INITIAL ENCOUNTER: Primary | ICD-10-CM

## 2018-04-20 LAB
DHEAS (DHEA SULFATE): 155 UG/DL (ref 45–270)
EKG ATRIAL RATE: 67 BPM
EKG P AXIS: -4 DEGREES
EKG P-R INTERVAL: 138 MS
EKG Q-T INTERVAL: 414 MS
EKG QRS DURATION: 80 MS
EKG QTC CALCULATION (BAZETT): 437 MS
EKG R AXIS: 24 DEGREES
EKG T AXIS: 19 DEGREES
EKG VENTRICULAR RATE: 67 BPM

## 2018-04-20 PROCEDURE — 93005 ELECTROCARDIOGRAM TRACING: CPT

## 2018-04-20 PROCEDURE — 99284 EMERGENCY DEPT VISIT MOD MDM: CPT

## 2018-04-20 RX ORDER — TOPIRAMATE 25 MG/1
50 CAPSULE, COATED PELLETS ORAL NIGHTLY
COMMUNITY
End: 2018-08-17 | Stop reason: ALTCHOICE

## 2018-04-22 LAB
17-HYDROXYPREGNENOLONE: 40 NG/DL
ANDROSTENEDIONE: 0.84 NG/ML (ref 0.26–2.14)
SEX HORMONE BINDING GLOBULIN: 91 NMOL/L (ref 30–135)
TESTOSTERONE FREE: 3.2 PG/ML (ref 1.3–9.2)
TESTOSTERONE, FEMALES/CHILDREN: 38 NG/DL (ref 9–55)

## 2018-05-02 ENCOUNTER — OFFICE VISIT (OUTPATIENT)
Dept: OBGYN CLINIC | Age: 33
End: 2018-05-02
Payer: COMMERCIAL

## 2018-05-02 VITALS
DIASTOLIC BLOOD PRESSURE: 58 MMHG | HEART RATE: 80 BPM | SYSTOLIC BLOOD PRESSURE: 102 MMHG | BODY MASS INDEX: 39.84 KG/M2 | HEIGHT: 69 IN | WEIGHT: 269 LBS

## 2018-05-02 DIAGNOSIS — N92.1 BREAKTHROUGH BLEEDING: ICD-10-CM

## 2018-05-02 DIAGNOSIS — Z71.2 ENCOUNTER TO DISCUSS TEST RESULTS: Primary | ICD-10-CM

## 2018-05-02 DIAGNOSIS — E28.2 PCOS (POLYCYSTIC OVARIAN SYNDROME): ICD-10-CM

## 2018-05-02 PROCEDURE — 1036F TOBACCO NON-USER: CPT | Performed by: OBSTETRICS & GYNECOLOGY

## 2018-05-02 PROCEDURE — G8417 CALC BMI ABV UP PARAM F/U: HCPCS | Performed by: OBSTETRICS & GYNECOLOGY

## 2018-05-02 PROCEDURE — 99213 OFFICE O/P EST LOW 20 MIN: CPT | Performed by: OBSTETRICS & GYNECOLOGY

## 2018-05-02 PROCEDURE — G8427 DOCREV CUR MEDS BY ELIG CLIN: HCPCS | Performed by: OBSTETRICS & GYNECOLOGY

## 2018-05-03 DIAGNOSIS — T17.208A FOREIGN BODY IN PHARYNX, INITIAL ENCOUNTER: Primary | ICD-10-CM

## 2018-05-03 PROBLEM — Z11.3 SCREENING FOR STD (SEXUALLY TRANSMITTED DISEASE): Status: RESOLVED | Noted: 2018-04-03 | Resolved: 2018-05-03

## 2018-05-12 PROBLEM — Z71.2 ENCOUNTER TO DISCUSS TEST RESULTS: Status: ACTIVE | Noted: 2018-05-12

## 2018-05-12 ASSESSMENT — ENCOUNTER SYMPTOMS
VOMITING: 0
COUGH: 0
NAUSEA: 0
SHORTNESS OF BREATH: 0

## 2018-05-14 ENCOUNTER — HOSPITAL ENCOUNTER (EMERGENCY)
Age: 33
Discharge: HOME | End: 2018-05-14
Payer: COMMERCIAL

## 2018-05-14 ENCOUNTER — APPOINTMENT (OUTPATIENT)
Dept: GENERAL RADIOLOGY | Age: 33
End: 2018-05-14
Payer: COMMERCIAL

## 2018-05-14 ENCOUNTER — APPOINTMENT (OUTPATIENT)
Dept: CT IMAGING | Age: 33
End: 2018-05-14
Payer: COMMERCIAL

## 2018-05-14 VITALS
BODY MASS INDEX: 40.92 KG/M2 | OXYGEN SATURATION: 96 % | RESPIRATION RATE: 22 BRPM | WEIGHT: 270 LBS | DIASTOLIC BLOOD PRESSURE: 91 MMHG | TEMPERATURE: 97.8 F | SYSTOLIC BLOOD PRESSURE: 121 MMHG | HEIGHT: 68 IN | HEART RATE: 81 BPM

## 2018-05-14 DIAGNOSIS — R06.02 SHORTNESS OF BREATH: Primary | ICD-10-CM

## 2018-05-14 DIAGNOSIS — R09.89 GLOBUS SENSATION: ICD-10-CM

## 2018-05-14 LAB
ALBUMIN SERPL-MCNC: 3.8 G/DL (ref 3.9–4.9)
ALP BLD-CCNC: 157 U/L (ref 40–130)
ALT SERPL-CCNC: 12 U/L (ref 0–33)
ANION GAP SERPL CALCULATED.3IONS-SCNC: 14 MEQ/L (ref 7–13)
APTT: 27.8 SEC (ref 21.6–35.4)
AST SERPL-CCNC: 20 U/L (ref 0–35)
BASOPHILS ABSOLUTE: 0 K/UL (ref 0–0.2)
BASOPHILS RELATIVE PERCENT: 0.4 %
BILIRUB SERPL-MCNC: <0.2 MG/DL (ref 0–1.2)
BUN BLDV-MCNC: 14 MG/DL (ref 6–20)
CALCIUM SERPL-MCNC: 9 MG/DL (ref 8.6–10.2)
CHLORIDE BLD-SCNC: 102 MEQ/L (ref 98–107)
CO2: 24 MEQ/L (ref 22–29)
CREAT SERPL-MCNC: 0.86 MG/DL (ref 0.5–0.9)
D DIMER: 0.32 MG/L FEU (ref 0–0.5)
EKG ATRIAL RATE: 88 BPM
EKG P AXIS: 46 DEGREES
EKG P-R INTERVAL: 156 MS
EKG Q-T INTERVAL: 366 MS
EKG QRS DURATION: 64 MS
EKG QTC CALCULATION (BAZETT): 442 MS
EKG R AXIS: 22 DEGREES
EKG T AXIS: 5 DEGREES
EKG VENTRICULAR RATE: 88 BPM
EOSINOPHILS ABSOLUTE: 0.3 K/UL (ref 0–0.7)
EOSINOPHILS RELATIVE PERCENT: 2.2 %
GFR AFRICAN AMERICAN: >60
GFR NON-AFRICAN AMERICAN: >60
GLOBULIN: 2.6 G/DL (ref 2.3–3.5)
GLUCOSE BLD-MCNC: 98 MG/DL (ref 74–109)
HCT VFR BLD CALC: 39 % (ref 37–47)
HEMOGLOBIN: 13.2 G/DL (ref 12–16)
INR BLD: 1
LYMPHOCYTES ABSOLUTE: 4.2 K/UL (ref 1–4.8)
LYMPHOCYTES RELATIVE PERCENT: 35.4 %
MCH RBC QN AUTO: 29.1 PG (ref 27–31.3)
MCHC RBC AUTO-ENTMCNC: 33.7 % (ref 33–37)
MCV RBC AUTO: 86.3 FL (ref 82–100)
MONOCYTES ABSOLUTE: 0.7 K/UL (ref 0.2–0.8)
MONOCYTES RELATIVE PERCENT: 5.5 %
NEUTROPHILS ABSOLUTE: 6.7 K/UL (ref 1.4–6.5)
NEUTROPHILS RELATIVE PERCENT: 56.5 %
PDW BLD-RTO: 14.8 % (ref 11.5–14.5)
PLATELET # BLD: 258 K/UL (ref 130–400)
POTASSIUM SERPL-SCNC: 4.1 MEQ/L (ref 3.5–5.1)
PROTHROMBIN TIME: 10.6 SEC (ref 9.6–12.3)
RBC # BLD: 4.52 M/UL (ref 4.2–5.4)
SODIUM BLD-SCNC: 140 MEQ/L (ref 132–144)
TOTAL CK: 167 U/L (ref 0–170)
TOTAL PROTEIN: 6.4 G/DL (ref 6.4–8.1)
TROPONIN: <0.01 NG/ML (ref 0–0.01)
WBC # BLD: 11.9 K/UL (ref 4.8–10.8)

## 2018-05-14 PROCEDURE — 85610 PROTHROMBIN TIME: CPT

## 2018-05-14 PROCEDURE — 36415 COLL VENOUS BLD VENIPUNCTURE: CPT

## 2018-05-14 PROCEDURE — 93010 ELECTROCARDIOGRAM REPORT: CPT | Performed by: INTERNAL MEDICINE

## 2018-05-14 PROCEDURE — 85025 COMPLETE CBC W/AUTO DIFF WBC: CPT

## 2018-05-14 PROCEDURE — 6360000004 HC RX CONTRAST MEDICATION: Performed by: PHYSICIAN ASSISTANT

## 2018-05-14 PROCEDURE — 71045 X-RAY EXAM CHEST 1 VIEW: CPT

## 2018-05-14 PROCEDURE — 85379 FIBRIN DEGRADATION QUANT: CPT

## 2018-05-14 PROCEDURE — 96374 THER/PROPH/DIAG INJ IV PUSH: CPT

## 2018-05-14 PROCEDURE — 99285 EMERGENCY DEPT VISIT HI MDM: CPT

## 2018-05-14 PROCEDURE — 70491 CT SOFT TISSUE NECK W/DYE: CPT

## 2018-05-14 PROCEDURE — 84484 ASSAY OF TROPONIN QUANT: CPT

## 2018-05-14 PROCEDURE — 82550 ASSAY OF CK (CPK): CPT

## 2018-05-14 PROCEDURE — 6360000002 HC RX W HCPCS: Performed by: PHYSICIAN ASSISTANT

## 2018-05-14 PROCEDURE — 2580000003 HC RX 258: Performed by: PHYSICIAN ASSISTANT

## 2018-05-14 PROCEDURE — 93005 ELECTROCARDIOGRAM TRACING: CPT

## 2018-05-14 PROCEDURE — 80053 COMPREHEN METABOLIC PANEL: CPT

## 2018-05-14 PROCEDURE — 85730 THROMBOPLASTIN TIME PARTIAL: CPT

## 2018-05-14 RX ORDER — PREDNISONE 20 MG/1
40 TABLET ORAL DAILY
Qty: 10 TABLET | Refills: 0 | Status: SHIPPED | OUTPATIENT
Start: 2018-05-14 | End: 2018-05-19

## 2018-05-14 RX ORDER — SODIUM CHLORIDE 0.9 % (FLUSH) 0.9 %
10 SYRINGE (ML) INJECTION ONCE
Status: COMPLETED | OUTPATIENT
Start: 2018-05-14 | End: 2018-05-14

## 2018-05-14 RX ORDER — METHYLPREDNISOLONE SODIUM SUCCINATE 125 MG/2ML
125 INJECTION, POWDER, LYOPHILIZED, FOR SOLUTION INTRAMUSCULAR; INTRAVENOUS ONCE
Status: COMPLETED | OUTPATIENT
Start: 2018-05-14 | End: 2018-05-14

## 2018-05-14 RX ADMIN — Medication 10 ML: at 07:23

## 2018-05-14 RX ADMIN — IOPAMIDOL 75 ML: 755 INJECTION, SOLUTION INTRAVENOUS at 07:23

## 2018-05-14 RX ADMIN — METHYLPREDNISOLONE SODIUM SUCCINATE 125 MG: 125 INJECTION, POWDER, FOR SOLUTION INTRAMUSCULAR; INTRAVENOUS at 06:44

## 2018-05-14 ASSESSMENT — PAIN DESCRIPTION - LOCATION: LOCATION: CHEST

## 2018-05-14 ASSESSMENT — ENCOUNTER SYMPTOMS
VOMITING: 0
DIARRHEA: 0
COLOR CHANGE: 0
SORE THROAT: 0
SHORTNESS OF BREATH: 1
NAUSEA: 0
EYE DISCHARGE: 0
ABDOMINAL DISTENTION: 0
RHINORRHEA: 0
BACK PAIN: 0
ABDOMINAL PAIN: 0
CONSTIPATION: 0

## 2018-05-14 ASSESSMENT — PAIN SCALES - GENERAL: PAINLEVEL_OUTOF10: 10

## 2018-05-15 LAB
GFR AFRICAN AMERICAN: >60
GFR NON-AFRICAN AMERICAN: >60
PERFORMED ON: NORMAL
POC CREATININE: 0.9 MG/DL (ref 0.6–1.1)
POC SAMPLE TYPE: NORMAL

## 2018-06-11 PROBLEM — Z71.2 ENCOUNTER TO DISCUSS TEST RESULTS: Status: RESOLVED | Noted: 2018-05-12 | Resolved: 2018-06-11

## 2018-07-07 ENCOUNTER — HOSPITAL ENCOUNTER (EMERGENCY)
Age: 33
Discharge: HOME | End: 2018-07-07
Attending: EMERGENCY MEDICINE
Payer: COMMERCIAL

## 2018-07-07 VITALS
DIASTOLIC BLOOD PRESSURE: 80 MMHG | RESPIRATION RATE: 18 BRPM | HEIGHT: 68 IN | HEART RATE: 78 BPM | TEMPERATURE: 98.2 F | OXYGEN SATURATION: 99 % | SYSTOLIC BLOOD PRESSURE: 124 MMHG | BODY MASS INDEX: 40.92 KG/M2 | WEIGHT: 270 LBS

## 2018-07-07 DIAGNOSIS — R20.2 NUMBNESS AND TINGLING: ICD-10-CM

## 2018-07-07 DIAGNOSIS — R07.89 CHEST WALL PAIN: Primary | ICD-10-CM

## 2018-07-07 DIAGNOSIS — R20.0 NUMBNESS AND TINGLING: ICD-10-CM

## 2018-07-07 LAB
CHP ED QC CHECK: YES
EKG ATRIAL RATE: 74 BPM
EKG P AXIS: 16 DEGREES
EKG P-R INTERVAL: 146 MS
EKG Q-T INTERVAL: 392 MS
EKG QRS DURATION: 76 MS
EKG QTC CALCULATION (BAZETT): 435 MS
EKG R AXIS: 50 DEGREES
EKG T AXIS: 27 DEGREES
EKG VENTRICULAR RATE: 74 BPM
GLUCOSE BLD-MCNC: 105 MG/DL
GLUCOSE BLD-MCNC: 105 MG/DL (ref 60–115)
PERFORMED ON: NORMAL

## 2018-07-07 PROCEDURE — 99284 EMERGENCY DEPT VISIT MOD MDM: CPT

## 2018-07-07 PROCEDURE — 93005 ELECTROCARDIOGRAM TRACING: CPT

## 2018-07-07 PROCEDURE — 96372 THER/PROPH/DIAG INJ SC/IM: CPT

## 2018-07-07 PROCEDURE — 6360000002 HC RX W HCPCS: Performed by: EMERGENCY MEDICINE

## 2018-07-07 RX ORDER — METHYLPREDNISOLONE 4 MG/1
TABLET ORAL
Qty: 1 KIT | Refills: 0 | Status: SHIPPED | OUTPATIENT
Start: 2018-07-07 | End: 2018-08-17 | Stop reason: ALTCHOICE

## 2018-07-07 RX ORDER — LORAZEPAM 2 MG/ML
1 INJECTION INTRAMUSCULAR ONCE
Status: COMPLETED | OUTPATIENT
Start: 2018-07-07 | End: 2018-07-07

## 2018-07-07 RX ORDER — METHYLPREDNISOLONE SODIUM SUCCINATE 125 MG/2ML
125 INJECTION, POWDER, LYOPHILIZED, FOR SOLUTION INTRAMUSCULAR; INTRAVENOUS ONCE
Status: COMPLETED | OUTPATIENT
Start: 2018-07-07 | End: 2018-07-07

## 2018-07-07 RX ADMIN — LORAZEPAM 1 MG: 2 INJECTION INTRAMUSCULAR; INTRAVENOUS at 04:16

## 2018-07-07 RX ADMIN — METHYLPREDNISOLONE SODIUM SUCCINATE 125 MG: 125 INJECTION, POWDER, FOR SOLUTION INTRAMUSCULAR; INTRAVENOUS at 04:53

## 2018-07-07 ASSESSMENT — PAIN SCALES - GENERAL: PAINLEVEL_OUTOF10: 6

## 2018-07-07 ASSESSMENT — PAIN DESCRIPTION - DESCRIPTORS: DESCRIPTORS: TIGHTNESS

## 2018-07-07 ASSESSMENT — PAIN DESCRIPTION - PAIN TYPE: TYPE: ACUTE PAIN

## 2018-07-07 ASSESSMENT — PAIN DESCRIPTION - FREQUENCY: FREQUENCY: CONTINUOUS

## 2018-07-07 ASSESSMENT — PAIN DESCRIPTION - ORIENTATION: ORIENTATION: MID

## 2018-07-07 NOTE — ED PROVIDER NOTES
will return for increasing tingling weakness or numbness, she will return for shortness of breath. There is no evidence of pulmonary embolism with her blood pressure normal and pulse normal pulse oxygenation normal respiration rate normal she is not short of breath with ambulation    Interpretation per the Radiologist below, if available at the time of this note:    No orders to display         ED BEDSIDE ULTRASOUND:   Performed by ED Physician - none    LABS:  Labs Reviewed   POCT GLUCOSE       All other labs were within normal range or not returned as of this dictation. EMERGENCY DEPARTMENT COURSE and DIFFERENTIAL DIAGNOSIS/MDM:   Vitals:    Vitals:    07/07/18 0355   BP: 124/80   Pulse: 78   Resp: 18   Temp: 98.2 °F (36.8 °C)   TempSrc: Oral   SpO2: 99%   Weight: 270 lb (122.5 kg)   Height: 5' 8\" (1.727 m)           MDM    CRITICAL CARE TIME   Total Critical Care time was  minutes, excluding separately reportable procedures. There was a high probability of clinically significant/life threatening deterioration in the patient's condition which required my urgent intervention. CONSULTS:  None    PROCEDURES:  Unless otherwise noted below, none     Procedures    FINAL IMPRESSION      1. Chest wall pain    2. Numbness and tingling          DISPOSITION/PLAN   DISPOSITION Decision To Discharge 07/07/2018 04:42:44 AM      PATIENT REFERRED TO:  Tano Sullivan PA-C  [address] [address]  [phone]    In 3 days        DISCHARGE MEDICATIONS:  New Prescriptions    METHYLPREDNISOLONE (MEDROL, BATSHEVA,) 4 MG TABLET    Take by mouth.           (Please note that portions of this note were completed with a voice recognition program.  Efforts were made to edit the dictations but occasionally words are mis-transcribed.)    Timo Linda MD (electronically signed)  Attending Emergency Physician          Timo Linda MD  07/07/18 8421

## 2018-07-07 NOTE — ED TRIAGE NOTES
Patient c/o midsternal chest pain x 3 days, describes it as tightness, denies cough, she c/o nausea, hx of MS and anxiety, she didn't take anything for the pain, she rates it 6/10, states it originally came while she was sleeping, it woke her form her sleep again tonight, that's why she came in.

## 2018-07-09 PROCEDURE — 93010 ELECTROCARDIOGRAM REPORT: CPT | Performed by: INTERNAL MEDICINE

## 2018-08-08 ENCOUNTER — HOSPITAL ENCOUNTER (EMERGENCY)
Age: 33
Discharge: HOME | End: 2018-08-08
Payer: COMMERCIAL

## 2018-08-08 VITALS
HEART RATE: 65 BPM | SYSTOLIC BLOOD PRESSURE: 108 MMHG | RESPIRATION RATE: 16 BRPM | TEMPERATURE: 98.5 F | HEIGHT: 68 IN | DIASTOLIC BLOOD PRESSURE: 73 MMHG | WEIGHT: 270 LBS | OXYGEN SATURATION: 98 % | BODY MASS INDEX: 40.92 KG/M2

## 2018-08-08 DIAGNOSIS — R20.2 PARESTHESIA: ICD-10-CM

## 2018-08-08 DIAGNOSIS — R51.9 NONINTRACTABLE HEADACHE, UNSPECIFIED CHRONICITY PATTERN, UNSPECIFIED HEADACHE TYPE: Primary | ICD-10-CM

## 2018-08-08 DIAGNOSIS — R11.0 NAUSEA: ICD-10-CM

## 2018-08-08 LAB
ACANTHOCYTES: 0
ALBUMIN SERPL-MCNC: 3.8 G/DL (ref 3.9–4.9)
ALP BLD-CCNC: 114 U/L (ref 40–130)
ALT SERPL-CCNC: 10 U/L (ref 0–33)
AMPHETAMINE SCREEN, URINE: NORMAL
ANION GAP SERPL CALCULATED.3IONS-SCNC: 14 MEQ/L (ref 7–13)
ANISOCYTOSIS: 0
AST SERPL-CCNC: 16 U/L (ref 0–35)
ATYPICAL LYMPHOCYTE RELATIVE PERCENT: 1 %
AUER RODS: 0
BARBITURATE SCREEN URINE: NORMAL
BASOPHILIC STIPPLING: 0
BASOPHILS ABSOLUTE: 0 K/UL (ref 0–0.2)
BASOPHILS RELATIVE PERCENT: 0.5 %
BENZODIAZEPINE SCREEN, URINE: NORMAL
BILIRUB SERPL-MCNC: <0.2 MG/DL (ref 0–1.2)
BILIRUBIN URINE: NEGATIVE
BLOOD, URINE: NEGATIVE
BUN BLDV-MCNC: 13 MG/DL (ref 6–20)
BURR CELLS: 0
CABOT RINGS: 0
CALCIUM SERPL-MCNC: 8.8 MG/DL (ref 8.6–10.2)
CANNABINOID SCREEN URINE: NORMAL
CHLORIDE BLD-SCNC: 103 MEQ/L (ref 98–107)
CLARITY: CLEAR
CO2: 25 MEQ/L (ref 22–29)
COCAINE METABOLITE SCREEN URINE: NORMAL
COLOR: YELLOW
CREAT SERPL-MCNC: 0.76 MG/DL (ref 0.5–0.9)
DOHLE BODIES: 0
EKG ATRIAL RATE: 75 BPM
EKG P AXIS: 0 DEGREES
EKG P-R INTERVAL: 136 MS
EKG Q-T INTERVAL: 414 MS
EKG QRS DURATION: 74 MS
EKG QTC CALCULATION (BAZETT): 462 MS
EKG R AXIS: 10 DEGREES
EKG T AXIS: -3 DEGREES
EKG VENTRICULAR RATE: 75 BPM
EOSINOPHILS ABSOLUTE: 0.1 K/UL (ref 0–0.7)
EOSINOPHILS RELATIVE PERCENT: 1 %
GFR AFRICAN AMERICAN: >60
GFR NON-AFRICAN AMERICAN: >60
GLOBULIN: 3.1 G/DL (ref 2.3–3.5)
GLUCOSE BLD-MCNC: 83 MG/DL (ref 74–109)
GLUCOSE URINE: NEGATIVE MG/DL
HAIRY CELLS: 0
HCT VFR BLD CALC: 39.3 % (ref 37–47)
HEMOGLOBIN: 13 G/DL (ref 12–16)
HOWELL-JOLLY BODIES: 0
HYPERSEGMENTED NEUTROPHILS: 0
HYPOCHROMIA: 0
KETONES, URINE: NEGATIVE MG/DL
LEUKOCYTE ESTERASE, URINE: NEGATIVE
LYMPHOCYTES ABSOLUTE: 5.1 K/UL (ref 1–4.8)
LYMPHOCYTES RELATIVE PERCENT: 44 %
Lab: NORMAL
MACROCYTES: 0
MAGNESIUM: 1.9 MG/DL (ref 1.7–2.3)
MCH RBC QN AUTO: 29.3 PG (ref 27–31.3)
MCHC RBC AUTO-ENTMCNC: 33.2 % (ref 33–37)
MCV RBC AUTO: 88.4 FL (ref 82–100)
MICROCYTES: 0
MONOCYTES ABSOLUTE: 0.5 K/UL (ref 0.2–0.8)
MONOCYTES RELATIVE PERCENT: 3.8 %
NEUTROPHILS ABSOLUTE: 5.8 K/UL (ref 1.4–6.5)
NEUTROPHILS RELATIVE PERCENT: 51 %
NITRITE, URINE: NEGATIVE
OPIATE SCREEN URINE: NORMAL
OVALOCYTES: 0
PAPPENHEIMER BODIES: 0
PDW BLD-RTO: 14.3 % (ref 11.5–14.5)
PELGER HUET CELLS: 0 %
PH UA: 5.5 (ref 5–9)
PHENCYCLIDINE SCREEN URINE: NORMAL
PLATELET # BLD: 272 K/UL (ref 130–400)
PLATELET SLIDE REVIEW: ADEQUATE
POIKILOCYTES: 0
POLYCHROMASIA: 0
POTASSIUM SERPL-SCNC: 3.7 MEQ/L (ref 3.5–5.1)
PROTEIN UA: NEGATIVE MG/DL
RBC # BLD: 4.44 M/UL (ref 4.2–5.4)
RBC # BLD: NORMAL 10*6/UL
SCHISTOCYTES: 0
SICKLE CELLS: 0
SMUDGE CELLS: 2.9
SODIUM BLD-SCNC: 142 MEQ/L (ref 132–144)
SPECIFIC GRAVITY UA: 1.01 (ref 1–1.03)
SPHEROCYTES: 0
STOMATOCYTES: 0
TARGET CELLS: 0
TEAR DROP CELLS: 0
TOTAL CK: 106 U/L (ref 0–170)
TOTAL PROTEIN: 6.9 G/DL (ref 6.4–8.1)
TOXIC GRANULATION: 0
TROPONIN: <0.01 NG/ML (ref 0–0.01)
URINE REFLEX TO CULTURE: NORMAL
UROBILINOGEN, URINE: 0.2 E.U./DL
VACUOLATED NEUTROPHILS: 0
WBC # BLD: 11.3 K/UL (ref 4.8–10.8)

## 2018-08-08 PROCEDURE — 36415 COLL VENOUS BLD VENIPUNCTURE: CPT

## 2018-08-08 PROCEDURE — 81003 URINALYSIS AUTO W/O SCOPE: CPT

## 2018-08-08 PROCEDURE — 93010 ELECTROCARDIOGRAM REPORT: CPT | Performed by: INTERNAL MEDICINE

## 2018-08-08 PROCEDURE — 6360000002 HC RX W HCPCS: Performed by: PHYSICIAN ASSISTANT

## 2018-08-08 PROCEDURE — 84484 ASSAY OF TROPONIN QUANT: CPT

## 2018-08-08 PROCEDURE — 85025 COMPLETE CBC W/AUTO DIFF WBC: CPT

## 2018-08-08 PROCEDURE — 83735 ASSAY OF MAGNESIUM: CPT

## 2018-08-08 PROCEDURE — 93005 ELECTROCARDIOGRAM TRACING: CPT

## 2018-08-08 PROCEDURE — 96374 THER/PROPH/DIAG INJ IV PUSH: CPT

## 2018-08-08 PROCEDURE — 2580000003 HC RX 258: Performed by: PHYSICIAN ASSISTANT

## 2018-08-08 PROCEDURE — 82550 ASSAY OF CK (CPK): CPT

## 2018-08-08 PROCEDURE — 99284 EMERGENCY DEPT VISIT MOD MDM: CPT

## 2018-08-08 PROCEDURE — 96375 TX/PRO/DX INJ NEW DRUG ADDON: CPT

## 2018-08-08 PROCEDURE — 80053 COMPREHEN METABOLIC PANEL: CPT

## 2018-08-08 PROCEDURE — 80307 DRUG TEST PRSMV CHEM ANLYZR: CPT

## 2018-08-08 RX ORDER — LIDOCAINE HYDROCHLORIDE 20 MG/ML
5 INJECTION, SOLUTION INFILTRATION; PERINEURAL ONCE
Status: DISCONTINUED | OUTPATIENT
Start: 2018-08-08 | End: 2018-08-08

## 2018-08-08 RX ORDER — DEXAMETHASONE SODIUM PHOSPHATE 4 MG/ML
4 INJECTION, SOLUTION INTRA-ARTICULAR; INTRALESIONAL; INTRAMUSCULAR; INTRAVENOUS; SOFT TISSUE ONCE
Status: COMPLETED | OUTPATIENT
Start: 2018-08-08 | End: 2018-08-08

## 2018-08-08 RX ORDER — DIPHENHYDRAMINE HYDROCHLORIDE 50 MG/ML
50 INJECTION INTRAMUSCULAR; INTRAVENOUS ONCE
Status: COMPLETED | OUTPATIENT
Start: 2018-08-08 | End: 2018-08-08

## 2018-08-08 RX ORDER — TRAMADOL HYDROCHLORIDE 50 MG/1
50 TABLET ORAL EVERY 6 HOURS PRN
Qty: 4 TABLET | Refills: 0 | Status: SHIPPED | OUTPATIENT
Start: 2018-08-08 | End: 2018-08-09

## 2018-08-08 RX ORDER — 0.9 % SODIUM CHLORIDE 0.9 %
500 INTRAVENOUS SOLUTION INTRAVENOUS ONCE
Status: COMPLETED | OUTPATIENT
Start: 2018-08-08 | End: 2018-08-08

## 2018-08-08 RX ORDER — SODIUM CHLORIDE 0.9 % (FLUSH) 0.9 %
3 SYRINGE (ML) INJECTION EVERY 8 HOURS
Status: DISCONTINUED | OUTPATIENT
Start: 2018-08-08 | End: 2018-08-08 | Stop reason: HOSPADM

## 2018-08-08 RX ORDER — ONDANSETRON 4 MG/1
4 TABLET, ORALLY DISINTEGRATING ORAL ONCE
Status: COMPLETED | OUTPATIENT
Start: 2018-08-08 | End: 2018-08-08

## 2018-08-08 RX ORDER — ONDANSETRON 2 MG/ML
4 INJECTION INTRAMUSCULAR; INTRAVENOUS ONCE
Status: DISCONTINUED | OUTPATIENT
Start: 2018-08-08 | End: 2018-08-08

## 2018-08-08 RX ORDER — METOCLOPRAMIDE HYDROCHLORIDE 5 MG/ML
10 INJECTION INTRAMUSCULAR; INTRAVENOUS ONCE
Status: COMPLETED | OUTPATIENT
Start: 2018-08-08 | End: 2018-08-08

## 2018-08-08 RX ORDER — ONDANSETRON 4 MG/1
4 TABLET, FILM COATED ORAL EVERY 8 HOURS PRN
Qty: 12 TABLET | Refills: 0 | Status: SHIPPED | OUTPATIENT
Start: 2018-08-08 | End: 2018-08-17 | Stop reason: ALTCHOICE

## 2018-08-08 RX ADMIN — SODIUM CHLORIDE 500 ML: 9 INJECTION, SOLUTION INTRAVENOUS at 04:45

## 2018-08-08 RX ADMIN — METOCLOPRAMIDE 10 MG: 5 INJECTION, SOLUTION INTRAMUSCULAR; INTRAVENOUS at 05:37

## 2018-08-08 RX ADMIN — ONDANSETRON 4 MG: 4 TABLET, ORALLY DISINTEGRATING ORAL at 06:27

## 2018-08-08 RX ADMIN — DEXAMETHASONE SODIUM PHOSPHATE 4 MG: 4 INJECTION, SOLUTION INTRAMUSCULAR; INTRAVENOUS at 05:37

## 2018-08-08 RX ADMIN — DIPHENHYDRAMINE HYDROCHLORIDE 50 MG: 50 INJECTION, SOLUTION INTRAMUSCULAR; INTRAVENOUS at 05:37

## 2018-08-08 RX ADMIN — Medication 3 ML: at 04:46

## 2018-08-08 ASSESSMENT — ENCOUNTER SYMPTOMS
BACK PAIN: 1
PHOTOPHOBIA: 0
APNEA: 0
ANAL BLEEDING: 0
VOMITING: 0
COUGH: 0
VOICE CHANGE: 0
EYE DISCHARGE: 0
ABDOMINAL DISTENTION: 0
SHORTNESS OF BREATH: 0
NAUSEA: 0

## 2018-08-08 ASSESSMENT — PAIN SCALES - GENERAL: PAINLEVEL_OUTOF10: 4

## 2018-08-08 ASSESSMENT — PAIN - FUNCTIONAL ASSESSMENT: PAIN_FUNCTIONAL_ASSESSMENT: 0-10

## 2018-08-08 NOTE — ED NOTES
Inquired with Berto FERNANDEZ if it was safe to discharge pt now, he states it would be fine. Pt states she feels she is able to drive home after sleeping off her medications. No distress noted at this time.       Will Rodrigez RN  08/08/18 3156

## 2018-08-08 NOTE — ED PROVIDER NOTES
tenderness. There is no rebound. Musculoskeletal: Normal range of motion. She exhibits tenderness. Pt has left sided trapezius tenderness. Neurological: She is alert and oriented to person, place, and time.   str. 5 for 5 all extremities   Skin: Skin is warm. No erythema. Psychiatric: She has a normal mood and affect. Her behavior is normal.   Nursing note and vitals reviewed. DIAGNOSTIC RESULTS     EKG: All EKG's are interpreted by the Emergency Department Physician who either signs or Co-signs this chart in the absence of a cardiologist.     EKG sinus rhythm with  rate 75 negative ST segment elevation. QTC is 414 ms    RADIOLOGY:   Non-plain film images such as CT, Ultrasound and MRI are read by the radiologist. Plain radiographic images are visualized and preliminarily interpreted by the emergency physician with the below findings:         Interpretation per the Radiologist below, if available at the time of this note:    No orders to display         ED BEDSIDE ULTRASOUND:   Performed by ED Physician - none    LABS:  Labs Reviewed   CBC WITH AUTO DIFFERENTIAL - Abnormal; Notable for the following:        Result Value    WBC 11.3 (*)     Lymphocytes # 5.1 (*)     All other components within normal limits   COMPREHENSIVE METABOLIC PANEL - Abnormal; Notable for the following: Anion Gap 14 (*)     Alb 3.8 (*)     All other components within normal limits   URINE DRUG SCREEN   MAGNESIUM   URINE RT REFLEX TO CULTURE   CK   TROPONIN       All other labs were within normal range or not returned as of this dictation.     EMERGENCY DEPARTMENT COURSE and DIFFERENTIAL DIAGNOSIS/MDM:   Vitals:    Vitals:    08/08/18 0351 08/08/18 0521 08/08/18 0624   BP: (!) 151/75 113/71 108/73   Pulse: 69 62 65   Resp: 20 18 16   Temp: 98.5 °F (36.9 °C)     TempSrc: Oral     SpO2: 99% 100% 98%   Weight: 270 lb (122.5 kg)     Height: 5' 8\" (1.727 m)              MDM  Number of Diagnoses or Management Options  Nausea:

## 2018-08-08 NOTE — ED TRIAGE NOTES
Patient complaining of facial numbness, headache and heaviness in left arm. Patient was seen at [facility] yesterday and discharged with a script but hasn't started the medications yet.   Patient states the numbness is worse tonight

## 2018-08-17 ENCOUNTER — OFFICE VISIT (OUTPATIENT)
Dept: FAMILY MEDICINE CLINIC | Age: 33
End: 2018-08-17
Payer: COMMERCIAL

## 2018-08-17 VITALS
HEIGHT: 68 IN | RESPIRATION RATE: 16 BRPM | DIASTOLIC BLOOD PRESSURE: 76 MMHG | SYSTOLIC BLOOD PRESSURE: 122 MMHG | BODY MASS INDEX: 39.1 KG/M2 | TEMPERATURE: 97.2 F | OXYGEN SATURATION: 98 % | WEIGHT: 258 LBS | HEART RATE: 87 BPM

## 2018-08-17 DIAGNOSIS — G50.0 TRIGEMINAL NEURALGIA OF RIGHT SIDE OF FACE: Primary | ICD-10-CM

## 2018-08-17 DIAGNOSIS — Z71.89 ENCOUNTER FOR PRE-BARIATRIC SURGERY COUNSELING AND EDUCATION: ICD-10-CM

## 2018-08-17 DIAGNOSIS — E55.9 VITAMIN D DEFICIENCY: ICD-10-CM

## 2018-08-17 DIAGNOSIS — Z01.811 ENCOUNTER FOR PRE-OPERATIVE RESPIRATORY CLEARANCE: ICD-10-CM

## 2018-08-17 DIAGNOSIS — Z01.810 ENCOUNTER FOR PRE-OPERATIVE CARDIOVASCULAR CLEARANCE: ICD-10-CM

## 2018-08-17 DIAGNOSIS — F43.0 ACUTE STRESS DISORDER: ICD-10-CM

## 2018-08-17 PROBLEM — L04.9 ACUTE LYMPHADENITIS: Status: RESOLVED | Noted: 2017-12-25 | Resolved: 2018-08-17

## 2018-08-17 PROBLEM — M47.816 SPONDYLOSIS OF LUMBAR REGION WITHOUT MYELOPATHY OR RADICULOPATHY: Status: RESOLVED | Noted: 2017-01-26 | Resolved: 2018-08-17

## 2018-08-17 PROBLEM — N89.8 VAGINAL ODOR: Status: RESOLVED | Noted: 2017-12-25 | Resolved: 2018-08-17

## 2018-08-17 PROBLEM — E66.813 OBESITY, CLASS III, BMI 40-49.9 (MORBID OBESITY) (HCC): Status: RESOLVED | Noted: 2017-12-11 | Resolved: 2018-08-17

## 2018-08-17 PROBLEM — E66.01 OBESITY, CLASS III, BMI 40-49.9 (MORBID OBESITY) (HCC): Status: RESOLVED | Noted: 2017-12-11 | Resolved: 2018-08-17

## 2018-08-17 PROCEDURE — G8427 DOCREV CUR MEDS BY ELIG CLIN: HCPCS | Performed by: PHYSICIAN ASSISTANT

## 2018-08-17 PROCEDURE — G8417 CALC BMI ABV UP PARAM F/U: HCPCS | Performed by: PHYSICIAN ASSISTANT

## 2018-08-17 PROCEDURE — 1036F TOBACCO NON-USER: CPT | Performed by: PHYSICIAN ASSISTANT

## 2018-08-17 PROCEDURE — 99214 OFFICE O/P EST MOD 30 MIN: CPT | Performed by: PHYSICIAN ASSISTANT

## 2018-08-17 RX ORDER — ESTRADIOL 2 MG/1
TABLET ORAL
Refills: 0 | COMMUNITY
Start: 2018-07-24 | End: 2018-08-17 | Stop reason: ALTCHOICE

## 2018-08-17 RX ORDER — TOPIRAMATE 50 MG/1
TABLET, FILM COATED ORAL
Refills: 3 | COMMUNITY
Start: 2018-05-19 | End: 2018-08-17 | Stop reason: ALTCHOICE

## 2018-08-17 RX ORDER — METRONIDAZOLE 7.5 MG/G
GEL VAGINAL
Refills: 0 | COMMUNITY
Start: 2018-07-26 | End: 2018-08-17 | Stop reason: ALTCHOICE

## 2018-08-17 RX ORDER — TOPIRAMATE 25 MG/1
TABLET ORAL
Refills: 3 | COMMUNITY
Start: 2018-08-01 | End: 2018-08-17 | Stop reason: ALTCHOICE

## 2018-08-17 RX ORDER — PROMETHAZINE HYDROCHLORIDE 25 MG/1
TABLET ORAL
Refills: 0 | COMMUNITY
Start: 2018-08-08 | End: 2018-08-17 | Stop reason: ALTCHOICE

## 2018-08-17 RX ORDER — CARBAMAZEPINE 200 MG/1
200 TABLET, EXTENDED RELEASE ORAL DAILY
Qty: 30 TABLET | Refills: 3 | Status: SHIPPED | OUTPATIENT
Start: 2018-08-17 | End: 2019-01-21

## 2018-08-17 ASSESSMENT — ENCOUNTER SYMPTOMS
CHEST TIGHTNESS: 0
ABDOMINAL PAIN: 0
SINUS PAIN: 0
SINUS PRESSURE: 0
SHORTNESS OF BREATH: 0
COLOR CHANGE: 0
COUGH: 0
VOMITING: 0
TROUBLE SWALLOWING: 0
WHEEZING: 0
SORE THROAT: 0
RHINORRHEA: 0

## 2018-08-17 NOTE — PROGRESS NOTES
Subjective  Victoria Service, 28 y.o. female presents today with:  Chief Complaint   Patient presents with    6 Month Follow-Up     MS, obesity      HPI  Bibiana presents to the office today for routine follow up visit. Last OV with me: 2/5/2018. MS. Followed by [facility] [place]. Last OV was on 5/11/2018. MRI of lumbar spine was ordered, she was referred to HA clinic for further evaluation of her HA. Was advised to return for follow up in 6 months. She is continuing to have facial numbness, nausea, dizziness. Thinks her symptoms have worsened over the past couple of weeks. Admits that her stress level has increased. HA. Almost daily; patient has a \"tight\", pounding pressure on the R side of her head. States that it feels like her R eye is going to \"pop\" out of its socket. Has previously tried topamax with little relief due to side effects. No longer taking imitrex. HA is worse with acute stress. Admits to increased stress lately due to her children's father being out of shelter. He has been home a week; known alcoholic and drug abuse. He has already started using again. Patient does not know what kind of \"mood or mental state\" he returns home in. Admits that this is not a good or safe environment for her children. Obesity. Continuing to pursue bariatric surgery. She is being followed by bariatric team at [facility]. Last OV was 8/16/2018. She is continuing to meet with CNP. Has not yet met with pulmonology or cardiology for medical clearance. Review of Systems   Constitutional: Positive for activity change and fatigue. HENT: Positive for congestion and hearing loss (chronic). Negative for ear pain, postnasal drip, rhinorrhea, sinus pain, sinus pressure, sneezing, sore throat and trouble swallowing. Eyes: Negative for visual disturbance. Respiratory: Negative for cough, chest tightness, shortness of breath and wheezing. Cardiovascular: Negative for chest pain. once a week for 8 doses 16 capsule 3    albuterol (PROVENTIL) (2.5 MG/3ML) 0.083% nebulizer solution Take 3 mLs by nebulization every 6 hours as needed for Wheezing or Shortness of Breath 20 each 3    clonazePAM (KLONOPIN) 0.5 MG tablet Take 1 tablet by mouth 2 times daily as needed for Anxiety 60 tablet 0     No current facility-administered medications for this visit. PMH, Surgical Hx, Family Hx, and Social Hx reviewed and updated. Health Maintenance reviewed. Objective  Vitals:    08/17/18 0826   BP: 122/76   Site: Left Arm   Position: Sitting   Cuff Size: Large Adult   Pulse: 87   Resp: 16   Temp: 97.2 °F (36.2 °C)   TempSrc: Temporal   SpO2: 98%   Weight: 258 lb (117 kg)   Height: 5' 8\" (1.727 m)     BP Readings from Last 3 Encounters:   08/17/18 122/76   08/08/18 108/73   07/07/18 124/80     Wt Readings from Last 3 Encounters:   08/17/18 258 lb (117 kg)   08/08/18 270 lb (122.5 kg)   07/07/18 270 lb (122.5 kg)     Physical Exam   Constitutional: She is oriented to person, place, and time. She appears well-developed and well-nourished. No distress. Obese female, sitting in exam room. HENT:   Head: Normocephalic and atraumatic. Right Ear: Tympanic membrane, external ear and ear canal normal. Decreased hearing is noted. Left Ear: Tympanic membrane and ear canal normal. There is drainage (cerumen, not impacted). Decreased hearing is noted. Nose: Nose normal.   Mouth/Throat: Uvula is midline, oropharynx is clear and moist and mucous membranes are normal. No oropharyngeal exudate. Eyes: Pupils are equal, round, and reactive to light. Conjunctivae and EOM are normal. Right eye exhibits no discharge. Left eye exhibits no discharge. No scleral icterus. Neck: Normal range of motion. Neck supple. Cardiovascular: Normal rate, regular rhythm and normal heart sounds. No murmur heard. Pulmonary/Chest: Effort normal and breath sounds normal. No respiratory distress. She has no wheezes.  She has no rales. Musculoskeletal: Normal range of motion. Neurological: She is alert and oriented to person, place, and time. Skin: Skin is warm and dry. No rash noted. She is not diaphoretic. No erythema. Psychiatric: Her speech is normal and behavior is normal. Judgment and thought content normal. Her mood appears anxious. Admits to increased stress with her children's father out of halfway. He is currently living with her; he is a known alcoholic. He will return back to their home intoxicated. With him out, this has heightened all of patient's symptoms. She is having a lot of chest pressure/tightness; numbness/tingling/HA worse. Vitals reviewed. Assessment & Plan    Diagnosis Orders   1. Trigeminal neuralgia of right side of face  carBAMazepine (TEGRETOL-XR) 200 MG extended release tablet   2. Vitamin D deficiency  Vitamin D 25 Hydroxy   3. BMI 39.0-39.9,adult  Ambulatory referral to Cardiology    Sena Chin MD   4. Encounter for pre-operative cardiovascular clearance  Ambulatory referral to Cardiology   5. Encounter for pre-operative respiratory clearance  Sena Chin MD   6. Encounter for pre-bariatric surgery counseling and education  Ambulatory referral to Cardiology    Sena Chin MD   7. Acute stress disorder     -Long talk with patient today regarding her symptoms and how they are heightened with her current living situation. Discussed different options for her to get to a \"safe\" place if she should feel unsafe in her home or if she feels at risk.   -Trial tegretol 200 mg daily for numbness/tingling of face.  -Referral to pulmonology and cardiology for pre-op clearance.   -Will check a vit d level.   -Follow up with me in 2 weeks.      Orders Placed This Encounter   Procedures    Vitamin D 25 Hydroxy     Standing Status:   Future     Standing Expiration Date:   8/17/2019    Ambulatory referral to understanding and desires to proceed. Close follow up to evaluate treatment results and for coordination of care. I have reviewed the patient's medical history in detail and updated the computerized patient record.     Leticia Villa PA-C

## 2018-08-30 ENCOUNTER — OFFICE VISIT (OUTPATIENT)
Dept: FAMILY MEDICINE CLINIC | Age: 33
End: 2018-08-30
Payer: COMMERCIAL

## 2018-08-30 VITALS
OXYGEN SATURATION: 98 % | SYSTOLIC BLOOD PRESSURE: 104 MMHG | WEIGHT: 258.2 LBS | DIASTOLIC BLOOD PRESSURE: 70 MMHG | BODY MASS INDEX: 39.13 KG/M2 | HEART RATE: 84 BPM | RESPIRATION RATE: 18 BRPM | HEIGHT: 68 IN | TEMPERATURE: 96.5 F

## 2018-08-30 DIAGNOSIS — G50.0 TRIGEMINAL NEURALGIA OF RIGHT SIDE OF FACE: Primary | ICD-10-CM

## 2018-08-30 DIAGNOSIS — R09.81 CHRONIC NASAL CONGESTION: ICD-10-CM

## 2018-08-30 DIAGNOSIS — Z77.120 CONTACT WITH OR EXPOSURE TO MOLD: ICD-10-CM

## 2018-08-30 DIAGNOSIS — E55.9 VITAMIN D DEFICIENCY: ICD-10-CM

## 2018-08-30 DIAGNOSIS — M62.838 MUSCLE SPASMS OF NECK: ICD-10-CM

## 2018-08-30 PROBLEM — N92.1 BREAKTHROUGH BLEEDING: Status: RESOLVED | Noted: 2017-12-11 | Resolved: 2018-08-30

## 2018-08-30 PROCEDURE — 1036F TOBACCO NON-USER: CPT | Performed by: PHYSICIAN ASSISTANT

## 2018-08-30 PROCEDURE — G8427 DOCREV CUR MEDS BY ELIG CLIN: HCPCS | Performed by: PHYSICIAN ASSISTANT

## 2018-08-30 PROCEDURE — 99214 OFFICE O/P EST MOD 30 MIN: CPT | Performed by: PHYSICIAN ASSISTANT

## 2018-08-30 PROCEDURE — G8417 CALC BMI ABV UP PARAM F/U: HCPCS | Performed by: PHYSICIAN ASSISTANT

## 2018-08-30 RX ORDER — CYCLOBENZAPRINE HCL 5 MG
5 TABLET ORAL NIGHTLY PRN
Qty: 30 TABLET | Refills: 1 | Status: SHIPPED | OUTPATIENT
Start: 2018-08-30 | End: 2018-09-09

## 2018-08-30 ASSESSMENT — ENCOUNTER SYMPTOMS
COUGH: 0
CHEST TIGHTNESS: 0
SINUS PRESSURE: 0
TROUBLE SWALLOWING: 0
ABDOMINAL PAIN: 0
SINUS PAIN: 0
SORE THROAT: 0
COLOR CHANGE: 0
SHORTNESS OF BREATH: 0
VOMITING: 0
WHEEZING: 0
RHINORRHEA: 0
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
## 2018-09-11 ENCOUNTER — OFFICE VISIT (OUTPATIENT)
Dept: PULMONOLOGY | Age: 33
End: 2018-09-11
Payer: COMMERCIAL

## 2018-09-11 VITALS
WEIGHT: 261 LBS | OXYGEN SATURATION: 98 % | BODY MASS INDEX: 39.56 KG/M2 | RESPIRATION RATE: 18 BRPM | DIASTOLIC BLOOD PRESSURE: 76 MMHG | HEIGHT: 68 IN | HEART RATE: 85 BPM | TEMPERATURE: 97.9 F | SYSTOLIC BLOOD PRESSURE: 122 MMHG

## 2018-09-11 DIAGNOSIS — E66.09 CLASS 2 OBESITY DUE TO EXCESS CALORIES WITHOUT SERIOUS COMORBIDITY WITH BODY MASS INDEX (BMI) OF 39.0 TO 39.9 IN ADULT: Primary | ICD-10-CM

## 2018-09-11 DIAGNOSIS — J45.20 MILD INTERMITTENT ASTHMA WITHOUT COMPLICATION: ICD-10-CM

## 2018-09-11 DIAGNOSIS — R06.83 SNORING: ICD-10-CM

## 2018-09-11 PROCEDURE — 1036F TOBACCO NON-USER: CPT | Performed by: INTERNAL MEDICINE

## 2018-09-11 PROCEDURE — G8417 CALC BMI ABV UP PARAM F/U: HCPCS | Performed by: INTERNAL MEDICINE

## 2018-09-11 PROCEDURE — 99203 OFFICE O/P NEW LOW 30 MIN: CPT | Performed by: INTERNAL MEDICINE

## 2018-09-11 PROCEDURE — G8427 DOCREV CUR MEDS BY ELIG CLIN: HCPCS | Performed by: INTERNAL MEDICINE

## 2018-09-11 NOTE — PROGRESS NOTES
Psychiatric: She has a normal mood and affect. Judgment normal.     Imaging studies reviewed by me CXR 5/2018 is unremarkable    Lab results reviewed in chart  PFT none  Sleep study negative        Assessment and Plan       Diagnosis Orders   1. Class 2 obesity due to excess calories without serious comorbidity with body mass index (BMI) of 39.0 to 39.9 in adult     2. Snoring  Spirometry with bronchodilator   3. Mild intermittent asthma without complication  Spirometry with bronchodilator     · Obesity with plan for bariatric surgery , patient is low risk for pulmonary complications   · Snoring with negative sleep study   · Possible history of asthma vs anxiety attacks, will obtain spirometry and evaluate on follow up       Orders Placed This Encounter   Procedures    Spirometry with bronchodilator     Standing Status:   Future     Standing Expiration Date:   9/11/2019     No orders of the defined types were placed in this encounter. Discussed with patient the importance of exercise and weight control and  overall health and well-being.     Reviewed with the patient: current clinical status, medications, activities and diet.       treatment plan and result expectations have been discussed with the patient who expresses understanding and desires to proceed.           Return in about 4 weeks (around 10/9/2018).       Gareth Hernandez MD

## 2018-09-13 DIAGNOSIS — R09.81 CHRONIC NASAL CONGESTION: ICD-10-CM

## 2018-09-13 DIAGNOSIS — Z77.120 CONTACT WITH OR EXPOSURE TO MOLD: ICD-10-CM

## 2018-09-13 DIAGNOSIS — E55.9 VITAMIN D DEFICIENCY: ICD-10-CM

## 2018-09-13 LAB — VITAMIN D 25-HYDROXY: 44.2 NG/ML (ref 30–100)

## 2018-09-16 LAB
ALLERGEN ASPERGILLUS ALTERNATA IGE: <0.1 KU/L
ALLERGEN ASPERGILLUS FUMIGATUS IGE: <0.1 KU/L
ALLERGEN BERMUDA GRASS IGE: <0.1 KU/L
ALLERGEN BIRCH IGE: <0.1 KU/L
ALLERGEN CAT DANDER IGE: <0.1 KU/L
ALLERGEN COMMON SHORT RAGWEED IGE: <0.1 KU/L
ALLERGEN COTTONWOOD: <0.1 KU/L
ALLERGEN COW MILK IGE: <0.1 KU/L
ALLERGEN DOG DANDER IGE: <0.1 KU/L
ALLERGEN ELM IGE: <0.1 KU/L
ALLERGEN FUNGI/MOLD M.RACEMOSUS IGE: <0.1 KU/L
ALLERGEN GERMAN COCKROACH IGE: <0.1 KU/L
ALLERGEN HORMODENDRUM HORDEI IGE: <0.1 KU/L
ALLERGEN MAPLE/BOX ELDER IGE: <0.1 KU/L
ALLERGEN MITE DUST FARINAE IGE: <0.1 KU/L
ALLERGEN MITE DUST PTERONYSSINUS IGE: <0.1 KU/L
ALLERGEN MOUNTAIN CEDAR: <0.1 KU/L
ALLERGEN MOUSE EPITHELIA IGE: <0.1 KU/L
ALLERGEN OAK TREE IGE: <0.1 KU/L
ALLERGEN PEANUT (F13) IGE: <0.1 KU/L
ALLERGEN PECAN TREE IGE: <0.1 KU/L
ALLERGEN PENICILLIUM NOTATUM: <0.1 KU/L
ALLERGEN ROUGH PIGWEED (W14) IGE: <0.1 KU/L
ALLERGEN RUSSIAN THISTLE IGE: <0.1 KU/L
ALLERGEN SEE NOTE: NORMAL
ALLERGEN SHEEP SORREL (W18) IGE: <0.1 KU/L
ALLERGEN TIMOTHY GRASS: <0.1 KU/L
ALLERGEN TREE SYCAMORE: <0.1 KU/L
ALLERGEN WALNUT TREE IGE: <0.1 KU/L
ALLERGEN WHITE MULBERRY TREE, IGE: <0.1 KU/L
ALLERGEN, TREE, WHITE ASH IGE: <0.1 KU/L
IGE: 22 KU/L

## 2018-09-19 ENCOUNTER — OFFICE VISIT (OUTPATIENT)
Dept: CARDIOLOGY CLINIC | Age: 33
End: 2018-09-19
Payer: COMMERCIAL

## 2018-09-19 VITALS
BODY MASS INDEX: 38.34 KG/M2 | HEART RATE: 71 BPM | RESPIRATION RATE: 16 BRPM | SYSTOLIC BLOOD PRESSURE: 116 MMHG | OXYGEN SATURATION: 98 % | WEIGHT: 253 LBS | HEIGHT: 68 IN | DIASTOLIC BLOOD PRESSURE: 78 MMHG

## 2018-09-19 DIAGNOSIS — R00.2 PALPITATIONS: ICD-10-CM

## 2018-09-19 DIAGNOSIS — Z01.818 PRE-OP EVALUATION: Primary | ICD-10-CM

## 2018-09-19 PROCEDURE — 99204 OFFICE O/P NEW MOD 45 MIN: CPT | Performed by: INTERNAL MEDICINE

## 2018-09-19 PROCEDURE — G8417 CALC BMI ABV UP PARAM F/U: HCPCS | Performed by: INTERNAL MEDICINE

## 2018-09-19 PROCEDURE — 93000 ELECTROCARDIOGRAM COMPLETE: CPT | Performed by: INTERNAL MEDICINE

## 2018-09-19 PROCEDURE — G8427 DOCREV CUR MEDS BY ELIG CLIN: HCPCS | Performed by: INTERNAL MEDICINE

## 2018-09-19 PROCEDURE — 1036F TOBACCO NON-USER: CPT | Performed by: INTERNAL MEDICINE

## 2018-09-19 ASSESSMENT — ENCOUNTER SYMPTOMS
ABDOMINAL PAIN: 0
BACK PAIN: 0
ABDOMINAL DISTENTION: 0
COLOR CHANGE: 0
APNEA: 0
CHOKING: 0
CHEST TIGHTNESS: 0

## 2019-01-21 ENCOUNTER — OFFICE VISIT (OUTPATIENT)
Dept: FAMILY MEDICINE CLINIC | Age: 34
End: 2019-01-21
Payer: COMMERCIAL

## 2019-01-21 VITALS
OXYGEN SATURATION: 99 % | TEMPERATURE: 95.8 F | HEIGHT: 68 IN | SYSTOLIC BLOOD PRESSURE: 102 MMHG | DIASTOLIC BLOOD PRESSURE: 70 MMHG | RESPIRATION RATE: 14 BRPM | BODY MASS INDEX: 41.56 KG/M2 | WEIGHT: 274.2 LBS | HEART RATE: 88 BPM

## 2019-01-21 DIAGNOSIS — R21 RASH AND NONSPECIFIC SKIN ERUPTION: Primary | ICD-10-CM

## 2019-01-21 DIAGNOSIS — H02.826 CYST OF LEFT EYELID: ICD-10-CM

## 2019-01-21 PROBLEM — F40.240 CLAUSTROPHOBIA: Status: RESOLVED | Noted: 2017-02-22 | Resolved: 2019-01-21

## 2019-01-21 PROCEDURE — G8417 CALC BMI ABV UP PARAM F/U: HCPCS | Performed by: PHYSICIAN ASSISTANT

## 2019-01-21 PROCEDURE — G8427 DOCREV CUR MEDS BY ELIG CLIN: HCPCS | Performed by: PHYSICIAN ASSISTANT

## 2019-01-21 PROCEDURE — G8484 FLU IMMUNIZE NO ADMIN: HCPCS | Performed by: PHYSICIAN ASSISTANT

## 2019-01-21 PROCEDURE — 99213 OFFICE O/P EST LOW 20 MIN: CPT | Performed by: PHYSICIAN ASSISTANT

## 2019-01-21 PROCEDURE — 1036F TOBACCO NON-USER: CPT | Performed by: PHYSICIAN ASSISTANT

## 2019-01-21 ASSESSMENT — ENCOUNTER SYMPTOMS
COUGH: 0
SHORTNESS OF BREATH: 0
CHEST TIGHTNESS: 0

## 2019-01-21 ASSESSMENT — PATIENT HEALTH QUESTIONNAIRE - PHQ9
1. LITTLE INTEREST OR PLEASURE IN DOING THINGS: 1
SUM OF ALL RESPONSES TO PHQ QUESTIONS 1-9: 2
2. FEELING DOWN, DEPRESSED OR HOPELESS: 1
SUM OF ALL RESPONSES TO PHQ QUESTIONS 1-9: 2
SUM OF ALL RESPONSES TO PHQ9 QUESTIONS 1 & 2: 2

## 2019-02-19 ENCOUNTER — OFFICE VISIT (OUTPATIENT)
Dept: FAMILY MEDICINE CLINIC | Age: 34
End: 2019-02-19
Payer: COMMERCIAL

## 2019-02-19 VITALS
TEMPERATURE: 97.1 F | BODY MASS INDEX: 41.52 KG/M2 | WEIGHT: 274 LBS | HEIGHT: 68 IN | SYSTOLIC BLOOD PRESSURE: 122 MMHG | OXYGEN SATURATION: 98 % | HEART RATE: 71 BPM | DIASTOLIC BLOOD PRESSURE: 72 MMHG | RESPIRATION RATE: 14 BRPM

## 2019-02-19 DIAGNOSIS — E55.9 VITAMIN D DEFICIENCY: ICD-10-CM

## 2019-02-19 DIAGNOSIS — R73.03 PREDIABETES: ICD-10-CM

## 2019-02-19 DIAGNOSIS — R63.5 WEIGHT GAIN: ICD-10-CM

## 2019-02-19 DIAGNOSIS — M54.32 SCIATICA, LEFT SIDE: ICD-10-CM

## 2019-02-19 DIAGNOSIS — G35 MULTIPLE SCLEROSIS (HCC): Primary | ICD-10-CM

## 2019-02-19 DIAGNOSIS — M48.061 SPINAL STENOSIS OF LUMBAR REGION WITHOUT NEUROGENIC CLAUDICATION: ICD-10-CM

## 2019-02-19 DIAGNOSIS — F43.0 ACUTE STRESS DISORDER: ICD-10-CM

## 2019-02-19 DIAGNOSIS — G35 MULTIPLE SCLEROSIS (HCC): ICD-10-CM

## 2019-02-19 DIAGNOSIS — R20.2 NUMBNESS AND TINGLING OF RIGHT SIDE OF FACE: ICD-10-CM

## 2019-02-19 DIAGNOSIS — M51.36 DDD (DEGENERATIVE DISC DISEASE), LUMBAR: ICD-10-CM

## 2019-02-19 DIAGNOSIS — R20.0 NUMBNESS AND TINGLING OF RIGHT SIDE OF FACE: ICD-10-CM

## 2019-02-19 PROBLEM — N93.8 DUB (DYSFUNCTIONAL UTERINE BLEEDING): Status: RESOLVED | Noted: 2017-12-11 | Resolved: 2019-02-19

## 2019-02-19 PROBLEM — H02.826 CYST OF LEFT EYELID: Status: RESOLVED | Noted: 2019-01-21 | Resolved: 2019-02-19

## 2019-02-19 PROBLEM — R21 RASH AND NONSPECIFIC SKIN ERUPTION: Status: RESOLVED | Noted: 2019-01-21 | Resolved: 2019-02-19

## 2019-02-19 PROBLEM — G50.0 TRIGEMINAL NEURALGIA OF RIGHT SIDE OF FACE: Status: RESOLVED | Noted: 2018-08-17 | Resolved: 2019-02-19

## 2019-02-19 LAB
ALBUMIN SERPL-MCNC: 4.1 G/DL (ref 3.5–4.6)
ALP BLD-CCNC: 119 U/L (ref 40–130)
ALT SERPL-CCNC: 11 U/L (ref 0–33)
ANION GAP SERPL CALCULATED.3IONS-SCNC: 12 MEQ/L (ref 9–15)
AST SERPL-CCNC: 16 U/L (ref 0–35)
BILIRUB SERPL-MCNC: <0.2 MG/DL (ref 0.2–0.7)
BUN BLDV-MCNC: 8 MG/DL (ref 6–20)
CALCIUM SERPL-MCNC: 9 MG/DL (ref 8.5–9.9)
CHLORIDE BLD-SCNC: 105 MEQ/L (ref 95–107)
CO2: 27 MEQ/L (ref 20–31)
CREAT SERPL-MCNC: 0.67 MG/DL (ref 0.5–0.9)
GFR AFRICAN AMERICAN: >60
GFR NON-AFRICAN AMERICAN: >60
GLOBULIN: 3.1 G/DL (ref 2.3–3.5)
GLUCOSE BLD-MCNC: 108 MG/DL (ref 70–99)
HBA1C MFR BLD: 6.2 % (ref 4.8–5.9)
HCT VFR BLD CALC: 40.6 % (ref 37–47)
HEMOGLOBIN: 13.4 G/DL (ref 12–16)
MCH RBC QN AUTO: 29.8 PG (ref 27–31.3)
MCHC RBC AUTO-ENTMCNC: 33.1 % (ref 33–37)
MCV RBC AUTO: 90.1 FL (ref 82–100)
PDW BLD-RTO: 15.1 % (ref 11.5–14.5)
PLATELET # BLD: 262 K/UL (ref 130–400)
POTASSIUM SERPL-SCNC: 4.8 MEQ/L (ref 3.4–4.9)
RBC # BLD: 4.5 M/UL (ref 4.2–5.4)
SODIUM BLD-SCNC: 144 MEQ/L (ref 135–144)
TOTAL PROTEIN: 7.2 G/DL (ref 6.3–8)
TSH REFLEX: 1.15 UIU/ML (ref 0.44–3.86)
VITAMIN D 25-HYDROXY: 29 NG/ML (ref 30–100)
WBC # BLD: 7.8 K/UL (ref 4.8–10.8)

## 2019-02-19 PROCEDURE — 99214 OFFICE O/P EST MOD 30 MIN: CPT | Performed by: PHYSICIAN ASSISTANT

## 2019-02-19 PROCEDURE — G8417 CALC BMI ABV UP PARAM F/U: HCPCS | Performed by: PHYSICIAN ASSISTANT

## 2019-02-19 PROCEDURE — G8427 DOCREV CUR MEDS BY ELIG CLIN: HCPCS | Performed by: PHYSICIAN ASSISTANT

## 2019-02-19 PROCEDURE — G8484 FLU IMMUNIZE NO ADMIN: HCPCS | Performed by: PHYSICIAN ASSISTANT

## 2019-02-19 PROCEDURE — 1036F TOBACCO NON-USER: CPT | Performed by: PHYSICIAN ASSISTANT

## 2019-02-19 RX ORDER — PREDNISONE 10 MG/1
TABLET ORAL
Qty: 51 TABLET | Refills: 0 | Status: SHIPPED | OUTPATIENT
Start: 2019-02-19 | End: 2019-03-01

## 2019-02-19 ASSESSMENT — ENCOUNTER SYMPTOMS
CHEST TIGHTNESS: 0
COUGH: 0
COLOR CHANGE: 0
SHORTNESS OF BREATH: 0
BACK PAIN: 1

## 2019-02-26 RX ORDER — ERGOCALCIFEROL 1.25 MG/1
50000 CAPSULE ORAL WEEKLY
Qty: 12 CAPSULE | Refills: 1 | Status: SHIPPED | OUTPATIENT
Start: 2019-02-26 | End: 2019-05-01

## 2019-02-26 RX ORDER — METFORMIN HYDROCHLORIDE 500 MG/1
500 TABLET, EXTENDED RELEASE ORAL EVERY OTHER DAY
Qty: 90 TABLET | Refills: 1 | Status: SHIPPED | OUTPATIENT
Start: 2019-02-26 | End: 2019-05-14

## 2019-03-11 ENCOUNTER — OFFICE VISIT (OUTPATIENT)
Dept: FAMILY MEDICINE CLINIC | Age: 34
End: 2019-03-11
Payer: COMMERCIAL

## 2019-03-11 VITALS
WEIGHT: 274 LBS | HEIGHT: 68 IN | SYSTOLIC BLOOD PRESSURE: 116 MMHG | HEART RATE: 90 BPM | BODY MASS INDEX: 41.52 KG/M2 | RESPIRATION RATE: 16 BRPM | TEMPERATURE: 98.2 F | OXYGEN SATURATION: 99 % | DIASTOLIC BLOOD PRESSURE: 84 MMHG

## 2019-03-11 DIAGNOSIS — M79.10 MYALGIA: ICD-10-CM

## 2019-03-11 DIAGNOSIS — M76.62 ACHILLES TENDINITIS OF LEFT LOWER EXTREMITY: Primary | ICD-10-CM

## 2019-03-11 PROCEDURE — G8417 CALC BMI ABV UP PARAM F/U: HCPCS | Performed by: PHYSICIAN ASSISTANT

## 2019-03-11 PROCEDURE — G8484 FLU IMMUNIZE NO ADMIN: HCPCS | Performed by: PHYSICIAN ASSISTANT

## 2019-03-11 PROCEDURE — G8427 DOCREV CUR MEDS BY ELIG CLIN: HCPCS | Performed by: PHYSICIAN ASSISTANT

## 2019-03-11 PROCEDURE — 99214 OFFICE O/P EST MOD 30 MIN: CPT | Performed by: PHYSICIAN ASSISTANT

## 2019-03-11 PROCEDURE — 1036F TOBACCO NON-USER: CPT | Performed by: PHYSICIAN ASSISTANT

## 2019-03-11 RX ORDER — PREDNISONE 20 MG/1
TABLET ORAL
Refills: 0 | COMMUNITY
Start: 2019-03-09 | End: 2019-04-02

## 2019-03-11 RX ORDER — MULTIVITAMIN WITH IRON
250 TABLET ORAL DAILY
Qty: 15 TABLET | Refills: 0 | Status: SHIPPED | OUTPATIENT
Start: 2019-03-11 | End: 2019-04-02

## 2019-03-11 RX ORDER — HYDROCODONE BITARTRATE AND ACETAMINOPHEN 5; 325 MG/1; MG/1
TABLET ORAL
Refills: 0 | COMMUNITY
Start: 2019-03-09 | End: 2019-04-02

## 2019-03-11 ASSESSMENT — ENCOUNTER SYMPTOMS
CHEST TIGHTNESS: 0
BACK PAIN: 0
COLOR CHANGE: 0
ABDOMINAL DISTENTION: 0

## 2019-03-15 ENCOUNTER — HOSPITAL ENCOUNTER (OUTPATIENT)
Dept: PHYSICAL THERAPY | Age: 34
Setting detail: THERAPIES SERIES
Discharge: HOME | End: 2019-03-15
Payer: COMMERCIAL

## 2019-03-15 PROCEDURE — G8979 MOBILITY GOAL STATUS: HCPCS

## 2019-03-15 PROCEDURE — G8978 MOBILITY CURRENT STATUS: HCPCS

## 2019-03-15 PROCEDURE — 97162 PT EVAL MOD COMPLEX 30 MIN: CPT

## 2019-03-15 ASSESSMENT — PAIN DESCRIPTION - PAIN TYPE: TYPE: CHRONIC PAIN

## 2019-03-15 ASSESSMENT — PAIN DESCRIPTION - LOCATION: LOCATION: LEG

## 2019-03-15 ASSESSMENT — PAIN SCALES - GENERAL: PAINLEVEL_OUTOF10: 5

## 2019-03-15 ASSESSMENT — PAIN DESCRIPTION - DESCRIPTORS: DESCRIPTORS: ACHING;SORE

## 2019-03-15 ASSESSMENT — PAIN DESCRIPTION - ORIENTATION: ORIENTATION: LEFT

## 2019-03-19 ENCOUNTER — HOSPITAL ENCOUNTER (OUTPATIENT)
Dept: PHYSICAL THERAPY | Age: 34
Setting detail: THERAPIES SERIES
Discharge: HOME | End: 2019-03-19
Payer: COMMERCIAL

## 2019-03-19 PROCEDURE — 97110 THERAPEUTIC EXERCISES: CPT

## 2019-03-19 PROCEDURE — 97140 MANUAL THERAPY 1/> REGIONS: CPT

## 2019-03-19 ASSESSMENT — PAIN DESCRIPTION - PAIN TYPE: TYPE: CHRONIC PAIN

## 2019-03-19 ASSESSMENT — PAIN DESCRIPTION - DESCRIPTORS: DESCRIPTORS: ACHING;SORE;NUMBNESS

## 2019-03-19 ASSESSMENT — PAIN DESCRIPTION - LOCATION: LOCATION: LEG

## 2019-03-19 ASSESSMENT — PAIN DESCRIPTION - ORIENTATION: ORIENTATION: LEFT

## 2019-03-19 ASSESSMENT — PAIN SCALES - GENERAL: PAINLEVEL_OUTOF10: 5

## 2019-03-21 ENCOUNTER — APPOINTMENT (OUTPATIENT)
Dept: PHYSICAL THERAPY | Age: 34
End: 2019-03-21
Payer: COMMERCIAL

## 2019-03-26 ENCOUNTER — HOSPITAL ENCOUNTER (OUTPATIENT)
Dept: PHYSICAL THERAPY | Age: 34
Setting detail: THERAPIES SERIES
Discharge: HOME | End: 2019-03-26
Payer: COMMERCIAL

## 2019-03-26 PROCEDURE — 97110 THERAPEUTIC EXERCISES: CPT

## 2019-03-26 PROCEDURE — 97140 MANUAL THERAPY 1/> REGIONS: CPT

## 2019-03-26 ASSESSMENT — PAIN DESCRIPTION - LOCATION: LOCATION: LEG

## 2019-03-26 ASSESSMENT — PAIN DESCRIPTION - DESCRIPTORS: DESCRIPTORS: ACHING;TIGHTNESS

## 2019-03-26 ASSESSMENT — PAIN DESCRIPTION - PAIN TYPE: TYPE: CHRONIC PAIN

## 2019-03-26 ASSESSMENT — PAIN DESCRIPTION - ORIENTATION: ORIENTATION: LEFT

## 2019-03-26 ASSESSMENT — PAIN SCALES - GENERAL: PAINLEVEL_OUTOF10: 8

## 2019-03-28 ENCOUNTER — HOSPITAL ENCOUNTER (OUTPATIENT)
Dept: PHYSICAL THERAPY | Age: 34
Setting detail: THERAPIES SERIES
Discharge: HOME | End: 2019-03-28
Payer: COMMERCIAL

## 2019-03-28 PROCEDURE — 97110 THERAPEUTIC EXERCISES: CPT

## 2019-03-28 PROCEDURE — 97140 MANUAL THERAPY 1/> REGIONS: CPT

## 2019-03-28 ASSESSMENT — PAIN DESCRIPTION - PAIN TYPE: TYPE: CHRONIC PAIN

## 2019-03-28 ASSESSMENT — PAIN DESCRIPTION - LOCATION: LOCATION: LEG

## 2019-03-28 ASSESSMENT — PAIN DESCRIPTION - ORIENTATION: ORIENTATION: LEFT

## 2019-03-28 ASSESSMENT — PAIN SCALES - GENERAL: PAINLEVEL_OUTOF10: 7

## 2019-03-28 ASSESSMENT — PAIN DESCRIPTION - DESCRIPTORS: DESCRIPTORS: TIGHTNESS;PRESSURE;SHARP

## 2019-04-02 ENCOUNTER — OFFICE VISIT (OUTPATIENT)
Dept: FAMILY MEDICINE CLINIC | Age: 34
End: 2019-04-02
Payer: COMMERCIAL

## 2019-04-02 VITALS
WEIGHT: 281 LBS | SYSTOLIC BLOOD PRESSURE: 104 MMHG | RESPIRATION RATE: 14 BRPM | BODY MASS INDEX: 42.59 KG/M2 | DIASTOLIC BLOOD PRESSURE: 76 MMHG | OXYGEN SATURATION: 99 % | HEIGHT: 68 IN | TEMPERATURE: 98.1 F | HEART RATE: 79 BPM

## 2019-04-02 DIAGNOSIS — F32.A ANXIETY AND DEPRESSION: ICD-10-CM

## 2019-04-02 DIAGNOSIS — M54.32 SCIATICA, LEFT SIDE: ICD-10-CM

## 2019-04-02 DIAGNOSIS — M25.50 MULTIPLE JOINT PAIN: ICD-10-CM

## 2019-04-02 DIAGNOSIS — R73.03 PREDIABETES: ICD-10-CM

## 2019-04-02 DIAGNOSIS — G35 MULTIPLE SCLEROSIS (HCC): ICD-10-CM

## 2019-04-02 DIAGNOSIS — R21 MALAR RASH: ICD-10-CM

## 2019-04-02 DIAGNOSIS — M48.061 SPINAL STENOSIS OF LUMBAR REGION WITHOUT NEUROGENIC CLAUDICATION: ICD-10-CM

## 2019-04-02 DIAGNOSIS — F43.0 ACUTE STRESS DISORDER: ICD-10-CM

## 2019-04-02 DIAGNOSIS — M79.605 LEFT LEG PAIN: ICD-10-CM

## 2019-04-02 DIAGNOSIS — R63.5 WEIGHT GAIN: ICD-10-CM

## 2019-04-02 DIAGNOSIS — F41.9 ANXIETY AND DEPRESSION: ICD-10-CM

## 2019-04-02 PROCEDURE — G8427 DOCREV CUR MEDS BY ELIG CLIN: HCPCS | Performed by: PHYSICIAN ASSISTANT

## 2019-04-02 PROCEDURE — G8417 CALC BMI ABV UP PARAM F/U: HCPCS | Performed by: PHYSICIAN ASSISTANT

## 2019-04-02 PROCEDURE — 1036F TOBACCO NON-USER: CPT | Performed by: PHYSICIAN ASSISTANT

## 2019-04-02 PROCEDURE — 99214 OFFICE O/P EST MOD 30 MIN: CPT | Performed by: PHYSICIAN ASSISTANT

## 2019-04-02 RX ORDER — KETOROLAC TROMETHAMINE 10 MG/1
10 TABLET, FILM COATED ORAL EVERY 6 HOURS PRN
Qty: 20 TABLET | Refills: 0 | Status: SHIPPED | OUTPATIENT
Start: 2019-04-02 | End: 2019-04-29

## 2019-04-02 RX ORDER — BUPROPION HYDROCHLORIDE 150 MG/1
150 TABLET ORAL EVERY MORNING
Qty: 30 TABLET | Refills: 3 | Status: SHIPPED | OUTPATIENT
Start: 2019-04-02 | End: 2019-06-25

## 2019-04-02 ASSESSMENT — ENCOUNTER SYMPTOMS
BLOOD IN STOOL: 0
TROUBLE SWALLOWING: 0
CHEST TIGHTNESS: 0
CONSTIPATION: 0
NAUSEA: 0
ABDOMINAL PAIN: 0
COLOR CHANGE: 0
BACK PAIN: 0
ABDOMINAL DISTENTION: 0

## 2019-04-02 NOTE — PROGRESS NOTES
Subjective  Jorje Padilla, 35 y.o. female presents today with:  Chief Complaint   Patient presents with    Vaginal Discharge     recently diabnosed with BV    Leg Pain     follow up      HPI  Bibiana is in the office today for follow up visit. Last OV with me: 3/11/2019    Vaginal discharge/groin pain. Recently went to a women's clinic for exam.  Patient has been having intermittent groin pain. She was concerned; states that she is not sexually active but was worried about her discomfort. Culture results showed BV; she was treated with flagyl. Denies dysuria, hematuria. This has resolved. LLE pain. Discussed at last OV; referred to PT for evaluation. States that she attended 5 sessions without any improvement in her symptoms. She is scheduled for EMG testing of LLE with Dr. Osorio Linn. Pain is intermittent. No injury or trauma. Was prescribed norco/pain medication in the past which help \"dull\" the pain. Unsure of what she can take. Rash. Complaining of her face, bilateral cheeks. No new exposure to product, chemicals, lotions, etc.  No change with sun exposure. No history of similar rash. MS. Follows up with Dr. Osorio Linn. She was discharged from [facility] at [facility]. Bibiana was instructed to continue with neurology. At this time, she is on no medication treatment. Anxiety and depression. Reports worsening stress and anxiety. Related to custody and visitation rights with some of her children. Patient has been seeing a therapist--which helps. Has klonopin for acute panic/stress, patient takes only with limited use. The worsening stress in her life is causing/provoking a lot of her physical ailments. Prediabetes. Restarted metformin after last OV. Tolerating medication. Obesity. [facility] evaluation. She has not heard anything from the office in regards to surgery.   Asking for a new referral.      Review of Systems Male   Lifestyle    Physical activity:     Days per week: Not on file     Minutes per session: Not on file    Stress: Not on file   Relationships    Social connections:     Talks on phone: Not on file     Gets together: Not on file     Attends Gnosticism service: Not on file     Active member of club or organization: Not on file     Attends meetings of clubs or organizations: Not on file     Relationship status: Not on file    Intimate partner violence:     Fear of current or ex partner: Not on file     Emotionally abused: Not on file     Physically abused: Not on file     Forced sexual activity: Not on file   Other Topics Concern    Not on file   Social History Narrative    Not on file     Family History   Problem Relation Age of Onset    Cancer Mother     Arthritis Mother     Diabetes Father     Heart Disease Father     Stroke Father     High Blood Pressure Father         Allergies   Allergen Reactions    Nsaids     Pcn [Penicillins] Hives    Phentermine      Anxiety with hospitalization    Topamax [Topiramate] Other (See Comments)     DIZZY     Current Outpatient Medications   Medication Sig Dispense Refill    ketorolac (TORADOL) 10 MG tablet Take 1 tablet by mouth every 6 hours as needed for Pain 20 tablet 0    diclofenac sodium 1 % GEL Apply 4 g topically 4 times daily 4 Tube 1    buPROPion (WELLBUTRIN XL) 150 MG extended release tablet Take 1 tablet by mouth every morning 30 tablet 3    vitamin D (ERGOCALCIFEROL) 13142 units CAPS capsule Take 1 capsule by mouth once a week 12 capsule 1    metFORMIN (GLUCOPHAGE-XR) 500 MG extended release tablet Take 1 tablet by mouth every other day 90 tablet 1    clonazePAM (KLONOPIN) 0.5 MG tablet Take 1 tablet by mouth 2 times daily as needed for Anxiety 60 tablet 0     No current facility-administered medications for this visit. PMH, Surgical Hx, Family Hx, and Social Hx reviewed and updated. Health Maintenance reviewed.     Objective  Vitals: 04/02/19 0951   BP: 104/76   Site: Left Upper Arm   Position: Sitting   Cuff Size: Large Adult   Pulse: 79   Resp: 14   Temp: 98.1 °F (36.7 °C)   TempSrc: Tympanic   SpO2: 99%   Weight: 281 lb (127.5 kg)   Height: 5' 8\" (1.727 m)     BP Readings from Last 3 Encounters:   04/02/19 104/76   03/11/19 116/84   02/19/19 122/72     Wt Readings from Last 3 Encounters:   04/02/19 281 lb (127.5 kg)   03/11/19 274 lb (124.3 kg)   02/19/19 274 lb (124.3 kg)     Physical Exam   Constitutional: She is oriented to person, place, and time. She appears well-developed and well-nourished. No distress. Obese female, sitting in exam room. NAD. HENT:   Head: Normocephalic and atraumatic. Right Ear: External ear normal.   Left Ear: External ear normal.   Mouth/Throat: Oropharynx is clear and moist. No oropharyngeal exudate. Eyes: Conjunctivae are normal.   Neck: Normal range of motion. Neck supple. Cardiovascular: Normal rate, regular rhythm and normal heart sounds. No murmur heard. Pulmonary/Chest: Effort normal and breath sounds normal. No respiratory distress. She has no wheezes. She has no rales. Musculoskeletal: Normal range of motion. She exhibits tenderness. She exhibits no edema or deformity. Left lower leg: She exhibits tenderness. She exhibits no bony tenderness, no swelling, no edema, no deformity and no laceration. Legs:  Neurological: She is alert and oriented to person, place, and time. Skin: Skin is warm and dry. Rash noted. Rash is maculopapular (maculopapular rash of bilateral cheeks and across nasal bridge noted today. ). She is not diaphoretic. No erythema. Psychiatric: Her speech is normal and behavior is normal. Judgment and thought content normal. Cognition and memory are normal.   Slightly depressed, sad mood noted on on exam today. Patient feels stressed about upcoming legal hearing with her daughter's grandmother. Very concerned about ruling.   Bibiana has been going to therapist which has helped but patient is still feeling very anxious. She is very composed when discussing legal concerns. Vitals reviewed. Assessment & Plan    Diagnosis Orders   1. BMI 40.0-44.9, adult ([facility] [address]  Amb External Referral To Bariatrics   2. Prediabetes  Amb External Referral To Bariatrics   3. Left leg pain  ketorolac (TORADOL) 10 MG tablet    diclofenac sodium 1 % GEL   4. Spinal stenosis of lumbar region without neurogenic claudication  ketorolac (TORADOL) 10 MG tablet   5. Sciatica, left side  ketorolac (TORADOL) 10 MG tablet   6. Malar rash     7. Multiple joint pain     8. Anxiety and depression  buPROPion (WELLBUTRIN XL) 150 MG extended release tablet   9. Multiple sclerosis ([facility] [address]     10. Acute stress disorder     11. Weight gain     -Long talk with patient today.  -Trial 150 mg xr wellbutrin for anxiety/depression.  -Trial toradol tablet for acute leg/back pain; await EMG testing later this month. -BROOK today for further work-up/evaluation.  -Referral to [facility] for [facility].   -Patient to return to office in 4-6 weeks for follow up visit.   -Call or [facility] with any concerns.     Orders Placed This Encounter   Procedures    Amb External Referral To Bariatrics     Referral Priority:   Routine     Referral Type:   Eval and Treat     Referral Reason:   Specialty Services Required     Referred to Provider:   López Rossi MD     Requested Specialty:   Bariatric Surgery     Number of Visits Requested:   1     Orders Placed This Encounter   Medications    ketorolac (TORADOL) 10 MG tablet     Sig: Take 1 tablet by mouth every 6 hours as needed for Pain     Dispense:  20 tablet     Refill:  0    diclofenac sodium 1 % GEL     Sig: Apply 4 g topically 4 times daily     Dispense:  4 Tube     Refill:  1    buPROPion (WELLBUTRIN XL) 150 MG extended release tablet     Sig: Take 1 tablet by mouth every morning     Dispense:  30 tablet     Refill:  3     Medications Discontinued

## 2019-04-03 ENCOUNTER — HOSPITAL ENCOUNTER (OUTPATIENT)
Dept: PHYSICAL THERAPY | Age: 34
Setting detail: THERAPIES SERIES
Discharge: HOME | End: 2019-04-03
Payer: COMMERCIAL

## 2019-04-03 PROCEDURE — 97035 APP MDLTY 1+ULTRASOUND EA 15: CPT

## 2019-04-03 PROCEDURE — 97140 MANUAL THERAPY 1/> REGIONS: CPT

## 2019-04-03 ASSESSMENT — PAIN DESCRIPTION - LOCATION: LOCATION: LEG

## 2019-04-03 ASSESSMENT — PAIN DESCRIPTION - ORIENTATION: ORIENTATION: DISTAL

## 2019-04-03 ASSESSMENT — PAIN SCALES - GENERAL: PAINLEVEL_OUTOF10: 4

## 2019-04-03 ASSESSMENT — PAIN DESCRIPTION - DESCRIPTORS: DESCRIPTORS: DISCOMFORT

## 2019-04-03 NOTE — PROGRESS NOTES
[address] [address]  Outpatient Physical Therapy    Treatment Note        Date: 4/3/2019  Patient: Maribel Gaston  : 1985  ACCT #: [de-identified]  Referring Practitioner: Gamal Becker PA-C  Diagnosis: Achilles tendinitis of left lower extremity    Visit Information:  PT Visit Information  PT Insurance Information: [employer], [employer] care  Total # of Visits Approved: 30  Total # of Visits to Date: 5  Plan of Care/Certification Expiration Date: 19  No Show: 0  Canceled Appointment: 0  Progress Note Counter:     Subjective: Pt reports decreased pain this date. States awaiting test results to confirm Lupus diagnosis. Comments: Pt 15 min late for session  HEP Compliance:  [x] Good [] Fair [] Poor [] Reports not doing due to:    Vital Signs  Patient Currently in Pain: Yes   Pain Screening  Patient Currently in Pain: Yes  Pain Assessment  Pain Assessment: 0-10  Pain Level: 4  Pain Location: Leg  Pain Orientation: Distal  Pain Descriptors: Discomfort    OBJECTIVE:   Manual:   Manual therapy  Soft Tissue Mobalization: STM with tennis ball to gastroc, cross friction to achilles tendon; total manual 16 min    Modalities:  Modalities  Ultrasound: 1 mHz @ 1.2 w/cm2 to L achilles tendon x8 min     *Indicates exercise, modality, or manual techniques to be initiated when appropriate    Assessment: Body structures, Functions, Activity limitations: Decreased ROM, Decreased strength, Decreased sensation, Increased Pain, Decreased functional mobility   Assessment: Pt with improved tolerance to session this date with initiation of US. States decreased numbness in L toes following US and manual this date. Treatment Diagnosis: LE pain, low back pain, left foot pain  Prognosis: Fair     Goals:  Short term goals  Time Frame for Short term goals: 4 weeks  Short term goal 1: Patient will report </= 3/10 pain in left LE with standing for short periods. Short term goal 2: Patient will be independent with HEP.     Long term

## 2019-04-06 LAB
ANTINUCLEAR AB INTERPRETIVE COMMENT: NORMAL
ANTINUCLEAR ANTIBODY, HEP-2, IGG: NORMAL

## 2019-04-10 ENCOUNTER — HOSPITAL ENCOUNTER (OUTPATIENT)
Dept: PHYSICAL THERAPY | Age: 34
Setting detail: THERAPIES SERIES
Discharge: HOME | End: 2019-04-10
Payer: COMMERCIAL

## 2019-04-11 DIAGNOSIS — F41.8 SITUATIONAL ANXIETY: Primary | ICD-10-CM

## 2019-04-11 RX ORDER — ALPRAZOLAM 0.5 MG/1
TABLET ORAL
Qty: 1 TABLET | Refills: 0 | Status: SHIPPED | OUTPATIENT
Start: 2019-04-11 | End: 2019-04-29

## 2019-04-15 ENCOUNTER — HOSPITAL ENCOUNTER (OUTPATIENT)
Dept: PHYSICAL THERAPY | Age: 34
Setting detail: THERAPIES SERIES
Discharge: HOME | End: 2019-04-15
Payer: COMMERCIAL

## 2019-04-17 ENCOUNTER — HOSPITAL ENCOUNTER (OUTPATIENT)
Dept: NEUROLOGY | Age: 34
Discharge: HOME | End: 2019-04-17
Payer: COMMERCIAL

## 2019-04-17 DIAGNOSIS — M51.36 DDD (DEGENERATIVE DISC DISEASE), LUMBAR: ICD-10-CM

## 2019-04-17 DIAGNOSIS — M54.32 SCIATICA, LEFT SIDE: ICD-10-CM

## 2019-04-17 PROCEDURE — 95886 MUSC TEST DONE W/N TEST COMP: CPT

## 2019-04-17 PROCEDURE — 95912 NRV CNDJ TEST 11-12 STUDIES: CPT

## 2019-04-17 NOTE — PROCEDURES
[address]                      [facility], [address] [facility]                             ELECTROMYOGRAM REPORT    PATIENT NAME: Dyan Man                     :        1985  MED REC NO:   [id number]                            ROOM:  ACCOUNT NO:   [de-identified]                           ADMIT DATE: 2019  PROVIDER:     Homer Davidson MD    DATE OF EM2019    Neurophysiologic studies are requested for the patient's pain radiating  down her left lower extremity from the back. Motor nerve conduction study of the right common peroneal nerve shows  normal amplitudes, latencies, and conduction velocity. Motor nerve  conduction study of the left common peroneal nerve shows normal  amplitudes, latencies, and conduction velocity. The right tibial nerve  shows normal amplitudes, latencies, and conduction velocity. The left  tibial nerve motor nerve conduction study shows normal amplitudes,  latencies, and conduction velocity. The right sural nerve conduction  study shows normal peak latencies, low amplitudes, and normal conduction  velocity. The left sural nerve conduction study shows normal peak  latencies, low amplitudes, and decreased conduction velocity. The right  superficial peroneal nerve shows normal peak latencies, low amplitudes,  and normal conduction velocity. The left superficial peroneal nerve  shows normal peak latencies, low amplitudes, and normal conduction  velocity. The saphenous nerves are not recordable. Needle electrode  examination of the above sampled muscles shows mild denervation in the  left tibialis anterior muscle. IMPRESSION:  Normal nerve conduction studies of the lower extremity. There is no suggestion of compressive neuropathy. L5 radicular changes are notable, isolated in the tibialis anterior  muscle.     The findings may be suggestive of a sciatica and an MRI of the  lumbosacral spine is recommended. Multiple sensory nerve conduction studies were evaluated to rule out an  artifact or temperature differences. This test is personally performed  and interpreted by me. Thank you Dr. Nicolle Nguyen for asking us to assist in the care of this  patient.         Wesley Monet MD    D: 04/17/2019 10:08:41       T: 04/17/2019 10:30:55     [email or web address]  Job#: [id number]     Doc#: [id number]    CC:

## 2019-04-18 ENCOUNTER — HOSPITAL ENCOUNTER (OUTPATIENT)
Dept: PHYSICAL THERAPY | Age: 34
Setting detail: THERAPIES SERIES
Discharge: HOME | End: 2019-04-18
Payer: COMMERCIAL

## 2019-04-18 NOTE — PROGRESS NOTES
[address]       Physical Therapy  Cancellation/No-show Note  Patient Name:  Kenn Ching  :  1985   Date:  2019  Referring Practitioner: Werner Moses PA-C  Diagnosis: Achilles tendinitis of left lower extremity    Visit Information:  PT Visit Information  PT Insurance Information: [employer]re[employer], [employer] care  Total # of Visits Approved: 30  Total # of Visits to Date: 5  Plan of Care/Certification Expiration Date: 19  No Show: 1  Canceled Appointment: 3  Progress Note Counter:  Cx 19    For today's appointment patient:  [x]  Cancelled  []  Rescheduled appointment  []  No-show   []  Called pt to remind of next appointment     Reason given by patient:  []  Patient ill  []  Conflicting appointment  []  No transportation    []  Conflict with work  []  No reason given  [x]  Other:   Having MRI     Comments:   Patient will call us back and advise    Signature: Electronically signed by Danny James PTA on 19 at 11:16 AM

## 2019-04-19 DIAGNOSIS — M54.42 CHRONIC MIDLINE LOW BACK PAIN WITH LEFT-SIDED SCIATICA: ICD-10-CM

## 2019-04-19 DIAGNOSIS — M25.50 MULTIPLE JOINT PAIN: ICD-10-CM

## 2019-04-19 DIAGNOSIS — M54.32 SCIATICA, LEFT SIDE: Primary | ICD-10-CM

## 2019-04-19 DIAGNOSIS — G89.29 CHRONIC MIDLINE LOW BACK PAIN WITH LEFT-SIDED SCIATICA: ICD-10-CM

## 2019-04-23 NOTE — TELEPHONE ENCOUNTER
Noted.  She has been referred to [facility] for pain management.

## 2019-04-29 ENCOUNTER — HOSPITAL ENCOUNTER (EMERGENCY)
Age: 34
Discharge: HOME | End: 2019-04-30
Payer: COMMERCIAL

## 2019-04-29 DIAGNOSIS — R07.89 CHEST WALL PAIN: ICD-10-CM

## 2019-04-29 DIAGNOSIS — M94.0 ACUTE COSTOCHONDRITIS: Primary | ICD-10-CM

## 2019-04-29 LAB
EKG ATRIAL RATE: 87 BPM
EKG P AXIS: -4 DEGREES
EKG P-R INTERVAL: 134 MS
EKG Q-T INTERVAL: 364 MS
EKG QRS DURATION: 70 MS
EKG QTC CALCULATION (BAZETT): 438 MS
EKG R AXIS: 3 DEGREES
EKG T AXIS: 3 DEGREES
EKG VENTRICULAR RATE: 87 BPM

## 2019-04-29 PROCEDURE — 99285 EMERGENCY DEPT VISIT HI MDM: CPT

## 2019-04-29 PROCEDURE — 93005 ELECTROCARDIOGRAM TRACING: CPT

## 2019-04-29 ASSESSMENT — PAIN DESCRIPTION - ONSET: ONSET: ON-GOING

## 2019-04-29 ASSESSMENT — PAIN DESCRIPTION - DESCRIPTORS: DESCRIPTORS: TIGHTNESS;CRAMPING

## 2019-04-29 ASSESSMENT — PAIN DESCRIPTION - FREQUENCY: FREQUENCY: CONTINUOUS

## 2019-04-29 ASSESSMENT — PAIN SCALES - GENERAL: PAINLEVEL_OUTOF10: 10

## 2019-04-29 ASSESSMENT — PAIN DESCRIPTION - LOCATION: LOCATION: CHEST;RIB CAGE

## 2019-04-29 ASSESSMENT — PAIN DESCRIPTION - PROGRESSION: CLINICAL_PROGRESSION: NOT CHANGED

## 2019-04-30 ENCOUNTER — APPOINTMENT (OUTPATIENT)
Dept: GENERAL RADIOLOGY | Age: 34
End: 2019-04-30
Payer: COMMERCIAL

## 2019-04-30 VITALS
WEIGHT: 280 LBS | BODY MASS INDEX: 42.44 KG/M2 | RESPIRATION RATE: 18 BRPM | DIASTOLIC BLOOD PRESSURE: 78 MMHG | HEIGHT: 68 IN | TEMPERATURE: 98.6 F | HEART RATE: 97 BPM | OXYGEN SATURATION: 97 % | SYSTOLIC BLOOD PRESSURE: 120 MMHG

## 2019-04-30 LAB
ALBUMIN SERPL-MCNC: 3.9 G/DL (ref 3.5–4.6)
ALP BLD-CCNC: 119 U/L (ref 40–130)
ALT SERPL-CCNC: 11 U/L (ref 0–33)
ANION GAP SERPL CALCULATED.3IONS-SCNC: 15 MEQ/L (ref 9–15)
AST SERPL-CCNC: 19 U/L (ref 0–35)
BASOPHILS ABSOLUTE: 0.1 K/UL (ref 0–0.2)
BASOPHILS RELATIVE PERCENT: 0.7 %
BILIRUB SERPL-MCNC: <0.2 MG/DL (ref 0.2–0.7)
BUN BLDV-MCNC: 11 MG/DL (ref 6–20)
CALCIUM SERPL-MCNC: 9.2 MG/DL (ref 8.5–9.9)
CHLORIDE BLD-SCNC: 99 MEQ/L (ref 95–107)
CK MB: 1.5 NG/ML (ref 0–3.8)
CO2: 24 MEQ/L (ref 20–31)
CREAT SERPL-MCNC: 0.81 MG/DL (ref 0.5–0.9)
CREATINE KINASE-MB INDEX: 0.5 % (ref 0–3.5)
D DIMER: 0.48 MG/L FEU (ref 0–0.5)
EOSINOPHILS ABSOLUTE: 0.3 K/UL (ref 0–0.7)
EOSINOPHILS RELATIVE PERCENT: 2.6 %
GFR AFRICAN AMERICAN: >60
GFR NON-AFRICAN AMERICAN: >60
GLOBULIN: 3 G/DL (ref 2.3–3.5)
GLUCOSE BLD-MCNC: 110 MG/DL (ref 70–99)
HCT VFR BLD CALC: 36.1 % (ref 37–47)
HEMOGLOBIN: 12.6 G/DL (ref 12–16)
INR BLD: 1.1
LYMPHOCYTES ABSOLUTE: 3.4 K/UL (ref 1–4.8)
LYMPHOCYTES RELATIVE PERCENT: 26.1 %
MAGNESIUM: 1.9 MG/DL (ref 1.7–2.4)
MCH RBC QN AUTO: 31 PG (ref 27–31.3)
MCHC RBC AUTO-ENTMCNC: 34.9 % (ref 33–37)
MCV RBC AUTO: 88.9 FL (ref 82–100)
MONOCYTES ABSOLUTE: 0.6 K/UL (ref 0.2–0.8)
MONOCYTES RELATIVE PERCENT: 4.4 %
NEUTROPHILS ABSOLUTE: 8.6 K/UL (ref 1.4–6.5)
NEUTROPHILS RELATIVE PERCENT: 66.2 %
PDW BLD-RTO: 14.2 % (ref 11.5–14.5)
PLATELET # BLD: 270 K/UL (ref 130–400)
POTASSIUM SERPL-SCNC: 4 MEQ/L (ref 3.4–4.9)
PROTHROMBIN TIME: 10.7 SEC (ref 9–11.5)
RBC # BLD: 4.06 M/UL (ref 4.2–5.4)
SODIUM BLD-SCNC: 138 MEQ/L (ref 135–144)
TOTAL CK: 315 U/L (ref 0–170)
TOTAL PROTEIN: 6.9 G/DL (ref 6.3–8)
TROPONIN: <0.01 NG/ML (ref 0–0.01)
WBC # BLD: 13 K/UL (ref 4.8–10.8)

## 2019-04-30 PROCEDURE — 71046 X-RAY EXAM CHEST 2 VIEWS: CPT

## 2019-04-30 PROCEDURE — 36415 COLL VENOUS BLD VENIPUNCTURE: CPT

## 2019-04-30 PROCEDURE — 96374 THER/PROPH/DIAG INJ IV PUSH: CPT

## 2019-04-30 PROCEDURE — 80053 COMPREHEN METABOLIC PANEL: CPT

## 2019-04-30 PROCEDURE — 82550 ASSAY OF CK (CPK): CPT

## 2019-04-30 PROCEDURE — 83735 ASSAY OF MAGNESIUM: CPT

## 2019-04-30 PROCEDURE — 85379 FIBRIN DEGRADATION QUANT: CPT

## 2019-04-30 PROCEDURE — 82553 CREATINE MB FRACTION: CPT

## 2019-04-30 PROCEDURE — 96376 TX/PRO/DX INJ SAME DRUG ADON: CPT

## 2019-04-30 PROCEDURE — 84484 ASSAY OF TROPONIN QUANT: CPT

## 2019-04-30 PROCEDURE — 96375 TX/PRO/DX INJ NEW DRUG ADDON: CPT

## 2019-04-30 PROCEDURE — 85610 PROTHROMBIN TIME: CPT

## 2019-04-30 PROCEDURE — 85025 COMPLETE CBC W/AUTO DIFF WBC: CPT

## 2019-04-30 PROCEDURE — 6360000002 HC RX W HCPCS: Performed by: NURSE PRACTITIONER

## 2019-04-30 RX ORDER — ONDANSETRON 2 MG/ML
4 INJECTION INTRAMUSCULAR; INTRAVENOUS ONCE
Status: COMPLETED | OUTPATIENT
Start: 2019-04-30 | End: 2019-04-30

## 2019-04-30 RX ORDER — MORPHINE SULFATE 2 MG/ML
4 INJECTION, SOLUTION INTRAMUSCULAR; INTRAVENOUS
Status: COMPLETED | OUTPATIENT
Start: 2019-04-30 | End: 2019-04-30

## 2019-04-30 RX ORDER — HYDROCODONE BITARTRATE AND ACETAMINOPHEN 5; 325 MG/1; MG/1
1 TABLET ORAL EVERY 6 HOURS PRN
Qty: 12 TABLET | Refills: 0 | Status: SHIPPED | OUTPATIENT
Start: 2019-04-30 | End: 2019-05-01 | Stop reason: SDUPTHER

## 2019-04-30 RX ADMIN — ONDANSETRON 4 MG: 2 INJECTION INTRAMUSCULAR; INTRAVENOUS at 00:30

## 2019-04-30 RX ADMIN — MORPHINE SULFATE 4 MG: 2 INJECTION, SOLUTION INTRAMUSCULAR; INTRAVENOUS at 00:30

## 2019-04-30 RX ADMIN — MORPHINE SULFATE 4 MG: 2 INJECTION, SOLUTION INTRAMUSCULAR; INTRAVENOUS at 02:25

## 2019-04-30 ASSESSMENT — ENCOUNTER SYMPTOMS
WHEEZING: 0
CONSTIPATION: 0
NAUSEA: 0
ABDOMINAL DISTENTION: 0
DIARRHEA: 0
CHEST TIGHTNESS: 1
COLOR CHANGE: 0
TROUBLE SWALLOWING: 0
COUGH: 0
ABDOMINAL PAIN: 0
VOMITING: 0
SHORTNESS OF BREATH: 1
RHINORRHEA: 0
SORE THROAT: 0
BACK PAIN: 0

## 2019-04-30 ASSESSMENT — PAIN SCALES - GENERAL
PAINLEVEL_OUTOF10: 9
PAINLEVEL_OUTOF10: 10

## 2019-04-30 ASSESSMENT — PAIN DESCRIPTION - DESCRIPTORS: DESCRIPTORS: ACHING

## 2019-04-30 ASSESSMENT — HEART SCORE: ECG: 0

## 2019-04-30 ASSESSMENT — PAIN DESCRIPTION - PAIN TYPE: TYPE: ACUTE PAIN

## 2019-04-30 NOTE — ED NOTES
Pt medicated per orders, pt macy. Well. A&ox4,s kin w/d/pink, pulses palp, msp's intact, 0 sob, 0 N&v, 0 distress, will monitor.      Katlin Moreno RN  04/30/19 3379

## 2019-04-30 NOTE — ED NOTES
Discharge instructions given and reviewed. Patient verbalized understanding. Patient ambulated out of ED with a slow steady gait to awaiting car.       Vikram Solorio RN  04/30/19 [id number]

## 2019-04-30 NOTE — ED TRIAGE NOTES
Patient drove self to ED with Chest and ribs cage pain. The pain has been going on today, progressively getting worse.

## 2019-04-30 NOTE — ED PROVIDER NOTES
[address] [facility] ED  eMERGENCY dEPARTMENT eNCOUnter      Pt Name: Jaclyn Gnozalez  MRN: [id number]  Izzygffortunato 1985  Date of evaluation: 4/29/2019  Provider: Brodie Nash       Chief Complaint   Patient presents with    Chest Pain         HISTORY OF PRESENT ILLNESS   (Location/Symptom, Timing/Onset,Context/Setting, Quality, Duration, Modifying Factors, Severity)  Note limiting factors. Jaclyn Gonzalez is a 35 y.o. female who presents to the emergency department for complaint of midsternal chest pain radiating the ribs. Patient reports the pain isn't present for 1 week but over the course of today has become significantly worse and more focal to the rib borders. She reports pain is currently a 10 out of 10 aching sharp pains that at times make her feel short of breath. He states the pain is worse with any direct pressure to her sternum or the lower rib regions and is increased and she attempted to get a breath. She denies any recent upper respiratory symptoms denies any cough or congestion. She denies any fever chills sweats. Denies abdominal pain nausea vomiting diarrhea. Nursing Notes were reviewed. REVIEW OF SYSTEMS    (2-9 systems for level 4, 10 or more for level 5)     Review of Systems   Constitutional: Negative for activity change, appetite change, chills, diaphoresis, fatigue and fever. HENT: Negative for congestion, ear pain, rhinorrhea, sore throat and trouble swallowing. Eyes: Negative for visual disturbance. Respiratory: Positive for chest tightness and shortness of breath. Negative for cough and wheezing. Cardiovascular: Positive for chest pain. Negative for palpitations. Gastrointestinal: Negative for abdominal distention, abdominal pain, constipation, diarrhea, nausea and vomiting. Genitourinary: Negative for difficulty urinating, dysuria, flank pain, frequency and urgency.    Musculoskeletal: Negative for back pain and abused: Not on file     Forced sexual activity: Not on file   Other Topics Concern    Not on file   Social History Narrative    Not on file       SCREENINGS      @[facility]([id number])@      PHYSICAL EXAM    (up to 7 for level 4, 8 or more for level 5)     ED Triage Vitals [04/29/19 2352]   BP Temp Temp Source Pulse Resp SpO2 Height Weight   (!) 141/106 98.6 °F (37 °C) Oral 88 -- 98 % 5' 8\" (1.727 m) 280 lb (127 kg)       Physical Exam   Constitutional: She is oriented to person, place, and time. She appears well-developed and well-nourished. No distress. HENT:   Head: Normocephalic and atraumatic. Right Ear: External ear normal.   Left Ear: External ear normal.   Nose: Nose normal.   Mouth/Throat: No oropharyngeal exudate. Eyes: Conjunctivae are normal. Right eye exhibits no discharge. Left eye exhibits no discharge. Neck: Normal range of motion. Cardiovascular: Normal rate, regular rhythm and intact distal pulses. Pulmonary/Chest: Effort normal and breath sounds normal. Chest wall is not dull to percussion. She exhibits tenderness. She exhibits no mass, no bony tenderness, no laceration, no crepitus, no edema, no deformity, no swelling and no retraction. Abdominal: Soft. She exhibits no distension. There is no tenderness. Musculoskeletal: Normal range of motion. Neurological: She is alert and oriented to person, place, and time. Skin: Skin is warm and dry. Capillary refill takes less than 2 seconds. She is not diaphoretic.        DIAGNOSTIC RESULTS     EKG: All EKG's are interpreted by the Emergency Department Physician who either signs or Co-signsthis chart in the absence of a cardiologist.    Sinus rhythm at 87 bpm no acute ST elevation or deviation good R-wave progression no ectopy  ms    RADIOLOGY:   Non-plain filmimages such as CT, Ultrasound and MRI are read by the radiologist. Plain radiographic images are visualized and preliminarily interpreted by the emergency physician with the below findings:    Two-view chest x-ray is negative for acute process    Interpretation per the Radiologist below, if available at the time ofthis note:    XR CHEST STANDARD (2 VW)    (Results Pending)         ED BEDSIDE ULTRASOUND:   Performed by ED Physician - none    LABS:  Labs Reviewed   CBC WITH AUTO DIFFERENTIAL - Abnormal; Notable for the following components:       Result Value    WBC 13.0 (*)     RBC 4.06 (*)     Hematocrit 36.1 (*)     Neutrophils # 8.6 (*)     All other components within normal limits   COMPREHENSIVE METABOLIC PANEL - Abnormal; Notable for the following components:    Glucose 110 (*)     All other components within normal limits   CK - Abnormal; Notable for the following components: Total  (*)     All other components within normal limits   D-DIMER, QUANTITATIVE   PROTIME-INR   MAGNESIUM   TROPONIN   CKMB & RELATIVE PERCENT       All other labs were within normal range or not returned as of this dictation. EMERGENCY DEPARTMENT COURSE and DIFFERENTIAL DIAGNOSIS/MDM:   Vitals:    Vitals:    04/29/19 2352 04/30/19 0000 04/30/19 0100 04/30/19 0145   BP: (!) 141/106 (!) 146/102 116/77 116/74   Pulse: 88 86 89 87   Resp:  18 14 20   Temp: 98.6 °F (37 °C)      TempSrc: Oral      SpO2: 98% 99% 97% 99%   Weight: 280 lb (127 kg)      Height: 5' 8\" (1.727 m)               MDM patient presents is afebrile nontoxic appearance no acute distress obvious discomfort in the chest wall area with palpable reproducible tenderness to the chest wall. Due to patient report chest pain a cardiac workup was ordered along with a d-dimer. Patient was provided medications for pain and discomfort. Patient's laboratory unremarkable d-dimer is negative troponin is negative. There is a noted elevation of white blood cell count however patient is on long-term steroid use due to multiple sclerosis.   Following demonstration pain medications order patient states she has some relief but continues to have pain is continues to be palpable reproducible. Patient appears to be stable at this time I talked to her about the possibility of inpatient or observation stay due to pain and discomfort. She states that she would like to be discharged home with medications for pain discomfort or follow-up with her primary care specialist since possible. Patient is encouraged return to the ER for any onset of new concerning symptoms or worsening condition or inability to tolerate pain shortness of breath. Patient verbalized percent of all given instructions education again states that she feels more comfortable being discharged home with additional medication for pain and discomfort. CRITICAL CARE TIME       CONSULTS:  None    PROCEDURES:  Unless otherwise noted below, none     Procedures    FINAL IMPRESSION      1. Acute costochondritis    2. Chest wall pain          DISPOSITION/PLAN   DISPOSITION        PATIENT REFERRED TO:  Chaka Candelario PA-C  [address] [facility] [address] [facility]  [phone]    Call today        DISCHARGE MEDICATIONS:  New Prescriptions    HYDROCODONE-ACETAMINOPHEN (NORCO) 5-325 MG PER TABLET    Take 1 tablet by mouth every 6 hours as needed for Pain for up to 3 days.  Sedation precautions please          (Please notethat portions of this note were completed with a voice recognition program.  Efforts were made to edit the dictations but occasionally words are mis-transcribed.)    KANDI Hernandez CNP (electronically signed)  Attending Emergency Physician         KANDI Hernandez CNP  04/30/19 9224

## 2019-05-01 PROCEDURE — 93010 ELECTROCARDIOGRAM REPORT: CPT | Performed by: INTERNAL MEDICINE

## 2019-05-03 NOTE — PROGRESS NOTES
[facility] [address]                                  Ph: [phone]  Fax: [phone]     [] Certification  [] Recertification  []  Plan of Care  [] Progress Note [x] Discharge                            To:  Referring Practitioner: Nadiya Bernard PA-C           From:  John Mane, PT  Patient: Jaclyn Bachelor     : 1985  Diagnosis: Achilles tendinitis of left lower extremity     Date: 5/3/2019  Treatment Diagnosis: LE pain, low back pain, left foot pain     Plan of Care/Certification Expiration Date: 19  Progress Report Period from:  3/15/2019  to 4/3/2019     Total # of Visits to Date: 5   No Show: 1    Canceled Appointment: 3      OBJECTIVE:   Short Term Goals - Time Frame for Short term goals: 4 weeks    Goals Current/Discharge status  Met   Short term goal 1: Patient will report </= 3/10 pain in left LE with standing for short periods. .NT unexpected [] yes  [x] no   Short term goal 2: Patient will be independent with HEP. Written HEP provided [] yes  [x] no      Long Term Goals - Time Frame for Long term goals : 6 weeks  Goals Current/ Discharge status Met   Long term goal 1: Patient will increase left dorsiflexion and SLR >/= 5-10 degrees for improved functional tolerance. NT unexpected DC [] yes  [x] no   Long term goal 2: Patient will increase strength in bilateral LEs >/= 1/2 manual muscle grade for improved standing tolerance. NT unexpected DC [] yes  [x] no   Long term goal 3: LEFS >/= 40/80 to demonstrate functional improvements. NT [] yes  [x] no      Body structures, Functions, Activity limitations: Decreased ROM, Decreased strength, Decreased sensation, Increased Pain, Decreased functional mobility   Assessment: Pt with fair tolerance to therapy. Cancelled appts due to having an MRI and no response to cont. DC per attendance policy.    Prognosis: Fair  Discharge Recommendations: Continue to

## 2019-05-14 ENCOUNTER — OFFICE VISIT (OUTPATIENT)
Dept: FAMILY MEDICINE CLINIC | Age: 34
End: 2019-05-14
Payer: COMMERCIAL

## 2019-05-14 VITALS
WEIGHT: 281.6 LBS | HEART RATE: 82 BPM | OXYGEN SATURATION: 99 % | BODY MASS INDEX: 42.68 KG/M2 | DIASTOLIC BLOOD PRESSURE: 82 MMHG | TEMPERATURE: 97.1 F | RESPIRATION RATE: 14 BRPM | HEIGHT: 68 IN | SYSTOLIC BLOOD PRESSURE: 114 MMHG

## 2019-05-14 DIAGNOSIS — F41.9 ANXIETY AND DEPRESSION: ICD-10-CM

## 2019-05-14 DIAGNOSIS — R21 MALAR RASH: Primary | ICD-10-CM

## 2019-05-14 DIAGNOSIS — R73.03 PREDIABETES: ICD-10-CM

## 2019-05-14 DIAGNOSIS — F32.A ANXIETY AND DEPRESSION: ICD-10-CM

## 2019-05-14 PROCEDURE — 99214 OFFICE O/P EST MOD 30 MIN: CPT | Performed by: NURSE PRACTITIONER

## 2019-05-14 PROCEDURE — G8427 DOCREV CUR MEDS BY ELIG CLIN: HCPCS | Performed by: NURSE PRACTITIONER

## 2019-05-14 PROCEDURE — G8417 CALC BMI ABV UP PARAM F/U: HCPCS | Performed by: NURSE PRACTITIONER

## 2019-05-14 PROCEDURE — 1036F TOBACCO NON-USER: CPT | Performed by: NURSE PRACTITIONER

## 2019-05-14 RX ORDER — CETIRIZINE HYDROCHLORIDE 10 MG/1
10 TABLET ORAL DAILY
Qty: 30 TABLET | Refills: 2 | Status: SHIPPED | OUTPATIENT
Start: 2019-05-14 | End: 2019-06-25

## 2019-05-14 RX ORDER — TRAZODONE HYDROCHLORIDE 50 MG/1
50 TABLET ORAL NIGHTLY
Qty: 30 TABLET | Refills: 2 | Status: SHIPPED | OUTPATIENT
Start: 2019-05-14 | End: 2019-06-25

## 2019-05-14 RX ORDER — METFORMIN HYDROCHLORIDE 500 MG/1
500 TABLET, EXTENDED RELEASE ORAL EVERY OTHER DAY
Qty: 90 TABLET | Refills: 1 | Status: SHIPPED | OUTPATIENT
Start: 2019-05-14 | End: 2020-03-17 | Stop reason: SDUPTHER

## 2019-05-14 ASSESSMENT — ENCOUNTER SYMPTOMS
TROUBLE SWALLOWING: 0
COUGH: 0
SORE THROAT: 0
VOICE CHANGE: 0
CHEST TIGHTNESS: 0
SHORTNESS OF BREATH: 0
DIARRHEA: 0
VOMITING: 0
EYE ITCHING: 0
RHINORRHEA: 0
FACIAL SWELLING: 0
EYE PAIN: 0
EYE DISCHARGE: 0

## 2019-05-14 NOTE — PROGRESS NOTES
Subjective  Bibiana Jian Mccullough, 35 y.o. female presents today with:  Chief Complaint   Patient presents with    Otalgia     left ear 2 weeks     Pain     pain still gets bad all over her body     Rash     on face still not gone away        Otalgia - Left ear x 1 week, now resolved. States she found a piece of cotton swab in her ear, which she removed with tweezers. Also had URI symptoms last week, now resolved. Would like ear checked for possible remaining foreign body. Chronic Thoracic Pain - Saw pain management this morning. Is taking Norco PRN with moderate results. She is concerned that the pain is being masked, as the underlying cause has not yet been identified. She is having a thoracic MRI later this month, and is also following up with Dr. Ortiz Schroeder for cardiac evaluation. Malar Rash - Waxing and waning for several months. States it sometimes itches and/or burns. Usually resolves with Benadryl. Denies exposure to known allergens or new chemical/plant substances. No rash present today. Anxiety/Depression - Took one month of Wellbutrin, then stopped when bottle empty. Denies headache/nausea or other side effects from medication. Prediabetes - Stopped taking metformin. States she is \"overwhelmed\" by all of her health issues.       Past Medical History:   Diagnosis Date    Asthma     Chronic back pain     Headache     Irregular heart beat     Multiple sclerosis (HCC)     Osteoarthritis        Allergies   Allergen Reactions    Nsaids     Pcn [Penicillins] Hives    Phentermine      Anxiety with hospitalization    Topamax [Topiramate] Other (See Comments)     DIZZY       Current Outpatient Medications on File Prior to Visit   Medication Sig Dispense Refill    diclofenac sodium 1 % GEL Apply 4 g topically 4 times daily 4 Tube 1    naloxone (NARCAN) 4 MG/0.1ML LIQD nasal spray 1 spray by Nasal route as needed for Opioid Reversal 1 each 0    buPROPion (WELLBUTRIN XL) 150 MG extended release tablet Take 1 tablet by mouth every morning 30 tablet 3    clonazePAM (KLONOPIN) 0.5 MG tablet Take 1 tablet by mouth 2 times daily as needed for Anxiety 60 tablet 0     No current facility-administered medications on file prior to visit. Review of Systems   Constitutional: Negative for chills, diaphoresis, fatigue and fever. HENT: Positive for ear pain (resolved). Negative for congestion, facial swelling, mouth sores, rhinorrhea, sore throat, trouble swallowing and voice change. Eyes: Negative for pain, discharge and itching. Respiratory: Negative for cough, chest tightness and shortness of breath. Cardiovascular: Positive for chest pain (bilateral thoracic). Gastrointestinal: Negative for diarrhea and vomiting. Musculoskeletal: Positive for arthralgias and myalgias. Skin: Positive for rash. Objective    Vitals:    05/14/19 1005   BP: 114/82   Site: Left Upper Arm   Position: Sitting   Cuff Size: Large Adult   Pulse: 82   Resp: 14   Temp: 97.1 °F (36.2 °C)   TempSrc: Tympanic   SpO2: 99%   Weight: 281 lb 9.6 oz (127.7 kg)   Height: 5' 8\" (1.727 m)       Physical Exam   Constitutional: She is oriented to person, place, and time. She appears well-developed and well-nourished. No distress. HENT:   Head: Normocephalic and atraumatic. Right Ear: External ear normal.   Left Ear: External ear normal.   Nose: Nose normal.   Mouth/Throat: Oropharynx is clear and moist.   Eyes: Conjunctivae and EOM are normal.   Neck: Normal range of motion. Neck supple. Cardiovascular: Normal rate, regular rhythm and normal heart sounds. Pulmonary/Chest: Effort normal and breath sounds normal. No respiratory distress. Musculoskeletal: Normal range of motion. Neurological: She is alert and oriented to person, place, and time. Skin: Skin is warm and dry. She is not diaphoretic. Psychiatric: She has a normal mood and affect. Her behavior is normal.   Nursing note and vitals reviewed.     POC Testing Today: No results found for this visit on 05/14/19. Assessment & Plan    Diagnosis Orders   1. Malar rash  cetirizine (ZYRTEC) 10 MG tablet   2. Anxiety and depression  traZODone (DESYREL) 50 MG tablet   3. Prediabetes  metFORMIN (GLUCOPHAGE-XR) 500 MG extended release tablet     Having Nexplanon removed on June 6 - [address] Bibiana feels the Nexplanon is possibly causing her weight gain and joint pain. She will restart Wellbutrin and metformin. Discussed at length getting into a routine with her health needs so that progress with current treatment can be determined. Bibiana verbalized understanding. Return in about 6 weeks (around 6/25/2019) for follow-up with Kellie Simpson. Controlled Substances Monitoring:   RX Monitoring 5/14/2019   Attestation The Prescription Monitoring Report for this patient was reviewed today. Chronic Pain Routine Monitoring Possible medication side effects, risk of tolerance/dependence & alternative treatments discussed. ;Random urine drug screen sent today.;Obtaining appropriate analgesic effect of treatment. ;No signs of potential drug abuse or diversion identified: otherwise, see note documentation       Side effects and adverse effects of any medication prescribed today, as well as treatment plan/rationale, follow-up care, and result expectations have been discussed with the patient. Expresses understanding and desires to proceed with treatment plan. Discussed signs and symptoms which require immediate follow-up in ED/call to 911. Understanding verbalized. I have reviewed and updated the electronic medical record.     KANDI Hancock NP

## 2019-05-17 ENCOUNTER — TELEPHONE (OUTPATIENT)
Dept: OBGYN CLINIC | Age: 34
End: 2019-05-17

## 2019-05-17 RX ORDER — FLUCONAZOLE 150 MG/1
TABLET ORAL
Qty: 3 TABLET | Refills: 0 | Status: SHIPPED | OUTPATIENT
Start: 2019-05-17 | End: 2019-06-25

## 2019-05-17 NOTE — TELEPHONE ENCOUNTER
Bibiana called this morning complaining of vaginal itching and discomfort. She said she was having discharge but this has cleared up. Appointment scheduled for 5/31/19. Pharmacy verified. Erica Fonseca

## 2019-05-31 ENCOUNTER — OFFICE VISIT (OUTPATIENT)
Dept: CARDIOLOGY CLINIC | Age: 34
End: 2019-05-31
Payer: COMMERCIAL

## 2019-05-31 VITALS
SYSTOLIC BLOOD PRESSURE: 110 MMHG | HEART RATE: 97 BPM | OXYGEN SATURATION: 98 % | DIASTOLIC BLOOD PRESSURE: 70 MMHG | BODY MASS INDEX: 42.16 KG/M2 | WEIGHT: 278.2 LBS | HEIGHT: 68 IN

## 2019-05-31 DIAGNOSIS — R07.89 ATYPICAL CHEST PAIN: ICD-10-CM

## 2019-05-31 PROCEDURE — 1036F TOBACCO NON-USER: CPT | Performed by: INTERNAL MEDICINE

## 2019-05-31 PROCEDURE — G8417 CALC BMI ABV UP PARAM F/U: HCPCS | Performed by: INTERNAL MEDICINE

## 2019-05-31 PROCEDURE — G8427 DOCREV CUR MEDS BY ELIG CLIN: HCPCS | Performed by: INTERNAL MEDICINE

## 2019-05-31 PROCEDURE — 99213 OFFICE O/P EST LOW 20 MIN: CPT | Performed by: INTERNAL MEDICINE

## 2019-06-14 ENCOUNTER — OFFICE VISIT (OUTPATIENT)
Dept: OBGYN CLINIC | Age: 34
End: 2019-06-14
Payer: COMMERCIAL

## 2019-06-14 VITALS
WEIGHT: 278 LBS | HEART RATE: 92 BPM | HEIGHT: 68 IN | BODY MASS INDEX: 42.13 KG/M2 | SYSTOLIC BLOOD PRESSURE: 110 MMHG | DIASTOLIC BLOOD PRESSURE: 62 MMHG

## 2019-06-14 DIAGNOSIS — N92.6 IRREGULAR MENSES: ICD-10-CM

## 2019-06-14 DIAGNOSIS — N89.8 VAGINAL DISCHARGE: Primary | ICD-10-CM

## 2019-06-14 DIAGNOSIS — Z11.3 SCREEN FOR STD (SEXUALLY TRANSMITTED DISEASE): ICD-10-CM

## 2019-06-14 DIAGNOSIS — N76.0 ACUTE VAGINITIS: ICD-10-CM

## 2019-06-14 PROCEDURE — G8427 DOCREV CUR MEDS BY ELIG CLIN: HCPCS | Performed by: OBSTETRICS & GYNECOLOGY

## 2019-06-14 PROCEDURE — G8417 CALC BMI ABV UP PARAM F/U: HCPCS | Performed by: OBSTETRICS & GYNECOLOGY

## 2019-06-14 PROCEDURE — 1036F TOBACCO NON-USER: CPT | Performed by: OBSTETRICS & GYNECOLOGY

## 2019-06-14 PROCEDURE — 99214 OFFICE O/P EST MOD 30 MIN: CPT | Performed by: OBSTETRICS & GYNECOLOGY

## 2019-06-14 RX ORDER — METRONIDAZOLE 500 MG/1
500 TABLET ORAL 2 TIMES DAILY
Qty: 14 TABLET | Refills: 0 | Status: SHIPPED | OUTPATIENT
Start: 2019-06-14 | End: 2019-06-21

## 2019-06-14 RX ORDER — FLUCONAZOLE 150 MG/1
TABLET ORAL
Qty: 3 TABLET | Refills: 0 | Status: SHIPPED | OUTPATIENT
Start: 2019-06-14 | End: 2019-06-25

## 2019-06-14 RX ORDER — LEVONORGESTREL 1.5 MG/1
1.5 TABLET ORAL ONCE
Qty: 1 TABLET | Refills: 6 | Status: SHIPPED | OUTPATIENT
Start: 2019-06-14 | End: 2019-06-25

## 2019-06-14 RX ORDER — GREEN TEA/HOODIA GORDONII 315-12.5MG
1 CAPSULE ORAL 2 TIMES DAILY
Qty: 60 TABLET | Refills: 3 | Status: SHIPPED | OUTPATIENT
Start: 2019-06-14 | End: 2019-07-14

## 2019-06-14 NOTE — PROGRESS NOTES
Abnormal Vaginal Discharge     Ben presents today for c/o vaginal discharge, she denies an odor, reports irritation,  reports itching and describes it as mucousy and thick X days. denies rash. Sexually active-yes. Medications used has used prescribed fluconazole from our office , prescribed over the phone. Patient also had several questions about her menstrual cycle patient has removed her Nexplanon with an outside clinic and mentions that her cycle has been irregular over the past month patient with questions about D&C to resolve her menstrual problems after the Nexplanon, patient also with concerns about the Nexplanon and how she had affected her menstrual cycles. Patient also with questions about birth control patient mentions that she can have a prescription for Plan B. Patient also with questions about history of recurrent bacterial vaginosis, patient believed that she has recurrent bacterial vaginosis and that she keeps getting abnormal discharge with odor. Patient was concerned whether the discharge was because she has removed her Nexplanon or whether because she keeps getting infections according to her description. Patient also with concerns about weight gain and questions about the relationship of weight gain with Nexplanon and birth control in general.    Contraceptive method:  none    Vitals:  /62 (Site: Left Upper Arm, Position: Sitting, Cuff Size: Large Adult)   Pulse 92   Ht 5' 8\" (1.727 m)   Wt 278 lb (126.1 kg)   LMP 06/11/2019   BMI 42.27 kg/m²   Allergies:  Nsaids; Pcn [penicillins];  Phentermine; and Topamax [topiramate]  Past Medical History:   Diagnosis Date    Asthma     Atypical chest pain 5/31/2019    Chronic back pain     Headache     Irregular heart beat     Multiple sclerosis (HCC)     Osteoarthritis      Past Surgical History:   Procedure Laterality Date    CHOLECYSTECTOMY       OB History    None       Family History   Problem Relation Age of Onset    well-developed and well-nourished. HENT:   Head: Normocephalic and atraumatic. Eyes: Pupils are equal, round, and reactive to light. Conjunctivae and EOM are normal.   Neck: Normal range of motion. Neck supple. Cardiovascular: Normal rate, regular rhythm and normal heart sounds. Pulmonary/Chest: Effort normal and breath sounds normal. No breast tenderness or discharge. Abdominal: Soft. Bowel sounds are normal.   Genitourinary: Uterus normal. No breast tenderness or discharge. There is no rash, tenderness or lesion on the right labia. There is no rash, tenderness or lesion on the left labia. Uterus is not enlarged and not tender. Cervix exhibits no motion tenderness, no discharge and no friability. Right adnexum displays no mass, no tenderness and no fullness. Left adnexum displays no mass, no tenderness and no fullness. No erythema, tenderness or bleeding in the vagina. Vaginal discharge found. Musculoskeletal: Normal range of motion. Neurological: She is alert and oriented to person, place, and time. She has normal reflexes. Psychiatric: She has a normal mood and affect. Her behavior is normal. Thought content normal.         Assessment:      Diagnosis Orders   1. Vaginal discharge     2. Acute vaginitis     3. BMI 40.0-44.9, adult ([address]     4. Irregular menses     5. Screen for STD (sexually transmitted disease)           Plan:     No orders of the defined types were placed in this encounter. Vaginal cultures withdrawn also testing for gonorrhea and chlamydia since patient is sexually active  Patient counseled about recurrent BV  RISK FACTORS --     Sexual activity is a risk factor for bacterial vaginosis (BV), and most experts believe that BV does not occur in women who have never had vaginal intercourse. Epidemiologic studies are strongly supportive of sexual transmission of BV pathogens.  In a systematic review and meta-analysis of 43 observational studies, sexual contact with new and multiple male and female partners was associated with an increased risk of BV, while condom use was associated with a decreased risk. In addition, many BV-associated species have been isolated from the male penile skin, semen, urethra, and urine specimens   . BV is highly prevalent (25 to 48 percent) in women who have sex with women (WSW), and is associated with increasing numbers of female sexual partners, a female partner with symptomatic BV, and various sexual practices, suggesting sexual transmission is an important factor . However, in one study, sexually active monogamous WSW partnerships over six months tended to have concordant, stable, vaginal microbiota, which was most concordant for normal federico . This suggests that longer duration, sexually active partnerships led to stability and alignment of a favorable vaginal microbiota in WSW couples. Further, the presence of other sexually transmitted infections appears to be associated with an increased prevalence of BV. In a systematic review and meta-analysis of studies evaluating the association between BV infection and herpes simplex virus (HSV)-2 infection, women infected with HSV-2 had a 54 percent higher risk of BV infection compared with women who were HSV-2 uninfected . Similarly, a five-year prospective cohort study reported that BV was both more prevalent and more persistent among HIV-infected women compared with those without HIV . Conversely, bacterial vaginosis may also be a risk factor for HIV acquisition . In addition to sexual and infectious risk factors, most studies indicate douching and cigarette smoking are risk factors for acquisition of BV among sexually active women . Although some degree of genetic susceptibility to BV is likely, no association between a gene polymorphism and BV has been established . Use of condoms and estrogen-containing contraceptives may be protective factors.       Patient also counseled about weight loss:     Diet : groups    Refills given for requested medications  Patient follow-up in 1 week to discuss results and possible treatment plan for recurrent bacterial vaginosis according to culture results    Orders Placed This Encounter   Medications    fluconazole (DIFLUCAN) 150 MG tablet     Sig: Take 1 tablet every 2 days for 3 doses. Dispense:  3 tablet     Refill:  0    metroNIDAZOLE (FLAGYL) 500 MG tablet     Sig: Take 1 tablet by mouth 2 times daily for 7 days     Dispense:  14 tablet     Refill:  0    levonorgestrel (PLAN B ONE-STEP) 1.5 MG tablet     Sig: Take 1 tablet by mouth once for 1 dose     Dispense:  1 tablet     Refill:  6    Lactobacillus (PROBIOTIC ACIDOPHILUS) TABS     Sig: Take 1 tablet by mouth 2 times daily     Dispense:  60 tablet     Refill:  3     Patient was seen with total face to face time of 25 minutes. More than 50% of this visit was counseling and education regarding The primary encounter diagnosis was Vaginal discharge. Diagnoses of Acute vaginitis, BMI 40.0-44.9, adult ([facility] [address] Irregular menses, and Screen for STD (sexually transmitted disease) were also pertinent to this visit. and Other (STD screening) and Discuss Medications (Contraception.)   as well as  counseling on preventative health maintenance follow-up. Follow up:  Return in about 1 week (around 6/21/2019) for F/U for results, medication assessment.     Jessica Laird MD

## 2019-06-25 ENCOUNTER — OFFICE VISIT (OUTPATIENT)
Dept: FAMILY MEDICINE CLINIC | Age: 34
End: 2019-06-25
Payer: COMMERCIAL

## 2019-06-25 VITALS
HEIGHT: 68 IN | TEMPERATURE: 97.8 F | SYSTOLIC BLOOD PRESSURE: 110 MMHG | DIASTOLIC BLOOD PRESSURE: 76 MMHG | HEART RATE: 88 BPM | RESPIRATION RATE: 16 BRPM | BODY MASS INDEX: 42.89 KG/M2 | WEIGHT: 283 LBS | OXYGEN SATURATION: 99 %

## 2019-06-25 DIAGNOSIS — R73.03 PREDIABETES: ICD-10-CM

## 2019-06-25 DIAGNOSIS — R07.89 ATYPICAL CHEST PAIN: Primary | ICD-10-CM

## 2019-06-25 DIAGNOSIS — G35 MULTIPLE SCLEROSIS (HCC): ICD-10-CM

## 2019-06-25 PROCEDURE — G8427 DOCREV CUR MEDS BY ELIG CLIN: HCPCS | Performed by: NURSE PRACTITIONER

## 2019-06-25 PROCEDURE — 99212 OFFICE O/P EST SF 10 MIN: CPT | Performed by: NURSE PRACTITIONER

## 2019-06-25 PROCEDURE — G8417 CALC BMI ABV UP PARAM F/U: HCPCS | Performed by: NURSE PRACTITIONER

## 2019-06-25 PROCEDURE — 1036F TOBACCO NON-USER: CPT | Performed by: NURSE PRACTITIONER

## 2019-06-25 RX ORDER — ERGOCALCIFEROL 1.25 MG/1
CAPSULE ORAL
Refills: 1 | COMMUNITY
Start: 2019-05-23 | End: 2019-08-31 | Stop reason: SDUPTHER

## 2019-06-25 RX ORDER — LEVONORGESTREL 1.5 MG/1
TABLET ORAL
Refills: 6 | COMMUNITY
Start: 2019-06-14 | End: 2019-06-25

## 2019-06-25 RX ORDER — DIAZEPAM 5 MG/1
TABLET ORAL
Refills: 0 | COMMUNITY
Start: 2019-05-29 | End: 2019-06-25

## 2019-06-25 ASSESSMENT — ENCOUNTER SYMPTOMS
VOMITING: 0
COUGH: 0
TROUBLE SWALLOWING: 0
SHORTNESS OF BREATH: 0
BACK PAIN: 1
FACIAL SWELLING: 0
NAUSEA: 0
DIARRHEA: 0
ABDOMINAL PAIN: 0
CONSTIPATION: 0
CHEST TIGHTNESS: 0

## 2019-06-25 NOTE — PROGRESS NOTES
Subjective  Konrad Coker, 35 y.o. female presents today with:  Chief Complaint   Patient presents with    Chest Pain     6-week f/u, still having pain. Chronic Thoracic Pain - Reports she continues to see pain management for thoracic back pain which sometimes radiates down sides into pelvis, but was told \"there's nothing they can do. \"  Had thoracic MRI per pain management, with reported negative results. States she saw Dr. Amanda Barnes, who ruled out cardiac causes. States he did order a stress test, but her insurance denied it. States she saw Dr. Domo Alvarado, who was also reportedly not able to identify cause for her pain and referred her to GI Dr. Angie Juarez for further evaluation of possible causes. States she thinks her MS may also be the cause of/contributing to her persistent pain. Has again stopped taking most of her medications, including Wellbutrin and trazodone. States she feels her medications are masking her overall pain symptoms, and she wants to [place] know what's going on. \"  Continues to take Klonopin PRN, but states she is using it much less than previously. Does not need a refill today. Continues to take metformin. Is still working on getting bariatric surgery at [facility].       Past Medical History:   Diagnosis Date    Asthma     Atypical chest pain 5/31/2019    Chronic back pain     Headache     Irregular heart beat     Multiple sclerosis (HCC)     Osteoarthritis        Allergies   Allergen Reactions    Nsaids     Pcn [Penicillins] Hives    Phentermine      Anxiety with hospitalization    Topamax [Topiramate] Other (See Comments)     DIZZY       Current Outpatient Medications on File Prior to Visit   Medication Sig Dispense Refill    vitamin D (ERGOCALCIFEROL) 88719 units CAPS capsule TK 1 C PO 1 TIME A WK  1    Lactobacillus (PROBIOTIC ACIDOPHILUS) TABS Take 1 tablet by mouth 2 times daily 60 tablet 3    clonazePAM (KLONOPIN) 0.5 MG tablet Take 1 tablet by mouth 2 times

## 2019-06-29 LAB
CHLAMYDIA TRACHOMATIS AMPLIFIED DET: NEGATIVE
N GONORRHOEAE AMPLIFIED DET: NEGATIVE

## 2019-07-05 ENCOUNTER — OFFICE VISIT (OUTPATIENT)
Dept: GASTROENTEROLOGY | Age: 34
End: 2019-07-05
Payer: COMMERCIAL

## 2019-07-05 VITALS
HEIGHT: 68 IN | WEIGHT: 279 LBS | SYSTOLIC BLOOD PRESSURE: 122 MMHG | BODY MASS INDEX: 42.28 KG/M2 | HEART RATE: 86 BPM | DIASTOLIC BLOOD PRESSURE: 74 MMHG | OXYGEN SATURATION: 98 % | TEMPERATURE: 97.9 F

## 2019-07-05 DIAGNOSIS — R10.9 ABDOMINAL CRAMPS: Primary | ICD-10-CM

## 2019-07-05 DIAGNOSIS — E66.01 CLASS 3 SEVERE OBESITY DUE TO EXCESS CALORIES WITHOUT SERIOUS COMORBIDITY WITH BODY MASS INDEX (BMI) OF 40.0 TO 44.9 IN ADULT (HCC): ICD-10-CM

## 2019-07-05 PROCEDURE — 99203 OFFICE O/P NEW LOW 30 MIN: CPT | Performed by: INTERNAL MEDICINE

## 2019-07-05 PROCEDURE — 1036F TOBACCO NON-USER: CPT | Performed by: INTERNAL MEDICINE

## 2019-07-05 PROCEDURE — G8427 DOCREV CUR MEDS BY ELIG CLIN: HCPCS | Performed by: INTERNAL MEDICINE

## 2019-07-05 PROCEDURE — G8417 CALC BMI ABV UP PARAM F/U: HCPCS | Performed by: INTERNAL MEDICINE

## 2019-07-05 RX ORDER — HYOSCYAMINE SULFATE 0.12 MG/1
125 TABLET SUBLINGUAL EVERY 4 HOURS PRN
Qty: 120 EACH | Refills: 3 | Status: SHIPPED | OUTPATIENT
Start: 2019-07-05 | End: 2019-11-15

## 2019-08-12 ENCOUNTER — ANESTHESIA EVENT (OUTPATIENT)
Dept: ENDOSCOPY | Age: 34
End: 2019-08-12
Payer: COMMERCIAL

## 2019-08-13 ENCOUNTER — ANESTHESIA (OUTPATIENT)
Dept: ENDOSCOPY | Age: 34
End: 2019-08-13
Payer: COMMERCIAL

## 2019-08-13 ENCOUNTER — HOSPITAL ENCOUNTER (OUTPATIENT)
Age: 34
Setting detail: OUTPATIENT SURGERY
Discharge: HOME | End: 2019-08-13
Attending: INTERNAL MEDICINE | Admitting: INTERNAL MEDICINE
Payer: COMMERCIAL

## 2019-08-13 VITALS
RESPIRATION RATE: 14 BRPM | SYSTOLIC BLOOD PRESSURE: 126 MMHG | OXYGEN SATURATION: 99 % | DIASTOLIC BLOOD PRESSURE: 80 MMHG

## 2019-08-13 VITALS
HEART RATE: 70 BPM | BODY MASS INDEX: 42.44 KG/M2 | SYSTOLIC BLOOD PRESSURE: 109 MMHG | RESPIRATION RATE: 20 BRPM | TEMPERATURE: 98 F | HEIGHT: 68 IN | OXYGEN SATURATION: 100 % | WEIGHT: 280 LBS | DIASTOLIC BLOOD PRESSURE: 77 MMHG

## 2019-08-13 LAB
HCG, URINE, POC: NEGATIVE
Lab: NORMAL
NEGATIVE QC PASS/FAIL: NORMAL
POSITIVE QC PASS/FAIL: NORMAL

## 2019-08-13 PROCEDURE — 3609017100 HC EGD: Performed by: INTERNAL MEDICINE

## 2019-08-13 PROCEDURE — 2500000003 HC RX 250 WO HCPCS: Performed by: NURSE ANESTHETIST, CERTIFIED REGISTERED

## 2019-08-13 PROCEDURE — 7100000011 HC PHASE II RECOVERY - ADDTL 15 MIN: Performed by: INTERNAL MEDICINE

## 2019-08-13 PROCEDURE — 2580000003 HC RX 258: Performed by: INTERNAL MEDICINE

## 2019-08-13 PROCEDURE — 6370000000 HC RX 637 (ALT 250 FOR IP): Performed by: INTERNAL MEDICINE

## 2019-08-13 PROCEDURE — 6360000002 HC RX W HCPCS: Performed by: NURSE ANESTHETIST, CERTIFIED REGISTERED

## 2019-08-13 PROCEDURE — 7100000010 HC PHASE II RECOVERY - FIRST 15 MIN: Performed by: INTERNAL MEDICINE

## 2019-08-13 PROCEDURE — 3700000000 HC ANESTHESIA ATTENDED CARE: Performed by: INTERNAL MEDICINE

## 2019-08-13 PROCEDURE — 43239 EGD BIOPSY SINGLE/MULTIPLE: CPT | Performed by: INTERNAL MEDICINE

## 2019-08-13 RX ORDER — SODIUM CHLORIDE 0.9 % (FLUSH) 0.9 %
10 SYRINGE (ML) INJECTION EVERY 12 HOURS SCHEDULED
Status: DISCONTINUED | OUTPATIENT
Start: 2019-08-13 | End: 2019-08-13 | Stop reason: HOSPADM

## 2019-08-13 RX ORDER — ONDANSETRON 2 MG/ML
4 INJECTION INTRAMUSCULAR; INTRAVENOUS
Status: DISCONTINUED | OUTPATIENT
Start: 2019-08-13 | End: 2019-08-13

## 2019-08-13 RX ORDER — PROPOFOL 10 MG/ML
INJECTION, EMULSION INTRAVENOUS PRN
Status: DISCONTINUED | OUTPATIENT
Start: 2019-08-13 | End: 2019-08-13 | Stop reason: SDUPTHER

## 2019-08-13 RX ORDER — LIDOCAINE HYDROCHLORIDE 20 MG/ML
INJECTION, SOLUTION INFILTRATION; PERINEURAL PRN
Status: DISCONTINUED | OUTPATIENT
Start: 2019-08-13 | End: 2019-08-13 | Stop reason: SDUPTHER

## 2019-08-13 RX ORDER — SODIUM CHLORIDE 0.9 % (FLUSH) 0.9 %
10 SYRINGE (ML) INJECTION PRN
Status: DISCONTINUED | OUTPATIENT
Start: 2019-08-13 | End: 2019-08-13 | Stop reason: HOSPADM

## 2019-08-13 RX ORDER — LIDOCAINE HYDROCHLORIDE 10 MG/ML
1 INJECTION, SOLUTION EPIDURAL; INFILTRATION; INTRACAUDAL; PERINEURAL
Status: DISCONTINUED | OUTPATIENT
Start: 2019-08-13 | End: 2019-08-13 | Stop reason: HOSPADM

## 2019-08-13 RX ORDER — SODIUM CHLORIDE 9 MG/ML
INJECTION, SOLUTION INTRAVENOUS CONTINUOUS
Status: DISCONTINUED | OUTPATIENT
Start: 2019-08-13 | End: 2019-08-13 | Stop reason: HOSPADM

## 2019-08-13 RX ORDER — LIDOCAINE HYDROCHLORIDE 20 MG/ML
15 SOLUTION OROPHARYNGEAL ONCE
Status: COMPLETED | OUTPATIENT
Start: 2019-08-13 | End: 2019-08-13

## 2019-08-13 RX ADMIN — LIDOCAINE HYDROCHLORIDE 60 MG: 20 INJECTION, SOLUTION INFILTRATION; PERINEURAL at 08:26

## 2019-08-13 RX ADMIN — SODIUM CHLORIDE: 9 INJECTION, SOLUTION INTRAVENOUS at 08:01

## 2019-08-13 RX ADMIN — PROPOFOL 50 MG: 10 INJECTION, EMULSION INTRAVENOUS at 08:33

## 2019-08-13 RX ADMIN — PROPOFOL 150 MG: 10 INJECTION, EMULSION INTRAVENOUS at 08:31

## 2019-08-13 ASSESSMENT — PAIN - FUNCTIONAL ASSESSMENT: PAIN_FUNCTIONAL_ASSESSMENT: 0-10

## 2019-08-13 ASSESSMENT — PAIN SCALES - GENERAL
PAINLEVEL_OUTOF10: 2
PAINLEVEL_OUTOF10: 2
PAINLEVEL_OUTOF10: 0

## 2019-08-13 NOTE — PROGRESS NOTES
At [facility] h-pylori test resulted negative. Copy of result to physician.

## 2019-08-13 NOTE — ANESTHESIA POSTPROCEDURE EVALUATION
Department of Anesthesiology  Postprocedure Note    Patient: Han Suarez  MRN: [id number]  [place]: 1985  Date of evaluation: 8/13/2019  Time:  8:36 AM     Procedure Summary     Date:  08/13/19 Room / Location:  [place] [address] / [facility]    Anesthesia Start:  8192 Anesthesia Stop:      Procedure:  EGD ESOPHAGOGASTRODUODENOSCOPY (N/A ) Diagnosis:  (abdominal cramps, class 3 severe obesity due to excess calories w/o serious comorbidity with body mass index of 40.0 to 44.9 in adult. R10.9, E66.01, Z68.41  (CPT 83428))    Surgeon:  Suzanne Myrick MD Responsible Provider:  KANDI Forrest CRNA    Anesthesia Type:  MAC ASA Status:  3          Anesthesia Type: No value filed. Darrel Phase I: Darrel Score: 10    Darrel Phase II:      Last vitals: Reviewed and per EMR flowsheets.        Anesthesia Post Evaluation    Patient location during evaluation: PACU  Patient participation: complete - patient cannot participate  Level of consciousness: sleepy but conscious  Pain score: 0  Airway patency: patent  Nausea & Vomiting: no nausea and no vomiting  Complications: no  Cardiovascular status: hemodynamically stable  Respiratory status: acceptable  Hydration status: euvolemic

## 2019-08-13 NOTE — H&P
spray 1 spray by Nasal route as needed for Opioid Reversal 19   Emerita López MD       Allergies: Allergies   Allergen Reactions    Nsaids     Pcn [Penicillins] Hives    Phentermine      Anxiety with hospitalization    Topamax [Topiramate] Other (See Comments)     DIZZY        History of allergic reaction to anesthesia:  No    Past Medical History:  Past Medical History:   Diagnosis Date    Asthma     Atypical chest pain 2019    Chronic back pain     Diabetes mellitus ([facility] [address]     Headache     Irregular heart beat     Multiple sclerosis ([facility] [address]     Osteoarthritis        Past Surgical History:  Past Surgical History:   Procedure Laterality Date    CHOLECYSTECTOMY         Social History:  Social History     Tobacco Use    Smoking status: Former Smoker     Packs/day: 0.50     Years: 10.00     Pack years: 5.00     Types: Cigarettes     Last attempt to quit: 2017     Years since quittin.5    Smokeless tobacco: Never Used   Substance Use Topics    Alcohol use: Not Currently     Comment: socially    Drug use: No       Vital Signs:   Vitals:    19 0841   BP:    Pulse: 82   Resp: 24   Temp:    SpO2: 96%     Physical Exam:  Cardiac:  [x]WNL  []Comments:  Pulmonary:  [x]WNL   []Comments:   Neuro/Mental Status:  [x]WNL  []Comments:  Abdominal:  [x]WNL    []Comments:  Other:   []WNL  []Comments:    Informed Consent:  The risks and benefits of the procedure have been discussed with either the patient or if they cannot consent, their representative. Assessment:  Patient examined and appropriate for planned sedation and procedure. Plan:  Proceed with planned sedation and procedure as above.     Clearance MD Corinne  8:45 AM

## 2019-08-15 DIAGNOSIS — R10.9 ABDOMINAL CRAMPS: ICD-10-CM

## 2019-08-15 DIAGNOSIS — E66.01 CLASS 3 SEVERE OBESITY DUE TO EXCESS CALORIES WITHOUT SERIOUS COMORBIDITY WITH BODY MASS INDEX (BMI) OF 40.0 TO 44.9 IN ADULT (HCC): ICD-10-CM

## 2019-08-19 ENCOUNTER — HOSPITAL ENCOUNTER (OUTPATIENT)
Dept: PULMONOLOGY | Age: 34
Discharge: HOME | End: 2019-08-19
Payer: COMMERCIAL

## 2019-08-19 DIAGNOSIS — R06.83 SNORING: ICD-10-CM

## 2019-08-19 DIAGNOSIS — J45.20 MILD INTERMITTENT ASTHMA WITHOUT COMPLICATION: ICD-10-CM

## 2019-08-19 PROCEDURE — 94060 EVALUATION OF WHEEZING: CPT

## 2019-08-19 PROCEDURE — 94060 EVALUATION OF WHEEZING: CPT | Performed by: INTERNAL MEDICINE

## 2019-08-19 PROCEDURE — 6360000002 HC RX W HCPCS: Performed by: INTERNAL MEDICINE

## 2019-08-19 RX ORDER — ALBUTEROL SULFATE 2.5 MG/3ML
2.5 SOLUTION RESPIRATORY (INHALATION) ONCE
Status: COMPLETED | OUTPATIENT
Start: 2019-08-19 | End: 2019-08-19

## 2019-08-19 RX ADMIN — ALBUTEROL SULFATE 2.5 MG: 2.5 SOLUTION RESPIRATORY (INHALATION) at 09:38

## 2019-09-20 ENCOUNTER — OFFICE VISIT (OUTPATIENT)
Dept: FAMILY MEDICINE CLINIC | Age: 34
End: 2019-09-20
Payer: COMMERCIAL

## 2019-09-20 VITALS
BODY MASS INDEX: 42.53 KG/M2 | WEIGHT: 280.6 LBS | TEMPERATURE: 98.6 F | RESPIRATION RATE: 22 BRPM | OXYGEN SATURATION: 99 % | SYSTOLIC BLOOD PRESSURE: 110 MMHG | DIASTOLIC BLOOD PRESSURE: 74 MMHG | HEART RATE: 83 BPM | HEIGHT: 68 IN

## 2019-09-20 DIAGNOSIS — M54.42 ACUTE BILATERAL LOW BACK PAIN WITH LEFT-SIDED SCIATICA: Primary | ICD-10-CM

## 2019-09-20 PROCEDURE — G8417 CALC BMI ABV UP PARAM F/U: HCPCS | Performed by: NURSE PRACTITIONER

## 2019-09-20 PROCEDURE — 1036F TOBACCO NON-USER: CPT | Performed by: NURSE PRACTITIONER

## 2019-09-20 PROCEDURE — 99213 OFFICE O/P EST LOW 20 MIN: CPT | Performed by: NURSE PRACTITIONER

## 2019-09-20 PROCEDURE — G8427 DOCREV CUR MEDS BY ELIG CLIN: HCPCS | Performed by: NURSE PRACTITIONER

## 2019-09-20 RX ORDER — ALBUTEROL SULFATE 90 UG/1
AEROSOL, METERED RESPIRATORY (INHALATION)
COMMUNITY
Start: 2017-01-28 | End: 2020-01-06

## 2019-09-20 RX ORDER — PREDNISONE 10 MG/1
TABLET ORAL
Qty: 51 TABLET | Refills: 0 | Status: SHIPPED | OUTPATIENT
Start: 2019-09-20 | End: 2019-09-30

## 2019-09-20 ASSESSMENT — ENCOUNTER SYMPTOMS
NAUSEA: 0
SHORTNESS OF BREATH: 0
DIARRHEA: 0
WHEEZING: 0
ABDOMINAL PAIN: 0
VOMITING: 0

## 2019-09-23 ENCOUNTER — OFFICE VISIT (OUTPATIENT)
Dept: FAMILY MEDICINE CLINIC | Age: 34
End: 2019-09-23
Payer: COMMERCIAL

## 2019-09-23 VITALS
HEIGHT: 68 IN | TEMPERATURE: 97 F | DIASTOLIC BLOOD PRESSURE: 68 MMHG | SYSTOLIC BLOOD PRESSURE: 118 MMHG | BODY MASS INDEX: 42.95 KG/M2 | RESPIRATION RATE: 16 BRPM | HEART RATE: 79 BPM | OXYGEN SATURATION: 99 % | WEIGHT: 283.4 LBS

## 2019-09-23 DIAGNOSIS — G89.29 ACUTE EXACERBATION OF CHRONIC LOW BACK PAIN: ICD-10-CM

## 2019-09-23 DIAGNOSIS — M54.50 ACUTE EXACERBATION OF CHRONIC LOW BACK PAIN: ICD-10-CM

## 2019-09-23 DIAGNOSIS — M48.062 SPINAL STENOSIS OF LUMBAR REGION WITH NEUROGENIC CLAUDICATION: Primary | ICD-10-CM

## 2019-09-23 PROCEDURE — 96372 THER/PROPH/DIAG INJ SC/IM: CPT | Performed by: PHYSICIAN ASSISTANT

## 2019-09-23 PROCEDURE — 1036F TOBACCO NON-USER: CPT | Performed by: PHYSICIAN ASSISTANT

## 2019-09-23 PROCEDURE — 99214 OFFICE O/P EST MOD 30 MIN: CPT | Performed by: PHYSICIAN ASSISTANT

## 2019-09-23 PROCEDURE — G8427 DOCREV CUR MEDS BY ELIG CLIN: HCPCS | Performed by: PHYSICIAN ASSISTANT

## 2019-09-23 PROCEDURE — G8417 CALC BMI ABV UP PARAM F/U: HCPCS | Performed by: PHYSICIAN ASSISTANT

## 2019-09-23 RX ORDER — KETOROLAC TROMETHAMINE 30 MG/ML
60 INJECTION, SOLUTION INTRAMUSCULAR; INTRAVENOUS ONCE
Status: COMPLETED | OUTPATIENT
Start: 2019-09-23 | End: 2019-09-23

## 2019-09-23 RX ORDER — LIDOCAINE 50 MG/G
1 PATCH TOPICAL DAILY
Qty: 30 PATCH | Refills: 0 | Status: SHIPPED | OUTPATIENT
Start: 2019-09-23 | End: 2019-09-27

## 2019-09-23 RX ADMIN — KETOROLAC TROMETHAMINE 60 MG: 30 INJECTION, SOLUTION INTRAMUSCULAR; INTRAVENOUS at 10:13

## 2019-09-23 ASSESSMENT — ENCOUNTER SYMPTOMS
CHEST TIGHTNESS: 0
COUGH: 0
BACK PAIN: 1
SHORTNESS OF BREATH: 0
COLOR CHANGE: 0

## 2019-09-23 NOTE — PROGRESS NOTES
Narrative    Not on file     Family History   Problem Relation Age of Onset    Cancer Mother     Arthritis Mother     Diabetes Father     Heart Disease Father     Stroke Father     High Blood Pressure Father         Allergies   Allergen Reactions    Nsaids Other (See Comments)     Waiting for bariatric procedure    Pcn [Penicillins] Hives    Phentermine      Anxiety with hospitalization    Topamax [Topiramate] Other (See Comments)     DIZZY     Current Outpatient Medications   Medication Sig Dispense Refill    lidocaine (LIDODERM) 5 % Place 1 patch onto the skin daily 12 hours on, 12 hours off. 30 patch 0    albuterol sulfate  (90 Base) MCG/ACT inhaler Use every 4 hours while awake for 7-10 days then PRN wheezing  May sub Ventolin or Proventil as needed per [employer].  predniSONE (DELTASONE) 10 MG tablet Take 6 tabs x 6 days, then 5 x 1, 4 x 1, 3 x 1, 2 x 1, 1 x 1 51 tablet 0    vitamin D (ERGOCALCIFEROL) 77547 units CAPS capsule TAKE 1 CAPSULE BY MOUTH 1 TIME A WEEK 12 capsule 0    Hyoscyamine Sulfate SL (LEVSIN/SL) 0.125 MG SUBL Place 125 mcg under the tongue every 4 hours as needed (pain) 120 each 3    clonazePAM (KLONOPIN) 0.5 MG tablet Take 1 tablet by mouth 2 times daily as needed for Anxiety for up to 30 days. 60 tablet 0    metFORMIN (GLUCOPHAGE-XR) 500 MG extended release tablet Take 1 tablet by mouth every other day 90 tablet 1    diclofenac sodium 1 % GEL Apply 4 g topically 4 times daily 4 Tube 1     No current facility-administered medications for this visit. PMH, Surgical Hx, Family Hx, and Social Hx reviewed and updated. Health Maintenance reviewed.     Objective  Vitals:    09/23/19 0948   BP: 118/68   Site: Left Upper Arm   Position: Sitting   Cuff Size: Medium Adult   Pulse: 79   Resp: 16   Temp: 97 °F (36.1 °C)   TempSrc: Tympanic   SpO2: 99%   Weight: 283 lb 6.4 oz (128.5 kg)   Height: 5' 8\" (1.727 m)     BP Readings from Last 3 Encounters:   09/23/19 118/68 09/20/19 110/74   08/13/19 126/80     Wt Readings from Last 3 Encounters:   09/23/19 283 lb 6.4 oz (128.5 kg)   09/20/19 280 lb 9.6 oz (127.3 kg)   08/13/19 280 lb (127 kg)     Physical Exam   Constitutional: She is oriented to person, place, and time. She appears well-developed and well-nourished. No distress. Obese female, sitting in exam room. Appears uncomfortable. HENT:   Head: Normocephalic and atraumatic. Right Ear: External ear normal.   Left Ear: External ear normal.   Eyes: Conjunctivae are normal.   Neck: Normal range of motion. Cardiovascular: Normal rate, regular rhythm and normal heart sounds. No murmur heard. Pulmonary/Chest: Effort normal and breath sounds normal. No respiratory distress. She has no wheezes. She has no rales. Musculoskeletal: She exhibits tenderness. Lumbar back: She exhibits decreased range of motion, tenderness, bony tenderness and pain. Back:    Positive straight leg test.     Neurological: She is alert and oriented to person, place, and time. Skin: Skin is warm and dry. No rash noted. She is not diaphoretic. No erythema. Vitals reviewed. Assessment & Plan    Diagnosis Orders   1. Spinal stenosis of lumbar region with neurogenic claudication  Ambulatory referral to Neurosurgery    ketorolac (TORADOL) injection 60 mg    lidocaine (LIDODERM) 5 %   2. Acute exacerbation of chronic low back pain  Ambulatory referral to Neurosurgery    ketorolac (TORADOL) injection 60 mg    lidocaine (LIDODERM) 5 %   -Referral to neurosurgery after reviewing recent MRI results. Discussed the importance of follow up.  -Toradol injection today; continue prednisone taper at this time.  -Lidoderm patch along area of discomfort.    -Follow up with me Friday, 9/27.     Orders Placed This Encounter   Procedures    Ambulatory referral to Neurosurgery     Referral Priority:   Routine     Referral Type:   Eval and Treat     Referral Reason:   Specialty Services Required

## 2019-09-27 ENCOUNTER — OFFICE VISIT (OUTPATIENT)
Dept: FAMILY MEDICINE CLINIC | Age: 34
End: 2019-09-27
Payer: COMMERCIAL

## 2019-09-27 VITALS
RESPIRATION RATE: 16 BRPM | WEIGHT: 281.2 LBS | TEMPERATURE: 98.2 F | OXYGEN SATURATION: 98 % | BODY MASS INDEX: 42.62 KG/M2 | DIASTOLIC BLOOD PRESSURE: 76 MMHG | SYSTOLIC BLOOD PRESSURE: 118 MMHG | HEART RATE: 66 BPM | HEIGHT: 68 IN

## 2019-09-27 DIAGNOSIS — E55.9 VITAMIN D DEFICIENCY: ICD-10-CM

## 2019-09-27 DIAGNOSIS — M48.062 SPINAL STENOSIS OF LUMBAR REGION WITH NEUROGENIC CLAUDICATION: Primary | ICD-10-CM

## 2019-09-27 DIAGNOSIS — M54.32 SCIATICA, LEFT SIDE: ICD-10-CM

## 2019-09-27 PROCEDURE — G8428 CUR MEDS NOT DOCUMENT: HCPCS | Performed by: PHYSICIAN ASSISTANT

## 2019-09-27 PROCEDURE — G8417 CALC BMI ABV UP PARAM F/U: HCPCS | Performed by: PHYSICIAN ASSISTANT

## 2019-09-27 PROCEDURE — 1036F TOBACCO NON-USER: CPT | Performed by: PHYSICIAN ASSISTANT

## 2019-09-27 PROCEDURE — 99214 OFFICE O/P EST MOD 30 MIN: CPT | Performed by: PHYSICIAN ASSISTANT

## 2019-09-27 RX ORDER — PREGABALIN 50 MG/1
50 CAPSULE ORAL 3 TIMES DAILY
Qty: 42 CAPSULE | Refills: 0 | Status: SHIPPED | OUTPATIENT
Start: 2019-09-27 | End: 2019-11-15

## 2019-09-27 RX ORDER — ERGOCALCIFEROL 1.25 MG/1
CAPSULE ORAL
Qty: 12 CAPSULE | Refills: 2 | Status: SHIPPED | OUTPATIENT
Start: 2019-09-27 | End: 2020-03-17 | Stop reason: SDUPTHER

## 2019-09-27 RX ORDER — METHOCARBAMOL 500 MG/1
500 TABLET, FILM COATED ORAL NIGHTLY PRN
Qty: 10 TABLET | Refills: 0 | Status: SHIPPED | OUTPATIENT
Start: 2019-09-27 | End: 2019-10-07

## 2019-09-27 ASSESSMENT — ENCOUNTER SYMPTOMS
COLOR CHANGE: 0
COUGH: 0
BACK PAIN: 1
CHEST TIGHTNESS: 0
SHORTNESS OF BREATH: 0

## 2019-09-27 NOTE — PROGRESS NOTES
Allergies   Allergen Reactions    Nsaids Other (See Comments)     Waiting for bariatric procedure    Pcn [Penicillins] Hives    Phentermine      Anxiety with hospitalization    Topamax [Topiramate] Other (See Comments)     DIZZY     Current Outpatient Medications   Medication Sig Dispense Refill    vitamin D (ERGOCALCIFEROL) 61594 units CAPS capsule TAKE 1 CAPSULE BY MOUTH 1 TIME A WEEK 12 capsule 2    pregabalin (LYRICA) 50 MG capsule Take 1 capsule by mouth 3 times daily for 14 days. 42 capsule 0    methocarbamol (ROBAXIN) 500 MG tablet Take 1 tablet by mouth nightly as needed (neck spasm/muscle spasm) 10 tablet 0    albuterol sulfate  (90 Base) MCG/ACT inhaler Use every 4 hours while awake for 7-10 days then PRN wheezing  May sub Ventolin or Proventil as needed per [employer].  predniSONE (DELTASONE) 10 MG tablet Take 6 tabs x 6 days, then 5 x 1, 4 x 1, 3 x 1, 2 x 1, 1 x 1 51 tablet 0    clonazePAM (KLONOPIN) 0.5 MG tablet Take 1 tablet by mouth 2 times daily as needed for Anxiety for up to 30 days. 60 tablet 0    metFORMIN (GLUCOPHAGE-XR) 500 MG extended release tablet Take 1 tablet by mouth every other day 90 tablet 1    Hyoscyamine Sulfate SL (LEVSIN/SL) 0.125 MG SUBL Place 125 mcg under the tongue every 4 hours as needed (pain) 120 each 3     No current facility-administered medications for this visit. PMH, Surgical Hx, Family Hx, and Social Hx reviewed and updated. Health Maintenance reviewed.     Objective  Vitals:    09/27/19 0852   BP: 118/76   Site: Left Upper Arm   Position: Sitting   Cuff Size: Large Adult   Pulse: 66   Resp: 16   Temp: 98.2 °F (36.8 °C)   TempSrc: Tympanic   SpO2: 98%   Weight: 281 lb 3.2 oz (127.6 kg)   Height: 5' 8\" (1.727 m)     BP Readings from Last 3 Encounters:   09/27/19 118/76   09/23/19 118/68   09/20/19 110/74     Wt Readings from Last 3 Encounters:   09/27/19 281 lb 3.2 oz (127.6 kg)   09/23/19 283 lb 6.4 oz (128.5 kg)   09/20/19 280 lb

## 2019-10-03 ASSESSMENT — ENCOUNTER SYMPTOMS
BLOOD IN STOOL: 0
BACK PAIN: 1
CHEST TIGHTNESS: 0

## 2019-10-08 ENCOUNTER — HOSPITAL ENCOUNTER (OUTPATIENT)
Dept: PHYSICAL THERAPY | Age: 34
Setting detail: THERAPIES SERIES
Discharge: HOME | End: 2019-10-08
Payer: COMMERCIAL

## 2019-10-08 PROCEDURE — 97162 PT EVAL MOD COMPLEX 30 MIN: CPT

## 2019-10-08 ASSESSMENT — PAIN DESCRIPTION - PAIN TYPE: TYPE: CHRONIC PAIN

## 2019-10-08 ASSESSMENT — PAIN DESCRIPTION - DESCRIPTORS: DESCRIPTORS: ACHING;BURNING;NUMBNESS;TINGLING

## 2019-10-08 ASSESSMENT — PAIN DESCRIPTION - PROGRESSION: CLINICAL_PROGRESSION: GRADUALLY IMPROVING

## 2019-10-08 ASSESSMENT — PAIN DESCRIPTION - ONSET: ONSET: ON-GOING

## 2019-10-08 ASSESSMENT — PAIN DESCRIPTION - ORIENTATION: ORIENTATION: LEFT

## 2019-10-08 ASSESSMENT — PAIN DESCRIPTION - DIRECTION: RADIATING_TOWARDS: LEFT FOOT

## 2019-10-08 ASSESSMENT — PAIN - FUNCTIONAL ASSESSMENT: PAIN_FUNCTIONAL_ASSESSMENT: PREVENTS OR INTERFERES WITH MANY ACTIVE NOT PASSIVE ACTIVITIES

## 2019-10-08 ASSESSMENT — PAIN DESCRIPTION - LOCATION: LOCATION: LEG

## 2019-10-08 ASSESSMENT — PAIN DESCRIPTION - FREQUENCY: FREQUENCY: CONTINUOUS

## 2019-10-09 ENCOUNTER — TELEPHONE (OUTPATIENT)
Dept: FAMILY MEDICINE CLINIC | Age: 34
End: 2019-10-09

## 2019-10-14 ENCOUNTER — HOSPITAL ENCOUNTER (OUTPATIENT)
Dept: PHYSICAL THERAPY | Age: 34
Setting detail: THERAPIES SERIES
Discharge: HOME | End: 2019-10-14
Payer: COMMERCIAL

## 2019-10-14 PROCEDURE — 97113 AQUATIC THERAPY/EXERCISES: CPT

## 2019-10-14 ASSESSMENT — PAIN DESCRIPTION - LOCATION: LOCATION: LEG

## 2019-10-14 ASSESSMENT — PAIN DESCRIPTION - DESCRIPTORS: DESCRIPTORS: ACHING;BURNING

## 2019-10-14 ASSESSMENT — PAIN SCALES - GENERAL: PAINLEVEL_OUTOF10: 6

## 2019-10-14 ASSESSMENT — PAIN DESCRIPTION - PAIN TYPE: TYPE: CHRONIC PAIN

## 2019-10-14 ASSESSMENT — PAIN DESCRIPTION - PROGRESSION: CLINICAL_PROGRESSION: GRADUALLY IMPROVING

## 2019-10-16 ENCOUNTER — HOSPITAL ENCOUNTER (OUTPATIENT)
Dept: PHYSICAL THERAPY | Age: 34
Setting detail: THERAPIES SERIES
Discharge: HOME | End: 2019-10-16
Payer: COMMERCIAL

## 2019-10-16 ASSESSMENT — PAIN DESCRIPTION - PROGRESSION: CLINICAL_PROGRESSION: GRADUALLY IMPROVING

## 2019-10-21 ENCOUNTER — HOSPITAL ENCOUNTER (OUTPATIENT)
Dept: PHYSICAL THERAPY | Age: 34
Setting detail: THERAPIES SERIES
Discharge: HOME | End: 2019-10-21
Payer: COMMERCIAL

## 2019-10-21 PROCEDURE — 97113 AQUATIC THERAPY/EXERCISES: CPT

## 2019-10-21 ASSESSMENT — PAIN DESCRIPTION - LOCATION: LOCATION: BACK;LEG

## 2019-10-21 ASSESSMENT — PAIN DESCRIPTION - PROGRESSION: CLINICAL_PROGRESSION: NOT CHANGED

## 2019-10-21 ASSESSMENT — PAIN DESCRIPTION - DESCRIPTORS: DESCRIPTORS: ACHING;PRESSURE;THROBBING

## 2019-10-21 ASSESSMENT — PAIN DESCRIPTION - FREQUENCY: FREQUENCY: CONTINUOUS

## 2019-10-21 ASSESSMENT — PAIN DESCRIPTION - DIRECTION: RADIATING_TOWARDS: LLE

## 2019-10-21 ASSESSMENT — PAIN SCALES - GENERAL: PAINLEVEL_OUTOF10: 5

## 2019-10-21 ASSESSMENT — PAIN DESCRIPTION - ORIENTATION: ORIENTATION: LOWER;LEFT

## 2019-10-21 ASSESSMENT — PAIN DESCRIPTION - PAIN TYPE: TYPE: CHRONIC PAIN

## 2019-10-23 ENCOUNTER — HOSPITAL ENCOUNTER (OUTPATIENT)
Dept: PHYSICAL THERAPY | Age: 34
Setting detail: THERAPIES SERIES
Discharge: HOME | End: 2019-10-23
Payer: COMMERCIAL

## 2019-10-23 PROCEDURE — 97113 AQUATIC THERAPY/EXERCISES: CPT

## 2019-10-23 ASSESSMENT — PAIN DESCRIPTION - ORIENTATION: ORIENTATION: LEFT;LOWER

## 2019-10-23 ASSESSMENT — PAIN DESCRIPTION - PAIN TYPE: TYPE: CHRONIC PAIN

## 2019-10-23 ASSESSMENT — PAIN DESCRIPTION - LOCATION: LOCATION: BACK

## 2019-10-23 ASSESSMENT — PAIN SCALES - GENERAL: PAINLEVEL_OUTOF10: 5

## 2019-10-23 ASSESSMENT — PAIN DESCRIPTION - DESCRIPTORS: DESCRIPTORS: ACHING

## 2019-10-28 ENCOUNTER — HOSPITAL ENCOUNTER (OUTPATIENT)
Dept: PHYSICAL THERAPY | Age: 34
Setting detail: THERAPIES SERIES
Discharge: HOME | End: 2019-10-28
Payer: COMMERCIAL

## 2019-10-28 PROCEDURE — 97113 AQUATIC THERAPY/EXERCISES: CPT

## 2019-10-28 ASSESSMENT — PAIN DESCRIPTION - ORIENTATION: ORIENTATION: LEFT;LOWER

## 2019-10-28 ASSESSMENT — PAIN DESCRIPTION - DESCRIPTORS: DESCRIPTORS: ACHING

## 2019-10-28 ASSESSMENT — PAIN DESCRIPTION - PAIN TYPE: TYPE: CHRONIC PAIN

## 2019-10-28 ASSESSMENT — PAIN DESCRIPTION - LOCATION: LOCATION: BACK

## 2019-10-30 ENCOUNTER — HOSPITAL ENCOUNTER (OUTPATIENT)
Dept: PHYSICAL THERAPY | Age: 34
Setting detail: THERAPIES SERIES
Discharge: HOME | End: 2019-10-30
Payer: COMMERCIAL

## 2019-11-04 ENCOUNTER — HOSPITAL ENCOUNTER (OUTPATIENT)
Dept: PHYSICAL THERAPY | Age: 34
Setting detail: THERAPIES SERIES
Discharge: HOME | End: 2019-11-04
Payer: COMMERCIAL

## 2019-11-06 ENCOUNTER — HOSPITAL ENCOUNTER (OUTPATIENT)
Dept: PHYSICAL THERAPY | Age: 34
Setting detail: THERAPIES SERIES
Discharge: HOME | End: 2019-11-06
Payer: COMMERCIAL

## 2019-11-06 PROCEDURE — 97113 AQUATIC THERAPY/EXERCISES: CPT

## 2019-11-06 PROCEDURE — 97150 GROUP THERAPEUTIC PROCEDURES: CPT

## 2019-11-06 ASSESSMENT — PAIN DESCRIPTION - PAIN TYPE: TYPE: CHRONIC PAIN

## 2019-11-06 ASSESSMENT — PAIN DESCRIPTION - FREQUENCY: FREQUENCY: CONTINUOUS

## 2019-11-06 ASSESSMENT — PAIN DESCRIPTION - ORIENTATION: ORIENTATION: LEFT;LOWER

## 2019-11-06 ASSESSMENT — PAIN DESCRIPTION - LOCATION: LOCATION: NECK

## 2019-11-06 ASSESSMENT — PAIN DESCRIPTION - DESCRIPTORS: DESCRIPTORS: ACHING

## 2019-11-06 ASSESSMENT — PAIN SCALES - GENERAL: PAINLEVEL_OUTOF10: 3

## 2019-11-15 ENCOUNTER — OFFICE VISIT (OUTPATIENT)
Dept: FAMILY MEDICINE CLINIC | Age: 34
End: 2019-11-15
Payer: COMMERCIAL

## 2019-11-15 VITALS
OXYGEN SATURATION: 99 % | RESPIRATION RATE: 16 BRPM | WEIGHT: 287 LBS | SYSTOLIC BLOOD PRESSURE: 110 MMHG | TEMPERATURE: 98 F | HEART RATE: 97 BPM | HEIGHT: 68 IN | DIASTOLIC BLOOD PRESSURE: 76 MMHG | BODY MASS INDEX: 43.5 KG/M2

## 2019-11-15 DIAGNOSIS — R73.03 PREDIABETES: ICD-10-CM

## 2019-11-15 DIAGNOSIS — R25.8 INVOLUNTARY JERKY MOVEMENTS: ICD-10-CM

## 2019-11-15 DIAGNOSIS — M54.32 SCIATICA, LEFT SIDE: ICD-10-CM

## 2019-11-15 DIAGNOSIS — R20.2 NUMBNESS AND TINGLING IN BOTH HANDS: ICD-10-CM

## 2019-11-15 DIAGNOSIS — R63.5 WEIGHT GAIN: ICD-10-CM

## 2019-11-15 DIAGNOSIS — H53.9 VISION CHANGES: Primary | ICD-10-CM

## 2019-11-15 DIAGNOSIS — R20.0 NUMBNESS AND TINGLING IN BOTH HANDS: ICD-10-CM

## 2019-11-15 DIAGNOSIS — M62.838 MUSCLE SPASMS OF NECK: ICD-10-CM

## 2019-11-15 LAB — HBA1C MFR BLD: 6.3 % (ref 4.8–5.9)

## 2019-11-15 PROCEDURE — G8417 CALC BMI ABV UP PARAM F/U: HCPCS | Performed by: PHYSICIAN ASSISTANT

## 2019-11-15 PROCEDURE — 99214 OFFICE O/P EST MOD 30 MIN: CPT | Performed by: PHYSICIAN ASSISTANT

## 2019-11-15 PROCEDURE — 1036F TOBACCO NON-USER: CPT | Performed by: PHYSICIAN ASSISTANT

## 2019-11-15 PROCEDURE — G8484 FLU IMMUNIZE NO ADMIN: HCPCS | Performed by: PHYSICIAN ASSISTANT

## 2019-11-15 PROCEDURE — G8427 DOCREV CUR MEDS BY ELIG CLIN: HCPCS | Performed by: PHYSICIAN ASSISTANT

## 2019-11-17 PROBLEM — H53.9 VISION CHANGES: Status: ACTIVE | Noted: 2019-11-17

## 2019-11-17 PROBLEM — R20.0 NUMBNESS AND TINGLING IN BOTH HANDS: Status: ACTIVE | Noted: 2019-11-17

## 2019-11-17 PROBLEM — R20.2 NUMBNESS AND TINGLING IN BOTH HANDS: Status: ACTIVE | Noted: 2019-11-17

## 2019-11-17 PROBLEM — R25.8 INVOLUNTARY JERKY MOVEMENTS: Status: ACTIVE | Noted: 2019-11-17

## 2019-11-17 PROBLEM — R07.89 ATYPICAL CHEST PAIN: Status: RESOLVED | Noted: 2019-05-31 | Resolved: 2019-11-17

## 2019-11-17 ASSESSMENT — ENCOUNTER SYMPTOMS
EYE PAIN: 0
PHOTOPHOBIA: 0
BACK PAIN: 1
EYE REDNESS: 0
SHORTNESS OF BREATH: 0
EYE ITCHING: 0
COUGH: 0
COLOR CHANGE: 0
CHEST TIGHTNESS: 0

## 2019-11-18 ENCOUNTER — OFFICE VISIT (OUTPATIENT)
Dept: OBGYN CLINIC | Age: 34
End: 2019-11-18
Payer: COMMERCIAL

## 2019-11-18 VITALS
HEART RATE: 96 BPM | BODY MASS INDEX: 43.35 KG/M2 | SYSTOLIC BLOOD PRESSURE: 108 MMHG | DIASTOLIC BLOOD PRESSURE: 70 MMHG | WEIGHT: 286 LBS | HEIGHT: 68 IN

## 2019-11-18 DIAGNOSIS — Z11.51 SCREENING FOR HPV (HUMAN PAPILLOMAVIRUS): ICD-10-CM

## 2019-11-18 DIAGNOSIS — Z11.3 SCREENING EXAMINATION FOR STD (SEXUALLY TRANSMITTED DISEASE): ICD-10-CM

## 2019-11-18 DIAGNOSIS — Z01.419 VISIT FOR GYNECOLOGIC EXAMINATION: Primary | ICD-10-CM

## 2019-11-18 PROCEDURE — 99395 PREV VISIT EST AGE 18-39: CPT | Performed by: OBSTETRICS & GYNECOLOGY

## 2019-11-18 PROCEDURE — G8484 FLU IMMUNIZE NO ADMIN: HCPCS | Performed by: OBSTETRICS & GYNECOLOGY

## 2019-11-18 RX ORDER — NORETHINDRONE ACETATE AND ETHINYL ESTRADIOL 1; .02 MG/1; MG/1
1 TABLET ORAL DAILY
Qty: 30 TABLET | Refills: 6 | Status: SHIPPED | OUTPATIENT
Start: 2019-11-18 | End: 2020-01-06

## 2019-11-18 RX ORDER — LEVONORGESTREL 1.5 MG/1
1.5 TABLET ORAL ONCE
Qty: 1 TABLET | Refills: 3 | Status: SHIPPED | OUTPATIENT
Start: 2019-11-18 | End: 2020-01-06

## 2019-12-06 LAB
CHLAMYDIA TRACHOMATIS AMPLIFIED DET: NORMAL
N GONORRHOEAE AMPLIFIED DET: NORMAL
PAP SMEAR: NORMAL

## 2019-12-18 RX ORDER — METRONIDAZOLE 500 MG/1
500 TABLET ORAL 2 TIMES DAILY
Qty: 14 TABLET | Refills: 0 | Status: SHIPPED | OUTPATIENT
Start: 2019-12-18 | End: 2019-12-25

## 2019-12-30 ENCOUNTER — APPOINTMENT (OUTPATIENT)
Dept: GENERAL RADIOLOGY | Age: 34
End: 2019-12-30
Payer: COMMERCIAL

## 2019-12-30 ENCOUNTER — HOSPITAL ENCOUNTER (EMERGENCY)
Age: 34
Discharge: HOME | End: 2019-12-30
Attending: NEUROMUSCULOSKELETAL MEDICINE, SPORTS MEDICINE
Payer: COMMERCIAL

## 2019-12-30 VITALS
HEIGHT: 68 IN | OXYGEN SATURATION: 96 % | SYSTOLIC BLOOD PRESSURE: 130 MMHG | BODY MASS INDEX: 42.44 KG/M2 | TEMPERATURE: 98 F | RESPIRATION RATE: 18 BRPM | WEIGHT: 280 LBS | HEART RATE: 87 BPM | DIASTOLIC BLOOD PRESSURE: 78 MMHG

## 2019-12-30 LAB
ALBUMIN SERPL-MCNC: 3.9 G/DL (ref 3.5–4.6)
ALP BLD-CCNC: 113 U/L (ref 40–130)
ALT SERPL-CCNC: 16 U/L (ref 0–33)
ANION GAP SERPL CALCULATED.3IONS-SCNC: 14 MEQ/L (ref 9–15)
AST SERPL-CCNC: 28 U/L (ref 0–35)
BASOPHILS ABSOLUTE: 0 K/UL (ref 0–0.2)
BASOPHILS RELATIVE PERCENT: 0.4 %
BILIRUB SERPL-MCNC: <0.2 MG/DL (ref 0.2–0.7)
BUN BLDV-MCNC: 7 MG/DL (ref 6–20)
CALCIUM SERPL-MCNC: 9 MG/DL (ref 8.5–9.9)
CHLORIDE BLD-SCNC: 101 MEQ/L (ref 95–107)
CO2: 22 MEQ/L (ref 20–31)
CREAT SERPL-MCNC: 0.64 MG/DL (ref 0.5–0.9)
EOSINOPHILS ABSOLUTE: 0.3 K/UL (ref 0–0.7)
EOSINOPHILS RELATIVE PERCENT: 4.2 %
GFR AFRICAN AMERICAN: >60
GFR NON-AFRICAN AMERICAN: >60
GLOBULIN: 3.4 G/DL (ref 2.3–3.5)
GLUCOSE BLD-MCNC: 115 MG/DL (ref 70–99)
HCT VFR BLD CALC: 38.7 % (ref 37–47)
HEMOGLOBIN: 13.3 G/DL (ref 12–16)
LYMPHOCYTES ABSOLUTE: 1.8 K/UL (ref 1–4.8)
LYMPHOCYTES RELATIVE PERCENT: 25.4 %
MCH RBC QN AUTO: 30 PG (ref 27–31.3)
MCHC RBC AUTO-ENTMCNC: 34.2 % (ref 33–37)
MCV RBC AUTO: 87.8 FL (ref 82–100)
MONOCYTES ABSOLUTE: 0.6 K/UL (ref 0.2–0.8)
MONOCYTES RELATIVE PERCENT: 8.5 %
NEUTROPHILS ABSOLUTE: 4.3 K/UL (ref 1.4–6.5)
NEUTROPHILS RELATIVE PERCENT: 61.5 %
PDW BLD-RTO: 13.9 % (ref 11.5–14.5)
PLATELET # BLD: 253 K/UL (ref 130–400)
POTASSIUM SERPL-SCNC: 3.5 MEQ/L (ref 3.4–4.9)
RBC # BLD: 4.41 M/UL (ref 4.2–5.4)
SODIUM BLD-SCNC: 137 MEQ/L (ref 135–144)
TOTAL PROTEIN: 7.3 G/DL (ref 6.3–8)
WBC # BLD: 7 K/UL (ref 4.8–10.8)

## 2019-12-30 PROCEDURE — 94640 AIRWAY INHALATION TREATMENT: CPT

## 2019-12-30 PROCEDURE — 99283 EMERGENCY DEPT VISIT LOW MDM: CPT

## 2019-12-30 PROCEDURE — 2580000003 HC RX 258: Performed by: NEUROMUSCULOSKELETAL MEDICINE, SPORTS MEDICINE

## 2019-12-30 PROCEDURE — 6370000000 HC RX 637 (ALT 250 FOR IP): Performed by: NEUROMUSCULOSKELETAL MEDICINE, SPORTS MEDICINE

## 2019-12-30 PROCEDURE — 85025 COMPLETE CBC W/AUTO DIFF WBC: CPT

## 2019-12-30 PROCEDURE — 80053 COMPREHEN METABOLIC PANEL: CPT

## 2019-12-30 PROCEDURE — 71045 X-RAY EXAM CHEST 1 VIEW: CPT

## 2019-12-30 PROCEDURE — 36415 COLL VENOUS BLD VENIPUNCTURE: CPT

## 2019-12-30 PROCEDURE — 94761 N-INVAS EAR/PLS OXIMETRY MLT: CPT

## 2019-12-30 RX ORDER — AZITHROMYCIN 500 MG/1
500 TABLET, FILM COATED ORAL DAILY
Qty: 3 TABLET | Refills: 0 | Status: SHIPPED | OUTPATIENT
Start: 2019-12-30 | End: 2020-01-06

## 2019-12-30 RX ORDER — IPRATROPIUM BROMIDE AND ALBUTEROL SULFATE 2.5; .5 MG/3ML; MG/3ML
1 SOLUTION RESPIRATORY (INHALATION) CONTINUOUS PRN
Status: DISCONTINUED | OUTPATIENT
Start: 2019-12-30 | End: 2019-12-30 | Stop reason: HOSPADM

## 2019-12-30 RX ORDER — ALBUTEROL SULFATE 90 UG/1
2 AEROSOL, METERED RESPIRATORY (INHALATION) 4 TIMES DAILY PRN
Qty: 1 INHALER | Refills: 5 | Status: SHIPPED | OUTPATIENT
Start: 2019-12-30 | End: 2020-04-03 | Stop reason: SDUPTHER

## 2019-12-30 RX ORDER — 0.9 % SODIUM CHLORIDE 0.9 %
1000 INTRAVENOUS SOLUTION INTRAVENOUS ONCE
Status: COMPLETED | OUTPATIENT
Start: 2019-12-30 | End: 2019-12-30

## 2019-12-30 RX ADMIN — SODIUM CHLORIDE 1000 ML: 9 INJECTION, SOLUTION INTRAVENOUS at 03:40

## 2019-12-30 RX ADMIN — IPRATROPIUM BROMIDE AND ALBUTEROL SULFATE 1 AMPULE: .5; 3 SOLUTION RESPIRATORY (INHALATION) at 03:36

## 2019-12-30 ASSESSMENT — ENCOUNTER SYMPTOMS
EYE DISCHARGE: 0
EYE REDNESS: 0
SHORTNESS OF BREATH: 1
SINUS PAIN: 0
SORE THROAT: 0
VOMITING: 0
WHEEZING: 1
NAUSEA: 0
ABDOMINAL PAIN: 0
DIARRHEA: 0
EYE PAIN: 0
COUGH: 1

## 2019-12-30 ASSESSMENT — PAIN DESCRIPTION - PAIN TYPE
TYPE: ACUTE PAIN
TYPE: ACUTE PAIN

## 2019-12-30 ASSESSMENT — PAIN SCALES - GENERAL
PAINLEVEL_OUTOF10: 7
PAINLEVEL_OUTOF10: 5

## 2019-12-30 ASSESSMENT — PAIN DESCRIPTION - DESCRIPTORS
DESCRIPTORS: ACHING
DESCRIPTORS: ACHING

## 2019-12-30 ASSESSMENT — PAIN DESCRIPTION - LOCATION
LOCATION: HEAD
LOCATION: HEAD;CHEST

## 2019-12-30 NOTE — ED PROVIDER NOTES
GASTROINTESTINAL ENDOSCOPY N/A 8/13/2019    EGD ESOPHAGOGASTRODUODENOSCOPY performed by Salena Coello MD at [address]        Discharge Medication List as of 12/30/2019  5:26 AM      CONTINUE these medications which have NOT CHANGED    Details   levonorgestrel (PLAN B ONE STEP) 1.5 MG tablet Take 1 tablet by mouth once for 1 dose, Disp-1 tablet, R-3Normal      norethindrone-ethinyl estradiol (MICROGESTIN) 1-20 MG-MCG per tablet Take 1 tablet by mouth daily, Disp-30 tablet, R-6Normal      vitamin D (ERGOCALCIFEROL) 32404 units CAPS capsule TAKE 1 CAPSULE BY MOUTH 1 TIME A WEEK, Disp-12 capsule, R-2Normal      !! albuterol sulfate  (90 Base) MCG/ACT inhaler Use every 4 hours while awake for 7-10 days then PRN wheezing  May sub Ventolin or Proventil as needed per [employer]. Historical Med      clonazePAM (KLONOPIN) 0.5 MG tablet Take 1 tablet by mouth 2 times daily as needed for Anxiety for up to 30 days. , Disp-60 tablet, R-0Normal      metFORMIN (GLUCOPHAGE-XR) 500 MG extended release tablet Take 1 tablet by mouth every other day, Disp-90 tablet, R-1Normal       !! - Potential duplicate medications found. Please discuss with provider. ALLERGIES     Nsaids; Pcn [penicillins];  Phentermine; and Topamax [topiramate]    FAMILY HISTORY       Family History   Problem Relation Age of Onset    Cancer Mother     Arthritis Mother     Diabetes Father     Heart Disease Father     Stroke Father     High Blood Pressure Father           SOCIAL HISTORY       Social History     Socioeconomic History    Marital status: Single     Spouse name: None    Number of children: None    Years of education: None    Highest education level: None   Occupational History    None   Social Needs    Financial resource strain: None    Food insecurity:     Worry: None     Inability: None    Transportation needs:     Medical: None     Non-medical: None   Tobacco Use    Smoking status: Former Breath sounds: Wheezing and rhonchi present. Abdominal:      General: Abdomen is flat. Bowel sounds are normal.      Palpations: Abdomen is soft. Skin:     General: Skin is warm and dry. Neurological:      General: No focal deficit present. Mental Status: She is alert. DIAGNOSTIC RESULTS     EKG: All EKG's are interpreted by the Emergency Department Physician who either signs or Co-signsthis chart in the absence of a cardiologist.    RADIOLOGY:   Gemma Chancy such as CT, Ultrasound and MRI are read by the radiologist. Plain radiographic images are visualized and preliminarily interpreted by the emergency physician with the below findings:      Interpretation per the Radiologist below, if available at the time ofthis note:    XR CHEST PORTABLE    (Results Pending)         ED BEDSIDE ULTRASOUND:   Performed by ED Physician - none    LABS:  Labs Reviewed   COMPREHENSIVE METABOLIC PANEL - Abnormal; Notable for the following components:       Result Value    Glucose 115 (*)     All other components within normal limits   CBC WITH AUTO DIFFERENTIAL       All other labs were within normal range or not returned as of this dictation. EMERGENCY DEPARTMENT COURSE and DIFFERENTIAL DIAGNOSIS/MDM:   Vitals:    Vitals:    12/30/19 0311 12/30/19 0500   BP: 133/80 130/78   Pulse: 88 87   Resp: 18 18   Temp: 98 °F (36.7 °C)    TempSrc: Oral    SpO2: 95% 96%   Weight: 280 lb (127 kg)    Height: 5' 8\" (1.727 m)      MDM  Saline lock and labs drawn. Patient medicated for pain. One liter of normal saline infused. Duoneb aerosol was given. REASSESSMENT          CRITICAL CARE TIME   Total Critical Care time was 0 minutes, excluding separatelyreportable procedures. There was a high probability ofclinically significant/life threatening deterioration in the patient's condition which required my urgent intervention.       CONSULTS:  None    PROCEDURES:  Unless otherwise noted below, none Procedures    FINAL IMPRESSION      1. Bronchitis          DISPOSITION/PLAN   DISPOSITION Decision To Discharge 12/30/2019 05:20:24 AM      PATIENT REFERREDTO:  No follow-up provider specified. DISCHARGEMEDICATIONS:  Discharge Medication List as of 12/30/2019  5:26 AM      START taking these medications    Details   !! albuterol sulfate  (90 Base) MCG/ACT inhaler Inhale 2 puffs into the lungs 4 times daily as needed for Wheezing, Disp-1 Inhaler, R-5Print      azithromycin (ZITHROMAX) 500 MG tablet Take 1 tablet by mouth daily for 7 days, Disp-3 tablet, R-0Print       !! - Potential duplicate medications found. Please discuss with provider. Controlled Substances Monitoring:     RX Monitoring 6/25/2019   Attestation -   Periodic Controlled Substance Monitoring Possible medication side effects, risk of tolerance/dependence & alternative treatments discussed. ;No signs of potential drug abuse or diversion identified.;Obtaining appropriate analgesic effect of treatment.        (Please note that portions of this note were completed with a voice recognition program.  Efforts were made to edit the dictations but occasionally words are mis-transcribed.)    Samuel Rubio MD (electronically signed)  Attending Emergency Physician          Marina Parrish MD  12/30/19 0104

## 2020-01-06 ENCOUNTER — OFFICE VISIT (OUTPATIENT)
Dept: FAMILY MEDICINE CLINIC | Age: 35
End: 2020-01-06
Payer: COMMERCIAL

## 2020-01-06 VITALS
HEART RATE: 79 BPM | BODY MASS INDEX: 41.59 KG/M2 | WEIGHT: 274.4 LBS | RESPIRATION RATE: 16 BRPM | HEIGHT: 68 IN | OXYGEN SATURATION: 99 % | DIASTOLIC BLOOD PRESSURE: 80 MMHG | SYSTOLIC BLOOD PRESSURE: 110 MMHG | TEMPERATURE: 97.9 F

## 2020-01-06 DIAGNOSIS — Z20.6 HIV EXPOSURE: ICD-10-CM

## 2020-01-06 PROBLEM — G47.9 TROUBLE IN SLEEPING: Status: ACTIVE | Noted: 2020-01-06

## 2020-01-06 PROBLEM — M62.838 MUSCLE SPASMS OF NECK: Status: RESOLVED | Noted: 2018-08-30 | Resolved: 2020-01-06

## 2020-01-06 PROBLEM — R63.5 WEIGHT GAIN: Status: RESOLVED | Noted: 2019-02-19 | Resolved: 2020-01-06

## 2020-01-06 PROBLEM — R25.8 INVOLUNTARY JERKY MOVEMENTS: Status: RESOLVED | Noted: 2019-11-17 | Resolved: 2020-01-06

## 2020-01-06 PROBLEM — F51.5 NIGHTMARES: Status: ACTIVE | Noted: 2020-01-06

## 2020-01-06 PROCEDURE — 1036F TOBACCO NON-USER: CPT | Performed by: PHYSICIAN ASSISTANT

## 2020-01-06 PROCEDURE — G8417 CALC BMI ABV UP PARAM F/U: HCPCS | Performed by: PHYSICIAN ASSISTANT

## 2020-01-06 PROCEDURE — G8484 FLU IMMUNIZE NO ADMIN: HCPCS | Performed by: PHYSICIAN ASSISTANT

## 2020-01-06 PROCEDURE — 99214 OFFICE O/P EST MOD 30 MIN: CPT | Performed by: PHYSICIAN ASSISTANT

## 2020-01-06 PROCEDURE — G8427 DOCREV CUR MEDS BY ELIG CLIN: HCPCS | Performed by: PHYSICIAN ASSISTANT

## 2020-01-06 RX ORDER — DOXEPIN HYDROCHLORIDE 10 MG/1
10 CAPSULE ORAL NIGHTLY
Qty: 30 CAPSULE | Refills: 3 | Status: SHIPPED | OUTPATIENT
Start: 2020-01-06 | End: 2020-01-24

## 2020-01-06 RX ORDER — LEVONORGESTREL 1.5 MG/1
TABLET ORAL
Refills: 3 | COMMUNITY
Start: 2019-11-18 | End: 2020-01-06

## 2020-01-06 ASSESSMENT — ENCOUNTER SYMPTOMS
PHOTOPHOBIA: 0
EYE PAIN: 0
EYE REDNESS: 0
EYE ITCHING: 0
CHEST TIGHTNESS: 0
BACK PAIN: 1
COUGH: 0
SHORTNESS OF BREATH: 0
COLOR CHANGE: 0

## 2020-01-06 NOTE — PROGRESS NOTES
depression     Asthma     Atypical chest pain 2019    Chronic back pain     Diabetes mellitus ([facility]     Headache     Irregular heart beat     Multiple sclerosis ([facility]     Osteoarthritis      Past Surgical History:   Procedure Laterality Date    CHOLECYSTECTOMY      UPPER GASTROINTESTINAL ENDOSCOPY N/A 2019    EGD ESOPHAGOGASTRODUODENOSCOPY performed by Beulah Vargas MD at [facility] Marital status: Single     Spouse name: Not on file    Number of children: Not on file    Years of education: Not on file    Highest education level: Not on file   Occupational History    Not on file   Social Needs    Financial resource strain: Not on file    Food insecurity:     Worry: Not on file     Inability: Not on file    Transportation needs:     Medical: Not on file     Non-medical: Not on file   Tobacco Use    Smoking status: Former Smoker     Packs/day: 0.50     Years: 10.00     Pack years: 5.00     Types: Cigarettes     Last attempt to quit: 2017     Years since quittin.9    Smokeless tobacco: Never Used   Substance and Sexual Activity    Alcohol use: Not Currently     Comment: socially    Drug use: No    Sexual activity: Yes     Partners: Male   Lifestyle    Physical activity:     Days per week: Not on file     Minutes per session: Not on file    Stress: Not on file   Relationships    Social connections:     Talks on phone: Not on file     Gets together: Not on file     Attends [employer] service: Not on file     Active member of club or organization: Not on file     Attends meetings of clubs or organizations: Not on file     Relationship status: Not on file    Intimate partner violence:     Fear of current or ex partner: Not on file     Emotionally abused: Not on file     Physically abused: Not on file     Forced sexual activity: Not on file   Other Topics Concern    Not on file   Social History Narrative    Not on file

## 2020-01-07 LAB — HIV 1,2 COMBO ANTIGEN/ANTIBODY: NEGATIVE

## 2020-01-09 ENCOUNTER — OFFICE VISIT (OUTPATIENT)
Dept: NEUROLOGY | Age: 35
End: 2020-01-09
Payer: COMMERCIAL

## 2020-01-09 VITALS
BODY MASS INDEX: 42.45 KG/M2 | DIASTOLIC BLOOD PRESSURE: 80 MMHG | SYSTOLIC BLOOD PRESSURE: 141 MMHG | HEART RATE: 109 BPM | WEIGHT: 279.2 LBS

## 2020-01-09 PROCEDURE — 99214 OFFICE O/P EST MOD 30 MIN: CPT | Performed by: PSYCHIATRY & NEUROLOGY

## 2020-01-09 PROCEDURE — 1036F TOBACCO NON-USER: CPT | Performed by: PSYCHIATRY & NEUROLOGY

## 2020-01-09 PROCEDURE — G8484 FLU IMMUNIZE NO ADMIN: HCPCS | Performed by: PSYCHIATRY & NEUROLOGY

## 2020-01-09 PROCEDURE — G8427 DOCREV CUR MEDS BY ELIG CLIN: HCPCS | Performed by: PSYCHIATRY & NEUROLOGY

## 2020-01-09 PROCEDURE — G8417 CALC BMI ABV UP PARAM F/U: HCPCS | Performed by: PSYCHIATRY & NEUROLOGY
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
## 2020-01-11 PROBLEM — G43.719 INTRACTABLE CHRONIC MIGRAINE WITHOUT AURA AND WITHOUT STATUS MIGRAINOSUS: Status: ACTIVE | Noted: 2020-01-11

## 2020-01-11 ASSESSMENT — ENCOUNTER SYMPTOMS
CHOKING: 0
VOMITING: 0
PHOTOPHOBIA: 0
BACK PAIN: 0
SHORTNESS OF BREATH: 0
TROUBLE SWALLOWING: 0
NAUSEA: 0

## 2020-01-13 ENCOUNTER — OFFICE VISIT (OUTPATIENT)
Dept: OBGYN CLINIC | Age: 35
End: 2020-01-13
Payer: COMMERCIAL

## 2020-01-13 VITALS
WEIGHT: 280 LBS | HEART RATE: 80 BPM | BODY MASS INDEX: 42.44 KG/M2 | HEIGHT: 68 IN | DIASTOLIC BLOOD PRESSURE: 64 MMHG | SYSTOLIC BLOOD PRESSURE: 100 MMHG

## 2020-01-13 PROCEDURE — G8417 CALC BMI ABV UP PARAM F/U: HCPCS | Performed by: OBSTETRICS & GYNECOLOGY

## 2020-01-13 PROCEDURE — G8484 FLU IMMUNIZE NO ADMIN: HCPCS | Performed by: OBSTETRICS & GYNECOLOGY

## 2020-01-13 PROCEDURE — 1036F TOBACCO NON-USER: CPT | Performed by: OBSTETRICS & GYNECOLOGY

## 2020-01-13 PROCEDURE — 99213 OFFICE O/P EST LOW 20 MIN: CPT | Performed by: OBSTETRICS & GYNECOLOGY

## 2020-01-13 PROCEDURE — G8427 DOCREV CUR MEDS BY ELIG CLIN: HCPCS | Performed by: OBSTETRICS & GYNECOLOGY

## 2020-01-13 RX ORDER — METRONIDAZOLE 500 MG/1
500 TABLET ORAL 2 TIMES DAILY
Qty: 14 TABLET | Refills: 0 | Status: SHIPPED | OUTPATIENT
Start: 2020-01-13 | End: 2020-01-20

## 2020-01-13 RX ORDER — FLUCONAZOLE 150 MG/1
TABLET ORAL
Qty: 3 TABLET | Refills: 0 | Status: SHIPPED | OUTPATIENT
Start: 2020-01-13 | End: 2020-01-24

## 2020-01-13 NOTE — PROGRESS NOTES
5.00     Types: Cigarettes     Last attempt to quit: 2017     Years since quittin.9    Smokeless tobacco: Never Used   Substance and Sexual Activity    Alcohol use: Not Currently     Comment: socially    Drug use: No    Sexual activity: Yes     Partners: Male   Lifestyle    Physical activity:     Days per week: Not on file     Minutes per session: Not on file    Stress: Not on file   Relationships    Social connections:     Talks on phone: Not on file     Gets together: Not on file     Attends Anabaptist service: Not on file     Active member of club or organization: Not on file     Attends meetings of clubs or organizations: Not on file     Relationship status: Not on file    Intimate partner violence:     Fear of current or ex partner: Not on file     Emotionally abused: Not on file     Physically abused: Not on file     Forced sexual activity: Not on file   Other Topics Concern    Not on file   Social History Narrative    Not on file       Review Of Systems:  Review of Systems  All other systems reviewed and are negative. Vaginal irritation   Physical Exam:  Physical Exam  Constitutional:       Appearance: Normal appearance. HENT:      Head: Normocephalic and atraumatic. Nose: Nose normal.   Eyes:      Extraocular Movements: Extraocular movements intact. Pupils: Pupils are equal, round, and reactive to light. Neck:      Musculoskeletal: Normal range of motion and neck supple. Genitourinary:     Vagina: Vaginal discharge present. Musculoskeletal: Normal range of motion. Neurological:      Mental Status: She is alert. Assessment:      Diagnosis Orders   1. Vaginal discharge  Bacterial Vaginosis Panel    Yeast culture    C.trachomatis N.gonorrhoeae DNA    Trichomonas Screen    HSV 1/2, DNA PCR         Plan:     Patient going through bariatric evaluation .        Orders Placed This Encounter   Procedures    Bacterial Vaginosis Panel     Standing Status:   Future

## 2020-01-15 ENCOUNTER — TELEPHONE (OUTPATIENT)
Dept: OBGYN CLINIC | Age: 35
End: 2020-01-15

## 2020-01-15 NOTE — TELEPHONE ENCOUNTER
Pt is calling for her culture results. Pt is aware that her results aren't back as of yet. She would like to be called the as soon as her results are back.

## 2020-01-17 LAB
CHLAMYDIA TRACHOMATIS AMPLIFIED DET: NEGATIVE
N GONORRHOEAE AMPLIFIED DET: NEGATIVE

## 2020-01-21 ENCOUNTER — OFFICE VISIT (OUTPATIENT)
Dept: OBGYN CLINIC | Age: 35
End: 2020-01-21
Payer: COMMERCIAL

## 2020-01-21 VITALS
WEIGHT: 278 LBS | DIASTOLIC BLOOD PRESSURE: 70 MMHG | HEIGHT: 68 IN | SYSTOLIC BLOOD PRESSURE: 108 MMHG | HEART RATE: 84 BPM | BODY MASS INDEX: 42.13 KG/M2

## 2020-01-21 PROCEDURE — G8484 FLU IMMUNIZE NO ADMIN: HCPCS | Performed by: OBSTETRICS & GYNECOLOGY

## 2020-01-21 PROCEDURE — 99213 OFFICE O/P EST LOW 20 MIN: CPT | Performed by: OBSTETRICS & GYNECOLOGY

## 2020-01-21 PROCEDURE — G8417 CALC BMI ABV UP PARAM F/U: HCPCS | Performed by: OBSTETRICS & GYNECOLOGY

## 2020-01-21 PROCEDURE — G8427 DOCREV CUR MEDS BY ELIG CLIN: HCPCS | Performed by: OBSTETRICS & GYNECOLOGY

## 2020-01-21 PROCEDURE — 1036F TOBACCO NON-USER: CPT | Performed by: OBSTETRICS & GYNECOLOGY

## 2020-01-21 RX ORDER — FLUCONAZOLE 150 MG/1
TABLET ORAL
Qty: 3 TABLET | Refills: 0 | Status: SHIPPED | OUTPATIENT
Start: 2020-01-21 | End: 2020-01-24

## 2020-01-24 ENCOUNTER — CARE COORDINATION (OUTPATIENT)
Dept: CARE COORDINATION | Age: 35
End: 2020-01-24

## 2020-01-24 ENCOUNTER — OFFICE VISIT (OUTPATIENT)
Dept: FAMILY MEDICINE CLINIC | Age: 35
End: 2020-01-24
Payer: COMMERCIAL

## 2020-01-24 VITALS
HEART RATE: 92 BPM | HEIGHT: 68 IN | BODY MASS INDEX: 41.68 KG/M2 | SYSTOLIC BLOOD PRESSURE: 110 MMHG | DIASTOLIC BLOOD PRESSURE: 78 MMHG | RESPIRATION RATE: 16 BRPM | OXYGEN SATURATION: 98 % | WEIGHT: 275 LBS | TEMPERATURE: 98.3 F

## 2020-01-24 PROBLEM — F43.10 PTSD (POST-TRAUMATIC STRESS DISORDER): Status: ACTIVE | Noted: 2020-01-24

## 2020-01-24 PROBLEM — Z20.6 HIV EXPOSURE: Status: RESOLVED | Noted: 2020-01-06 | Resolved: 2020-01-24

## 2020-01-24 PROCEDURE — 1036F TOBACCO NON-USER: CPT | Performed by: PHYSICIAN ASSISTANT

## 2020-01-24 PROCEDURE — G8427 DOCREV CUR MEDS BY ELIG CLIN: HCPCS | Performed by: PHYSICIAN ASSISTANT

## 2020-01-24 PROCEDURE — 99214 OFFICE O/P EST MOD 30 MIN: CPT | Performed by: PHYSICIAN ASSISTANT

## 2020-01-24 PROCEDURE — G8417 CALC BMI ABV UP PARAM F/U: HCPCS | Performed by: PHYSICIAN ASSISTANT

## 2020-01-24 PROCEDURE — 96372 THER/PROPH/DIAG INJ SC/IM: CPT | Performed by: PHYSICIAN ASSISTANT

## 2020-01-24 PROCEDURE — G8484 FLU IMMUNIZE NO ADMIN: HCPCS | Performed by: PHYSICIAN ASSISTANT

## 2020-01-24 RX ORDER — KETOROLAC TROMETHAMINE 30 MG/ML
60 INJECTION, SOLUTION INTRAMUSCULAR; INTRAVENOUS ONCE
Status: COMPLETED | OUTPATIENT
Start: 2020-01-24 | End: 2020-01-24

## 2020-01-24 RX ADMIN — KETOROLAC TROMETHAMINE 60 MG: 30 INJECTION, SOLUTION INTRAMUSCULAR; INTRAVENOUS at 11:11
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
## 2020-01-27 ENCOUNTER — TELEPHONE (OUTPATIENT)
Dept: FAMILY MEDICINE CLINIC | Age: 35
End: 2020-01-27

## 2020-01-29 ASSESSMENT — ENCOUNTER SYMPTOMS
COUGH: 0
BACK PAIN: 1

## 2020-02-05 ENCOUNTER — HOSPITAL ENCOUNTER (OUTPATIENT)
Dept: NEUROLOGY | Age: 35
Discharge: HOME | End: 2020-02-05
Payer: COMMERCIAL

## 2020-02-05 PROCEDURE — 95911 NRV CNDJ TEST 9-10 STUDIES: CPT

## 2020-02-05 PROCEDURE — 95816 EEG AWAKE AND DROWSY: CPT

## 2020-02-05 PROCEDURE — 95886 MUSC TEST DONE W/N TEST COMP: CPT | Performed by: PSYCHIATRY & NEUROLOGY

## 2020-02-05 PROCEDURE — 95913 NRV CNDJ TEST 13/> STUDIES: CPT | Performed by: PSYCHIATRY & NEUROLOGY

## 2020-02-05 PROCEDURE — 95886 MUSC TEST DONE W/N TEST COMP: CPT

## 2020-02-05 NOTE — PROCEDURES
[address]                      [facility], [address] [facility]                             ELECTROMYOGRAM REPORT    PATIENT NAME: Margie Freedman                     :        1985  MED REC NO:   [id number]                            ROOM:  ACCOUNT NO:   [de-identified]                           ADMIT DATE: 2020  PROVIDER:     Wayne Bhatt MD    DATE OF EM2020    Neurophysiologic studies are requested for the patient's underlying  numbness in her hands. MOTOR NERVE CONDUCTION STUDIES:  Motor nerve conduction study of the  right median nerve shows normal amplitudes, latencies, and conduction  velocity. Motor nerve study of the left median nerve shows normal  amplitudes, latencies, and conduction velocity. Motor nerve study of  the right ulnar nerve shows normal amplitudes, latencies, and conduction  velocity. Motor nerve study of the left ulnar nerve shows normal  amplitudes, latencies, and conduction velocity. F-wave responses are  normal.    SENSORY NERVE CONDUCTION STUDIES:  Right median nerve recorded in digit  two shows prolonged latencies, normal amplitudes, and decreased  conduction velocity. Further mid-palm stimulation was obtained to  confirm findings and shows prolonged latencies, normal amplitudes, and  decreased conduction velocity. The left median nerve digital  examination shows prolonged latencies, normal amplitudes, and decreased  conduction velocity. The left median nerve digital examination shows  prolonged latencies, normal amplitudes, and decreased conduction  velocity. The left median mid-palm stimulation shows prolonged  latencies, normal amplitudes, and decreased conduction velocity. The  right ulnar digital examination shows normal amplitudes, latencies, and  conduction velocity. The left ulnar digital examination shows normal  amplitudes, latencies, and conduction velocity.   Right ulnar mixed  orthodromic study shows

## 2020-02-06 ENCOUNTER — CARE COORDINATION (OUTPATIENT)
Dept: CARE COORDINATION | Age: 35
End: 2020-02-06

## 2020-02-16 PROCEDURE — 95816 EEG AWAKE AND DROWSY: CPT | Performed by: PSYCHIATRY & NEUROLOGY

## 2020-02-17 NOTE — PROCEDURES
[address]                      [facility], [address] [facility]                          ELECTROENCEPHALOGRAM REPORT    PATIENT NAME: Uma Christopher                     :        1985  MED REC NO:   [id number]                            ROOM:  ACCOUNT NO:   [de-identified]                           ADMIT DATE: 2020  PROVIDER:     Светлана Britt MD    DATE OF EE2020    EEG FINDINGS:  This is a spontaneous 21-channel EEG recording for this  patient with history of migraine headaches. The background rhythm of  this EEG shows 8 Hz posterior dominant rhythm of alpha. This is  symmetrical and attenuates with eye opening. EKG is monitored, this is  regular. Photic stimulation is performed without any photic responses. There is no photic responses to seizures. The record remains  symmetrical throughout without any change in findings. This EEG was  done to rule out subclinical seizures. Hyperventilation does not reveal  anything significant. IMPRESSION:  This is a normal, well-regulated EEG recording. Clinical  correlation is recommended.         Deny Galicia MD    D: 2020 12:26:18       T: 2020 14:12:25     [id number]  Job#: [id number]     Doc#: [id number]

## 2020-02-18 ENCOUNTER — OFFICE VISIT (OUTPATIENT)
Dept: FAMILY MEDICINE CLINIC | Age: 35
End: 2020-02-18
Payer: COMMERCIAL

## 2020-02-18 VITALS
SYSTOLIC BLOOD PRESSURE: 112 MMHG | HEART RATE: 72 BPM | DIASTOLIC BLOOD PRESSURE: 80 MMHG | OXYGEN SATURATION: 100 % | RESPIRATION RATE: 16 BRPM | WEIGHT: 272.4 LBS | HEIGHT: 68 IN | BODY MASS INDEX: 41.28 KG/M2 | TEMPERATURE: 97.8 F

## 2020-02-18 VITALS
TEMPERATURE: 97.8 F | HEART RATE: 72 BPM | BODY MASS INDEX: 41.28 KG/M2 | SYSTOLIC BLOOD PRESSURE: 112 MMHG | RESPIRATION RATE: 16 BRPM | DIASTOLIC BLOOD PRESSURE: 80 MMHG | WEIGHT: 272.4 LBS | OXYGEN SATURATION: 100 % | HEIGHT: 68 IN

## 2020-02-18 PROBLEM — R21 MALAR RASH: Status: RESOLVED | Noted: 2019-04-02 | Resolved: 2020-02-18

## 2020-02-18 PROCEDURE — 99214 OFFICE O/P EST MOD 30 MIN: CPT | Performed by: PHYSICIAN ASSISTANT

## 2020-02-18 PROCEDURE — G8484 FLU IMMUNIZE NO ADMIN: HCPCS | Performed by: PHYSICIAN ASSISTANT

## 2020-02-18 PROCEDURE — G0438 PPPS, INITIAL VISIT: HCPCS | Performed by: PHYSICIAN ASSISTANT

## 2020-02-18 PROCEDURE — G8417 CALC BMI ABV UP PARAM F/U: HCPCS | Performed by: PHYSICIAN ASSISTANT

## 2020-02-18 PROCEDURE — G8427 DOCREV CUR MEDS BY ELIG CLIN: HCPCS | Performed by: PHYSICIAN ASSISTANT

## 2020-02-18 PROCEDURE — 1036F TOBACCO NON-USER: CPT | Performed by: PHYSICIAN ASSISTANT

## 2020-02-18 RX ORDER — HYDROCODONE BITARTRATE AND ACETAMINOPHEN 5; 325 MG/1; MG/1
TABLET ORAL
COMMUNITY
Start: 2020-02-02 | End: 2020-02-18

## 2020-02-18 RX ORDER — CYCLOBENZAPRINE HCL 10 MG
TABLET ORAL
COMMUNITY
Start: 2020-02-02 | End: 2020-05-14

## 2020-02-18 RX ORDER — GLUCOSAM/CHONDRO/HERB 149/HYAL 750-100 MG
1 TABLET ORAL DAILY
Qty: 90 TABLET | Refills: 1 | Status: SHIPPED | OUTPATIENT
Start: 2020-02-18 | End: 2020-06-15

## 2020-02-18 RX ORDER — LIDOCAINE 246 MG/1
PATCH TOPICAL
COMMUNITY
Start: 2020-01-26 | End: 2020-06-15

## 2020-02-18 RX ORDER — PREDNISONE 20 MG/1
TABLET ORAL
COMMUNITY
Start: 2020-02-02 | End: 2020-02-18

## 2020-02-18 RX ORDER — IBUPROFEN AND FAMOTIDINE 26.6; 8 MG/1; MG/1
1 TABLET, FILM COATED ORAL 3 TIMES DAILY PRN
Qty: 90 TABLET | Refills: 0 | Status: SHIPPED | OUTPATIENT
Start: 2020-02-18 | End: 2020-05-14

## 2020-02-18 RX ORDER — METHYLPREDNISOLONE 4 MG/1
TABLET ORAL
COMMUNITY
Start: 2020-01-26 | End: 2020-02-18

## 2020-02-18 ASSESSMENT — LIFESTYLE VARIABLES
HOW MANY STANDARD DRINKS CONTAINING ALCOHOL DO YOU HAVE ON A TYPICAL DAY: 0
HOW OFTEN DO YOU HAVE SIX OR MORE DRINKS ON ONE OCCASION: 1
HOW OFTEN DO YOU HAVE A DRINK CONTAINING ALCOHOL: 2
AUDIT-C TOTAL SCORE: 3

## 2020-02-18 ASSESSMENT — ENCOUNTER SYMPTOMS
BACK PAIN: 1
FACIAL SWELLING: 0
EYE PAIN: 0
PHOTOPHOBIA: 0
EYE REDNESS: 0
COUGH: 0
CHEST TIGHTNESS: 0
COLOR CHANGE: 0
EYE ITCHING: 0
SHORTNESS OF BREATH: 0

## 2020-02-18 ASSESSMENT — PATIENT HEALTH QUESTIONNAIRE - PHQ9
SUM OF ALL RESPONSES TO PHQ QUESTIONS 1-9: 2
SUM OF ALL RESPONSES TO PHQ QUESTIONS 1-9: 2

## 2020-02-18 NOTE — PATIENT INSTRUCTIONS
Personalized Preventive Plan for Dany Thompson - 2/18/2020  Medicare offers a range of preventive health benefits. Some of the tests and screenings are paid in full while other may be subject to a deductible, co-insurance, and/or copay. Some of these benefits include a comprehensive review of your medical history including lifestyle, illnesses that may run in your family, and various assessments and screenings as appropriate. After reviewing your medical record and screening and assessments performed today your provider may have ordered immunizations, labs, imaging, and/or referrals for you. A list of these orders (if applicable) as well as your Preventive Care list are included within your After Visit Summary for your review. Other Preventive Recommendations:    · A preventive eye exam performed by an eye specialist is recommended every 1-2 years to screen for glaucoma; cataracts, macular degeneration, and other eye disorders. · A preventive dental visit is recommended every 6 months. · Try to get at least 150 minutes of exercise per week or 10,000 steps per day on a pedometer . · Order or download the FREE \"Exercise & Physical Activity: Your Everyday Guide\" from [employer]. Call 9-784.996.7933 or search [employer] online. · You need 8164-0091 mg of calcium and 8051-8537 IU of vitamin D per day. It is possible to meet your calcium requirement with diet alone, but a vitamin D supplement is usually necessary to meet this goal.  · When exposed to the sun, use a sunscreen that protects against both UVA and UVB radiation with an SPF of 30 or greater. Reapply every 2 to 3 hours or after sweating, drying off with a towel, or swimming. · Always wear a seat belt when traveling in a car. Always wear a helmet when riding a bicycle or motorcycle.

## 2020-02-18 NOTE — PROGRESS NOTES
Attends [employer] service: Not on file     Active member of club or organization: Not on file     Attends meetings of clubs or organizations: Not on file     Relationship status: Not on file    Intimate partner violence:     Fear of current or ex partner: Not on file     Emotionally abused: Not on file     Physically abused: Not on file     Forced sexual activity: Not on file   Other Topics Concern    Not on file   Social History Narrative    Not on file     Family History   Problem Relation Age of Onset    Cancer Mother     Arthritis Mother     Diabetes Father     Heart Disease Father     Stroke Father     High Blood Pressure Father      Allergies   Allergen Reactions    Nsaids Other (See Comments)     Waiting for bariatric procedure    Pcn [Penicillins] Hives    Phentermine      Anxiety with hospitalization    Topamax [Topiramate] Other (See Comments)     DIZZY     Current Outpatient Medications   Medication Sig Dispense Refill    cyclobenzaprine (FLEXERIL) 10 MG tablet       ASPERCREME LIDOCAINE 4 % external patch APPLY TOPICALLY AA ONCE D      Misc Natural Products (GLUCOSAMINE CHOND COMPLEX/MSM) TABS Take 1 tablet by mouth daily 90 tablet 1    ibuprofen-famotidine 800-26.6 MG TABS Take 1 tablet by mouth 3 times daily as needed (joint pain/discomfort) 90 tablet 0    albuterol sulfate  (90 Base) MCG/ACT inhaler Inhale 2 puffs into the lungs 4 times daily as needed for Wheezing 1 Inhaler 5    vitamin D (ERGOCALCIFEROL) 60639 units CAPS capsule TAKE 1 CAPSULE BY MOUTH 1 TIME A WEEK 12 capsule 2    clonazePAM (KLONOPIN) 0.5 MG tablet Take 1 tablet by mouth 2 times daily as needed for Anxiety for up to 30 days. 60 tablet 0    metFORMIN (GLUCOPHAGE-XR) 500 MG extended release tablet Take 1 tablet by mouth every other day 90 tablet 1     No current facility-administered medications for this visit. PMH, Surgical Hx, Family Hx, and Social Hx reviewed and updated.   Health Maintenance reviewed. Objective  Vitals:    02/18/20 1110   BP: 112/80   Site: Left Upper Arm   Position: Sitting   Cuff Size: Large Adult   Pulse: 72   Resp: 16   Temp: 97.8 °F (36.6 °C)   TempSrc: Temporal   SpO2: 100%   Weight: 272 lb 6.4 oz (123.6 kg)   Height: 5' 8\" (1.727 m)     BP Readings from Last 3 Encounters:   02/18/20 112/80   02/18/20 112/80   01/24/20 110/78     Wt Readings from Last 3 Encounters:   02/18/20 272 lb 6.4 oz (123.6 kg)   02/18/20 272 lb 6.4 oz (123.6 kg)   01/24/20 275 lb (124.7 kg)     Physical Exam  Vitals signs reviewed. Constitutional:       General: She is not in acute distress. Appearance: She is well-developed. She is obese. She is not ill-appearing or diaphoretic. HENT:      Head: Normocephalic and atraumatic. Right Ear: External ear normal. Decreased hearing noted. Left Ear: External ear normal. Decreased hearing noted. Eyes:      General: Lids are normal.      Extraocular Movements: Extraocular movements intact. Conjunctiva/sclera: Conjunctivae normal.      Pupils: Pupils are equal, round, and reactive to light. Cardiovascular:      Rate and Rhythm: Normal rate and regular rhythm. Heart sounds: Normal heart sounds. No murmur. Pulmonary:      Effort: Pulmonary effort is normal. No respiratory distress. Breath sounds: Normal breath sounds. No wheezing or rales. Skin:     General: Skin is warm and dry. Findings: No erythema or rash. Neurological:      Mental Status: She is alert and oriented to person, place, and time. Psychiatric:         Mood and Affect: Mood is anxious. Speech: Speech normal.         Behavior: Behavior normal.         Thought Content: Thought content normal.         Cognition and Memory: Cognition normal.         Judgment: Judgment normal.      Comments: Having a good day today. Feeling well. No MAJOR acute concerns to discuss. Assessment & Plan   Bibiana was seen today for stress.     Diagnoses and all expresses understanding and desires to proceed. Close follow up to evaluate treatment results and for coordination of care. I have reviewed the patient's medical history in detail and updated the computerized patient record.     Leticia Villa PA-C

## 2020-02-18 NOTE — PROGRESS NOTES
Medicare Annual Wellness Visit  Name: Ruth Rosen Date: 2020   MRN: [id number] Sex: Female   Age: 29 y.o. Ethnicity: Non-/Non    : 1985 Race: Black      Maria Isabel is here for [place] (Patient is here today for her medical annual wellness visit)    Screenings for behavioral, psychosocial and functional/safety risks, and cognitive dysfunction are all negative except as indicated below. These results, as well as other patient data from the [address] form, are documented in Flowsheets linked to this Encounter. Allergies   Allergen Reactions    Nsaids Other (See Comments)     Waiting for bariatric procedure    Pcn [Penicillins] Hives    Phentermine      Anxiety with hospitalization    Topamax [Topiramate] Other (See Comments)     DIZZY       Prior to Visit Medications    Medication Sig Taking? Authorizing Provider   cyclobenzaprine (FLEXERIL) 10 MG tablet  Yes Historical Provider, MD   ASPERCREME LIDOCAINE 4 % external patch APPLY TOPICALLY AA ONCE D Yes Historical Provider, MD   albuterol sulfate  (90 Base) MCG/ACT inhaler Inhale 2 puffs into the lungs 4 times daily as needed for Wheezing Yes Patricia Restrepo MD   vitamin D (ERGOCALCIFEROL) 54048 units CAPS capsule TAKE 1 CAPSULE BY MOUTH 1 TIME A WEEK Yes Leticia Villa PA-C   clonazePAM (KLONOPIN) 0.5 MG tablet Take 1 tablet by mouth 2 times daily as needed for Anxiety for up to 30 days.  Yes KANDI Garcia NP   metFORMIN (GLUCOPHAGE-XR) 500 MG extended release tablet Take 1 tablet by mouth every other day Yes KANDI Garcia NP   HYDROcodone-acetaminophen (NORCO) 5-325 MG per tablet TK 1 T PO  Q 4 H PRN  Historical Provider, MD   methylPREDNISolone (MEDROL DOSEPACK) 4 MG tablet FPD  Historical Provider, MD   predniSONE (DELTASONE) 20 MG tablet TK 2 TS PO ONCE A DAY  Historical Provider, MD       Past Medical History:   Diagnosis Date    Anxiety and depression     Asthma     Atypical chest pain 5/31/2019    Chronic back pain     Diabetes mellitus ([facility] [address]     Headache     HIV exposure 1/6/2020    Irregular heart beat     Multiple sclerosis ([facility] [address]     Osteoarthritis        Past Surgical History:   Procedure Laterality Date    CHOLECYSTECTOMY      UPPER GASTROINTESTINAL ENDOSCOPY N/A 8/13/2019    EGD ESOPHAGOGASTRODUODENOSCOPY performed by [facility]y Renteria [facility] at [facility]       Family History   Problem Relation Age of Onset    Cancer Mother     Arthritis Mother     Diabetes Father     Heart Disease Father     Stroke Father     High Blood Pressure Father        CareTeam (Including outside providers/suppliers regularly involved in providing care):   Patient Care Team:  Sujit Sullivan PA-C as PCP - General (Family Medicine)  Sujit Sullivan PA-C as PCP - [facility] Empaneled Provider  FRIDA Santos as     Wt Readings from Last 3 Encounters:   02/18/20 272 lb 6.4 oz (123.6 kg)   02/18/20 272 lb 6.4 oz (123.6 kg)   01/24/20 275 lb (124.7 kg)     Vitals:    02/18/20 1057   BP: 112/80   Site: Left Upper Arm   Position: Sitting   Cuff Size: Large Adult   Pulse: 72   Resp: 16   Temp: 97.8 °F (36.6 °C)   TempSrc: Temporal   SpO2: 100%   Weight: 272 lb 6.4 oz (123.6 kg)   Height: 5' 8\" (1.727 m)     Body mass index is 41.42 kg/m². Based upon direct observation of the patient, evaluation of cognition reveals recent and remote memory intact. General Appearance: alert and oriented to person, place and time, well developed and well- nourished, in no acute distress; obese body habitus. Skin: warm and dry, no rash or erythema  Head: normocephalic and atraumatic  Eyes: pupils equal, round, and reactive to light, extraocular eye movements intact, conjunctivae normal  ENT: tympanic membrane, external ear and ear canal normal bilaterally, nose without deformity, nasal mucosa and turbinates normal without polyps; chronically hard of hearing.    Neck: supple and non-tender without mass, no thyromegaly or thyroid nodules, no cervical lymphadenopathy  Pulmonary/Chest: clear to auscultation bilaterally- no wheezes, rales or rhonchi, normal air movement, no respiratory distress  Cardiovascular: normal rate, regular rhythm, normal S1 and S2, no murmurs, rubs, clicks, or gallops, distal pulses intact, no carotid bruits  Abdomen: soft, non-tender, non-distended, normal bowel sounds, no masses or organomegaly  Extremities: no cyanosis, clubbing or edema  Musculoskeletal: normal range of motion, no joint swelling, deformity or tenderness    Patient's complete Health Risk Assessment and screening values have been reviewed and are found in Flowsheets. The following problems were reviewed today and where indicated follow up appointments were made and/or referrals ordered. Positive Risk Factor Screenings with Interventions:     Fall Risk:  2 or more falls in past year?: (!) yes  Fall with injury in past year?: no  Fall Risk Interventions:    · Home safety tips provided    General Health:  General  In general, how would you say your health is?: Fair  In the past 7 days, have you experienced any of the following? New or Increased Pain, New or Increased Fatigue, Loneliness, Social Isolation, Stress or Anger?: (!) Stress  Do you get the social and emotional support that you need?: Yes  Do you have a Living Will?: (!) No  General Health Risk Interventions:  · Stress: relaxation techniques discussed  · No Living Will: patient declined    Health Habits/Nutrition:  Health Habits/Nutrition  Do you exercise for at least 20 minutes 2-3 times per week?: Yes  Have you lost any weight without trying in the past 3 months?: No  Do you eat fewer than 2 meals per day?: No  Have you seen a dentist within the past year?: Yes  Body mass index is 41.42 kg/m².   Health Habits/Nutrition Interventions:  · in the process of bariatric surgery evaluation/program    Hearing/Vision:  No exam data present  Hearing/Vision  Do you or your family notice any trouble with your hearing?: (!) Yes  Do you have difficulty driving, watching TV, or doing any of your daily activities because of your eyesight?: (!) Yes  Have you had an eye exam within the past year?: Yes  Hearing/Vision Interventions:  · Hearing concerns:  followed by ENT  · Vision concerns:  has appointment tomorrow with ophthomalogy    Safety:  Safety  Do you have working smoke detectors?: Yes  Have all throw rugs been removed or fastened?: Yes  Do you have non-slip mats or surfaces in all bathtubs/showers?: (!) No  Do all of your stairways have a railing or banister?: Yes  Are your doorways, halls and stairs free of clutter?: Yes  Do you always fasten your seatbelt when you are in a car?: Yes  Safety Interventions:  · Home safety tips provided    ADL:  ADLs  In the past 7 days, did you need help from others to perform any of the following everyday activities? Eating, dressing, grooming, bathing, toileting, or walking/balance?: None  In the past 7 days, did you need help from others to take care of any of the following?  Laundry, housekeeping, banking/finances, shopping, telephone use, food preparation, transportation, or taking medications?: (!) Housekeeping, Food Preparation, Transportation  ADL Interventions:  · receives help through family/friends/insurance    Personalized Preventive Plan   Current Health Maintenance Status  Immunization History   Administered Date(s) Administered    Influenza A ([id number]) Vaccine PF IM 02/12/2010        Health Maintenance   Topic Date Due    Varicella vaccine (1 of 2 - 2-dose childhood series) 09/08/1986    DTaP/Tdap/Td vaccine (1 - Tdap) 09/08/1996    Annual Wellness Visit (AWV)  05/29/2019    Pneumococcal 0-64 years Vaccine (1 of 1 - PPSV23) 05/15/2020 (Originally 9/8/1991)    Flu vaccine (1) 09/23/2020 (Originally 9/1/2019)    A1C test (Diabetic or Prediabetic)  11/15/2020    Cervical cancer screen  11/18/2024    Shingles Vaccine (1 of 2) 09/08/2035    HIV screen  Completed    Hepatitis A vaccine  Aged Out    Hepatitis B vaccine  Aged Out    Hib vaccine  Aged Out    Meningococcal (ACWY) vaccine  Aged Out     Recommendations for [place] Due: see orders and patient instructions/AVS.  . Recommended screening schedule for the next 5-10 years is provided to the patient in written form: see Patient Instructions/AVS.    Jong Kelly was seen today for medicare awv. Diagnoses and all orders for this visit:    Routine general medical examination at a health care facility          Close follow up to evaluate treatment results and for coordination of care.   I have reviewed the patient's medical history in detail and updated the computerized patient record.     Leticia Villa PA-C

## 2020-02-19 ENCOUNTER — CARE COORDINATION (OUTPATIENT)
Dept: CARE COORDINATION | Age: 35
End: 2020-02-19

## 2020-02-24 ENCOUNTER — TELEPHONE (OUTPATIENT)
Dept: OBGYN CLINIC | Age: 35
End: 2020-02-24

## 2020-03-03 ENCOUNTER — CARE COORDINATION (OUTPATIENT)
Dept: CARE COORDINATION | Age: 35
End: 2020-03-03

## 2020-03-03 NOTE — CARE COORDINATION
Telephone call to patient. She indicated that she has not had further assistance with grandmother. Discussed have provided her with legal services resources and have no additional information. Patient feels she has exhausted all of the known legal resources that she could afford. Discussed plan to discharge from [address] [place] [address]  Provided patient with contact information for future reference as necessary.

## 2020-03-06 ENCOUNTER — TELEPHONE (OUTPATIENT)
Dept: FAMILY MEDICINE CLINIC | Age: 35
End: 2020-03-06

## 2020-03-17 ENCOUNTER — OFFICE VISIT (OUTPATIENT)
Dept: FAMILY MEDICINE CLINIC | Age: 35
End: 2020-03-17
Payer: COMMERCIAL

## 2020-03-17 VITALS
RESPIRATION RATE: 16 BRPM | SYSTOLIC BLOOD PRESSURE: 110 MMHG | OXYGEN SATURATION: 99 % | TEMPERATURE: 97.3 F | HEIGHT: 68 IN | DIASTOLIC BLOOD PRESSURE: 78 MMHG | WEIGHT: 271 LBS | BODY MASS INDEX: 41.07 KG/M2 | HEART RATE: 93 BPM

## 2020-03-17 PROCEDURE — G8427 DOCREV CUR MEDS BY ELIG CLIN: HCPCS | Performed by: PHYSICIAN ASSISTANT

## 2020-03-17 PROCEDURE — 99214 OFFICE O/P EST MOD 30 MIN: CPT | Performed by: PHYSICIAN ASSISTANT

## 2020-03-17 PROCEDURE — G8484 FLU IMMUNIZE NO ADMIN: HCPCS | Performed by: PHYSICIAN ASSISTANT

## 2020-03-17 PROCEDURE — 1036F TOBACCO NON-USER: CPT | Performed by: PHYSICIAN ASSISTANT

## 2020-03-17 PROCEDURE — G8417 CALC BMI ABV UP PARAM F/U: HCPCS | Performed by: PHYSICIAN ASSISTANT

## 2020-03-17 RX ORDER — METFORMIN HYDROCHLORIDE 500 MG/1
500 TABLET, EXTENDED RELEASE ORAL EVERY OTHER DAY
Qty: 90 TABLET | Refills: 2 | Status: SHIPPED | OUTPATIENT
Start: 2020-03-17 | End: 2020-05-14 | Stop reason: SDUPTHER

## 2020-03-17 RX ORDER — ERGOCALCIFEROL 1.25 MG/1
CAPSULE ORAL
Qty: 12 CAPSULE | Refills: 2 | Status: SHIPPED | OUTPATIENT
Start: 2020-03-17 | End: 2020-05-14 | Stop reason: SDUPTHER

## 2020-03-17 RX ORDER — GABAPENTIN 300 MG/1
300 CAPSULE ORAL 3 TIMES DAILY PRN
Qty: 30 CAPSULE | Refills: 1 | Status: SHIPPED | OUTPATIENT
Start: 2020-03-17 | End: 2020-06-15

## 2020-03-17 ASSESSMENT — ENCOUNTER SYMPTOMS
SHORTNESS OF BREATH: 0
EYE PAIN: 0
COLOR CHANGE: 0
EYE REDNESS: 0
BACK PAIN: 1
CHEST TIGHTNESS: 0
FACIAL SWELLING: 0
COUGH: 0
EYE ITCHING: 0
PHOTOPHOBIA: 0

## 2020-03-17 NOTE — PROGRESS NOTES
photophobia, pain, redness, itching and visual disturbance. Respiratory: Negative for cough, chest tightness and shortness of breath. Cardiovascular: Negative for chest pain, palpitations and leg swelling. Musculoskeletal: Positive for arthralgias, back pain, myalgias, neck pain and neck stiffness. Negative for gait problem. Chronic m/s   Skin: Negative for color change and rash. Neurological: Positive for numbness. Negative for dizziness, seizures, weakness, light-headedness and headaches. Hematological: Does not bruise/bleed easily. Psychiatric/Behavioral: Negative for agitation, dysphoric mood and sleep disturbance. The patient is not nervous/anxious.       Past Medical History:   Diagnosis Date    Anxiety and depression     Asthma     Atypical chest pain 5/31/2019    Chronic back pain     Diabetes mellitus ([facility] [address]     Headache     HIV exposure 1/6/2020    Irregular heart beat     Multiple sclerosis (HCC)     Osteoarthritis      Past Surgical History:   Procedure Laterality Date    CHOLECYSTECTOMY      UPPER GASTROINTESTINAL ENDOSCOPY N/A 8/13/2019    EGD ESOPHAGOGASTRODUODENOSCOPY performed by Amy De Paz MD at [address] [place] [address] Marital status: Single     Spouse name: Not on file    Number of children: Not on file    Years of education: Not on file    Highest education level: Not on file   Occupational History    Not on file   Social Needs    Financial resource strain: Not on file    Food insecurity     Worry: Not on file     Inability: Not on file    Transportation needs     Medical: Not on file     Non-medical: Not on file   Tobacco Use    Smoking status: Former Smoker     Packs/day: 0.50     Years: 10.00     Pack years: 5.00     Types: Cigarettes     Last attempt to quit: 1/27/2017     Years since quitting: 3.1    Smokeless tobacco: Never Used   Substance and Sexual Activity    Alcohol use: Not Currently     Comment: needed (joint pain/discomfort) 90 tablet 0    albuterol sulfate  (90 Base) MCG/ACT inhaler Inhale 2 puffs into the lungs 4 times daily as needed for Wheezing 1 Inhaler 5    clonazePAM (KLONOPIN) 0.5 MG tablet Take 1 tablet by mouth 2 times daily as needed for Anxiety for up to 30 days. 60 tablet 0     No current facility-administered medications for this visit. PMH, Surgical Hx, Family Hx, and Social Hx reviewed and updated. Health Maintenance reviewed. Objective  Vitals:    03/17/20 1116   BP: 110/78   Site: Left Upper Arm   Position: Sitting   Cuff Size: Large Adult   Pulse: 93   Resp: 16   Temp: 97.3 °F (36.3 °C)   TempSrc: Temporal   SpO2: 99%   Weight: 271 lb (122.9 kg)   Height: 5' 8\" (1.727 m)     BP Readings from Last 3 Encounters:   03/17/20 110/78   02/18/20 112/80   02/18/20 112/80     Wt Readings from Last 3 Encounters:   03/17/20 271 lb (122.9 kg)   02/18/20 272 lb 6.4 oz (123.6 kg)   02/18/20 272 lb 6.4 oz (123.6 kg)     Physical Exam  Vitals signs reviewed. Constitutional:       General: She is not in acute distress. Appearance: She is well-developed. She is obese. She is not ill-appearing or diaphoretic. HENT:      Head: Normocephalic and atraumatic. Right Ear: External ear normal. Decreased hearing noted. Left Ear: External ear normal. Decreased hearing noted. Eyes:      General: Lids are normal.      Extraocular Movements: Extraocular movements intact. Conjunctiva/sclera: Conjunctivae normal.      Pupils: Pupils are equal, round, and reactive to light. Cardiovascular:      Rate and Rhythm: Normal rate and regular rhythm. Heart sounds: Normal heart sounds. No murmur. Pulmonary:      Effort: Pulmonary effort is normal. No respiratory distress. Breath sounds: Normal breath sounds. No wheezing or rales. Skin:     General: Skin is warm and dry. Findings: No erythema or rash.    Neurological:      Mental Status: She is alert and oriented to

## 2020-03-25 ENCOUNTER — PATIENT MESSAGE (OUTPATIENT)
Dept: OBGYN CLINIC | Age: 35
End: 2020-03-25

## 2020-03-26 RX ORDER — FLUCONAZOLE 150 MG/1
TABLET ORAL
Qty: 3 TABLET | Refills: 0 | Status: SHIPPED | OUTPATIENT
Start: 2020-03-26 | End: 2020-04-28 | Stop reason: SDUPTHER

## 2020-03-26 NOTE — TELEPHONE ENCOUNTER
From: Molly Mora  To: Gabriel Easley MD  Sent: 3/25/2020 8:07 PM EDT  Subject: Non-Urgent Evert Helling Dr. Umair Floyd, I am struggling with a yeast infection. Could you please send a priscription in to the [facility]YE ARTEAGA on [address] [facility] in [place]. Thank you and hope you are safe during this time.

## 2020-04-03 ENCOUNTER — TELEPHONE (OUTPATIENT)
Dept: FAMILY MEDICINE CLINIC | Age: 35
End: 2020-04-03

## 2020-04-28 ENCOUNTER — VIRTUAL VISIT (OUTPATIENT)
Dept: FAMILY MEDICINE CLINIC | Age: 35
End: 2020-04-28
Payer: COMMERCIAL

## 2020-04-28 PROCEDURE — 99215 OFFICE O/P EST HI 40 MIN: CPT | Performed by: PHYSICIAN ASSISTANT

## 2020-04-28 PROCEDURE — G8428 CUR MEDS NOT DOCUMENT: HCPCS | Performed by: PHYSICIAN ASSISTANT

## 2020-04-28 RX ORDER — MONTELUKAST SODIUM 10 MG/1
10 TABLET ORAL NIGHTLY
Qty: 30 TABLET | Refills: 3 | Status: SHIPPED | OUTPATIENT
Start: 2020-04-28 | End: 2020-06-15

## 2020-04-28 RX ORDER — METRONIDAZOLE 7.5 MG/G
1 GEL VAGINAL NIGHTLY
Qty: 70 G | Refills: 0 | Status: SHIPPED | OUTPATIENT
Start: 2020-04-28 | End: 2020-05-03

## 2020-04-28 RX ORDER — FLUTICASONE PROPIONATE 50 MCG
1 SPRAY, SUSPENSION (ML) NASAL 2 TIMES DAILY PRN
Qty: 2 BOTTLE | Refills: 1 | Status: SHIPPED | OUTPATIENT
Start: 2020-04-28 | End: 2021-02-12

## 2020-04-28 RX ORDER — PANTOPRAZOLE SODIUM 40 MG/1
40 TABLET, DELAYED RELEASE ORAL
Qty: 30 TABLET | Refills: 0 | Status: SHIPPED | OUTPATIENT
Start: 2020-04-28 | End: 2020-06-01

## 2020-04-28 RX ORDER — FLUCONAZOLE 150 MG/1
TABLET ORAL
Qty: 3 TABLET | Refills: 0 | Status: SHIPPED | OUTPATIENT
Start: 2020-04-28 | End: 2020-05-14

## 2020-04-28 NOTE — PROGRESS NOTES
2020    TELEHEALTH EVALUATION -- Audio/Visual (During VWCPK-54 public health emergency)    Due to COVID 19 outbreak, patient's office visit was converted to a virtual visit. Patient was contacted and agreed to proceed with a virtual visit via [email or web address]  The risks and benefits of converting to a virtual visit were discussed in light of the current infectious disease epidemic. Patient also understood that insurance coverage and co-pays are up to their individual insurance plans. HPI:    Sharda Paul (:  1985) has requested an audio/video evaluation for the following concern(s):    Acute stress/anxiety. Reports continues stress related to her children's father's family. Feels like they constantly antagonize her--will send her 'nasty' text messages. Causes a lot of strain and stress--which then patient reacts to physically. Feels like it is a terrible cycle. Unsure as to what she should do. We have discussed at length in the past as there have been legal consequences with his family. She is willing to speak to [facility]. BMI 41/obesity. Still wanting bariatric surgery. She is working with [facility] bariatric surgery team.  Needs to be at goal weight prior to weight loss surgery. GERD. Complaining of \"burning\" up esophagus--worse at night. Worse with certain foods. Denies N/V. No changes to bowel movements. Denies excessive use of NSAID medications/OTC products. Seasonal allergies. +congestion with PND--leading to cough. Has been using albuterol inhaler at night for increase tightness/wheezing. History of seasonal allergies and reactive airway disease. She has been taking antihistamine with only fair response. \"head and ears will feel full\". Huy fever, known covid exposure. Vaginal discharge. Complaining of recurrent vaginal discharge with odor and itching. This is recurrent, and similar to past BV/yeast infections.   She is followed by for Wheezing Yes Leticia Villa PA-C   vitamin D (ERGOCALCIFEROL) 1.25 MG (46563 UT) CAPS capsule TAKE 1 CAPSULE BY MOUTH 1 TIME A WEEK Yes Leticia Villa PA-C   metFORMIN (GLUCOPHAGE-XR) 500 MG extended release tablet Take 1 tablet by mouth every other day Yes Leticia Villa PA-C   gabapentin (NEURONTIN) 300 MG capsule Take 1 capsule by mouth 3 times daily as needed (AS NEEDED FOR L LEG PAIN FLARES) for up to 30 days. Yes Leticia Villa PA-C   cyclobenzaprine (FLEXERIL) 10 MG tablet  Yes Historical Provider, MD   clonazePAM (KLONOPIN) 0.5 MG tablet Take 1 tablet by mouth 2 times daily as needed for Anxiety for up to 30 days.  Yes KANDI Daigle NP   ASPERCREME LIDOCAINE 4 % external patch APPLY TOPICALLY AA ONCE D  Historical Provider, MD   Misc Natural Products (GLUCOSAMINE CHOND COMPLEX/MSM) TABS Take 1 tablet by mouth daily  Leticia Villa PA-C   ibuprofen-famotidine 800-26.6 MG TABS Take 1 tablet by mouth 3 times daily as needed (joint pain/discomfort)  Leticia Villa PA-C       Social History     Tobacco Use    Smoking status: Former Smoker     Packs/day: 0.50     Years: 10.00     Pack years: 5.00     Types: Cigarettes     Last attempt to quit: 1/27/2017     Years since quitting: 3.2    Smokeless tobacco: Never Used   Substance Use Topics    Alcohol use: Not Currently     Comment: socially    Drug use: No        Allergies   Allergen Reactions    Nsaids Other (See Comments)     Waiting for bariatric procedure    Pcn [Penicillins] Hives    Phentermine      Anxiety with hospitalization    Topamax [Topiramate] Other (See Comments)     DIZZY   ,   Past Medical History:   Diagnosis Date    Anxiety and depression     Asthma     Atypical chest pain 5/31/2019    Chronic back pain     Diabetes mellitus ([facility]     Headache     HIV exposure 1/6/2020    Irregular heart beat     Multiple sclerosis ([facility] [address]     Osteoarthritis    ,   Past Surgical History:   Procedure Laterality Date    CHOLECYSTECTOMY      UPPER GASTROINTESTINAL ENDOSCOPY N/A 8/13/2019    EGD ESOPHAGOGASTRODUODENOSCOPY performed by Romero Villafuerte MD at [facility]   ,   Social History     Tobacco Use    Smoking status: Former Smoker     Packs/day: 0.50     Years: 10.00     Pack years: 5.00     Types: Cigarettes     Last attempt to quit: 1/27/2017     Years since quitting: 3.2    Smokeless tobacco: Never Used   Substance Use Topics    Alcohol use: Not Currently     Comment: socially    Drug use: No   ,   Family History   Problem Relation Age of Onset    Cancer Mother     Arthritis Mother     Diabetes Father     Heart Disease Father     Stroke Father     High Blood Pressure Father    ,   Immunization History   Administered Date(s) Administered    Influenza A (J9C1-23) Vaccine PF IM 02/12/2010   ,   Health Maintenance   Topic Date Due    Varicella vaccine (1 of 2 - 2-dose childhood series) 09/08/1986    DTaP/Tdap/Td vaccine (1 - Tdap) 09/08/2004    Pneumococcal 0-64 years Vaccine (1 of 1 - PPSV23) 05/15/2020 (Originally 9/8/1991)    Flu vaccine (Season Ended) 09/23/2020 (Originally 9/1/2020)    A1C test (Diabetic or Prediabetic)  11/15/2020    Annual Wellness Visit (AWV)  02/18/2021    Cervical cancer screen  11/18/2024    HIV screen  Completed    Hepatitis A vaccine  Aged Out    Hepatitis B vaccine  Aged Out    Hib vaccine  Aged Out    Meningococcal (ACWY) vaccine  Aged Out     PHYSICAL EXAMINATION:  [ INSTRUCTIONS:  \"[x]\" Indicates a positive item  \"[]\" Indicates a negative item  -- DELETE ALL ITEMS NOT EXAMINED]  [x] Alert  [x] Oriented to person/place/time    [x] No apparent distress  [] Toxic appearing    [x] Face flushed appearing [x] Sclera clear  [] Lips are cyanotic      [x] Breathing appears normal  [] Appears tachypneic      [] Rash on visible skin    [x] Cranial Nerves II-XII grossly intact    [x] Motor grossly intact in visible upper extremities    [x] Motor grossly intact in visible lower extremities    [x] Normal Mood  [] Anxious appearing    [] Depressed appearing  [] Confused appearing      [] Poor short term memory  [] Poor long term memory    [] OTHER:      Due to this being a TeleHealth encounter, evaluation of the following organ systems is limited: Vitals/Constitutional/EENT/Resp/CV/GI//MS/Neuro/Skin/Heme-Lymph-Imm. ASSESSMENT/PLAN:  1. BMI 40.0-44.9, adult [facility])  Working with [facility]  Continue appointment    2. Seasonal allergies  Add flonase  Continue antihistamine    - fluticasone (FLONASE) 50 MCG/ACT nasal spray; 1 spray by Each Nostril route 2 times daily as needed for Rhinitis or Allergies  Dispense: 2 Bottle; Refill: 1    3. Mild persistent reactive airway disease without complication  Albuterol PRN. Start singulair  - montelukast (SINGULAIR) 10 MG tablet; Take 1 tablet by mouth nightly For asthma and allergies. Dispense: 30 tablet; Refill: 3    4. Gastroesophageal reflux disease, esophagitis presence not specified    - pantoprazole (PROTONIX) 40 MG tablet; Take 1 tablet by mouth Daily with supper For heartburn. Dispense: 30 tablet; Refill: 0    5. Vaginal discharge  Discussed when to start oral birth control pill.     - fluconazole (DIFLUCAN) 150 MG tablet; Take 1 tablet every 2 days for 3 doses. For vaginal yeast infection. Dispense: 3 tablet; Refill: 0  - metroNIDAZOLE (METROGEL) 0.75 % vaginal gel; Place 1 Applicatorful vaginally nightly for 5 days For vaginal discharge. Dispense: 70 g; Refill: 0    6. Acute stress reaction  Discussed with patient. Behavioral health consult with Dr. Jose Saleh. Return in about 2 weeks (around 5/12/2020) for follow up. An  electronic signature was used to authenticate this note.     --Julianne House PA-C on 5/2/2020 at 6:09 AM        Pursuant to the emergency declaration under the [facility] authority and the [employer] Act, this Virtual  Visit

## 2020-05-02 PROBLEM — Z97.5 NEXPLANON IN PLACE: Status: RESOLVED | Noted: 2018-01-03 | Resolved: 2020-05-02

## 2020-05-02 ASSESSMENT — PATIENT HEALTH QUESTIONNAIRE - PHQ9
1. LITTLE INTEREST OR PLEASURE IN DOING THINGS: 1
SUM OF ALL RESPONSES TO PHQ9 QUESTIONS 1 & 2: 2
SUM OF ALL RESPONSES TO PHQ QUESTIONS 1-9: 2
2. FEELING DOWN, DEPRESSED OR HOPELESS: 1
SUM OF ALL RESPONSES TO PHQ QUESTIONS 1-9: 2

## 2020-05-02 ASSESSMENT — ENCOUNTER SYMPTOMS
NAUSEA: 0
ABDOMINAL PAIN: 1
ABDOMINAL DISTENTION: 0
SINUS PRESSURE: 1
WHEEZING: 1
RECTAL PAIN: 0
SHORTNESS OF BREATH: 0
TROUBLE SWALLOWING: 0
COUGH: 1
CHEST TIGHTNESS: 1
COLOR CHANGE: 0
SINUS PAIN: 0
CONSTIPATION: 0
BLOOD IN STOOL: 0
SORE THROAT: 0
VOMITING: 0
RHINORRHEA: 1

## 2020-05-14 ENCOUNTER — OFFICE VISIT (OUTPATIENT)
Dept: FAMILY MEDICINE CLINIC | Age: 35
End: 2020-05-14
Payer: COMMERCIAL

## 2020-05-14 VITALS
HEIGHT: 68 IN | SYSTOLIC BLOOD PRESSURE: 122 MMHG | TEMPERATURE: 98.1 F | WEIGHT: 266 LBS | DIASTOLIC BLOOD PRESSURE: 66 MMHG | RESPIRATION RATE: 18 BRPM | BODY MASS INDEX: 40.32 KG/M2

## 2020-05-14 PROCEDURE — 1036F TOBACCO NON-USER: CPT | Performed by: PHYSICIAN ASSISTANT

## 2020-05-14 PROCEDURE — G8417 CALC BMI ABV UP PARAM F/U: HCPCS | Performed by: PHYSICIAN ASSISTANT

## 2020-05-14 PROCEDURE — G8427 DOCREV CUR MEDS BY ELIG CLIN: HCPCS | Performed by: PHYSICIAN ASSISTANT

## 2020-05-14 PROCEDURE — 99215 OFFICE O/P EST HI 40 MIN: CPT | Performed by: PHYSICIAN ASSISTANT

## 2020-05-14 RX ORDER — ERGOCALCIFEROL 1.25 MG/1
CAPSULE ORAL
Qty: 12 CAPSULE | Refills: 2 | Status: SHIPPED | OUTPATIENT
Start: 2020-05-14 | End: 2021-02-12

## 2020-05-14 RX ORDER — METFORMIN HYDROCHLORIDE 500 MG/1
500 TABLET, EXTENDED RELEASE ORAL EVERY OTHER DAY
Qty: 90 TABLET | Refills: 2 | Status: SHIPPED | OUTPATIENT
Start: 2020-05-14 | End: 2020-12-28 | Stop reason: SDUPTHER

## 2020-05-14 RX ORDER — SERTRALINE HYDROCHLORIDE 25 MG/1
25 TABLET, FILM COATED ORAL DAILY
Qty: 30 TABLET | Refills: 5 | Status: SHIPPED | OUTPATIENT
Start: 2020-05-14 | End: 2021-02-12

## 2020-05-14 NOTE — PROGRESS NOTES
status: Former Smoker     Packs/day: 0.50     Years: 10.00     Pack years: 5.00     Types: Cigarettes     Last attempt to quit: 1/27/2017     Years since quitting: 3.3    Smokeless tobacco: Never Used   Substance and Sexual Activity    Alcohol use: Not Currently     Comment: socially    Drug use: No    Sexual activity: Yes     Partners: Male   Lifestyle    Physical activity     Days per week: Not on file     Minutes per session: Not on file    Stress: Not on file   Relationships    Social connections     Talks on phone: Not on file     Gets together: Not on file     Attends Jehovah's witness service: Not on file     Active member of club or organization: Not on file     Attends meetings of clubs or organizations: Not on file     Relationship status: Not on file    Intimate partner violence     Fear of current or ex partner: Not on file     Emotionally abused: Not on file     Physically abused: Not on file     Forced sexual activity: Not on file   Other Topics Concern    Not on file   Social History Narrative    Not on file     Family History   Problem Relation Age of Onset    Cancer Mother     Arthritis Mother     Diabetes Father     Heart Disease Father     Stroke Father     High Blood Pressure Father      Allergies   Allergen Reactions    Nsaids Other (See Comments)     Waiting for bariatric procedure    Pcn [Penicillins] Hives    Phentermine      Anxiety with hospitalization    Topamax [Topiramate] Other (See Comments)     DIZZY     Current Outpatient Medications   Medication Sig Dispense Refill    metFORMIN (GLUCOPHAGE-XR) 500 MG extended release tablet Take 1 tablet by mouth every other day FOR PREDIABETES. 90 tablet 2    vitamin D (ERGOCALCIFEROL) 1.25 MG (97540 UT) CAPS capsule FOR LOW VIT D. TAKE 1 CAPSULE BY MOUTH 1 TIME A WEEK 12 capsule 2    sertraline (ZOLOFT) 25 MG tablet Take 1 tablet by mouth daily FOR ANXIETY/PANIC ATTACKS.  30 tablet 5    montelukast (SINGULAIR) 10 MG tablet Take 1

## 2020-05-21 PROBLEM — F41.1 GAD (GENERALIZED ANXIETY DISORDER): Status: ACTIVE | Noted: 2020-05-21

## 2020-05-21 ASSESSMENT — ENCOUNTER SYMPTOMS
SORE THROAT: 0
CHEST TIGHTNESS: 1
WHEEZING: 0
COLOR CHANGE: 0
SINUS PAIN: 0
ABDOMINAL DISTENTION: 0
SINUS PRESSURE: 0
BLOOD IN STOOL: 0
COUGH: 0
RECTAL PAIN: 0
RHINORRHEA: 0
SHORTNESS OF BREATH: 0
TROUBLE SWALLOWING: 0
CONSTIPATION: 0
NAUSEA: 0
ABDOMINAL PAIN: 1
VOMITING: 0

## 2020-06-15 ENCOUNTER — OFFICE VISIT (OUTPATIENT)
Dept: FAMILY MEDICINE CLINIC | Age: 35
End: 2020-06-15
Payer: COMMERCIAL

## 2020-06-15 VITALS
OXYGEN SATURATION: 99 % | DIASTOLIC BLOOD PRESSURE: 68 MMHG | HEIGHT: 68 IN | RESPIRATION RATE: 18 BRPM | WEIGHT: 263 LBS | BODY MASS INDEX: 39.86 KG/M2 | SYSTOLIC BLOOD PRESSURE: 110 MMHG | HEART RATE: 77 BPM | TEMPERATURE: 98.2 F

## 2020-06-15 DIAGNOSIS — E55.9 VITAMIN D DEFICIENCY: ICD-10-CM

## 2020-06-15 DIAGNOSIS — Z13.220 SCREENING CHOLESTEROL LEVEL: ICD-10-CM

## 2020-06-15 DIAGNOSIS — R73.03 PREDIABETES: ICD-10-CM

## 2020-06-15 PROBLEM — F51.5 NIGHTMARES: Status: RESOLVED | Noted: 2020-01-06 | Resolved: 2020-06-15

## 2020-06-15 LAB
ALBUMIN SERPL-MCNC: 3.7 G/DL (ref 3.5–4.6)
ALP BLD-CCNC: 113 U/L (ref 40–130)
ALT SERPL-CCNC: 18 U/L (ref 0–33)
ANION GAP SERPL CALCULATED.3IONS-SCNC: 9 MEQ/L (ref 9–15)
AST SERPL-CCNC: 32 U/L (ref 0–35)
BASOPHILS ABSOLUTE: 0 K/UL (ref 0–0.2)
BASOPHILS RELATIVE PERCENT: 0.3 %
BILIRUB SERPL-MCNC: <0.2 MG/DL (ref 0.2–0.7)
BUN BLDV-MCNC: 5 MG/DL (ref 6–20)
CALCIUM SERPL-MCNC: 8.9 MG/DL (ref 8.5–9.9)
CHLORIDE BLD-SCNC: 103 MEQ/L (ref 95–107)
CHOLESTEROL, TOTAL: 146 MG/DL (ref 0–199)
CO2: 26 MEQ/L (ref 20–31)
CREAT SERPL-MCNC: 0.79 MG/DL (ref 0.5–0.9)
EOSINOPHILS ABSOLUTE: 0.1 K/UL (ref 0–0.7)
EOSINOPHILS RELATIVE PERCENT: 2.8 %
GFR AFRICAN AMERICAN: >60
GFR NON-AFRICAN AMERICAN: >60
GLOBULIN: 3 G/DL (ref 2.3–3.5)
GLUCOSE BLD-MCNC: 101 MG/DL (ref 70–99)
HBA1C MFR BLD: 6.1 % (ref 4.8–5.9)
HCT VFR BLD CALC: 40.8 % (ref 37–47)
HDLC SERPL-MCNC: 42 MG/DL (ref 40–59)
HEMOGLOBIN: 13.2 G/DL (ref 12–16)
LDL CHOLESTEROL CALCULATED: 79 MG/DL (ref 0–129)
LYMPHOCYTES ABSOLUTE: 2 K/UL (ref 1–4.8)
LYMPHOCYTES RELATIVE PERCENT: 41.8 %
MCH RBC QN AUTO: 29 PG (ref 27–31.3)
MCHC RBC AUTO-ENTMCNC: 32.5 % (ref 33–37)
MCV RBC AUTO: 89.3 FL (ref 82–100)
MONOCYTES ABSOLUTE: 0.4 K/UL (ref 0.2–0.8)
MONOCYTES RELATIVE PERCENT: 8 %
NEUTROPHILS ABSOLUTE: 2.3 K/UL (ref 1.4–6.5)
NEUTROPHILS RELATIVE PERCENT: 47.1 %
PDW BLD-RTO: 14.7 % (ref 11.5–14.5)
PLATELET # BLD: 236 K/UL (ref 130–400)
POTASSIUM SERPL-SCNC: 4.6 MEQ/L (ref 3.4–4.9)
RBC # BLD: 4.56 M/UL (ref 4.2–5.4)
SODIUM BLD-SCNC: 138 MEQ/L (ref 135–144)
TOTAL PROTEIN: 6.7 G/DL (ref 6.3–8)
TRIGL SERPL-MCNC: 125 MG/DL (ref 0–150)
VITAMIN D 25-HYDROXY: 36.2 NG/ML (ref 30–100)
WBC # BLD: 4.8 K/UL (ref 4.8–10.8)

## 2020-06-15 PROCEDURE — 1036F TOBACCO NON-USER: CPT | Performed by: PHYSICIAN ASSISTANT

## 2020-06-15 PROCEDURE — G8417 CALC BMI ABV UP PARAM F/U: HCPCS | Performed by: PHYSICIAN ASSISTANT

## 2020-06-15 PROCEDURE — G8427 DOCREV CUR MEDS BY ELIG CLIN: HCPCS | Performed by: PHYSICIAN ASSISTANT

## 2020-06-15 PROCEDURE — 99214 OFFICE O/P EST MOD 30 MIN: CPT | Performed by: PHYSICIAN ASSISTANT

## 2020-06-15 RX ORDER — ACETAMINOPHEN AND CODEINE PHOSPHATE 120; 12 MG/5ML; MG/5ML
SOLUTION ORAL
COMMUNITY
Start: 2020-04-27 | End: 2020-06-15

## 2020-06-15 RX ORDER — METRONIDAZOLE 500 MG/1
TABLET ORAL
COMMUNITY
Start: 2020-04-28 | End: 2020-06-15

## 2020-06-15 ASSESSMENT — ENCOUNTER SYMPTOMS
CHEST TIGHTNESS: 0
SINUS PAIN: 0
COUGH: 0
WHEEZING: 0
SORE THROAT: 0
ABDOMINAL PAIN: 1
CONSTIPATION: 0
NAUSEA: 0
SINUS PRESSURE: 0
SHORTNESS OF BREATH: 0
RHINORRHEA: 0
VOMITING: 0
RECTAL PAIN: 0
BLOOD IN STOOL: 0
COLOR CHANGE: 0
ABDOMINAL DISTENTION: 0
TROUBLE SWALLOWING: 0

## 2020-06-15 ASSESSMENT — PATIENT HEALTH QUESTIONNAIRE - PHQ9
SUM OF ALL RESPONSES TO PHQ9 QUESTIONS 1 & 2: 2
SUM OF ALL RESPONSES TO PHQ QUESTIONS 1-9: 2
SUM OF ALL RESPONSES TO PHQ QUESTIONS 1-9: 2
1. LITTLE INTEREST OR PLEASURE IN DOING THINGS: 1
2. FEELING DOWN, DEPRESSED OR HOPELESS: 1

## 2020-06-15 NOTE — PROGRESS NOTES
Days per week: Not on file     Minutes per session: Not on file    Stress: Not on file   Relationships    Social connections     Talks on phone: Not on file     Gets together: Not on file     Attends Zoroastrian service: Not on file     Active member of club or organization: Not on file     Attends meetings of clubs or organizations: Not on file     Relationship status: Not on file    Intimate partner violence     Fear of current or ex partner: Not on file     Emotionally abused: Not on file     Physically abused: Not on file     Forced sexual activity: Not on file   Other Topics Concern    Not on file   Social History Narrative    Not on file     Family History   Problem Relation Age of Onset    Cancer Mother     Arthritis Mother     Diabetes Father     Heart Disease Father     Stroke Father     High Blood Pressure Father      Allergies   Allergen Reactions    Nsaids Other (See Comments)     Waiting for bariatric procedure    Pcn [Penicillins] Hives    Phentermine      Anxiety with hospitalization    Topamax [Topiramate] Other (See Comments)     DIZZY     Current Outpatient Medications   Medication Sig Dispense Refill    pantoprazole (PROTONIX) 40 MG tablet TAKE 1 TABLET BY MOUTH DAILY WITH SUPPER FOR HEARTBURN 30 tablet 0    metFORMIN (GLUCOPHAGE-XR) 500 MG extended release tablet Take 1 tablet by mouth every other day FOR PREDIABETES. 90 tablet 2    fluticasone (FLONASE) 50 MCG/ACT nasal spray 1 spray by Each Nostril route 2 times daily as needed for Rhinitis or Allergies 2 Bottle 1    albuterol sulfate  (90 Base) MCG/ACT inhaler Inhale 2 puffs into the lungs 4 times daily as needed for Wheezing 1 Inhaler 5    vitamin D (ERGOCALCIFEROL) 1.25 MG (67125 UT) CAPS capsule FOR LOW VIT D. TAKE 1 CAPSULE BY MOUTH 1 TIME A WEEK (Patient not taking: Reported on 6/15/2020) 12 capsule 2    sertraline (ZOLOFT) 25 MG tablet Take 1 tablet by mouth daily FOR ANXIETY/PANIC ATTACKS.  (Patient not taking: Reported on 6/15/2020) 30 tablet 5     No current facility-administered medications for this visit. PMH, Surgical Hx, Family Hx, and Social Hx reviewed and updated. Health Maintenance reviewed. Objective  Vitals:    06/15/20 1134   BP: 110/68   Site: Right Upper Arm   Position: Sitting   Cuff Size: Large Adult   Pulse: 77   Resp: 18   Temp: 98.2 °F (36.8 °C)   TempSrc: Tympanic   SpO2: 99%   Weight: 263 lb (119.3 kg)   Height: 5' 8\" (1.727 m)     BP Readings from Last 3 Encounters:   06/15/20 110/68   05/14/20 122/66   03/17/20 110/78     Wt Readings from Last 3 Encounters:   06/15/20 263 lb (119.3 kg)   05/14/20 266 lb (120.7 kg)   03/17/20 271 lb (122.9 kg)     Physical Exam  Vitals signs reviewed. Constitutional:       General: She is not in acute distress. Appearance: She is well-developed. She is obese. She is not ill-appearing or diaphoretic. HENT:      Head: Normocephalic and atraumatic. Right Ear: External ear normal. Decreased hearing noted. Left Ear: External ear normal. Decreased hearing noted. Nose: Nose normal.      Mouth/Throat:      Mouth: Mucous membranes are moist.   Eyes:      General: Lids are normal.      Extraocular Movements: Extraocular movements intact. Conjunctiva/sclera: Conjunctivae normal.      Pupils: Pupils are equal, round, and reactive to light. Cardiovascular:      Rate and Rhythm: Normal rate and regular rhythm. Heart sounds: Normal heart sounds. No murmur. Pulmonary:      Effort: Pulmonary effort is normal. No respiratory distress. Breath sounds: Normal breath sounds. No wheezing or rales. Skin:     General: Skin is warm and dry. Findings: No erythema or rash. Neurological:      Mental Status: She is alert and oriented to person, place, and time. Psychiatric:         Attention and Perception: Attention normal.         Mood and Affect: Mood normal. Mood is not anxious.          Speech: Speech normal.         Behavior: Behavior normal.         Thought Content: Thought content normal.         Cognition and Memory: Cognition normal.         Judgment: Judgment normal.       Assessment & Plan   Bibiana was seen today for multiple sclerosis. Diagnoses and all orders for this visit:    Prediabetes  -     CBC Auto Differential; Future  -     Comprehensive Metabolic Panel; Future  -     Hemoglobin A1C; Future    PTSD (post-traumatic stress disorder)  -     Ambulatory referral to Psychology    CARMEN (generalized anxiety disorder)  -     Ambulatory referral to Psychology    Vitamin D deficiency  -     Vitamin D 25 Hydroxy; Future    Screening cholesterol level  -     Lipid Panel; Future    PCOS (polycystic ovarian syndrome)    [facility]  -     Ambulatory referral to Psychology    Acute stress reaction  -     Ambulatory referral to Psychology    Routine labs today.      Orders Placed This Encounter   Procedures    CBC Auto Differential     Standing Status:   Future     Number of Occurrences:   1     Standing Expiration Date:   6/15/2021    Comprehensive Metabolic Panel     Standing Status:   Future     Number of Occurrences:   1     Standing Expiration Date:   6/15/2021    Lipid Panel     Standing Status:   Future     Number of Occurrences:   1     Standing Expiration Date:   6/15/2021     Order Specific Question:   Is Patient Fasting?/# of Hours     Answer:   8+ hours    Vitamin D 25 Hydroxy     Standing Status:   Future     Number of Occurrences:   1     Standing Expiration Date:   6/15/2021    Hemoglobin A1C     Standing Status:   Future     Number of Occurrences:   1     Standing Expiration Date:   6/15/2021    Ambulatory referral to Psychology     Referral Priority:   Routine     Referral Type:   Psychiatric     Referral Reason:   Specialty Services Required     Referred to Provider:   Georgina Saul PSYD     Requested Specialty:   Psychology     Number of Visits Requested:   1     No orders of the defined types were placed in this encounter. Medications Discontinued During This Encounter   Medication Reason    metroNIDAZOLE (FLAGYL) 500 MG tablet Patient Choice    norethindrone (MICRONOR) 0.35 MG tablet Patient Choice    montelukast (SINGULAIR) 10 MG tablet Patient Choice    gabapentin (NEURONTIN) 300 MG capsule Patient Choice    ASPERCREME LIDOCAINE 4 % external patch Patient Choice    Misc Natural Products (GLUCOSAMINE CHOND COMPLEX/MSM) TABS Patient Choice     Return in about 8 weeks (around 8/10/2020) for follow up. Reviewed with the patient: current clinical status, medications, activities and diet. Side effects, adverse effects of the medication prescribed today, as well as treatment plan/ rationale and result expectations have been discussed with the patient who expresses understanding and desires to proceed. Close follow up to evaluate treatment results and for coordination of care. I have reviewed the patient's medical history in detail and updated the computerized patient record.     Leticia Villa PA-C

## 2020-07-09 ENCOUNTER — OFFICE VISIT (OUTPATIENT)
Dept: NEUROLOGY | Age: 35
End: 2020-07-09
Payer: COMMERCIAL

## 2020-07-09 VITALS
DIASTOLIC BLOOD PRESSURE: 73 MMHG | WEIGHT: 258 LBS | SYSTOLIC BLOOD PRESSURE: 117 MMHG | HEART RATE: 80 BPM | BODY MASS INDEX: 39.23 KG/M2

## 2020-07-09 PROBLEM — G56.03 BILATERAL CARPAL TUNNEL SYNDROME: Status: ACTIVE | Noted: 2020-07-09

## 2020-07-09 PROBLEM — G47.10 HYPERSOMNOLENCE: Status: ACTIVE | Noted: 2020-07-09

## 2020-07-09 PROCEDURE — G8427 DOCREV CUR MEDS BY ELIG CLIN: HCPCS | Performed by: PSYCHIATRY & NEUROLOGY

## 2020-07-09 PROCEDURE — 99214 OFFICE O/P EST MOD 30 MIN: CPT | Performed by: PSYCHIATRY & NEUROLOGY

## 2020-07-09 PROCEDURE — 1036F TOBACCO NON-USER: CPT | Performed by: PSYCHIATRY & NEUROLOGY

## 2020-07-09 PROCEDURE — G8417 CALC BMI ABV UP PARAM F/U: HCPCS | Performed by: PSYCHIATRY & NEUROLOGY

## 2020-07-09 ASSESSMENT — ENCOUNTER SYMPTOMS
NAUSEA: 0
SHORTNESS OF BREATH: 0
BACK PAIN: 0
VOMITING: 0
PHOTOPHOBIA: 0
COLOR CHANGE: 0
TROUBLE SWALLOWING: 0
CHOKING: 0
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
## 2020-07-10 ENCOUNTER — TELEPHONE (OUTPATIENT)
Dept: FAMILY MEDICINE CLINIC | Age: 35
End: 2020-07-10

## 2020-07-10 RX ORDER — METRONIDAZOLE 500 MG/1
500 TABLET ORAL 2 TIMES DAILY
Qty: 14 TABLET | Refills: 0 | Status: SHIPPED | OUTPATIENT
Start: 2020-07-10 | End: 2020-07-17

## 2020-07-10 NOTE — TELEPHONE ENCOUNTER
Patient called and said that she got medication from [facility] and from you with different directions. She would like to know if she could give one of the prescriptions to her partner.

## 2020-07-21 ENCOUNTER — TELEPHONE (OUTPATIENT)
Dept: FAMILY MEDICINE CLINIC | Age: 35
End: 2020-07-21

## 2020-07-21 NOTE — TELEPHONE ENCOUNTER
Patient called in today wondering what anitinflmatory that she can take because she is not allowed NSAID'S.

## 2020-08-25 ENCOUNTER — APPOINTMENT (OUTPATIENT)
Dept: CT IMAGING | Age: 35
End: 2020-08-25
Payer: COMMERCIAL

## 2020-08-25 ENCOUNTER — APPOINTMENT (OUTPATIENT)
Dept: GENERAL RADIOLOGY | Age: 35
End: 2020-08-25
Payer: COMMERCIAL

## 2020-08-25 ENCOUNTER — HOSPITAL ENCOUNTER (EMERGENCY)
Age: 35
Discharge: HOME | End: 2020-08-25
Attending: EMERGENCY MEDICINE
Payer: COMMERCIAL

## 2020-08-25 ENCOUNTER — TELEPHONE (OUTPATIENT)
Dept: FAMILY MEDICINE CLINIC | Age: 35
End: 2020-08-25

## 2020-08-25 VITALS
RESPIRATION RATE: 18 BRPM | TEMPERATURE: 97.9 F | HEIGHT: 68 IN | BODY MASS INDEX: 39.4 KG/M2 | DIASTOLIC BLOOD PRESSURE: 78 MMHG | SYSTOLIC BLOOD PRESSURE: 105 MMHG | WEIGHT: 260 LBS | OXYGEN SATURATION: 99 % | HEART RATE: 68 BPM

## 2020-08-25 LAB
ALBUMIN SERPL-MCNC: 4.1 G/DL (ref 3.5–4.6)
ALP BLD-CCNC: 118 U/L (ref 40–130)
ALT SERPL-CCNC: 17 U/L (ref 0–33)
AMPHETAMINE SCREEN, URINE: NORMAL
ANION GAP SERPL CALCULATED.3IONS-SCNC: 9 MEQ/L (ref 9–15)
AST SERPL-CCNC: 26 U/L (ref 0–35)
BARBITURATE SCREEN URINE: NORMAL
BASOPHILS ABSOLUTE: 0 K/UL (ref 0–0.2)
BASOPHILS RELATIVE PERCENT: 0.2 %
BENZODIAZEPINE SCREEN, URINE: NORMAL
BILIRUB SERPL-MCNC: 0.3 MG/DL (ref 0.2–0.7)
BILIRUBIN URINE: NEGATIVE
BLOOD, URINE: NEGATIVE
BUN BLDV-MCNC: 10 MG/DL (ref 6–20)
CALCIUM SERPL-MCNC: 9.4 MG/DL (ref 8.5–9.9)
CANNABINOID SCREEN URINE: NORMAL
CHLORIDE BLD-SCNC: 100 MEQ/L (ref 95–107)
CLARITY: CLEAR
CO2: 26 MEQ/L (ref 20–31)
COCAINE METABOLITE SCREEN URINE: NORMAL
COLOR: YELLOW
CREAT SERPL-MCNC: 1 MG/DL (ref 0.5–0.9)
EKG ATRIAL RATE: 80 BPM
EKG P AXIS: 4 DEGREES
EKG P-R INTERVAL: 136 MS
EKG Q-T INTERVAL: 390 MS
EKG QRS DURATION: 76 MS
EKG QTC CALCULATION (BAZETT): 449 MS
EKG R AXIS: 18 DEGREES
EKG T AXIS: 26 DEGREES
EKG VENTRICULAR RATE: 80 BPM
EOSINOPHILS ABSOLUTE: 0.1 K/UL (ref 0–0.7)
EOSINOPHILS RELATIVE PERCENT: 0.8 %
ETHANOL PERCENT: NORMAL G/DL
ETHANOL: <10 MG/DL (ref 0–0.08)
GFR AFRICAN AMERICAN: >60
GFR NON-AFRICAN AMERICAN: >60
GLOBULIN: 3.1 G/DL (ref 2.3–3.5)
GLUCOSE BLD-MCNC: 104 MG/DL (ref 70–99)
GLUCOSE URINE: NEGATIVE MG/DL
HCG, URINE, POC: NEGATIVE
HCT VFR BLD CALC: 40.7 % (ref 37–47)
HEMOGLOBIN: 13.9 G/DL (ref 12–16)
KETONES, URINE: NEGATIVE MG/DL
LEUKOCYTE ESTERASE, URINE: NEGATIVE
LYMPHOCYTES ABSOLUTE: 2.2 K/UL (ref 1–4.8)
LYMPHOCYTES RELATIVE PERCENT: 22.1 %
Lab: NORMAL
Lab: NORMAL
MAGNESIUM: 2.1 MG/DL (ref 1.7–2.4)
MCH RBC QN AUTO: 30.1 PG (ref 27–31.3)
MCHC RBC AUTO-ENTMCNC: 34 % (ref 33–37)
MCV RBC AUTO: 88.4 FL (ref 82–100)
METHADONE SCREEN, URINE: NORMAL
MONOCYTES ABSOLUTE: 0.4 K/UL (ref 0.2–0.8)
MONOCYTES RELATIVE PERCENT: 4.2 %
NEGATIVE QC PASS/FAIL: NORMAL
NEUTROPHILS ABSOLUTE: 7.3 K/UL (ref 1.4–6.5)
NEUTROPHILS RELATIVE PERCENT: 72.7 %
NITRITE, URINE: NEGATIVE
OPIATE SCREEN URINE: NORMAL
OXYCODONE URINE: NORMAL
PDW BLD-RTO: 14.4 % (ref 11.5–14.5)
PH UA: 8.5 (ref 5–9)
PHENCYCLIDINE SCREEN URINE: NORMAL
PLATELET # BLD: 269 K/UL (ref 130–400)
POSITIVE QC PASS/FAIL: NORMAL
POTASSIUM SERPL-SCNC: 4 MEQ/L (ref 3.4–4.9)
PROPOXYPHENE SCREEN: NORMAL
PROTEIN UA: NEGATIVE MG/DL
RBC # BLD: 4.61 M/UL (ref 4.2–5.4)
SODIUM BLD-SCNC: 135 MEQ/L (ref 135–144)
SPECIFIC GRAVITY UA: 1.02 (ref 1–1.03)
TOTAL PROTEIN: 7.2 G/DL (ref 6.3–8)
TROPONIN: <0.01 NG/ML (ref 0–0.01)
URINE REFLEX TO CULTURE: NORMAL
UROBILINOGEN, URINE: 0.2 E.U./DL
WBC # BLD: 10.1 K/UL (ref 4.8–10.8)

## 2020-08-25 PROCEDURE — 99284 EMERGENCY DEPT VISIT MOD MDM: CPT

## 2020-08-25 PROCEDURE — 83735 ASSAY OF MAGNESIUM: CPT

## 2020-08-25 PROCEDURE — 84484 ASSAY OF TROPONIN QUANT: CPT

## 2020-08-25 PROCEDURE — 81003 URINALYSIS AUTO W/O SCOPE: CPT

## 2020-08-25 PROCEDURE — 80307 DRUG TEST PRSMV CHEM ANLYZR: CPT

## 2020-08-25 PROCEDURE — 96375 TX/PRO/DX INJ NEW DRUG ADDON: CPT

## 2020-08-25 PROCEDURE — 6360000002 HC RX W HCPCS: Performed by: STUDENT IN AN ORGANIZED HEALTH CARE EDUCATION/TRAINING PROGRAM

## 2020-08-25 PROCEDURE — 2580000003 HC RX 258: Performed by: STUDENT IN AN ORGANIZED HEALTH CARE EDUCATION/TRAINING PROGRAM

## 2020-08-25 PROCEDURE — 85025 COMPLETE CBC W/AUTO DIFF WBC: CPT

## 2020-08-25 PROCEDURE — 80053 COMPREHEN METABOLIC PANEL: CPT

## 2020-08-25 PROCEDURE — 96374 THER/PROPH/DIAG INJ IV PUSH: CPT

## 2020-08-25 PROCEDURE — 70450 CT HEAD/BRAIN W/O DYE: CPT

## 2020-08-25 PROCEDURE — 36415 COLL VENOUS BLD VENIPUNCTURE: CPT

## 2020-08-25 PROCEDURE — 71045 X-RAY EXAM CHEST 1 VIEW: CPT

## 2020-08-25 PROCEDURE — 93010 ELECTROCARDIOGRAM REPORT: CPT | Performed by: INTERNAL MEDICINE

## 2020-08-25 PROCEDURE — 6370000000 HC RX 637 (ALT 250 FOR IP): Performed by: STUDENT IN AN ORGANIZED HEALTH CARE EDUCATION/TRAINING PROGRAM

## 2020-08-25 PROCEDURE — G0480 DRUG TEST DEF 1-7 CLASSES: HCPCS

## 2020-08-25 PROCEDURE — 93005 ELECTROCARDIOGRAM TRACING: CPT | Performed by: EMERGENCY MEDICINE

## 2020-08-25 RX ORDER — METOCLOPRAMIDE HYDROCHLORIDE 5 MG/ML
10 INJECTION INTRAMUSCULAR; INTRAVENOUS ONCE
Status: DISCONTINUED | OUTPATIENT
Start: 2020-08-25 | End: 2020-08-25 | Stop reason: HOSPADM

## 2020-08-25 RX ORDER — 0.9 % SODIUM CHLORIDE 0.9 %
1000 INTRAVENOUS SOLUTION INTRAVENOUS ONCE
Status: COMPLETED | OUTPATIENT
Start: 2020-08-25 | End: 2020-08-25

## 2020-08-25 RX ORDER — ACETAMINOPHEN 500 MG
1000 TABLET ORAL ONCE
Status: COMPLETED | OUTPATIENT
Start: 2020-08-25 | End: 2020-08-25

## 2020-08-25 RX ORDER — KETOROLAC TROMETHAMINE 30 MG/ML
30 INJECTION, SOLUTION INTRAMUSCULAR; INTRAVENOUS ONCE
Status: COMPLETED | OUTPATIENT
Start: 2020-08-25 | End: 2020-08-25

## 2020-08-25 RX ORDER — MECLIZINE HYDROCHLORIDE 25 MG/1
25 TABLET ORAL 3 TIMES DAILY PRN
Qty: 15 TABLET | Refills: 0 | Status: SHIPPED | OUTPATIENT
Start: 2020-08-25 | End: 2020-08-30

## 2020-08-25 RX ORDER — DIPHENHYDRAMINE HYDROCHLORIDE 50 MG/ML
25 INJECTION INTRAMUSCULAR; INTRAVENOUS ONCE
Status: COMPLETED | OUTPATIENT
Start: 2020-08-25 | End: 2020-08-25

## 2020-08-25 RX ORDER — METOCLOPRAMIDE 10 MG/1
10 TABLET ORAL 4 TIMES DAILY PRN
Qty: 20 TABLET | Refills: 0 | Status: SHIPPED | OUTPATIENT
Start: 2020-08-25 | End: 2020-09-28 | Stop reason: ALTCHOICE

## 2020-08-25 RX ADMIN — ACETAMINOPHEN 1000 MG: 500 TABLET ORAL at 14:18

## 2020-08-25 RX ADMIN — DIPHENHYDRAMINE HYDROCHLORIDE 25 MG: 50 INJECTION, SOLUTION INTRAMUSCULAR; INTRAVENOUS at 14:19

## 2020-08-25 RX ADMIN — KETOROLAC TROMETHAMINE 30 MG: 30 INJECTION, SOLUTION INTRAMUSCULAR at 14:19

## 2020-08-25 RX ADMIN — SODIUM CHLORIDE 1000 ML: 9 INJECTION, SOLUTION INTRAVENOUS at 14:18

## 2020-08-25 ASSESSMENT — ENCOUNTER SYMPTOMS
BLOOD IN STOOL: 0
CONSTIPATION: 0
NAUSEA: 1
WHEEZING: 0
ABDOMINAL DISTENTION: 0
DIARRHEA: 0
COUGH: 0
PHOTOPHOBIA: 0
ANAL BLEEDING: 0
VOMITING: 0
ABDOMINAL PAIN: 0
CHEST TIGHTNESS: 1
SHORTNESS OF BREATH: 0

## 2020-08-25 ASSESSMENT — PAIN DESCRIPTION - PAIN TYPE
TYPE: ACUTE PAIN
TYPE: ACUTE PAIN

## 2020-08-25 ASSESSMENT — PAIN SCALES - GENERAL
PAINLEVEL_OUTOF10: 10
PAINLEVEL_OUTOF10: 10
PAINLEVEL_OUTOF10: 7
PAINLEVEL_OUTOF10: 2
PAINLEVEL_OUTOF10: 6

## 2020-08-25 ASSESSMENT — VISUAL ACUITY: OU: 1

## 2020-08-25 ASSESSMENT — PAIN DESCRIPTION - FREQUENCY: FREQUENCY: CONTINUOUS

## 2020-08-25 ASSESSMENT — PAIN DESCRIPTION - LOCATION
LOCATION: HEAD;CHEST
LOCATION: HEAD

## 2020-08-25 NOTE — ED PROVIDER NOTES
[facility] ED  EMERGENCY DEPARTMENT ENCOUNTER      Pt Name: Nae Jose  MRN: [id number]  [place] 1985  Date of evaluation: 8/25/2020  Provider: Parisa Mckeon Dr       Chief Complaint   Patient presents with    Dizziness     x1 hour; pt also reports migraine x6 days and chest tightness         HISTORY OF PRESENT ILLNESS   (Location/Symptom, Timing/Onset, Context/Setting, Quality, Duration, Modifying Factors, Severity)  Note limiting factors. Nae Jose is a 29 y.o. female who per chart review has pmhx of OA, PCOS, MS, anxiety and depression, migraines, PTSD, TIIDM presents to the emergency department with gradual onset, constant, moderate, headache worse in the R sided temporal region and nausea x 6 days. Pt states hx of migraines and current sx are similar. + for intermittent lightheadedness that began pta. Pt states she felt faint at the gym earlier, no syncope. She tried excedrin migraine pta without relief. Pt sees Dr. Wu Vaca for MS and migraines, does not take any ppx medications for her migraines. She also states she began noticing substernal non radiating chest tightness that began when she arrived to the ED. Not associated with nausea, vomiting, diaphoresis and non exertional. No history of major cardiac events. She does have a history of anxiety and states her current chest tightness feels similar to anxiety sx. She did not drink a lot of water today. She denies numbness, weakness, tingling, visual changes, speech difficulty, balance difficulty, neck pain or stiffness, fever, chills, abd pain, vomiting, diarrhea, sob, palpitations, urinary sx, hemoptysis, leg swelling. LMP 8/21. No hx of blood clots. No recent surgery, hospitalizations, periods of immobilization. HPI    Nursing Notes were reviewed. REVIEW OF SYSTEMS    (2-9 systems for level 4, 10 or more for level 5)     Review of Systems   Constitutional: Negative for chills and fever.    HENT: Negative for congestion. Eyes: Negative for photophobia. Respiratory: Positive for chest tightness. Negative for cough, shortness of breath and wheezing. Cardiovascular: Negative for chest pain and palpitations. Gastrointestinal: Positive for nausea. Negative for abdominal distention, abdominal pain, anal bleeding, blood in stool, constipation, diarrhea and vomiting. Genitourinary: Negative for dysuria, frequency and hematuria. Musculoskeletal: Negative for myalgias. Allergic/Immunologic: Negative for immunocompromised state. Neurological: Positive for light-headedness and headaches. Negative for dizziness, tremors, seizures, syncope, facial asymmetry, speech difficulty, weakness and numbness. All other systems reviewed and are negative. Except as noted above the remainder of the review of systems was reviewed and negative. PAST MEDICAL HISTORY     Past Medical History:   Diagnosis Date    Anxiety and depression     Asthma     Atypical chest pain 5/31/2019    Chronic back pain     Diabetes mellitus ([facility] [address]     Headache     HIV exposure 1/6/2020    Irregular heart beat     Multiple sclerosis ([facility] [address]     Osteoarthritis          SURGICAL HISTORY       Past Surgical History:   Procedure Laterality Date    CHOLECYSTECTOMY      UPPER GASTROINTESTINAL ENDOSCOPY N/A 8/13/2019    EGD ESOPHAGOGASTRODUODENOSCOPY performed by Leticia Zhao MD at [address]       Previous Medications    ALBUTEROL SULFATE  (90 BASE) MCG/ACT INHALER    Inhale 2 puffs into the lungs 4 times daily as needed for Wheezing    FLUTICASONE (FLONASE) 50 MCG/ACT NASAL SPRAY    1 spray by Each Nostril route 2 times daily as needed for Rhinitis or Allergies    METFORMIN (GLUCOPHAGE-XR) 500 MG EXTENDED RELEASE TABLET    Take 1 tablet by mouth every other day FOR PREDIABETES.     PANTOPRAZOLE (PROTONIX) 40 MG TABLET    TAKE 1 TABLET BY MOUTH DAILY WITH SUPPER FOR HEARTBURN SERTRALINE (ZOLOFT) 25 MG TABLET    Take 1 tablet by mouth daily FOR ANXIETY/PANIC ATTACKS. VITAMIN D (ERGOCALCIFEROL) 1.25 MG (21402 UT) CAPS CAPSULE    FOR LOW VIT D. TAKE 1 CAPSULE BY MOUTH 1 TIME A WEEK       ALLERGIES     Nsaids; Pcn [penicillins];  Phentermine; and Topamax [topiramate]    FAMILY HISTORY       Family History   Problem Relation Age of Onset   [facility] Cancer Mother     Arthritis Mother     Diabetes Father     Heart Disease Father     Stroke Father     High Blood Pressure Father           SOCIAL HISTORY       Social History     Socioeconomic History    Marital status: Single     Spouse name: None    Number of children: None    Years of education: None    Highest education level: None   Occupational History    None   Social Needs    Financial resource strain: None    Food insecurity     Worry: None     Inability: None    Transportation needs     Medical: None     Non-medical: None   Tobacco Use    Smoking status: Former Smoker     Packs/day: 0.50     Years: 10.00     Pack years: 5.00     Types: Cigarettes     Last attempt to quit: 1/27/2017     Years since quitting: 3.5    Smokeless tobacco: Never Used   Substance and Sexual Activity    Alcohol use: Not Currently     Comment: socially    Drug use: No    Sexual activity: Yes     Partners: Male   Lifestyle    Physical activity     Days per week: None     Minutes per session: None    Stress: None   Relationships    Social connections     Talks on phone: None     Gets together: None     Attends Hoahaoism service: None     Active member of club or organization: None     Attends meetings of clubs or organizations: None     Relationship status: None    Intimate partner violence     Fear of current or ex partner: None     Emotionally abused: None     Physically abused: None     Forced sexual activity: None   Other Topics Concern    None   Social History Narrative    None       SCREENINGS                        PHYSICAL EXAM    (up to 7 for level 4, 8 or more for level 5)     ED Triage Vitals [08/25/20 1312]   BP Temp Temp Source Pulse Resp SpO2 Height Weight   107/76 97.9 °F (36.6 °C) Temporal 79 17 99 % 5' 8\" (1.727 m) 260 lb (117.9 kg)       Physical Exam  Constitutional:       General: She is not in acute distress. Appearance: She is well-developed. She is not toxic-appearing. HENT:      Head: Normocephalic and atraumatic. No raccoon eyes, Mcdaniel's sign, abrasion, contusion, masses, right periorbital erythema or left periorbital erythema. Right Ear: Tympanic membrane, ear canal and external ear normal.      Left Ear: Tympanic membrane, ear canal and external ear normal.      Nose: Nose normal.      Mouth/Throat:      Mouth: Mucous membranes are moist.   Eyes:      General: Lids are normal. Vision grossly intact. Gaze aligned appropriately. No visual field deficit. Extraocular Movements: Extraocular movements intact. Conjunctiva/sclera: Conjunctivae normal.      Pupils: Pupils are equal, round, and reactive to light. Neck:      Musculoskeletal: Full passive range of motion without pain and normal range of motion. Normal range of motion. No edema, erythema, neck rigidity, crepitus, injury, pain with movement, torticollis, spinous process tenderness or muscular tenderness. Comments: No nuchal rigidity   Cardiovascular:      Rate and Rhythm: Normal rate and regular rhythm. Pulses: Normal pulses. Heart sounds: Normal heart sounds. No murmur. No friction rub. No gallop. Pulmonary:      Effort: Pulmonary effort is normal.      Breath sounds: Normal breath sounds. No decreased breath sounds, wheezing, rhonchi or rales. Abdominal:      General: Bowel sounds are normal. There is no distension. Palpations: Abdomen is soft. Tenderness: There is no abdominal tenderness. Musculoskeletal:         General: No swelling. Right lower leg: No edema. Left lower leg: No edema.    Skin:     General: Skin is warm and dry. Neurological:      General: No focal deficit present. Mental Status: She is alert and oriented to person, place, and time. Mental status is at baseline. GCS: GCS eye subscore is 4. GCS verbal subscore is 5. GCS motor subscore is 6. Cranial Nerves: Cranial nerves are intact. No cranial nerve deficit. Sensory: Sensation is intact. No sensory deficit. Motor: Motor function is intact. Coordination: Coordination is intact. Coordination normal.      Gait: Gait is intact. Gait normal.      Deep Tendon Reflexes: Reflexes normal.      Comments: No focal deficit           DIAGNOSTIC RESULTS     EKG: All EKG's are interpreted by the Emergency Department Physician who either signs or Co-signs this chart in the absence of a cardiologist.    EKG shows NSR with HR 80, normal axis, normal intervals, no ST changes. No change compared to April 29 2019. RADIOLOGY:   Non-plain film images such as CT, Ultrasound and MRI are read by the radiologist. Plain radiographic images are visualized and preliminarily interpreted by the emergency physician with the below findings:        Interpretation per the Radiologist below, if available at the time of this note:    CT Head WO Contrast   Final Result   No no acute hemorrhage or extra-axial fluid collection is seen. .      All CT scans at this facility use dose modulation, iterative reconstruction, and/or weight based dosing when appropriate to reduce radiation dose to as low as reasonably achievable. PORTABLE CHEST      COMPARISON: December 30, 2019. HISTORY: migraine, syncope       TECHNIQUE: AP view         FINDINGS:    The lungs contain no focal infiltrate, pleural effusion or consolidation. The cardiac silhouette is within normal limits. The osseous structures are grossly intact.  .              IMPRESSION: No evidence of acute cardiopulmonary process          XR CHEST PORTABLE   Final Result   No no acute hemorrhage or extra-axial fluid collection is seen. .      All CT scans at this facility use dose modulation, iterative reconstruction, and/or weight based dosing when appropriate to reduce radiation dose to as low as reasonably achievable. PORTABLE CHEST      COMPARISON: December 30, 2019. HISTORY: migraine, syncope       TECHNIQUE: AP view         FINDINGS:    The lungs contain no focal infiltrate, pleural effusion or consolidation. The cardiac silhouette is within normal limits. The osseous structures are grossly intact. .              IMPRESSION: No evidence of acute cardiopulmonary process                ED BEDSIDE ULTRASOUND:   Performed by ED Physician - none    LABS:  Labs Reviewed   COMPREHENSIVE METABOLIC PANEL - Abnormal; Notable for the following components:       Result Value    Glucose 104 (*)     CREATININE 1.00 (*)     All other components within normal limits   CBC WITH AUTO DIFFERENTIAL - Abnormal; Notable for the following components:    Neutrophils Absolute 7.3 (*)     All other components within normal limits   TROPONIN   MAGNESIUM   URINE RT REFLEX TO CULTURE   ETHANOL   URINE DRUG SCREEN   POC PREGNANCY UR-QUAL       All other labs were within normal range or not returned as of this dictation. EMERGENCY DEPARTMENT COURSE and DIFFERENTIAL DIAGNOSIS/MDM:   Vitals:    Vitals:    08/25/20 1312 08/25/20 1543 08/25/20 1655   BP: 107/76 102/77 105/78   Pulse: 79 65 68   Resp: 17 18 18   Temp: 97.9 °F (36.6 °C)     TempSrc: Temporal     SpO2: 99% 99% 99%   Weight: 260 lb (117.9 kg)     Height: 5' 8\" (1.727 m)         MDM     Pt is a 29year old F who presents to the ED with headache, nausea, lightheadedness and chest tightness. She is afebrile and hemodynamically stable. She was given 1 L IV NS, IV toradol, PO tylenol, IV reglan, IV benadryl in the ED. EKG shows NSR with HR 80, normal axis, normal intervals, no ST changes. No change compared to April 29 2019.   CXR and CT head negative for acute abnormality. Labs reviewed, unremarkable. Pt reassessed with complete resolution of symptoms. Neurologic exam repeated, negative for acute abnormality. Suspect lightheadedness and nausea 2/2 to migraine. Suspect chest tightness 2/2 anxiety. PERC negative. Heart score is 1. Less concern for acute cardiopulmonary cause at this time. Pt stable for discharge. Given scripts for reglan and meclizine. She will follow with Dr. Flex Bah in 1-2 days. Given cardiology referral as well. Pt to return to the ED for worsening sx. Given chest pain and HA warning signs. Pt will be taken home by her mother. Pt agrees to and understands plan, all questions answered. REASSESSMENT          CRITICAL CARE TIME   Total Critical Care time was 0 minutes, excluding separately reportable procedures. There was a high probability of clinically significant/life threatening deterioration in the patient's condition which required my urgent intervention. CONSULTS:  None    PROCEDURES:  Unless otherwise noted below, none     Procedures        FINAL IMPRESSION      1. Nonintractable headache, unspecified chronicity pattern, unspecified headache type    2. Chest pain, unspecified type    3.  Near syncope          DISPOSITION/PLAN   DISPOSITION Discharge - Pending Orders Complete 08/25/2020 03:39:08 PM      PATIENT REFERRED TO:  Priscila Hartman PA-C  [address] [facility] [address]  [phone]    Schedule an appointment as soon as possible for a visit in 1 day      Maedleine Polo MD  [address] [facility]  [address] [address]  [phone]    Schedule an appointment as soon as possible for a visit in 1 day      [facility]) ED  [address] [facility] [address] [phone]  Go to   As needed, If symptoms worsen    Melany Pastor [address] [place] [address]  [phone]    Schedule an appointment as soon as possible for a visit in 1 day        DISCHARGE MEDICATIONS:  New Prescriptions

## 2020-08-25 NOTE — ED NOTES
IV discontinued. Reviewed discharge instructions. Discussed medications and follow up appointments. Patient verbalized understanding. Patient ambulated to door.      Charley Greer RN  08/25/20 0952

## 2020-08-26 ENCOUNTER — CARE COORDINATION (OUTPATIENT)
Dept: CARE COORDINATION | Age: 35
End: 2020-08-26

## 2020-08-26 NOTE — CARE COORDINATION
factors. Call to bijan due to recent er visit. She reports continued headache . No further chest discomfort. She has appt with pcp scheduled and will contact neurology and cardiology to schedule.  No other symptoms at this time

## 2020-08-31 ENCOUNTER — VIRTUAL VISIT (OUTPATIENT)
Dept: FAMILY MEDICINE CLINIC | Age: 35
End: 2020-08-31
Payer: COMMERCIAL

## 2020-08-31 PROBLEM — M25.562 ACUTE PAIN OF LEFT KNEE: Status: ACTIVE | Noted: 2020-08-31

## 2020-08-31 PROCEDURE — G8428 CUR MEDS NOT DOCUMENT: HCPCS | Performed by: PHYSICIAN ASSISTANT

## 2020-08-31 PROCEDURE — 99214 OFFICE O/P EST MOD 30 MIN: CPT | Performed by: PHYSICIAN ASSISTANT

## 2020-08-31 RX ORDER — TIZANIDINE 2 MG/1
2 TABLET ORAL NIGHTLY PRN
Qty: 10 TABLET | Refills: 0 | Status: SHIPPED | OUTPATIENT
Start: 2020-08-31 | End: 2021-01-15 | Stop reason: SDUPTHER

## 2020-08-31 RX ORDER — SUMATRIPTAN 50 MG/1
50 TABLET, FILM COATED ORAL
Qty: 9 TABLET | Refills: 3 | Status: SHIPPED | OUTPATIENT
Start: 2020-08-31 | End: 2020-09-28 | Stop reason: ALTCHOICE

## 2020-08-31 ASSESSMENT — ENCOUNTER SYMPTOMS
CHEST TIGHTNESS: 0
COUGH: 0
BACK PAIN: 1
SHORTNESS OF BREATH: 0

## 2020-08-31 NOTE — PROGRESS NOTES
and neck stiffness. Neurological: Positive for headaches. Negative for dizziness, facial asymmetry, light-headedness and numbness. Psychiatric/Behavioral: Negative for dysphoric mood. The patient is not nervous/anxious. Prior to Visit Medications    Medication Sig Taking? Authorizing Provider   SUMAtriptan (IMITREX) 50 MG tablet Take 1 tablet by mouth once as needed for Migraine (May repeat a second dose in 2 hours if needed.) Yes Leticia Villa PA-C   tiZANidine (ZANAFLEX) 2 MG tablet Take 1 tablet by mouth nightly as needed (neck pain/spasm, nighttime only.) Yes Leticia Villa PA-C   diclofenac (VOLTAREN) 50 MG EC tablet Take 1 tablet by mouth 2 times daily Yes Leticia Villa PA-C   metoclopramide (REGLAN) 10 MG tablet Take 1 tablet by mouth 4 times daily as needed (headache)  Pooja Dobbins PA-C   pantoprazole (PROTONIX) 40 MG tablet TAKE 1 TABLET BY MOUTH DAILY WITH SUPPER FOR HEARTBURN  Leticia Villa PA-C   metFORMIN (GLUCOPHAGE-XR) 500 MG extended release tablet Take 1 tablet by mouth every other day FOR PREDIABETES. Leticia Villa PA-C   vitamin D (ERGOCALCIFEROL) 1.25 MG (58399 UT) CAPS capsule FOR LOW VIT D. TAKE 1 CAPSULE BY MOUTH 1 TIME A WEEK  Leticia Villa PA-C   sertraline (ZOLOFT) 25 MG tablet Take 1 tablet by mouth daily FOR ANXIETY/PANIC ATTACKS. Leticia Villa PA-C   fluticasone (FLONASE) 50 MCG/ACT nasal spray 1 spray by Each Nostril route 2 times daily as needed for Rhinitis or Allergies  Leticia Villa PA-C   albuterol sulfate  (90 Base) MCG/ACT inhaler Inhale 2 puffs into the lungs 4 times daily as needed for Wheezing  Leticia Villa PA-C       Social History     Tobacco Use    Smoking status: Former Smoker     Packs/day: 0.50     Years: 10.00     Pack years: 5.00     Types: Cigarettes     Last attempt to quit: 1/27/2017     Years since quitting: 3.5    Smokeless tobacco: Never Used   Substance Use Topics    Alcohol use: Not Currently     Comment: socially    Drug use:  No Allergies   Allergen Reactions    Nsaids Other (See Comments)     Waiting for bariatric procedure    Pcn [Penicillins] Hives    Phentermine      Anxiety with hospitalization    Topamax [Topiramate] Other (See Comments)     DIZZY   ,   Past Medical History:   Diagnosis Date    Anxiety and depression     Asthma     Atypical chest pain 5/31/2019    Chronic back pain     [facility] mellitus [facility]     Headache     HIV exposure 1/6/2020    Irregular heart beat     Multiple sclerosis ([facility]     Osteoarthritis    ,   Past Surgical History:   Procedure Laterality Date    CHOLECYSTECTOMY      UPPER GASTROINTESTINAL ENDOSCOPY N/A 8/13/2019    EGD ESOPHAGOGASTRODUODENOSCOPY performed by Royal Camarena MD at [facility]   ,   Social History     Tobacco Use    Smoking status: Former Smoker     Packs/day: 0.50     Years: 10.00     Pack years: 5.00     Types: Cigarettes     Last attempt to quit: 1/27/2017     Years since quitting: 3.5    Smokeless tobacco: Never Used   Substance Use Topics    Alcohol use: Not Currently     Comment: socially    Drug use: No   ,   Family History   Problem Relation Age of Onset    Cancer Mother     Arthritis Mother     Diabetes Father     Heart Disease Father     Stroke Father     High Blood Pressure Father    ,   Immunization History   Administered Date(s) Administered    Influenza A ([id number]) Vaccine PF IM 02/12/2010   ,   Health Maintenance   Topic Date Due    Varicella vaccine (1 of 2 - 2-dose childhood series) 09/08/1986    Pneumococcal 0-64 years Vaccine (1 of 1 - PPSV23) 09/08/1991    DTaP/Tdap/Td vaccine (1 - Tdap) 05/14/2021 (Originally 9/8/2004)    Flu vaccine (1) 09/01/2020    Annual Wellness Visit (AWV)  02/18/2021    A1C test (Diabetic or Prediabetic)  06/15/2021    Cervical cancer screen  11/18/2024    HIV screen  Completed    Hepatitis A vaccine  Aged Out    Hepatitis B vaccine  Aged Out    Hib vaccine  Aged Out    Meningococcal (ACWY) vaccine Aged Out     PHYSICAL EXAMINATION:  [ INSTRUCTIONS:  \"[x]\" Indicates a positive item  \"[]\" Indicates a negative item  -- DELETE ALL ITEMS NOT EXAMINED]  [x] Alert  [x] Oriented to person/place/time    [x] No apparent distress  [] Toxic appearing    [] Face flushed appearing [] Sclera clear  [] Lips are cyanotic      [] Breathing appears normal  [] Appears tachypneic      [x] Rash on visible skin    [] Cranial Nerves II-XII grossly intact    [] Motor grossly intact in visible upper extremities    [] Motor grossly intact in visible lower extremities    [x] Normal Mood  [] Anxious appearing    [] Depressed appearing  [] Confused appearing      [] Poor short term memory  [] Poor long term memory    [] OTHER:      Due to this being a TeleHealth encounter, evaluation of the following organ systems is limited: Vitals/Constitutional/EENT/Resp/CV/GI//MS/Neuro/Skin/Heme-Lymph-Imm. ASSESSMENT/PLAN:  1. Intractable chronic migraine without aura and without status migrainosus    - SUMAtriptan (IMITREX) 50 MG tablet; Take 1 tablet by mouth once as needed for Migraine (May repeat a second dose in 2 hours if needed.)  Dispense: 9 tablet; Refill: 3    2. Acute pain of left knee    - diclofenac (VOLTAREN) 50 MG EC tablet; Take 1 tablet by mouth 2 times daily  Dispense: 60 tablet; Refill: 3    3. Muscle spasms of neck    - tiZANidine (ZANAFLEX) 2 MG tablet; Take 1 tablet by mouth nightly as needed (neck pain/spasm, nighttime only.)  Dispense: 10 tablet; Refill: 0  - diclofenac (VOLTAREN) 50 MG EC tablet; Take 1 tablet by mouth 2 times daily  Dispense: 60 tablet; Refill: 3    Discussed proper stretches with patient prior to intense exercise. Discussed medication usage. Contact office with any questions or concerns. Return in about 4 weeks (around 9/28/2020) for follow up in office. An  electronic signature was used to authenticate this note.     --Sera Mathew PA-C on 8/31/2020 at 8:48 AM        Pursuant to the emergency declaration under the [facility], [facility] waiver authority and the [employer] and [employer] Act, this Virtual  Visit was conducted, with patient's consent, to reduce the patient's risk of exposure to COVID-19 and provide continuity of care for an established patient. Services were provided through a video synchronous discussion virtually to substitute for in-person clinic visit.

## 2020-09-09 ENCOUNTER — CARE COORDINATION (OUTPATIENT)
Dept: CARE COORDINATION | Age: 35
End: 2020-09-09

## 2020-09-09 NOTE — CARE COORDINATION
You Patient resolved from the Care Transitions episode on 9/9/20  Discussed COVID-19 related testing which was not done at this time. Test results were not done. Patient informed of results, if available? No testing    Patient/family has been provided the following resources and education related to COVID-19:                         Signs, symptoms and red flags related to COVID-19            CDC exposure and quarantine guidelines            [employer] exposure contact - [phone]            Contact for their local Department of Health                 Patient currently reports that the following symptoms have improved:  no new/worsening symptoms     No further outreach scheduled with this CTN/ACM. Episode of Care resolved. Patient has this CTN/ACM contact information if future needs arise. Call to bijan to resolve covid episode. She reports continued intermittent headache and has followed up with pcp. She reports no other symptoms.

## 2020-09-28 ENCOUNTER — OFFICE VISIT (OUTPATIENT)
Dept: FAMILY MEDICINE CLINIC | Age: 35
End: 2020-09-28
Payer: COMMERCIAL

## 2020-09-28 VITALS
OXYGEN SATURATION: 100 % | SYSTOLIC BLOOD PRESSURE: 110 MMHG | TEMPERATURE: 98.1 F | HEIGHT: 68 IN | DIASTOLIC BLOOD PRESSURE: 72 MMHG | BODY MASS INDEX: 39.4 KG/M2 | RESPIRATION RATE: 16 BRPM | HEART RATE: 79 BPM | WEIGHT: 260 LBS

## 2020-09-28 PROBLEM — L92.9 GRANULOMA, SKIN: Status: ACTIVE | Noted: 2020-09-28

## 2020-09-28 PROBLEM — M25.562 ACUTE PAIN OF LEFT KNEE: Status: RESOLVED | Noted: 2020-08-31 | Resolved: 2020-09-28

## 2020-09-28 PROBLEM — M79.605 LEFT LEG PAIN: Status: RESOLVED | Noted: 2019-04-02 | Resolved: 2020-09-28

## 2020-09-28 PROCEDURE — 99214 OFFICE O/P EST MOD 30 MIN: CPT | Performed by: PHYSICIAN ASSISTANT

## 2020-09-28 PROCEDURE — G8428 CUR MEDS NOT DOCUMENT: HCPCS | Performed by: PHYSICIAN ASSISTANT

## 2020-09-28 PROCEDURE — 1036F TOBACCO NON-USER: CPT | Performed by: PHYSICIAN ASSISTANT

## 2020-09-28 PROCEDURE — G8417 CALC BMI ABV UP PARAM F/U: HCPCS | Performed by: PHYSICIAN ASSISTANT

## 2020-09-28 ASSESSMENT — ENCOUNTER SYMPTOMS
COLOR CHANGE: 1
COUGH: 0
CHEST TIGHTNESS: 0
ROS SKIN COMMENTS: SKIN LESION
SHORTNESS OF BREATH: 0
ABDOMINAL DISTENTION: 0
WHEEZING: 0
ABDOMINAL PAIN: 0

## 2020-09-28 NOTE — PROGRESS NOTES
([facility]     Headache     HIV exposure 1/6/2020    Irregular heart beat     Multiple sclerosis ([facility]     Osteoarthritis      Past Surgical History:   Procedure Laterality Date    CHOLECYSTECTOMY      UPPER GASTROINTESTINAL ENDOSCOPY N/A 8/13/2019    EGD ESOPHAGOGASTRODUODENOSCOPY performed by Josh Alvarez MD at [facility] [address] [place] [address] Marital status: Single     Spouse name: Not on file    Number of children: Not on file    Years of education: Not on file    Highest education level: Not on file   Occupational History    Not on file   Social Needs    Financial resource strain: Not on file    Food insecurity     Worry: Not on file     Inability: Not on file    Transportation needs     Medical: Not on file     Non-medical: Not on file   Tobacco Use    Smoking status: Former Smoker     Packs/day: 0.50     Years: 10.00     Pack years: 5.00     Types: Cigarettes     Last attempt to quit: 1/27/2017     Years since quitting: 3.6    Smokeless tobacco: Never Used   Substance and Sexual Activity    Alcohol use: Not Currently     Comment: socially    Drug use: No    Sexual activity: Yes     Partners: Male   Lifestyle    Physical activity     Days per week: Not on file     Minutes per session: Not on file    Stress: Not on file   Relationships    Social connections     Talks on phone: Not on file     Gets together: Not on file     Attends Uatsdin service: Not on file     Active member of club or organization: Not on file     Attends meetings of clubs or organizations: Not on file     Relationship status: Not on file    Intimate partner violence     Fear of current or ex partner: Not on file     Emotionally abused: Not on file     Physically abused: Not on file     Forced sexual activity: Not on file   Other Topics Concern    Not on file   Social History Narrative    Not on file     Family History   Problem Relation Age of Onset    Cancer Mother     Arthritis Mother     Diabetes Father     Heart Disease Father     Stroke Father     High Blood Pressure Father      Allergies   Allergen Reactions    Nsaids Other (See Comments)     Waiting for bariatric procedure    Pcn [Penicillins] Hives    Phentermine      Anxiety with hospitalization    Topamax [Topiramate] Other (See Comments)     DIZZY     Current Outpatient Medications   Medication Sig Dispense Refill    Ubrogepant 50 MG TABS Migraine, acute: Oral: Initial: 50 mg po as a single dose; may repeat once based on response and tolerability after =2 hours 9 tablet 2    tiZANidine (ZANAFLEX) 2 MG tablet Take 1 tablet by mouth nightly as needed (neck pain/spasm, nighttime only. ) 10 tablet 0    diclofenac (VOLTAREN) 50 MG EC tablet Take 1 tablet by mouth 2 times daily 60 tablet 3    pantoprazole (PROTONIX) 40 MG tablet TAKE 1 TABLET BY MOUTH DAILY WITH SUPPER FOR HEARTBURN 30 tablet 0    metFORMIN (GLUCOPHAGE-XR) 500 MG extended release tablet Take 1 tablet by mouth every other day FOR PREDIABETES. 90 tablet 2    vitamin D (ERGOCALCIFEROL) 1.25 MG (71237 UT) CAPS capsule FOR LOW VIT D. TAKE 1 CAPSULE BY MOUTH 1 TIME A WEEK 12 capsule 2    sertraline (ZOLOFT) 25 MG tablet Take 1 tablet by mouth daily FOR ANXIETY/PANIC ATTACKS. 30 tablet 5    fluticasone (FLONASE) 50 MCG/ACT nasal spray 1 spray by Each Nostril route 2 times daily as needed for Rhinitis or Allergies 2 Bottle 1    albuterol sulfate  (90 Base) MCG/ACT inhaler Inhale 2 puffs into the lungs 4 times daily as needed for Wheezing 1 Inhaler 5     No current facility-administered medications for this visit. PMH, Surgical Hx, Family Hx, and Social Hx reviewed and updated. Health Maintenance reviewed.     Objective  Vitals:    09/28/20 1353   BP: 110/72   Site: Left Upper Arm   Position: Sitting   Cuff Size: Large Adult   Pulse: 79   Resp: 16   Temp: 98.1 °F (36.7 °C)   TempSrc: Temporal   SpO2: 100%   Weight: 260 lb (117.9 kg)   Height: 5' 8\" keshia in this time. Trial ubrogepant. Orders:  -     Ubrogepant 50 MG TABS; Migraine, acute: Oral: Initial: 50 mg po as a single dose; may repeat once based on response and tolerability after =2 hours    BMI 39.0-39.9,adult  Comments:  Continues with [facility]. Granuloma, skin    Follow up with me in November, 2020. Contact office with any questions or concerns. Orders Placed This Encounter   Medications    Ubrogepant 50 MG TABS     Sig: Migraine, acute: Oral: Initial: 50 mg po as a single dose; may repeat once based on response and tolerability after =2 hours     Dispense:  9 tablet     Refill:  2     Medications Discontinued During This Encounter   Medication Reason    SUMAtriptan (IMITREX) 50 MG tablet Therapy completed    metoclopramide (REGLAN) 10 MG tablet Therapy completed     Return in about 6 weeks (around 11/12/2020) for follow up in office. Reviewed with the patient: current clinical status, medications, activities and diet. Side effects, adverse effects of the medication prescribed today, as well as treatment plan/ rationale and result expectations have been discussed with the patient who expresses understanding and desires to proceed. Close follow up to evaluate treatment results and for coordination of care. I have reviewed the patient's medical history in detail and updated the computerized patient record.     Leticia Villa PA-C

## 2020-10-06 ENCOUNTER — PATIENT MESSAGE (OUTPATIENT)
Dept: FAMILY MEDICINE CLINIC | Age: 35
End: 2020-10-06

## 2020-10-06 RX ORDER — SULFAMETHOXAZOLE AND TRIMETHOPRIM 800; 160 MG/1; MG/1
1 TABLET ORAL 2 TIMES DAILY
Qty: 20 TABLET | Refills: 0 | Status: SHIPPED | OUTPATIENT
Start: 2020-10-06 | End: 2020-10-16

## 2020-11-05 ENCOUNTER — OFFICE VISIT (OUTPATIENT)
Dept: FAMILY MEDICINE CLINIC | Age: 35
End: 2020-11-05
Payer: COMMERCIAL

## 2020-11-05 VITALS
BODY MASS INDEX: 39.62 KG/M2 | OXYGEN SATURATION: 100 % | TEMPERATURE: 96.5 F | HEIGHT: 68 IN | RESPIRATION RATE: 16 BRPM | DIASTOLIC BLOOD PRESSURE: 68 MMHG | HEART RATE: 76 BPM | SYSTOLIC BLOOD PRESSURE: 106 MMHG | WEIGHT: 261.4 LBS

## 2020-11-05 DIAGNOSIS — R30.0 DYSURIA: ICD-10-CM

## 2020-11-05 PROBLEM — G43.019 INTRACTABLE MIGRAINE WITHOUT AURA AND WITHOUT STATUS MIGRAINOSUS: Status: ACTIVE | Noted: 2020-01-11

## 2020-11-05 PROBLEM — B96.89 BV (BACTERIAL VAGINOSIS): Status: ACTIVE | Noted: 2020-11-05

## 2020-11-05 PROBLEM — N76.0 BV (BACTERIAL VAGINOSIS): Status: ACTIVE | Noted: 2020-11-05

## 2020-11-05 PROBLEM — L92.9 GRANULOMA, SKIN: Status: RESOLVED | Noted: 2020-09-28 | Resolved: 2020-11-05

## 2020-11-05 LAB
BILIRUBIN, POC: NEGATIVE
BLOOD URINE, POC: NEGATIVE
CLARITY, POC: CLEAR
COLOR, POC: NORMAL
GLUCOSE URINE, POC: NEGATIVE
KETONES, POC: NEGATIVE
LEUKOCYTE EST, POC: NORMAL
NITRITE, POC: NEGATIVE
PH, POC: 6
PROTEIN, POC: NEGATIVE
SPECIFIC GRAVITY, POC: 1.02
UROBILINOGEN, POC: NEGATIVE

## 2020-11-05 PROCEDURE — G8427 DOCREV CUR MEDS BY ELIG CLIN: HCPCS | Performed by: PHYSICIAN ASSISTANT

## 2020-11-05 PROCEDURE — 81002 URINALYSIS NONAUTO W/O SCOPE: CPT | Performed by: PHYSICIAN ASSISTANT

## 2020-11-05 PROCEDURE — 1036F TOBACCO NON-USER: CPT | Performed by: PHYSICIAN ASSISTANT

## 2020-11-05 PROCEDURE — G8417 CALC BMI ABV UP PARAM F/U: HCPCS | Performed by: PHYSICIAN ASSISTANT

## 2020-11-05 PROCEDURE — 99214 OFFICE O/P EST MOD 30 MIN: CPT | Performed by: PHYSICIAN ASSISTANT

## 2020-11-05 PROCEDURE — G8484 FLU IMMUNIZE NO ADMIN: HCPCS | Performed by: PHYSICIAN ASSISTANT

## 2020-11-05 RX ORDER — RIMEGEPANT SULFATE 75 MG/75MG
TABLET, ORALLY DISINTEGRATING ORAL
Qty: 4 TABLET | Refills: 0 | COMMUNITY
Start: 2020-11-05 | End: 2020-12-17 | Stop reason: SDUPTHER

## 2020-11-05 ASSESSMENT — ENCOUNTER SYMPTOMS
COUGH: 0
ABDOMINAL PAIN: 0
PHOTOPHOBIA: 1
ROS SKIN COMMENTS: SKIN LESION
WHEEZING: 0
COLOR CHANGE: 1
ABDOMINAL DISTENTION: 0
SHORTNESS OF BREATH: 0
CHEST TIGHTNESS: 0

## 2020-11-05 NOTE — PROGRESS NOTES
Subjective  Adelaida Perdomo, 28 y.o. female presents today with:  Chief Complaint   Patient presents with    Migraine     6 week follow up they have gotten better     Dysuria     dark and some odor x2weeks      HPI  Bibiana is in the office today for follow up. Last OV with me: 9/28/2020    Migraine HA. Continues to have migraine HAs several days throughout the month. She has been on several medications in the past and failed therapy including triptan (maxalt, imitrex), topamax and most recently ubrevly. +photophonia and nausea associated with migraine. Open to trying new medication today. Migraines do affect her ADLs and her ability to focus. BV.  Started therapy yesterday for BV. Taking flagyl. Dysuria. C/o very dark, discolored urine with mild dysuria. Denies hematuria. No flank or side pain. Obesity. Continues to box for exercise. Enjoying. Feels like her body composition is changing. Review of Systems   Constitutional: Positive for activity change (with migraine) and fatigue. Negative for appetite change, chills, diaphoresis, fever and unexpected weight change. HENT: Negative for congestion. Eyes: Positive for photophobia (with migraine). Respiratory: Negative for cough, chest tightness, shortness of breath and wheezing. Cardiovascular: Negative for chest pain and leg swelling. Gastrointestinal: Negative for abdominal distention and abdominal pain. Genitourinary: Positive for decreased urine volume and dysuria. Negative for difficulty urinating, flank pain and urgency. Skin: Positive for color change. Negative for rash and wound. Skin lesion   Neurological: Positive for headaches. Negative for dizziness, tremors, seizures, syncope, facial asymmetry, weakness, light-headedness and numbness. Psychiatric/Behavioral: Negative for dysphoric mood and sleep disturbance. The patient is not nervous/anxious.       Past Medical History:   Diagnosis Date    Anxiety and depression     Asthma     Atypical chest pain 5/31/2019    Chronic back pain     Diabetes mellitus ([facility] [address]     Headache     HIV exposure 1/6/2020    Irregular heart beat     Multiple sclerosis (HCC)     Osteoarthritis      Past Surgical History:   Procedure Laterality Date    CHOLECYSTECTOMY      UPPER GASTROINTESTINAL ENDOSCOPY N/A 8/13/2019    EGD ESOPHAGOGASTRODUODENOSCOPY performed by Magan Hammond MD at [facility] [address] [place] Marital status: Single     Spouse name: Not on file    Number of children: Not on file    Years of education: Not on file    Highest education level: Not on file   Occupational History    Not on file   Social Needs    Financial resource strain: Not on file    Food insecurity     Worry: Not on file     Inability: Not on file    Transportation needs     Medical: Not on file     Non-medical: Not on file   Tobacco Use    Smoking status: Former Smoker     Packs/day: 0.50     Years: 10.00     Pack years: 5.00     Types: Cigarettes     Last attempt to quit: 1/27/2017     Years since quitting: 3.7    Smokeless tobacco: Never Used   Substance and Sexual Activity    Alcohol use: Not Currently     Comment: socially    Drug use: No    Sexual activity: Yes     Partners: Male   Lifestyle    Physical activity     Days per week: Not on file     Minutes per session: Not on file    Stress: Not on file   Relationships    Social connections     Talks on phone: Not on file     Gets together: Not on file     Attends Yazdanism service: Not on file     Active member of club or organization: Not on file     Attends meetings of clubs or organizations: Not on file     Relationship status: Not on file    Intimate partner violence     Fear of current or ex partner: Not on file     Emotionally abused: Not on file     Physically abused: Not on file     Forced sexual activity: Not on file   Other Topics Concern    Not on file   Social History Encounters:   11/05/20 106/68   09/28/20 110/72   08/25/20 105/78     Wt Readings from Last 3 Encounters:   11/05/20 261 lb 6.4 oz (118.6 kg)   09/28/20 260 lb (117.9 kg)   08/25/20 260 lb (117.9 kg)     Physical Exam  Vitals signs reviewed. Constitutional:       General: She is not in acute distress. Appearance: She is obese. She is not ill-appearing. HENT:      Head: Normocephalic and atraumatic. Right Ear: Tympanic membrane, ear canal and external ear normal. Decreased hearing noted. Left Ear: Decreased hearing noted. Tympanic membrane is injected. Nose: Nose normal.      Mouth/Throat:      Lips: Pink. Mouth: Mucous membranes are moist.   Eyes:      Conjunctiva/sclera: Conjunctivae normal.      Pupils: Pupils are equal, round, and reactive to light. Cardiovascular:      Rate and Rhythm: Normal rate. Neurological:      Mental Status: She is alert. Psychiatric:         Attention and Perception: Attention normal.         Mood and Affect: Mood is depressed. Speech: Speech normal.         Behavior: Behavior normal.         Thought Content: Thought content normal.         Cognition and Memory: Cognition normal.         Judgment: Judgment normal.      Comments: Slightly depressed mood noted today. Patient reports that she has been feeling 'fatigued/tired'. Assessment & Plan   Bibiana was seen today for migraine and dysuria. Diagnoses and all orders for this visit:    Intractable migraine without aura and without status migrainosus  -     Rimegepant Sulfate (NURTEC) 75 MG TBDP; Take 1 tablet po at onset of migraine HA. Dysuria  -     POCT Urinalysis no Micro  -     Culture, Urine; Future    BV (bacterial vaginosis)    BMI 39.0-39.9,adult    Samples of nurtec given to patient to try. Urine sent for culture. Continue exercise program/regimen. Continue treatment for flagyl. 4 week follow up for migraine.     Orders Placed This Encounter   Procedures    Culture, Urine     Standing Status:   Future     Standing Expiration Date:   11/5/2021     Order Specific Question:   Specify (ex-cath, midstream, cysto, etc)? Answer:   midstream    POCT Urinalysis no Micro     Orders Placed This Encounter   Medications    Rimegepant Sulfate (NURTEC) 75 MG TBDP     Sig: Take 1 tablet po at onset of migraine HA. Dispense:  4 tablet     Refill:  0     Medications Discontinued During This Encounter   Medication Reason    diclofenac (VOLTAREN) 50 MG EC tablet LIST CLEANUP    Ubrogepant 50 MG TABS Cost of medication     No follow-ups on file. Reviewed with the patient: current clinical status, medications, activities and diet. Side effects, adverse effects of the medication prescribed today, as well as treatment plan/ rationale and result expectations have been discussed with the patient who expresses understanding and desires to proceed. Close follow up to evaluate treatment results and for coordination of care. I have reviewed the patient's medical history in detail and updated the computerized patient record.     Leticia Villa PA-C

## 2020-11-07 LAB — URINE CULTURE, ROUTINE: NORMAL

## 2020-11-11 PROBLEM — G35 MULTIPLE SCLEROSIS (HCC): Status: ACTIVE | Noted: 2018-08-06

## 2020-11-11 PROBLEM — R10.2 FEMALE PELVIC PAIN: Status: ACTIVE | Noted: 2020-11-11

## 2020-11-11 PROBLEM — Z72.51 UNPROTECTED SEX: Status: ACTIVE | Noted: 2020-11-11

## 2020-11-11 PROBLEM — H10.9 UNSPECIFIED CONJUNCTIVITIS: Status: ACTIVE | Noted: 2018-11-05

## 2020-11-11 PROBLEM — M79.605 PAIN OF LEFT LOWER EXTREMITY: Status: ACTIVE | Noted: 2019-03-09

## 2020-11-11 PROBLEM — E78.00 PURE HYPERCHOLESTEROLEMIA, UNSPECIFIED: Status: ACTIVE | Noted: 2018-05-03

## 2020-11-11 PROBLEM — Z30.09 COUNSELING FOR INITIATION OF BIRTH CONTROL METHOD: Status: ACTIVE | Noted: 2020-11-11

## 2020-11-11 PROBLEM — Z79.899 OTHER LONG TERM (CURRENT) DRUG THERAPY: Status: ACTIVE | Noted: 2019-03-09

## 2020-11-11 PROBLEM — R09.89 GLOBUS SENSATION: Status: ACTIVE | Noted: 2020-11-11

## 2020-11-11 PROBLEM — A59.01 TRICHOMONAS VAGINITIS: Status: ACTIVE | Noted: 2020-11-05

## 2020-11-11 PROBLEM — G47.00 INSOMNIA: Status: ACTIVE | Noted: 2020-01-06

## 2020-11-11 PROBLEM — H91.92 UNSPECIFIED HEARING LOSS, LEFT EAR: Status: ACTIVE | Noted: 2018-12-10

## 2020-11-11 PROBLEM — R09.89 OTHER SPECIFIED SYMPTOMS AND SIGNS INVOLVING THE CIRCULATORY AND RESPIRATORY SYSTEMS: Status: ACTIVE | Noted: 2018-05-03

## 2020-11-11 PROBLEM — H57.89 OTHER SPECIFIED DISORDERS OF EYE AND ADNEXA: Status: ACTIVE | Noted: 2018-11-05

## 2020-11-11 PROBLEM — H66.92 OTITIS MEDIA, UNSPECIFIED, LEFT EAR: Status: ACTIVE | Noted: 2018-12-10

## 2020-11-11 PROBLEM — H92.02 OTALGIA, LEFT EAR: Status: ACTIVE | Noted: 2018-12-10

## 2020-11-11 PROBLEM — M25.562 PAIN IN LEFT KNEE: Status: ACTIVE | Noted: 2019-03-09

## 2020-11-11 PROBLEM — M54.42 LUMBAGO WITH SCIATICA, LEFT SIDE: Status: ACTIVE | Noted: 2017-03-14

## 2020-11-11 PROBLEM — A64 SEXUALLY TRANSMITTED DISEASE: Status: ACTIVE | Noted: 2020-11-11

## 2020-11-11 PROBLEM — R42 DIZZINESS AND GIDDINESS: Status: ACTIVE | Noted: 2018-08-06

## 2020-11-11 PROBLEM — R09.A2 GLOBUS SENSATION: Status: ACTIVE | Noted: 2020-11-11

## 2020-11-11 PROBLEM — H90.0 CONDUCTIVE HEARING LOSS, BILATERAL: Status: ACTIVE | Noted: 2018-12-10

## 2020-11-11 PROBLEM — G43.719 INTRACTABLE CHRONIC MIGRAINE WITHOUT AURA: Status: ACTIVE | Noted: 2018-11-05

## 2020-11-11 PROBLEM — R11.0 NAUSEA: Status: ACTIVE | Noted: 2018-08-06

## 2020-11-11 PROBLEM — R21 RASH AND OTHER NONSPECIFIC SKIN ERUPTION: Status: ACTIVE | Noted: 2018-12-10

## 2020-11-11 PROBLEM — N89.8 VAGINAL ITCHING: Status: ACTIVE | Noted: 2020-11-11

## 2020-11-11 PROBLEM — H57.13 OCULAR PAIN, BILATERAL: Status: ACTIVE | Noted: 2018-11-05

## 2020-11-11 PROBLEM — M60.9 MYOSITIS, UNSPECIFIED: Status: ACTIVE | Noted: 2019-03-09

## 2020-11-11 PROBLEM — M70.52 OTHER BURSITIS OF KNEE, LEFT KNEE: Status: ACTIVE | Noted: 2019-03-09

## 2020-11-12 ENCOUNTER — VIRTUAL VISIT (OUTPATIENT)
Dept: FAMILY MEDICINE CLINIC | Age: 35
End: 2020-11-12
Payer: COMMERCIAL

## 2020-11-12 PROCEDURE — G8428 CUR MEDS NOT DOCUMENT: HCPCS | Performed by: PHYSICIAN ASSISTANT

## 2020-11-12 PROCEDURE — 99213 OFFICE O/P EST LOW 20 MIN: CPT | Performed by: PHYSICIAN ASSISTANT

## 2020-11-12 RX ORDER — GREEN TEA/HOODIA GORDONII 315-12.5MG
1 CAPSULE ORAL 2 TIMES DAILY
Qty: 60 TABLET | Refills: 0 | Status: SHIPPED | OUTPATIENT
Start: 2020-11-12 | End: 2020-12-12

## 2020-11-12 RX ORDER — CHOLECALCIFEROL (VITAMIN D3) 50 MCG
2000 TABLET ORAL DAILY
Qty: 30 TABLET | Refills: 0 | Status: SHIPPED | OUTPATIENT
Start: 2020-11-12 | End: 2021-02-12

## 2020-11-12 ASSESSMENT — ENCOUNTER SYMPTOMS
SHORTNESS OF BREATH: 0
CHEST TIGHTNESS: 0
COUGH: 1
VOICE CHANGE: 0
TROUBLE SWALLOWING: 0
SORE THROAT: 0

## 2020-11-12 NOTE — PROGRESS NOTES
2020    TELEHEALTH EVALUATION -- Audio/Visual (During TFLNP-14 public health emergency)    Due to COVID 19 outbreak, patient's office visit was converted to a virtual visit. Patient was contacted and agreed to proceed with a virtual visit via Telephone Visit--total length of call 15 minutes. The risks and benefits of converting to a virtual visit were discussed in light of the current infectious disease epidemic. Patient also understood that insurance coverage and co-pays are up to their individual insurance plans. HPI:    Sheyla Laughlin (:  1985) has requested an audio/video evaluation for the following concern(s):    COVID-19. Started having fatigue about 3 days ago. Little/low energy, +chills. Yesterday had HA and non-productive cough. Today, can't taste or smell. No known positive COVID-19 exposure. Review of Systems   Constitutional: Positive for activity change and fatigue. HENT: Positive for congestion (mild). Negative for postnasal drip, sore throat, trouble swallowing and voice change. Loss of taste/smell   Respiratory: Positive for cough. Negative for chest tightness and shortness of breath. Neurological: Positive for headaches. Prior to Visit Medications    Medication Sig Taking? Authorizing Provider   Probiotic Acidophilus (FLORANEX) TABS Take 1 tablet by mouth 2 times daily Yes Leticia Villa PA-C   vitamin D (CHOLECALCIFEROL) 50 MCG ( UT) TABS tablet Take 1 tablet by mouth daily HOLD WEEKLY DOSE AT THIS TIME. Yes Leticia Villa PA-C   Rimegepant Sulfate (NURTEC) 75 MG TBDP Take 1 tablet po at onset of migraine HERNANDEZ.   Leticia Villa PA-C   tiZANidine (ZANAFLEX) 2 MG tablet Take 1 tablet by mouth nightly as needed (neck pain/spasm, nighttime only.)  Leticia Villa PA-C   pantoprazole (PROTONIX) 40 MG tablet TAKE 1 TABLET BY MOUTH DAILY WITH SUPPER FOR HEARTBURN  Leticia Villa PA-C   metFORMIN (GLUCOPHAGE-XR) 500 MG extended release tablet Take 1 tablet by mouth every other day FOR PREDIABETES. Leticia Villa PA-C   vitamin D (ERGOCALCIFEROL) 1.25 MG (35616 UT) CAPS capsule FOR LOW VIT D. TAKE 1 CAPSULE BY MOUTH 1 TIME A WEEK  Leticia Villa PA-C   sertraline (ZOLOFT) 25 MG tablet Take 1 tablet by mouth daily FOR ANXIETY/PANIC ATTACKS.   Leticia Villa PA-C   fluticasone (FLONASE) 50 MCG/ACT nasal spray 1 spray by Each Nostril route 2 times daily as needed for Rhinitis or Allergies  Leticia Villa PA-C   albuterol sulfate  (90 Base) MCG/ACT inhaler Inhale 2 puffs into the lungs 4 times daily as needed for Wheezing  Leticia Villa PA-C       Social History     Tobacco Use    Smoking status: Former Smoker     Packs/day: 0.50     Years: 10.00     Pack years: 5.00     Types: Cigarettes     Last attempt to quit: 1/27/2017     Years since quitting: 3.7    Smokeless tobacco: Never Used   Substance Use Topics    Alcohol use: Not Currently     Comment: socially    Drug use: No        Allergies   Allergen Reactions    Nsaids Other (See Comments)     Waiting for bariatric procedure    Pcn [Penicillins] Hives    Phentermine      Anxiety with hospitalization    Topamax [Topiramate] Other (See Comments)     DIZZY   ,   Past Medical History:   Diagnosis Date    Anxiety and depression     Asthma     Atypical chest pain 5/31/2019    Chronic back pain     Diabetes mellitus ([facility] [address]     Headache     HIV exposure 1/6/2020    Irregular heart beat     Multiple sclerosis ([facility] [address]     Osteoarthritis     Pure hypercholesterolemia, unspecified 5/3/2018   ,   Past Surgical History:   Procedure Laterality Date    CHOLECYSTECTOMY      UPPER GASTROINTESTINAL ENDOSCOPY N/A 8/13/2019    EGD ESOPHAGOGASTRODUODENOSCOPY performed by Ortiz Olmstead MD at [facility]   ,   Social History     Tobacco Use    Smoking status: Former Smoker     Packs/day: 0.50     Years: 10.00     Pack years: 5.00     Types: Cigarettes     Last attempt to quit: 1/27/2017     Years since quitting: 3.7  Smokeless tobacco: Never Used   Substance Use Topics    Alcohol use: Not Currently     Comment: socially    Drug use: No   ,   Family History   Problem Relation Age of Onset    Cancer Mother     Arthritis Mother     Diabetes Father     Heart Disease Father     Stroke Father     High Blood Pressure Father    ,   Immunization History   Administered Date(s) Administered    Influenza A ([id number]) Vaccine PF IM 02/12/2010   ,   Health Maintenance   Topic Date Due    Varicella vaccine (1 of 2 - 2-dose childhood series) 09/08/1986    DTaP/Tdap/Td vaccine (1 - Tdap) 05/14/2021 (Originally 9/8/2004)    Flu vaccine (1) 11/05/2021 (Originally 9/1/2020)    Annual Wellness Visit (AWV)  02/18/2021    A1C test (Diabetic or Prediabetic)  06/15/2021    Cervical cancer screen  11/18/2024    HIV screen  Completed    Hepatitis A vaccine  Aged Out    Hepatitis B vaccine  Aged Out    Hib vaccine  Aged Out    Meningococcal (ACWY) vaccine  Aged Out    Pneumococcal 0-64 years Vaccine  Aged Out       PHYSICAL EXAMINATION:  [ INSTRUCTIONS:  \"[x]\" Indicates a positive item  \"[]\" Indicates a negative item  -- DELETE ALL ITEMS NOT EXAMINED]  [x] Alert  [x] Oriented to person/place/time    [x] No apparent distress  [] Toxic appearing    [] Face flushed appearing [] Sclera clear  [] Lips are cyanotic      [x] Breathing appears normal  [] Appears tachypneic      [] Rash on visible skin    [] Cranial Nerves II-XII grossly intact    [] Motor grossly intact in visible upper extremities    [] Motor grossly intact in visible lower extremities    [x] Normal Mood  [] Anxious appearing    [] Depressed appearing  [] Confused appearing      [] Poor short term memory  [] Poor long term memory    [] OTHER:      Due to this being a TeleHealth encounter, evaluation of the following organ systems is limited: Vitals/Constitutional/EENT/Resp/CV/GI//MS/Neuro/Skin/Heme-Lymph-Imm. ASSESSMENT/PLAN:  1.  Loss of taste    - COVID-19

## 2020-11-13 ENCOUNTER — NURSE ONLY (OUTPATIENT)
Dept: PRIMARY CARE CLINIC | Age: 35
End: 2020-11-13

## 2020-11-13 DIAGNOSIS — R43.2 LOSS OF TASTE: ICD-10-CM

## 2020-11-13 DIAGNOSIS — Z20.822 COVID-19 RULED OUT: ICD-10-CM

## 2020-11-15 LAB
SARS-COV-2: DETECTED
SOURCE: ABNORMAL

## 2020-12-11 PROBLEM — Z01.419 ENCOUNTER FOR ROUTINE GYNECOLOGICAL EXAMINATION: Status: RESOLVED | Noted: 2017-12-25 | Resolved: 2020-12-11

## 2020-12-17 ENCOUNTER — OFFICE VISIT (OUTPATIENT)
Dept: FAMILY MEDICINE CLINIC | Age: 35
End: 2020-12-17
Payer: COMMERCIAL

## 2020-12-17 VITALS
SYSTOLIC BLOOD PRESSURE: 100 MMHG | RESPIRATION RATE: 16 BRPM | DIASTOLIC BLOOD PRESSURE: 68 MMHG | TEMPERATURE: 96.7 F | OXYGEN SATURATION: 100 % | HEIGHT: 68 IN | HEART RATE: 65 BPM | WEIGHT: 267 LBS | BODY MASS INDEX: 40.47 KG/M2

## 2020-12-17 PROBLEM — R10.2 FEMALE PELVIC PAIN: Status: RESOLVED | Noted: 2020-11-11 | Resolved: 2020-12-17

## 2020-12-17 PROBLEM — R09.A2 GLOBUS SENSATION: Status: RESOLVED | Noted: 2020-11-11 | Resolved: 2020-12-17

## 2020-12-17 PROBLEM — H53.9 VISION CHANGES: Status: RESOLVED | Noted: 2019-11-17 | Resolved: 2020-12-17

## 2020-12-17 PROBLEM — R20.2 PARESTHESIA OF HAND: Status: RESOLVED | Noted: 2019-11-17 | Resolved: 2020-12-17

## 2020-12-17 PROBLEM — A64 SEXUALLY TRANSMITTED DISEASE: Status: RESOLVED | Noted: 2020-11-11 | Resolved: 2020-12-17

## 2020-12-17 PROBLEM — H10.9 UNSPECIFIED CONJUNCTIVITIS: Status: RESOLVED | Noted: 2018-11-05 | Resolved: 2020-12-17

## 2020-12-17 PROBLEM — A59.01 TRICHOMONAS VAGINITIS: Status: RESOLVED | Noted: 2020-11-05 | Resolved: 2020-12-17

## 2020-12-17 PROBLEM — E78.00 PURE HYPERCHOLESTEROLEMIA, UNSPECIFIED: Status: RESOLVED | Noted: 2018-05-03 | Resolved: 2020-12-17

## 2020-12-17 PROBLEM — R21 RASH AND OTHER NONSPECIFIC SKIN ERUPTION: Status: RESOLVED | Noted: 2018-12-10 | Resolved: 2020-12-17

## 2020-12-17 PROBLEM — G47.00 INSOMNIA: Status: RESOLVED | Noted: 2020-01-06 | Resolved: 2020-12-17

## 2020-12-17 PROBLEM — N89.8 VAGINAL IRRITATION: Status: RESOLVED | Noted: 2017-12-25 | Resolved: 2020-12-17

## 2020-12-17 PROBLEM — M25.50 MULTIPLE JOINT PAIN: Status: RESOLVED | Noted: 2019-04-02 | Resolved: 2020-12-17

## 2020-12-17 PROBLEM — M25.562 PAIN IN LEFT KNEE: Status: RESOLVED | Noted: 2019-03-09 | Resolved: 2020-12-17

## 2020-12-17 PROBLEM — Z72.51 UNPROTECTED SEX: Status: RESOLVED | Noted: 2020-11-11 | Resolved: 2020-12-17

## 2020-12-17 PROBLEM — R30.0 DYSURIA: Status: RESOLVED | Noted: 2020-11-05 | Resolved: 2020-12-17

## 2020-12-17 PROBLEM — R09.89 GLOBUS SENSATION: Status: RESOLVED | Noted: 2020-11-11 | Resolved: 2020-12-17

## 2020-12-17 PROBLEM — N89.8 VAGINAL ITCHING: Status: RESOLVED | Noted: 2020-11-11 | Resolved: 2020-12-17

## 2020-12-17 PROBLEM — M79.605 PAIN OF LEFT LOWER EXTREMITY: Status: RESOLVED | Noted: 2019-03-09 | Resolved: 2020-12-17

## 2020-12-17 PROBLEM — G47.10 HYPERSOMNOLENCE: Status: RESOLVED | Noted: 2020-07-09 | Resolved: 2020-12-17

## 2020-12-17 PROBLEM — F43.10 PTSD (POST-TRAUMATIC STRESS DISORDER): Status: RESOLVED | Noted: 2020-01-24 | Resolved: 2020-12-17

## 2020-12-17 PROBLEM — H66.92 OTITIS MEDIA, UNSPECIFIED, LEFT EAR: Status: RESOLVED | Noted: 2018-12-10 | Resolved: 2020-12-17

## 2020-12-17 PROBLEM — Z97.5 PRESENCE OF SUBCUTANEOUS CONTRACEPTIVE IMPLANT: Status: RESOLVED | Noted: 2018-01-03 | Resolved: 2020-12-17

## 2020-12-17 PROBLEM — H57.13 OCULAR PAIN, BILATERAL: Status: RESOLVED | Noted: 2018-11-05 | Resolved: 2020-12-17

## 2020-12-17 PROBLEM — R11.0 NAUSEA: Status: RESOLVED | Noted: 2018-08-06 | Resolved: 2020-12-17

## 2020-12-17 PROBLEM — Z79.899 OTHER LONG TERM (CURRENT) DRUG THERAPY: Status: RESOLVED | Noted: 2019-03-09 | Resolved: 2020-12-17

## 2020-12-17 PROBLEM — Z11.3 ENCOUNTER FOR SCREENING FOR INFECTIONS WITH A PREDOMINANTLY SEXUAL MODE OF TRANSMISSION: Status: RESOLVED | Noted: 2018-04-03 | Resolved: 2020-12-17

## 2020-12-17 PROBLEM — H92.02 OTALGIA, LEFT EAR: Status: RESOLVED | Noted: 2018-12-10 | Resolved: 2020-12-17

## 2020-12-17 PROBLEM — F51.5 DREAM ANXIETY DISORDER: Status: RESOLVED | Noted: 2020-01-06 | Resolved: 2020-12-17

## 2020-12-17 PROBLEM — R42 DIZZINESS AND GIDDINESS: Status: RESOLVED | Noted: 2018-08-06 | Resolved: 2020-12-17

## 2020-12-17 PROBLEM — Z30.09 COUNSELING FOR INITIATION OF BIRTH CONTROL METHOD: Status: RESOLVED | Noted: 2020-11-11 | Resolved: 2020-12-17

## 2020-12-17 PROBLEM — H57.89 OTHER SPECIFIED DISORDERS OF EYE AND ADNEXA: Status: RESOLVED | Noted: 2018-11-05 | Resolved: 2020-12-17

## 2020-12-17 PROCEDURE — G8484 FLU IMMUNIZE NO ADMIN: HCPCS | Performed by: PHYSICIAN ASSISTANT

## 2020-12-17 PROCEDURE — G8417 CALC BMI ABV UP PARAM F/U: HCPCS | Performed by: PHYSICIAN ASSISTANT

## 2020-12-17 PROCEDURE — G8427 DOCREV CUR MEDS BY ELIG CLIN: HCPCS | Performed by: PHYSICIAN ASSISTANT

## 2020-12-17 PROCEDURE — 1036F TOBACCO NON-USER: CPT | Performed by: PHYSICIAN ASSISTANT

## 2020-12-17 PROCEDURE — 99214 OFFICE O/P EST MOD 30 MIN: CPT | Performed by: PHYSICIAN ASSISTANT

## 2020-12-17 RX ORDER — NORETHINDRONE AND ETHINYL ESTRADIOL 0.5-0.035
1 KIT ORAL DAILY
Qty: 1 PACKET | Refills: 5 | Status: SHIPPED | OUTPATIENT
Start: 2020-12-17 | End: 2021-02-12

## 2020-12-17 RX ORDER — RIMEGEPANT SULFATE 75 MG/75MG
75 TABLET, ORALLY DISINTEGRATING ORAL PRN
Qty: 8 TABLET | Refills: 1 | Status: SHIPPED | OUTPATIENT
Start: 2020-12-17 | End: 2021-02-12 | Stop reason: SDUPTHER

## 2020-12-17 RX ORDER — CLONAZEPAM 0.5 MG/1
TABLET ORAL
COMMUNITY
End: 2021-02-12

## 2020-12-17 RX ORDER — BUPROPION HYDROCHLORIDE 300 MG/1
TABLET ORAL
COMMUNITY
End: 2021-02-12

## 2020-12-17 SDOH — ECONOMIC STABILITY: FOOD INSECURITY: WITHIN THE PAST 12 MONTHS, YOU WORRIED THAT YOUR FOOD WOULD RUN OUT BEFORE YOU GOT MONEY TO BUY MORE.: NEVER TRUE

## 2020-12-17 SDOH — ECONOMIC STABILITY: INCOME INSECURITY: HOW HARD IS IT FOR YOU TO PAY FOR THE VERY BASICS LIKE FOOD, HOUSING, MEDICAL CARE, AND HEATING?: NOT HARD AT ALL

## 2020-12-17 SDOH — ECONOMIC STABILITY: TRANSPORTATION INSECURITY
IN THE PAST 12 MONTHS, HAS LACK OF TRANSPORTATION KEPT YOU FROM MEETINGS, WORK, OR FROM GETTING THINGS NEEDED FOR DAILY LIVING?: NO

## 2020-12-17 SDOH — ECONOMIC STABILITY: FOOD INSECURITY: WITHIN THE PAST 12 MONTHS, THE FOOD YOU BOUGHT JUST DIDN'T LAST AND YOU DIDN'T HAVE MONEY TO GET MORE.: NEVER TRUE

## 2020-12-17 SDOH — ECONOMIC STABILITY: TRANSPORTATION INSECURITY
IN THE PAST 12 MONTHS, HAS THE LACK OF TRANSPORTATION KEPT YOU FROM MEDICAL APPOINTMENTS OR FROM GETTING MEDICATIONS?: NO

## 2020-12-17 ASSESSMENT — ENCOUNTER SYMPTOMS
CHEST TIGHTNESS: 0
TROUBLE SWALLOWING: 0
COUGH: 0
ABDOMINAL DISTENTION: 0
WHEEZING: 0
ABDOMINAL PAIN: 0
SHORTNESS OF BREATH: 0
COLOR CHANGE: 0

## 2020-12-17 ASSESSMENT — PATIENT HEALTH QUESTIONNAIRE - PHQ9
SUM OF ALL RESPONSES TO PHQ QUESTIONS 1-9: 0
SUM OF ALL RESPONSES TO PHQ QUESTIONS 1-9: 0
SUM OF ALL RESPONSES TO PHQ9 QUESTIONS 1 & 2: 0
1. LITTLE INTEREST OR PLEASURE IN DOING THINGS: 0
2. FEELING DOWN, DEPRESSED OR HOPELESS: 0
SUM OF ALL RESPONSES TO PHQ QUESTIONS 1-9: 0

## 2020-12-17 NOTE — PROGRESS NOTES
Subjective  Antolin Collazo, 28 y.o. female presents today with:  Chief Complaint   Patient presents with    Migraine     follow up only took new medication once and it worked      HPI  Bibiana is in the office today for follow up. Last OV with me: 11/12/2020 via VV. Migraine HA. Started on Nurtec samples. Medication working very well. Has taken 1 tablet since November. Has not needed medication since that time. Would like refilled today if possible. Prior failed therapies: topamax (allergy), imitrex, maxalt. Miscarriage. Most recently had a miscarriage about 2 weeks ago. Bleeding stopped this week. She is under the care of the midwife group at [facility]. Most recently started new OCP 2 days ago. COVID-19. Tested positive in November. Symptoms have completely resolved. Obesity. Still pursuing bariatric surgery through [facility]. Needs behavioral health clearance. She is continuing to work out, exercise several days/week. Meal planning, counting calories. Weight has been fluctuating about 5-10 lb. Overall, would like surgery. Review of Systems   Constitutional: Positive for activity change (with migraine) and unexpected weight change (gain/loss). Negative for appetite change, chills, diaphoresis, fatigue and fever. HENT: Negative for congestion and trouble swallowing. Respiratory: Negative for cough, chest tightness, shortness of breath and wheezing. Cardiovascular: Negative for chest pain and leg swelling. Gastrointestinal: Negative for abdominal distention and abdominal pain. Endocrine: Negative for polydipsia, polyphagia and polyuria. Genitourinary: Negative for menstrual problem, vaginal bleeding, vaginal discharge and vaginal pain. Musculoskeletal: Negative for gait problem, joint swelling and myalgias. Skin: Negative for color change, rash and wound. Neurological: Positive for headaches (improved with nurtec). Negative for dizziness, tremors, seizures, syncope, facial asymmetry, light-headedness and numbness. Psychiatric/Behavioral: Positive for dysphoric mood (mild). Negative for sleep disturbance. The patient is not nervous/anxious.       Past Medical History:   Diagnosis Date    Anxiety and depression     Asthma     Atypical chest pain 5/31/2019    Chronic back pain     Diabetes mellitus ([address]     Headache     HIV exposure 1/6/2020    Irregular heart beat     Multiple sclerosis (HCC)     Osteoarthritis     Pure hypercholesterolemia, unspecified 5/3/2018     Past Surgical History:   Procedure Laterality Date    CHOLECYSTECTOMY      UPPER GASTROINTESTINAL ENDOSCOPY N/A 8/13/2019    EGD ESOPHAGOGASTRODUODENOSCOPY performed by Phi Valencia MD at [address] [place] [address] Marital status: Single     Spouse name: Not on file    Number of children: Not on file    Years of education: Not on file    Highest education level: Not on file   Occupational History    Not on file   Social Needs    Financial resource strain: Not hard at all    Food insecurity     Worry: Never true     Inability: Never true   [employer] needs     Medical: No     Non-medical: No   Tobacco Use    Smoking status: Former Smoker     Packs/day: 0.50     Years: 10.00     Pack years: 5.00     Types: Cigarettes     Quit date: 1/27/2017     Years since quitting: 3.8    Smokeless tobacco: Never Used   Substance and Sexual Activity    Alcohol use: Not Currently     Comment: socially    Drug use: No    Sexual activity: Yes     Partners: Male   Lifestyle    Physical activity     Days per week: Not on file     Minutes per session: Not on file    Stress: Not on file   Relationships    Social connections     Talks on phone: Not on file     Gets together: Not on file     Attends Holiness service: Not on file Active member of club or organization: Not on file     Attends meetings of clubs or organizations: Not on file     Relationship status: Not on file    Intimate partner violence     Fear of current or ex partner: Not on file     Emotionally abused: Not on file     Physically abused: Not on file     Forced sexual activity: Not on file   Other Topics Concern    Not on file   Social History Narrative    Not on file     Family History   Problem Relation Age of Onset    Cancer Mother     Arthritis Mother     Diabetes Father     Heart Disease Father     Stroke Father     High Blood Pressure Father      Allergies   Allergen Reactions    Nsaids Other (See Comments)     Waiting for bariatric procedure    Pcn [Penicillins] Hives    Phentermine      Anxiety with hospitalization    Topamax [Topiramate] Other (See Comments)     DIZZY     Current Outpatient Medications   Medication Sig Dispense Refill    buPROPion (WELLBUTRIN XL) 300 MG extended release tablet Take by mouth      Cetirizine HCl 10 MG CAPS Take by mouth      clonazePAM (KLONOPIN) 0.5 MG tablet Take by mouth.  norethindrone-ethinyl estradiol (NORTREL 0.5/35, 28,) 0.5-35 MG-MCG per tablet Take 1 tablet by mouth daily 1 packet 5    Rimegepant Sulfate (NURTEC) 75 MG TBDP Take 75 mg by mouth as needed (acute migraine) 8 tablet 1    vitamin D (CHOLECALCIFEROL) 50 MCG (2000 UT) TABS tablet Take 1 tablet by mouth daily HOLD WEEKLY DOSE AT THIS TIME. 30 tablet 0    tiZANidine (ZANAFLEX) 2 MG tablet Take 1 tablet by mouth nightly as needed (neck pain/spasm, nighttime only. ) 10 tablet 0    pantoprazole (PROTONIX) 40 MG tablet TAKE 1 TABLET BY MOUTH DAILY WITH SUPPER FOR HEARTBURN 30 tablet 0    metFORMIN (GLUCOPHAGE-XR) 500 MG extended release tablet Take 1 tablet by mouth every other day FOR PREDIABETES.  90 tablet 2    vitamin D (ERGOCALCIFEROL) 1.25 MG (41271 UT) CAPS capsule FOR LOW VIT D. TAKE 1 CAPSULE BY MOUTH 1 TIME A WEEK 12 capsule 2  sertraline (ZOLOFT) 25 MG tablet Take 1 tablet by mouth daily FOR ANXIETY/PANIC ATTACKS. 30 tablet 5    fluticasone (FLONASE) 50 MCG/ACT nasal spray 1 spray by Each Nostril route 2 times daily as needed for Rhinitis or Allergies 2 Bottle 1    albuterol sulfate  (90 Base) MCG/ACT inhaler Inhale 2 puffs into the lungs 4 times daily as needed for Wheezing 1 Inhaler 5     No current facility-administered medications for this visit. PMH, Surgical Hx, Family Hx, and Social Hx reviewed and updated. Health Maintenance reviewed. Objective  Vitals:    12/17/20 0925   BP: 100/68   Site: Left Upper Arm   Position: Sitting   Cuff Size: Large Adult   Pulse: 65   Resp: 16   Temp: 96.7 °F (35.9 °C)   TempSrc: Temporal   SpO2: 100%   Weight: 267 lb (121.1 kg)   Height: 5' 8\" (1.727 m)     BP Readings from Last 3 Encounters:   12/17/20 100/68   11/05/20 106/68   09/28/20 110/72     Wt Readings from Last 3 Encounters:   12/17/20 267 lb (121.1 kg)   11/05/20 261 lb 6.4 oz (118.6 kg)   09/28/20 260 lb (117.9 kg)     Physical Exam  Vitals signs reviewed. Constitutional:       General: She is not in acute distress. Appearance: She is well-developed. She is obese. She is not ill-appearing or diaphoretic. HENT:      Head: Normocephalic and atraumatic. Right Ear: External ear normal. Decreased hearing noted. Left Ear: External ear normal. Decreased hearing noted. Nose: Nose normal.      Mouth/Throat:      Mouth: Mucous membranes are moist.   Eyes:      General: Lids are normal.      Extraocular Movements: Extraocular movements intact. Conjunctiva/sclera: Conjunctivae normal.      Pupils: Pupils are equal, round, and reactive to light. Cardiovascular:      Rate and Rhythm: Normal rate and regular rhythm. Heart sounds: Normal heart sounds. No murmur. Pulmonary:      Effort: Pulmonary effort is normal. No respiratory distress. Breath sounds: Normal breath sounds. No wheezing or rales. Skin:     General: Skin is warm and dry. Findings: No erythema or rash. Neurological:      Mental Status: She is alert and oriented to person, place, and time. Psychiatric:         Attention and Perception: Attention normal.         Mood and Affect: Mood normal. Mood is not anxious. Speech: Speech normal.         Behavior: Behavior normal.         Thought Content: Thought content normal.         Cognition and Memory: Cognition normal.         Judgment: Judgment normal.       Assessment & Plan   Bibiana was seen today for migraine. Diagnoses and all orders for this visit:    BMI 40.0-44.9, adult ([facility] [address]    Anxiety and depression    Intractable chronic migraine without aura and without status migrainosus  -     Rimegepant Sulfate (NURTEC) 75 MG TBDP; Take 75 mg by mouth as needed (acute migraine)    Polycystic ovarian syndrome  -     norethindrone-ethinyl estradiol (NORTREL 0.5/35, 28,) 0.5-35 MG-MCG per tablet; Take 1 tablet by mouth daily      4 month follow up with me. Continue work-up/process with [facility] concerning bariatric surgery. Nurtec to be sent to pharmacy as patient responded very well. Will refill OCP for patient. Orders Placed This Encounter   Medications    norethindrone-ethinyl estradiol (NORTREL 0.5/35, 28,) 0.5-35 MG-MCG per tablet     Sig: Take 1 tablet by mouth daily     Dispense:  1 packet     Refill:  5    Rimegepant Sulfate (NURTEC) 75 MG TBDP     Sig: Take 75 mg by mouth as needed (acute migraine)     Dispense:  8 tablet     Refill:  1     Medications Discontinued During This Encounter   Medication Reason    Rimegepant Sulfate (NURTEC) 75 MG TBDP DUPLICATE     Return in about 4 months (around 4/17/2021) for follow up in office. Reviewed with the patient: current clinical status, medications, activities and diet. Side effects, adverse effects of the medication prescribed today, as well as treatment plan/ rationale and result expectations have been discussed with the patient who expresses understanding and desires to proceed. Close follow up to evaluate treatment results and for coordination of care. I have reviewed the patient's medical history in detail and updated the computerized patient record.     Leticia Villa PA-C

## 2020-12-27 ENCOUNTER — PATIENT MESSAGE (OUTPATIENT)
Dept: FAMILY MEDICINE CLINIC | Age: 35
End: 2020-12-27

## 2020-12-28 RX ORDER — PRENATAL WITH FERROUS FUM AND FOLIC ACID 3080; 920; 120; 400; 22; 1.84; 3; 20; 10; 1; 12; 200; 27; 25; 2 [IU]/1; [IU]/1; MG/1; [IU]/1; MG/1; MG/1; MG/1; MG/1; MG/1; MG/1; UG/1; MG/1; MG/1; MG/1; MG/1
1 TABLET ORAL DAILY
Qty: 90 TABLET | Refills: 4 | Status: SHIPPED | OUTPATIENT
Start: 2020-12-28 | End: 2021-02-12

## 2020-12-28 RX ORDER — METFORMIN HYDROCHLORIDE 500 MG/1
500 TABLET, EXTENDED RELEASE ORAL EVERY OTHER DAY
Qty: 90 TABLET | Refills: 2 | Status: SHIPPED | OUTPATIENT
Start: 2020-12-28 | End: 2021-02-12

## 2021-01-03 ENCOUNTER — HOSPITAL ENCOUNTER (EMERGENCY)
Age: 36
Discharge: HOME | End: 2021-01-03
Attending: EMERGENCY MEDICINE
Payer: COMMERCIAL

## 2021-01-03 ENCOUNTER — APPOINTMENT (OUTPATIENT)
Dept: CT IMAGING | Age: 36
End: 2021-01-03
Payer: COMMERCIAL

## 2021-01-03 VITALS
WEIGHT: 269 LBS | BODY MASS INDEX: 39.84 KG/M2 | HEART RATE: 73 BPM | SYSTOLIC BLOOD PRESSURE: 118 MMHG | DIASTOLIC BLOOD PRESSURE: 88 MMHG | TEMPERATURE: 98 F | HEIGHT: 69 IN | RESPIRATION RATE: 16 BRPM | OXYGEN SATURATION: 100 %

## 2021-01-03 DIAGNOSIS — R10.9 ABDOMINAL PAIN, UNSPECIFIED ABDOMINAL LOCATION: Primary | ICD-10-CM

## 2021-01-03 LAB
ALBUMIN SERPL-MCNC: 4.1 G/DL (ref 3.5–4.6)
ALP BLD-CCNC: 104 U/L (ref 40–130)
ALT SERPL-CCNC: 8 U/L (ref 0–33)
ANION GAP SERPL CALCULATED.3IONS-SCNC: 9 MEQ/L (ref 9–15)
AST SERPL-CCNC: 21 U/L (ref 0–35)
BASOPHILS ABSOLUTE: 0 K/UL (ref 0–0.2)
BASOPHILS RELATIVE PERCENT: 0.5 %
BILIRUB SERPL-MCNC: <0.2 MG/DL (ref 0.2–0.7)
BILIRUBIN URINE: NEGATIVE
BLOOD, URINE: NEGATIVE
BUN BLDV-MCNC: 8 MG/DL (ref 6–20)
CALCIUM SERPL-MCNC: 9.9 MG/DL (ref 8.5–9.9)
CHLORIDE BLD-SCNC: 102 MEQ/L (ref 95–107)
CHP ED QC CHECK: NORMAL
CLARITY: CLEAR
CO2: 29 MEQ/L (ref 20–31)
COLOR: YELLOW
CREAT SERPL-MCNC: 0.75 MG/DL (ref 0.5–0.9)
EOSINOPHILS ABSOLUTE: 0.2 K/UL (ref 0–0.7)
EOSINOPHILS RELATIVE PERCENT: 2.5 %
GFR AFRICAN AMERICAN: >60
GFR NON-AFRICAN AMERICAN: >60
GLOBULIN: 3.5 G/DL (ref 2.3–3.5)
GLUCOSE BLD-MCNC: 70 MG/DL (ref 70–99)
GLUCOSE URINE: NEGATIVE MG/DL
HCT VFR BLD CALC: 42 % (ref 37–47)
HEMOGLOBIN: 14.2 G/DL (ref 12–16)
KETONES, URINE: NEGATIVE MG/DL
LEUKOCYTE ESTERASE, URINE: NEGATIVE
LIPASE: 19 U/L (ref 12–95)
LYMPHOCYTES ABSOLUTE: 2.8 K/UL (ref 1–4.8)
LYMPHOCYTES RELATIVE PERCENT: 33.8 %
MAGNESIUM: 1.8 MG/DL (ref 1.7–2.4)
MCH RBC QN AUTO: 31 PG (ref 27–31.3)
MCHC RBC AUTO-ENTMCNC: 33.9 % (ref 33–37)
MCV RBC AUTO: 91.4 FL (ref 82–100)
MONOCYTES ABSOLUTE: 0.6 K/UL (ref 0.2–0.8)
MONOCYTES RELATIVE PERCENT: 6.9 %
NEUTROPHILS ABSOLUTE: 4.7 K/UL (ref 1.4–6.5)
NEUTROPHILS RELATIVE PERCENT: 56.3 %
NITRITE, URINE: NEGATIVE
PDW BLD-RTO: 13.8 % (ref 11.5–14.5)
PH UA: 5.5 (ref 5–9)
PLATELET # BLD: 345 K/UL (ref 130–400)
POTASSIUM SERPL-SCNC: 4.5 MEQ/L (ref 3.4–4.9)
PREGNANCY TEST URINE, POC: NORMAL
PROTEIN UA: NEGATIVE MG/DL
RBC # BLD: 4.59 M/UL (ref 4.2–5.4)
SODIUM BLD-SCNC: 140 MEQ/L (ref 135–144)
SPECIFIC GRAVITY UA: 1.02 (ref 1–1.03)
TOTAL PROTEIN: 7.6 G/DL (ref 6.3–8)
UROBILINOGEN, URINE: 0.2 E.U./DL
WBC # BLD: 8.4 K/UL (ref 4.8–10.8)

## 2021-01-03 PROCEDURE — 85025 COMPLETE CBC W/AUTO DIFF WBC: CPT

## 2021-01-03 PROCEDURE — 6360000002 HC RX W HCPCS: Performed by: EMERGENCY MEDICINE

## 2021-01-03 PROCEDURE — 96375 TX/PRO/DX INJ NEW DRUG ADDON: CPT

## 2021-01-03 PROCEDURE — 96376 TX/PRO/DX INJ SAME DRUG ADON: CPT

## 2021-01-03 PROCEDURE — 81003 URINALYSIS AUTO W/O SCOPE: CPT

## 2021-01-03 PROCEDURE — 36415 COLL VENOUS BLD VENIPUNCTURE: CPT

## 2021-01-03 PROCEDURE — 83690 ASSAY OF LIPASE: CPT

## 2021-01-03 PROCEDURE — 99284 EMERGENCY DEPT VISIT MOD MDM: CPT

## 2021-01-03 PROCEDURE — 74177 CT ABD & PELVIS W/CONTRAST: CPT

## 2021-01-03 PROCEDURE — 80053 COMPREHEN METABOLIC PANEL: CPT

## 2021-01-03 PROCEDURE — 96372 THER/PROPH/DIAG INJ SC/IM: CPT

## 2021-01-03 PROCEDURE — 96374 THER/PROPH/DIAG INJ IV PUSH: CPT

## 2021-01-03 PROCEDURE — 2580000003 HC RX 258: Performed by: EMERGENCY MEDICINE

## 2021-01-03 PROCEDURE — 83735 ASSAY OF MAGNESIUM: CPT

## 2021-01-03 PROCEDURE — 6360000004 HC RX CONTRAST MEDICATION: Performed by: EMERGENCY MEDICINE

## 2021-01-03 RX ORDER — 0.9 % SODIUM CHLORIDE 0.9 %
1000 INTRAVENOUS SOLUTION INTRAVENOUS ONCE
Status: COMPLETED | OUTPATIENT
Start: 2021-01-03 | End: 2021-01-03

## 2021-01-03 RX ORDER — PROMETHAZINE HYDROCHLORIDE 25 MG/ML
25 INJECTION, SOLUTION INTRAMUSCULAR; INTRAVENOUS ONCE
Status: COMPLETED | OUTPATIENT
Start: 2021-01-03 | End: 2021-01-03

## 2021-01-03 RX ORDER — MORPHINE SULFATE 2 MG/ML
4 INJECTION, SOLUTION INTRAMUSCULAR; INTRAVENOUS
Status: DISCONTINUED | OUTPATIENT
Start: 2021-01-03 | End: 2021-01-03 | Stop reason: HOSPADM

## 2021-01-03 RX ORDER — TRAMADOL HYDROCHLORIDE 50 MG/1
50 TABLET ORAL EVERY 4 HOURS PRN
Qty: 18 TABLET | Refills: 0 | Status: SHIPPED | OUTPATIENT
Start: 2021-01-03 | End: 2021-01-06

## 2021-01-03 RX ORDER — ONDANSETRON 2 MG/ML
4 INJECTION INTRAMUSCULAR; INTRAVENOUS ONCE
Status: COMPLETED | OUTPATIENT
Start: 2021-01-03 | End: 2021-01-03

## 2021-01-03 RX ADMIN — ONDANSETRON 4 MG: 2 INJECTION INTRAMUSCULAR; INTRAVENOUS at 16:33

## 2021-01-03 RX ADMIN — IOPAMIDOL 100 ML: 612 INJECTION, SOLUTION INTRAVENOUS at 14:30

## 2021-01-03 RX ADMIN — HYDROMORPHONE HYDROCHLORIDE 1 MG: 1 INJECTION, SOLUTION INTRAMUSCULAR; INTRAVENOUS; SUBCUTANEOUS at 15:43

## 2021-01-03 RX ADMIN — MORPHINE SULFATE 4 MG: 2 INJECTION, SOLUTION INTRAMUSCULAR; INTRAVENOUS at 13:16

## 2021-01-03 RX ADMIN — ONDANSETRON 4 MG: 2 INJECTION INTRAMUSCULAR; INTRAVENOUS at 13:16

## 2021-01-03 RX ADMIN — SODIUM CHLORIDE 1000 ML: 9 INJECTION, SOLUTION INTRAVENOUS at 13:16

## 2021-01-03 RX ADMIN — ONDANSETRON 4 MG: 2 INJECTION INTRAMUSCULAR; INTRAVENOUS at 15:20

## 2021-01-03 RX ADMIN — PROMETHAZINE HYDROCHLORIDE 25 MG: 25 INJECTION, SOLUTION INTRAMUSCULAR; INTRAVENOUS at 16:33

## 2021-01-03 ASSESSMENT — PAIN DESCRIPTION - FREQUENCY
FREQUENCY: CONTINUOUS

## 2021-01-03 ASSESSMENT — PAIN DESCRIPTION - PAIN TYPE
TYPE: ACUTE PAIN

## 2021-01-03 ASSESSMENT — ENCOUNTER SYMPTOMS
ABDOMINAL PAIN: 1
DIARRHEA: 0
BACK PAIN: 0
SHORTNESS OF BREATH: 0
COUGH: 0
NAUSEA: 0
VOMITING: 0
SORE THROAT: 0

## 2021-01-03 ASSESSMENT — PAIN DESCRIPTION - LOCATION
LOCATION: ABDOMEN

## 2021-01-03 ASSESSMENT — PAIN SCALES - GENERAL
PAINLEVEL_OUTOF10: 5
PAINLEVEL_OUTOF10: 8
PAINLEVEL_OUTOF10: 6
PAINLEVEL_OUTOF10: 6

## 2021-01-03 NOTE — ED NOTES
Discharge instructions given. Patient verbalizes understanding and denies any questions. Scripts x1 given. Patient ambulating with steady gait upon discharge accompanied by mother as .         Kris Jackson RN  01/03/21 4614

## 2021-01-03 NOTE — ED PROVIDER NOTES
[facility] ED  eMERGENCYdEPARTMENT eNCOUnter      Pt Name: Clarissa Rendon  MRN: [id number]  Armsisabellagffortunato 1985  Date of evaluation: 1/3/2021  Blair Conroy MD    CHIEF COMPLAINT           HPI  Clarissa Rendon is a 28 y.o. female per chart review has a h/o depression/axniety, asthma, DM II, MS, OA presents to the ED with ab pain. Pt notes gradual onset, moderate, constant, sharp, LLQ ab pain radiating to LUQ since this am.  Pt denies fever, n/v, cp, sob, dysuria, diarrhea. ROS  Review of Systems   Constitutional: Negative for activity change, chills and fever. HENT: Negative for ear pain and sore throat. Eyes: Negative for visual disturbance. Respiratory: Negative for cough and shortness of breath. Cardiovascular: Negative for chest pain, palpitations and leg swelling. Gastrointestinal: Positive for abdominal pain. Negative for diarrhea, nausea and vomiting. Genitourinary: Negative for dysuria. Musculoskeletal: Negative for back pain. Skin: Negative for rash. Neurological: Negative for dizziness and weakness. Except as noted above the remainder of the review of systems was reviewed and negative.        PAST MEDICAL HISTORY     Past Medical History:   Diagnosis Date    Anxiety and depression     Asthma     Atypical chest pain 5/31/2019    Chronic back pain     Diabetes mellitus ([facility] [address]     Headache     HIV exposure 1/6/2020    Irregular heart beat     Multiple sclerosis ([facility] [address]     Osteoarthritis     Pure hypercholesterolemia, unspecified 5/3/2018         SURGICAL HISTORY       Past Surgical History:   Procedure Laterality Date    CHOLECYSTECTOMY      UPPER GASTROINTESTINAL ENDOSCOPY N/A 8/13/2019    EGD ESOPHAGOGASTRODUODENOSCOPY performed by Katrina Heller MD at [facility]       Previous Medications    ALBUTEROL SULFATE  (90 BASE) MCG/ACT INHALER    Inhale 2 puffs into the lungs 4 times daily as needed for Wheezing BUPROPION (WELLBUTRIN XL) 300 MG EXTENDED RELEASE TABLET    Take by mouth    CETIRIZINE HCL 10 MG CAPS    Take by mouth    CLONAZEPAM (KLONOPIN) 0.5 MG TABLET    Take by mouth. FLUTICASONE (FLONASE) 50 MCG/ACT NASAL SPRAY    1 spray by Each Nostril route 2 times daily as needed for Rhinitis or Allergies    METFORMIN (GLUCOPHAGE-XR) 500 MG EXTENDED RELEASE TABLET    Take 1 tablet by mouth every other day FOR PREDIABETES. NORETHINDRONE-ETHINYL ESTRADIOL (NORTREL 0.5/35, 28,) 0.5-35 MG-MCG PER TABLET    Take 1 tablet by mouth daily    PANTOPRAZOLE (PROTONIX) 40 MG TABLET    TAKE 1 TABLET BY MOUTH DAILY WITH SUPPER FOR HEARTBURN    PRENATAL VIT-FE FUMARATE-FA (PRENATAL VITAMIN) 27-1 MG TABS TABLET    Take 1 tablet by mouth daily    RIMEGEPANT SULFATE (NURTEC) 75 MG TBDP    Take 75 mg by mouth as needed (acute migraine)    SERTRALINE (ZOLOFT) 25 MG TABLET    Take 1 tablet by mouth daily FOR ANXIETY/PANIC ATTACKS. TIZANIDINE (ZANAFLEX) 2 MG TABLET    Take 1 tablet by mouth nightly as needed (neck pain/spasm, nighttime only.)    VITAMIN D (CHOLECALCIFEROL) 50 MCG (2000 UT) TABS TABLET    Take 1 tablet by mouth daily HOLD WEEKLY DOSE AT THIS TIME.     VITAMIN D (ERGOCALCIFEROL) 1.25 MG (36910 UT) CAPS CAPSULE    FOR LOW VIT D. TAKE 1 CAPSULE BY MOUTH 1 TIME A WEEK       ALLERGIES     Nsaids, Pcn [penicillins], Phentermine, and Topamax [topiramate]    FAMILY HISTORY       Family History   Problem Relation Age of Onset    Cancer Mother     Arthritis Mother     Diabetes Father     Heart Disease Father     Stroke Father     High Blood Pressure Father           SOCIAL HISTORY       Social History     Socioeconomic History    Marital status: Single     Spouse name: None    Number of children: None    Years of education: None    Highest education level: None   Occupational History    None   Social Needs    Financial resource strain: Not hard at all   [place] insecurity     Worry: Never true     Inability: Never true    Transportation needs     Medical: No     Non-medical: No   Tobacco Use    Smoking status: Former Smoker     Packs/day: 0.50     Years: 10.00     Pack years: 5.00     Types: Cigarettes     Quit date: 1/27/2017     Years since quitting: 3.9    Smokeless tobacco: Never Used   Substance and Sexual Activity    Alcohol use: Not Currently     Comment: socially    Drug use: No    Sexual activity: Yes     Partners: Male   Lifestyle    Physical activity     Days per week: None     Minutes per session: None    Stress: None   Relationships    Social connections     Talks on phone: None     Gets together: None     Attends Restoration service: None     Active member of club or organization: None     Attends meetings of clubs or organizations: None     Relationship status: None    Intimate partner violence     Fear of current or ex partner: None     Emotionally abused: None     Physically abused: None     Forced sexual activity: None   Other Topics Concern    None   Social History Narrative    None         PHYSICAL EXAM       ED Triage Vitals [01/03/21 1225]   BP Temp Temp Source Pulse Resp SpO2 Height Weight   (!) 143/84 97.8 °F (36.6 °C) Temporal 87 18 99 % 5' 9\" (1.753 m) 269 lb (122 kg)       Physical Exam  Vitals signs and nursing note reviewed. Constitutional:       Appearance: She is well-developed. HENT:      Head: Normocephalic. Right Ear: External ear normal.      Left Ear: External ear normal.   Eyes:      Conjunctiva/sclera: Conjunctivae normal.      Pupils: Pupils are equal, round, and reactive to light. Neck:      Musculoskeletal: Normal range of motion and neck supple. Cardiovascular:      Rate and Rhythm: Normal rate and regular rhythm. Heart sounds: Normal heart sounds. Pulmonary:      Effort: Pulmonary effort is normal.      Breath sounds: Normal breath sounds. Abdominal:      General: Bowel sounds are normal. There is no distension. Palpations: Abdomen is soft. Tenderness: There is abdominal tenderness in the left upper quadrant and left lower quadrant. There is no guarding or rebound. Musculoskeletal: Normal range of motion. Skin:     General: Skin is warm and dry. Neurological:      Mental Status: She is alert and oriented to person, place, and time. Psychiatric:         Mood and Affect: Mood normal.           MDM  29 yo female presents to the ED with ab pain. Pt is afebrile, hemodynamically stable. Pt given 1 L NS, IV morphine, IV zofran in the ED. Labs, UA negative. CT AP negative. Pt reassessed and still has pain. Pt given IV dilaudid, IV zofran in the ED with complete relief. Suspect possible ovarian cysts/fibroids/endometriosis. Pt stopped taking her birth control Wednesday due to hot flashes. Pt educated about ab pain. Pt given prescription for tramadol, given ab pain warning signs, and will f/u with pcp. Pt understands plan. FINAL IMPRESSION      1.  Abdominal pain, unspecified abdominal location          DISPOSITION/PLAN   DISPOSITION Discharge - Pending Orders Complete 01/03/2021 03:28:13 PM        DISCHARGE MEDICATIONS:  [unfilled]         Jaqueline Hutton MD(electronically signed)  Attending Emergency Physician            Jaqueline Hutton MD  01/03/21 0744

## 2021-01-15 ENCOUNTER — TELEPHONE (OUTPATIENT)
Dept: FAMILY MEDICINE CLINIC | Age: 36
End: 2021-01-15

## 2021-01-15 DIAGNOSIS — M62.838 MUSCLE SPASMS OF NECK: ICD-10-CM

## 2021-01-15 RX ORDER — TIZANIDINE 2 MG/1
2 TABLET ORAL NIGHTLY PRN
Qty: 10 TABLET | Refills: 0 | Status: SHIPPED | OUTPATIENT
Start: 2021-01-15 | End: 2021-02-12

## 2021-01-15 NOTE — TELEPHONE ENCOUNTER
Pt said she is having muscle spasms in her leg & was wondering if you could send something over to the pharmacy.   She uses [facility] on [address] in [facility]

## 2021-01-30 ENCOUNTER — APPOINTMENT (OUTPATIENT)
Dept: CT IMAGING | Age: 36
End: 2021-01-30
Payer: COMMERCIAL

## 2021-01-30 ENCOUNTER — HOSPITAL ENCOUNTER (EMERGENCY)
Age: 36
Discharge: HOME | End: 2021-01-30
Payer: COMMERCIAL

## 2021-01-30 VITALS
RESPIRATION RATE: 19 BRPM | HEIGHT: 68 IN | DIASTOLIC BLOOD PRESSURE: 90 MMHG | HEART RATE: 77 BPM | SYSTOLIC BLOOD PRESSURE: 135 MMHG | OXYGEN SATURATION: 98 % | TEMPERATURE: 97.7 F | WEIGHT: 270 LBS | BODY MASS INDEX: 40.92 KG/M2

## 2021-01-30 DIAGNOSIS — R10.9 ABDOMINAL PAIN, UNSPECIFIED ABDOMINAL LOCATION: Primary | ICD-10-CM

## 2021-01-30 LAB
ALBUMIN SERPL-MCNC: 3.9 G/DL (ref 3.5–4.6)
ALP BLD-CCNC: 118 U/L (ref 40–130)
ALT SERPL-CCNC: 18 U/L (ref 0–33)
AMPHETAMINE SCREEN, URINE: NORMAL
ANION GAP SERPL CALCULATED.3IONS-SCNC: 8 MEQ/L (ref 9–15)
AST SERPL-CCNC: 25 U/L (ref 0–35)
BACTERIA: ABNORMAL /HPF
BARBITURATE SCREEN URINE: NORMAL
BASOPHILS ABSOLUTE: 0 K/UL (ref 0–0.2)
BASOPHILS RELATIVE PERCENT: 0.2 %
BENZODIAZEPINE SCREEN, URINE: NORMAL
BILIRUB SERPL-MCNC: <0.2 MG/DL (ref 0.2–0.7)
BILIRUBIN URINE: NEGATIVE
BLOOD, URINE: NEGATIVE
BUN BLDV-MCNC: 11 MG/DL (ref 6–20)
CALCIUM SERPL-MCNC: 9.1 MG/DL (ref 8.5–9.9)
CANNABINOID SCREEN URINE: NORMAL
CHLORIDE BLD-SCNC: 103 MEQ/L (ref 95–107)
CLARITY: CLEAR
CO2: 28 MEQ/L (ref 20–31)
COCAINE METABOLITE SCREEN URINE: NORMAL
COLOR: YELLOW
CREAT SERPL-MCNC: 0.75 MG/DL (ref 0.5–0.9)
EOSINOPHILS ABSOLUTE: 0.2 K/UL (ref 0–0.7)
EOSINOPHILS RELATIVE PERCENT: 2.4 %
EPITHELIAL CELLS, UA: ABNORMAL /HPF (ref 0–5)
GFR AFRICAN AMERICAN: >60
GFR NON-AFRICAN AMERICAN: >60
GLOBULIN: 3 G/DL (ref 2.3–3.5)
GLUCOSE BLD-MCNC: 105 MG/DL (ref 70–99)
GLUCOSE URINE: NEGATIVE MG/DL
HCG, URINE, POC: NEGATIVE
HCT VFR BLD CALC: 39.7 % (ref 37–47)
HEMOGLOBIN: 13.2 G/DL (ref 12–16)
HYALINE CASTS: ABNORMAL /HPF (ref 0–5)
KETONES, URINE: NEGATIVE MG/DL
LEUKOCYTE ESTERASE, URINE: ABNORMAL
LIPASE: 24 U/L (ref 12–95)
LYMPHOCYTES ABSOLUTE: 2.4 K/UL (ref 1–4.8)
LYMPHOCYTES RELATIVE PERCENT: 27.4 %
Lab: NORMAL
Lab: NORMAL
MCH RBC QN AUTO: 30.1 PG (ref 27–31.3)
MCHC RBC AUTO-ENTMCNC: 33.3 % (ref 33–37)
MCV RBC AUTO: 90.3 FL (ref 82–100)
METHADONE SCREEN, URINE: NORMAL
MONOCYTES ABSOLUTE: 0.4 K/UL (ref 0.2–0.8)
MONOCYTES RELATIVE PERCENT: 4.9 %
NEGATIVE QC PASS/FAIL: NORMAL
NEUTROPHILS ABSOLUTE: 5.6 K/UL (ref 1.4–6.5)
NEUTROPHILS RELATIVE PERCENT: 65.1 %
NITRITE, URINE: NEGATIVE
OPIATE SCREEN URINE: NORMAL
OXYCODONE URINE: NORMAL
PDW BLD-RTO: 13.9 % (ref 11.5–14.5)
PH UA: 7 (ref 5–9)
PHENCYCLIDINE SCREEN URINE: NORMAL
PLATELET # BLD: 268 K/UL (ref 130–400)
POSITIVE QC PASS/FAIL: NORMAL
POTASSIUM SERPL-SCNC: 4.9 MEQ/L (ref 3.4–4.9)
PROPOXYPHENE SCREEN: NORMAL
PROTEIN UA: NEGATIVE MG/DL
RBC # BLD: 4.39 M/UL (ref 4.2–5.4)
RBC UA: ABNORMAL /HPF (ref 0–5)
SODIUM BLD-SCNC: 139 MEQ/L (ref 135–144)
SPECIFIC GRAVITY UA: 1.01 (ref 1–1.03)
TOTAL PROTEIN: 6.9 G/DL (ref 6.3–8)
URINE REFLEX TO CULTURE: ABNORMAL
UROBILINOGEN, URINE: 0.2 E.U./DL
WBC # BLD: 8.6 K/UL (ref 4.8–10.8)
WBC UA: ABNORMAL /HPF (ref 0–5)

## 2021-01-30 PROCEDURE — 81001 URINALYSIS AUTO W/SCOPE: CPT

## 2021-01-30 PROCEDURE — 83690 ASSAY OF LIPASE: CPT

## 2021-01-30 PROCEDURE — 99283 EMERGENCY DEPT VISIT LOW MDM: CPT

## 2021-01-30 PROCEDURE — 96374 THER/PROPH/DIAG INJ IV PUSH: CPT

## 2021-01-30 PROCEDURE — 80053 COMPREHEN METABOLIC PANEL: CPT

## 2021-01-30 PROCEDURE — 2580000003 HC RX 258: Performed by: STUDENT IN AN ORGANIZED HEALTH CARE EDUCATION/TRAINING PROGRAM

## 2021-01-30 PROCEDURE — 6360000002 HC RX W HCPCS: Performed by: STUDENT IN AN ORGANIZED HEALTH CARE EDUCATION/TRAINING PROGRAM

## 2021-01-30 PROCEDURE — 6360000004 HC RX CONTRAST MEDICATION: Performed by: STUDENT IN AN ORGANIZED HEALTH CARE EDUCATION/TRAINING PROGRAM

## 2021-01-30 PROCEDURE — 74177 CT ABD & PELVIS W/CONTRAST: CPT

## 2021-01-30 PROCEDURE — 85025 COMPLETE CBC W/AUTO DIFF WBC: CPT

## 2021-01-30 PROCEDURE — 36415 COLL VENOUS BLD VENIPUNCTURE: CPT

## 2021-01-30 PROCEDURE — 80307 DRUG TEST PRSMV CHEM ANLYZR: CPT

## 2021-01-30 RX ORDER — 0.9 % SODIUM CHLORIDE 0.9 %
1000 INTRAVENOUS SOLUTION INTRAVENOUS ONCE
Status: COMPLETED | OUTPATIENT
Start: 2021-01-30 | End: 2021-01-30

## 2021-01-30 RX ORDER — SODIUM CHLORIDE 0.9 % (FLUSH) 0.9 %
10 SYRINGE (ML) INJECTION ONCE
Status: DISCONTINUED | OUTPATIENT
Start: 2021-01-30 | End: 2021-01-30 | Stop reason: HOSPADM

## 2021-01-30 RX ORDER — MORPHINE SULFATE 2 MG/ML
4 INJECTION, SOLUTION INTRAMUSCULAR; INTRAVENOUS
Status: DISCONTINUED | OUTPATIENT
Start: 2021-01-30 | End: 2021-01-30

## 2021-01-30 RX ORDER — ONDANSETRON 2 MG/ML
4 INJECTION INTRAMUSCULAR; INTRAVENOUS ONCE
Status: COMPLETED | OUTPATIENT
Start: 2021-01-30 | End: 2021-01-30

## 2021-01-30 RX ADMIN — SODIUM CHLORIDE 1000 ML: 9 INJECTION, SOLUTION INTRAVENOUS at 12:29

## 2021-01-30 RX ADMIN — IOPAMIDOL 100 ML: 612 INJECTION, SOLUTION INTRAVENOUS at 13:39

## 2021-01-30 RX ADMIN — ONDANSETRON 4 MG: 2 INJECTION INTRAMUSCULAR; INTRAVENOUS at 12:29

## 2021-01-30 ASSESSMENT — PAIN DESCRIPTION - DESCRIPTORS: DESCRIPTORS: SHARP

## 2021-01-30 ASSESSMENT — PAIN DESCRIPTION - LOCATION: LOCATION: ABDOMEN

## 2021-01-30 NOTE — ED PROVIDER NOTES
[address] [facility] ED  EMERGENCY DEPARTMENT ENCOUNTER      Pt Name: Hubert Brown  MRN: [id number]  [place] 1985  Date of evaluation: 1/30/2021  Provider: Parisa Davis Dr       Chief Complaint   Patient presents with    Abdominal Pain     abd pain x1 wk denies nvd         HISTORY OF PRESENT ILLNESS   (Location/Symptom, Timing/Onset, Context/Setting, Quality, Duration, Modifying Factors, Severity)  Note limiting factors. Hubert Brown is a 28 y.o. female who per chart review has pmhx of OA, PCOS, MS, anxiety and depression, CARMEN, myositis, conductive hearing loss, TIIDM presents to the emergency department with gradual onset, constant, moderate, diffuse abd pain worse in the LLQ described as sharp and shooting x 1 week. Pain is worse with movement. States it feels as if it is tearing and stretching when she lays down or moves. Has tried otc pain medications without relief. Pain relieved with sitting still. Pt was seen in the ED for similar on 1/3 with essentially negative w/u. She denies fever chills nvd distention urinary sx blood in the stool or urine vaginal discharge/odor/lesions. She is passing gas. Surgical hx of abd includes cholecystectomy. HPI    Nursing Notes were reviewed. REVIEW OF SYSTEMS    (2-9 systems for level 4, 10 or more for level 5)     Review of Systems   Constitutional: Negative for chills and fever. HENT: Negative for congestion. Eyes: Negative for photophobia. Respiratory: Negative for cough, shortness of breath and wheezing. Cardiovascular: Negative for chest pain and palpitations. Gastrointestinal: Positive for abdominal pain. Negative for abdominal distention, anal bleeding, blood in stool, constipation, diarrhea, nausea, rectal pain and vomiting. Genitourinary: Negative for dysuria, frequency and hematuria. Musculoskeletal: Negative for myalgias. Allergic/Immunologic: Negative for immunocompromised state.    Neurological: Negative for dizziness, weakness and headaches. All other systems reviewed and are negative. Except as noted above the remainder of the review of systems was reviewed and negative. PAST MEDICAL HISTORY     Past Medical History:   Diagnosis Date    Anxiety and depression     Asthma     Atypical chest pain 5/31/2019    Chronic back pain     Diabetes mellitus ([facility] [address]     Headache     HIV exposure 1/6/2020    Irregular heart beat     Multiple sclerosis ([facility] [address]     Osteoarthritis     Pure hypercholesterolemia, unspecified 5/3/2018         SURGICAL HISTORY       Past Surgical History:   Procedure Laterality Date    CHOLECYSTECTOMY      UPPER GASTROINTESTINAL ENDOSCOPY N/A 8/13/2019    EGD ESOPHAGOGASTRODUODENOSCOPY performed by Humberto Borges MD at [address] [facility]       Discharge Medication List as of 1/30/2021  2:42 PM      CONTINUE these medications which have NOT CHANGED    Details   tiZANidine (ZANAFLEX) 2 MG tablet Take 1 tablet by mouth nightly as needed (neck pain/spasm, nighttime only.), Disp-10 tablet, R-0Normal      metFORMIN (GLUCOPHAGE-XR) 500 MG extended release tablet Take 1 tablet by mouth every other day FOR PREDIABETES., Disp-90 tablet, R-2Normal      Prenatal Vit-Fe Fumarate-FA (PRENATAL VITAMIN) 27-1 MG TABS tablet Take 1 tablet by mouth daily, Disp-90 tablet, R-4Normal      buPROPion (WELLBUTRIN XL) 300 MG extended release tablet Take by mouthHistorical Med      Cetirizine HCl 10 MG CAPS Take by mouthHistorical Med      clonazePAM (KLONOPIN) 0.5 MG tablet Take by mouth. Historical Med      norethindrone-ethinyl estradiol (NORTREL 0.5/35, 28,) 0.5-35 MG-MCG per tablet Take 1 tablet by mouth daily, Disp-1 packet, R-5Normal      Rimegepant Sulfate (NURTEC) 75 MG TBDP Take 75 mg by mouth as needed (acute migraine), Disp-8 tablet, R-1Normal      vitamin D (CHOLECALCIFEROL) 50 MCG (2000 UT) TABS tablet Take 1 tablet by mouth daily HOLD WEEKLY DOSE AT THIS TIME., Disp-30 tablet,R-0Normal      pantoprazole (PROTONIX) 40 MG tablet TAKE 1 TABLET BY MOUTH DAILY WITH SUPPER FOR HEARTBURN, Disp-30 tablet, R-0Normal      vitamin D (ERGOCALCIFEROL) 1.25 MG (05778 UT) CAPS capsule FOR LOW VIT D. TAKE 1 CAPSULE BY MOUTH 1 TIME A WEEK, Disp-12 capsule, R-2Normal      sertraline (ZOLOFT) 25 MG tablet Take 1 tablet by mouth daily FOR ANXIETY/PANIC ATTACKS., Disp-30 tablet, R-5Normal      fluticasone (FLONASE) 50 MCG/ACT nasal spray 1 spray by Each Nostril route 2 times daily as needed for Rhinitis or Allergies, Disp-2 Bottle, R-1Normal      albuterol sulfate  (90 Base) MCG/ACT inhaler Inhale 2 puffs into the lungs 4 times daily as needed for Wheezing, Disp-1 Inhaler, R-5Normal             ALLERGIES     Nsaids, Pcn [penicillins], Phentermine, and Topamax [topiramate]    FAMILY HISTORY       Family History   Problem Relation Age of Onset    Cancer Mother     Arthritis Mother     Diabetes Father     Heart Disease Father     Stroke Father     High Blood Pressure Father           SOCIAL HISTORY       Social History     Socioeconomic History    Marital status: Single     Spouse name: None    Number of children: None    Years of education: None    Highest education level: None   Occupational History    None   Social Needs    Financial resource strain: Not hard at all   [place] insecurity     Worry: Never true     Inability: Never true    Transportation needs     Medical: No     Non-medical: No   Tobacco Use    Smoking status: Former Smoker     Packs/day: 0.50     Years: 10.00     Pack years: 5.00     Types: Cigarettes     Quit date: 2017     Years since quittin.0    Smokeless tobacco: Never Used   Substance and Sexual Activity    Alcohol use: Yes     Comment: occas    Drug use: No    Sexual activity: Yes     Partners: Male   Lifestyle    Physical activity     Days per week: None     Minutes per session: None    Stress: None   Relationships    Social oriented to person, place, and time. DIAGNOSTIC RESULTS     EKG: All EKG's are interpreted by the Emergency Department Physician who either signs or Co-signs this chart in the absence of a cardiologist.        RADIOLOGY:   Non-plain film images such as CT, Ultrasound and MRI are read by the radiologist. Plain radiographic images are visualized and preliminarily interpreted by the emergency physician with the below findings:        Interpretation per the Radiologist below, if available at the time of this note:    CT ABDOMEN PELVIS W IV CONTRAST Additional Contrast? None   Final Result   Impression: UNREMARKABLE CT SCAN OF THE ABDOMEN AND PELVIS AS DESCRIBED ABOVE         All CT scans at this facility use dose modulation, iterative reconstruction, and/or weight based dosing when appropriate to reduce radiation dose to as low as reasonably achievable. ED BEDSIDE ULTRASOUND:   Performed by ED Physician - none    LABS:  Labs Reviewed   COMPREHENSIVE METABOLIC PANEL - Abnormal; Notable for the following components:       Result Value    Anion Gap 8 (*)     Glucose 105 (*)     All other components within normal limits   URINE RT REFLEX TO CULTURE - Abnormal; Notable for the following components:    Leukocyte Esterase, Urine SMALL (*)     All other components within normal limits   MICROSCOPIC URINALYSIS - Abnormal; Notable for the following components:    Bacteria, UA FEW (*)     All other components within normal limits   URINE DRUG SCREEN   CBC WITH AUTO DIFFERENTIAL   LIPASE   POC PREGNANCY UR-QUAL       All other labs were within normal range or not returned as of this dictation.     EMERGENCY DEPARTMENT COURSE and DIFFERENTIAL DIAGNOSIS/MDM:   Vitals:    Vitals:    01/30/21 1144 01/30/21 1358   BP: 116/84 (!) 135/90   Pulse: 79 77   Resp: 17 19   Temp: 97.7 °F (36.5 °C)    TempSrc: Temporal    SpO2: 98% 98%   Weight: 270 lb (122.5 kg)    Height: 5' 8\" (1.727 m)        MDM     Pt is a 27 yo F who presents to the ED with abd pain. She is afebrile and HD stable. She was given 1 L IV NS, IV zofran in the ED. Pt denied pain medication. Labs unremarkable. UA + for few bacteria no wbc and small leukocyte esterase, suspect this is contamination. CT shows L cystic adnexal mass, 2.3 cm and some diastases of the anterior abd wall with small umbilical hernia containing mesenteric fat. No acute abnormality. Pt reassessed with some improvement. She is non toxic appearing with stable vitals, stable for discharge. Given referral to general surgery for f/u of hernia and diastases. She will also f/u with her ob/gyn for ovarian cyst. Return to the ED for worsening sx, given warning signs for which she should return. Pt understands and agrees to plan, all questions answered. REASSESSMENT          CRITICAL CARE TIME   Total Critical Care time was 0 minutes, excluding separately reportable procedures. There was a high probability of clinically significant/life threatening deterioration in the patient's condition which required my urgent intervention. CONSULTS:  None    PROCEDURES:  Unless otherwise noted below, none     Procedures        FINAL IMPRESSION      1.  Abdominal pain, unspecified abdominal location          DISPOSITION/PLAN   DISPOSITION Decision To Discharge 01/30/2021 02:52:22 PM      PATIENT REFERRED TO:  Paul Sanford PA-C  [place] [address] [address] [facility]  [phone]    Schedule an appointment as soon as possible for a visit   As needed, If symptoms worsen    [facility]) ED  [address] [facility]  [phone]  Go to   As needed, If symptoms worsen    Faith Smith MD  [address] [place] [address]  [phone]    Schedule an appointment as soon as possible for a visit in 2 days        DISCHARGE MEDICATIONS:  Discharge Medication List as of 1/30/2021  2:42 PM        Controlled Substances Monitoring:     RX Monitoring 6/25/2019   Attestation -   Periodic Controlled Substance Monitoring Possible medication side effects, risk of tolerance/dependence & alternative treatments discussed. ;No signs of potential drug abuse or diversion identified.;Obtaining appropriate analgesic effect of treatment.        (Please note that portions of this note were completed with a voice recognition program.  Efforts were made to edit the dictations but occasionally words are mis-transcribed.)    Dajuan Patrick PA-C (electronically signed)           Dajuan Patrick PA-C  01/31/21 5470

## 2021-01-31 ASSESSMENT — ENCOUNTER SYMPTOMS
NAUSEA: 0
VOMITING: 0
RECTAL PAIN: 0
PHOTOPHOBIA: 0
ABDOMINAL DISTENTION: 0
COUGH: 0
BLOOD IN STOOL: 0
WHEEZING: 0
SHORTNESS OF BREATH: 0
CONSTIPATION: 0
DIARRHEA: 0
ANAL BLEEDING: 0
ABDOMINAL PAIN: 1

## 2021-02-12 ENCOUNTER — OFFICE VISIT (OUTPATIENT)
Dept: FAMILY MEDICINE CLINIC | Age: 36
End: 2021-02-12
Payer: COMMERCIAL

## 2021-02-12 VITALS
SYSTOLIC BLOOD PRESSURE: 120 MMHG | WEIGHT: 269 LBS | RESPIRATION RATE: 20 BRPM | HEART RATE: 63 BPM | HEIGHT: 68 IN | BODY MASS INDEX: 40.77 KG/M2 | TEMPERATURE: 96.3 F | DIASTOLIC BLOOD PRESSURE: 82 MMHG | OXYGEN SATURATION: 99 %

## 2021-02-12 DIAGNOSIS — G43.719 INTRACTABLE CHRONIC MIGRAINE WITHOUT AURA AND WITHOUT STATUS MIGRAINOSUS: ICD-10-CM

## 2021-02-12 DIAGNOSIS — R10.2 PELVIC PAIN: ICD-10-CM

## 2021-02-12 DIAGNOSIS — N83.202 CYST OF LEFT OVARY: Primary | ICD-10-CM

## 2021-02-12 PROBLEM — M54.42 LUMBAGO WITH SCIATICA, LEFT SIDE: Status: RESOLVED | Noted: 2017-03-14 | Resolved: 2021-02-12

## 2021-02-12 PROBLEM — R09.89 OTHER SPECIFIED SYMPTOMS AND SIGNS INVOLVING THE CIRCULATORY AND RESPIRATORY SYSTEMS: Status: RESOLVED | Noted: 2018-05-03 | Resolved: 2021-02-12

## 2021-02-12 PROCEDURE — G8484 FLU IMMUNIZE NO ADMIN: HCPCS | Performed by: PHYSICIAN ASSISTANT

## 2021-02-12 PROCEDURE — G8417 CALC BMI ABV UP PARAM F/U: HCPCS | Performed by: PHYSICIAN ASSISTANT

## 2021-02-12 PROCEDURE — G8427 DOCREV CUR MEDS BY ELIG CLIN: HCPCS | Performed by: PHYSICIAN ASSISTANT

## 2021-02-12 PROCEDURE — 99213 OFFICE O/P EST LOW 20 MIN: CPT | Performed by: PHYSICIAN ASSISTANT

## 2021-02-12 PROCEDURE — 1036F TOBACCO NON-USER: CPT | Performed by: PHYSICIAN ASSISTANT

## 2021-02-12 RX ORDER — RIMEGEPANT SULFATE 75 MG/75MG
75 TABLET, ORALLY DISINTEGRATING ORAL PRN
Qty: 8 TABLET | Refills: 1 | Status: SHIPPED | OUTPATIENT
Start: 2021-02-12 | End: 2021-08-06

## 2021-02-12 ASSESSMENT — ENCOUNTER SYMPTOMS
ABDOMINAL DISTENTION: 0
COLOR CHANGE: 0
CHEST TIGHTNESS: 0
SHORTNESS OF BREATH: 0
TROUBLE SWALLOWING: 0
COUGH: 0
WHEEZING: 0
ABDOMINAL PAIN: 0

## 2021-02-12 NOTE — PROGRESS NOTES
Subjective  Timmothy , 28 y.o. female presents today with:  Chief Complaint   Patient presents with    Abdominal Pain     Patient states for the last month she has been having adbominal pain. She states it is like a pulling and stabbling feeling combined with activity      HPI  Bibiana is in the office today for ED follow up. Last OV with me: 12/17/2020    Stopped all medications except for albuterol inhaler PRN, nurtec for migraine. ED follow up. Presented to [facility] ED on 1/3/2021 and 1/30/2021 for lower abdominal pain. Patient reports sudden LLQ/pelvic pain. Sudden and sharp. Worse with movement/twising. Had CT of abdomen/pelvis x 2. Findings were normal.    Finding of cyst of L ovary. No longer on OCP. Denies changes to bowel movements, dysuria, flank pain. Migraine HA. Asking for refill of nurtec today. Medication is working very well. Prior failed therapies: topamax (allergy), imitrex, maxalt. No interest in monthly injectable. Review of Systems   Constitutional: Positive for activity change (with migraine) and unexpected weight change (gain/loss). Negative for appetite change, chills, diaphoresis, fatigue and fever. HENT: Negative for congestion and trouble swallowing. Respiratory: Negative for cough, chest tightness, shortness of breath and wheezing. Cardiovascular: Negative for chest pain and leg swelling. Gastrointestinal: Negative for abdominal distention and abdominal pain. Endocrine: Negative for polydipsia, polyphagia and polyuria. Genitourinary: Positive for pelvic pain. Negative for menstrual problem, vaginal bleeding, vaginal discharge and vaginal pain. Musculoskeletal: Negative for gait problem, joint swelling and myalgias. Skin: Negative for color change, rash and wound. Neurological: Positive for headaches (improved with nurtec). Negative for dizziness, tremors, seizures, syncope, facial asymmetry, light-headedness and numbness. Psychiatric/Behavioral: Negative for dysphoric mood and sleep disturbance. The patient is not nervous/anxious.       Past Medical History:   Diagnosis Date    Anxiety and depression     Asthma     Atypical chest pain 2019    Chronic back pain     Diabetes mellitus ([address]     Headache     HIV exposure 2020    Irregular heart beat     Multiple sclerosis (HCC)     Osteoarthritis     Pure hypercholesterolemia, unspecified 5/3/2018     Past Surgical History:   Procedure Laterality Date    CHOLECYSTECTOMY      UPPER GASTROINTESTINAL ENDOSCOPY N/A 2019    EGD ESOPHAGOGASTRODUODENOSCOPY performed by Mario Miller MD at [address] [place] [address] Marital status: Single     Spouse name: Not on file    Number of children: Not on file    Years of education: Not on file    Highest education level: Not on file   Occupational History    Not on file   Social Needs    Financial resource strain: Not hard at all    Food insecurity     Worry: Never true     Inability: Never true   [employer] needs     Medical: No     Non-medical: No   Tobacco Use    Smoking status: Former Smoker     Packs/day: 0.50     Years: 10.00     Pack years: 5.00     Types: Cigarettes     Quit date: 2017     Years since quittin.0    Smokeless tobacco: Never Used   Substance and Sexual Activity    Alcohol use: Yes     Comment: occas    Drug use: No    Sexual activity: Yes     Partners: Male   Lifestyle    Physical activity     Days per week: Not on file     Minutes per session: Not on file    Stress: Not on file   Relationships    Social connections     Talks on phone: Not on file     Gets together: Not on file     Attends Buddhist service: Not on file     Active member of club or organization: Not on file     Attends meetings of clubs or organizations: Not on file     Relationship status: Not on file    Intimate partner violence     Fear of current or ex partner: Not on file     Emotionally abused: Not on file     Physically abused: Not on file     Forced sexual activity: Not on file   Other Topics Concern    Not on file   Social History Narrative    Not on file     Family History   Problem Relation Age of Onset    Cancer Mother     Arthritis Mother     Diabetes Father     Heart Disease Father     Stroke Father     High Blood Pressure Father      Allergies   Allergen Reactions    Nsaids Other (See Comments)     Waiting for bariatric procedure    Pcn [Penicillins] Hives    Phentermine      Anxiety with hospitalization    Topamax [Topiramate] Other (See Comments)     DIZZY     Current Outpatient Medications   Medication Sig Dispense Refill    Rimegepant Sulfate (NURTEC) 75 MG TBDP Take 75 mg by mouth as needed (acute migraine) 8 tablet 1    albuterol sulfate  (90 Base) MCG/ACT inhaler Inhale 2 puffs into the lungs 4 times daily as needed for Wheezing 1 Inhaler 5     No current facility-administered medications for this visit. PMH, Surgical Hx, Family Hx, and Social Hx reviewed and updated. Health Maintenance reviewed. Objective  Vitals:    02/12/21 0837   BP: 120/82   Pulse: 63   Resp: 20   Temp: 96.3 °F (35.7 °C)   TempSrc: Temporal   SpO2: 99%   Weight: 269 lb (122 kg)   Height: 5' 8\" (1.727 m)     BP Readings from Last 3 Encounters:   02/12/21 120/82   01/30/21 (!) 135/90   01/03/21 118/88     Wt Readings from Last 3 Encounters:   02/12/21 269 lb (122 kg)   01/30/21 270 lb (122.5 kg)   01/03/21 269 lb (122 kg)     Physical Exam  Vitals signs reviewed. Constitutional:       Appearance: She is obese. HENT:      Head: Normocephalic and atraumatic. Right Ear: External ear normal.      Left Ear: External ear normal.      Nose: Nose normal.   Cardiovascular:      Rate and Rhythm: Normal rate and regular rhythm. Pulmonary:      Effort: Pulmonary effort is normal.      Breath sounds: Normal breath sounds.    Abdominal:      General: Bowel sounds are normal. There is no distension. Palpations: Abdomen is soft. There is no mass. Tenderness: There is no abdominal tenderness. There is no guarding. Musculoskeletal: Normal range of motion. Skin:     General: Skin is warm and dry. Neurological:      General: No focal deficit present. Mental Status: She is alert and oriented to person, place, and time. Assessment & Plan   Bibiana was seen today for abdominal pain. Diagnoses and all orders for this visit:    Cyst of left ovary    Pelvic pain    Intractable chronic migraine without aura and without status migrainosus  -     Rimegepant Sulfate (NURTEC) 75 MG TBDP; Take 75 mg by mouth as needed (acute migraine)      Reviewed ED visits today. Nurtec refilled. Discussed that pelvic pain most likely related to ovulation. Monitor, discussed monthly cycle and correlation with pelvic pain/ovulatoin. Return in 3 months for routine follow up.      Orders Placed This Encounter   Medications    Rimegepant Sulfate (NURTEC) 75 MG TBDP     Sig: Take 75 mg by mouth as needed (acute migraine)     Dispense:  8 tablet     Refill:  1     Medications Discontinued During This Encounter   Medication Reason    Cetirizine HCl 10 MG CAPS Patient Choice    vitamin D (CHOLECALCIFEROL) 50 MCG (2000 UT) TABS tablet Patient Choice    pantoprazole (PROTONIX) 40 MG tablet Patient Choice    vitamin D (ERGOCALCIFEROL) 1.25 MG (92062 UT) CAPS capsule Patient Choice    norethindrone-ethinyl estradiol (NORTREL 0.5/35, 28,) 0.5-35 MG-MCG per tablet Patient Choice    clonazePAM (KLONOPIN) 0.5 MG tablet Patient Choice    buPROPion (WELLBUTRIN XL) 300 MG extended release tablet Patient Choice    Prenatal Vit-Fe Fumarate-FA (PRENATAL VITAMIN) 27-1 MG TABS tablet Patient Choice    sertraline (ZOLOFT) 25 MG tablet Patient Choice    fluticasone (FLONASE) 50 MCG/ACT nasal spray Patient Choice    metFORMIN (GLUCOPHAGE-XR) 500 MG extended release tablet Patient Choice    tiZANidine (ZANAFLEX) 2 MG tablet Patient Choice    Rimegepant Sulfate (NURTEC) 75 MG TBDP REORDER     No follow-ups on file. Reviewed with the patient: current clinical status, medications, activities and diet. Side effects, adverse effects of the medication prescribed today, as well as treatment plan/ rationale and result expectations have been discussed with the patient who expresses understanding and desires to proceed. Close follow up to evaluate treatment results and for coordination of care. I have reviewed the patient's medical history in detail and updated the computerized patient record.     Leticia Villa PA-C

## 2021-02-25 ENCOUNTER — PATIENT MESSAGE (OUTPATIENT)
Dept: FAMILY MEDICINE CLINIC | Age: 36
End: 2021-02-25

## 2021-02-25 DIAGNOSIS — M25.562 ACUTE PAIN OF LEFT KNEE: Primary | ICD-10-CM

## 2021-03-03 ENCOUNTER — HOSPITAL ENCOUNTER (OUTPATIENT)
Dept: GENERAL RADIOLOGY | Age: 36
Discharge: HOME | End: 2021-03-05
Payer: COMMERCIAL

## 2021-03-03 DIAGNOSIS — M25.562 ACUTE PAIN OF LEFT KNEE: ICD-10-CM

## 2021-03-03 PROCEDURE — 73564 X-RAY EXAM KNEE 4 OR MORE: CPT

## 2021-03-12 ENCOUNTER — HOSPITAL ENCOUNTER (OUTPATIENT)
Dept: PHYSICAL THERAPY | Age: 36
Setting detail: THERAPIES SERIES
Discharge: HOME | End: 2021-03-12
Payer: COMMERCIAL

## 2021-03-12 PROBLEM — N76.0 VAGINITIS DUE TO GARDNERELLA VAGINALIS: Status: ACTIVE | Noted: 2021-03-12

## 2021-03-12 PROBLEM — Z87.19 HISTORY OF CHOLELITHIASIS: Status: ACTIVE | Noted: 2021-03-12

## 2021-03-12 PROBLEM — K62.9: Status: ACTIVE | Noted: 2021-03-12

## 2021-03-12 PROBLEM — Z30.41 USES ORAL CONTRACEPTION: Status: ACTIVE | Noted: 2021-03-12

## 2021-03-12 PROBLEM — F12.11: Status: ACTIVE | Noted: 2021-02-22

## 2021-03-12 PROBLEM — B96.89 VAGINITIS DUE TO GARDNERELLA VAGINALIS: Status: ACTIVE | Noted: 2021-03-12

## 2021-03-12 PROBLEM — E11.9 TYPE 2 DIABETES MELLITUS WITHOUT COMPLICATION, WITHOUT LONG-TERM CURRENT USE OF INSULIN (HCC): Status: ACTIVE | Noted: 2017-10-09

## 2021-03-12 PROBLEM — Z34.90 ENCOUNTER FOR SUPERVISION OF NORMAL PREGNANCY: Status: ACTIVE | Noted: 2021-03-12

## 2021-03-12 PROBLEM — Z87.42 HISTORY OF RECURRENT VAGINAL DISCHARGE: Status: ACTIVE | Noted: 2021-03-12

## 2021-03-12 PROBLEM — Z86.2 HISTORY OF ANEMIA: Status: ACTIVE | Noted: 2021-03-12

## 2021-03-12 PROBLEM — R23.8 SKIN IRRITATION: Status: ACTIVE | Noted: 2018-12-10

## 2021-03-12 PROBLEM — N90.89 LESION OF LABIA: Status: ACTIVE | Noted: 2021-03-12

## 2021-03-12 PROBLEM — Z86.69 HISTORY OF HEARING LOSS: Status: ACTIVE | Noted: 2021-03-12

## 2021-03-12 PROBLEM — Z86.16 HISTORY OF 2019 NOVEL CORONAVIRUS DISEASE (COVID-19): Status: ACTIVE | Noted: 2020-12-31

## 2021-03-12 PROBLEM — Z30.012 EMERGENCY CONTRACEPTIVE COUNSELING AND PRESCRIPTION: Status: ACTIVE | Noted: 2020-11-11

## 2021-03-12 PROBLEM — O09.219 HIGH RISK PREGNANCY DUE TO HISTORY OF PRETERM LABOR: Status: ACTIVE | Noted: 2021-03-12

## 2021-03-12 PROBLEM — R22.1 SENSATION OF LUMP IN THROAT: Status: ACTIVE | Noted: 2020-11-11

## 2021-03-12 PROBLEM — N89.8 LEUKORRHEA: Status: ACTIVE | Noted: 2021-03-12

## 2021-03-12 PROBLEM — Z11.3 ROUTINE SCREENING FOR STI (SEXUALLY TRANSMITTED INFECTION): Status: ACTIVE | Noted: 2021-03-12

## 2021-03-12 PROCEDURE — 97161 PT EVAL LOW COMPLEX 20 MIN: CPT

## 2021-03-12 NOTE — PLAN OF CARE
Long term goal 5: Patient will be independent with HEP. Patient issued HEP. [] yes  [x] no        Body structures, Functions, Activity limitations: Decreased ROM, Decreased strength, Increased pain  Assessment: Patient reports injury to left knee mid Feburary causing increased pain and discomfort with weightbearing activity. Upon PT evaluation, patient demonstrates some tenderness to palpation along medial joint line and +Apleys compression test.  Further PT recommended to improve left LE strength for overall improved functional tolerance. Prognosis: Good  Discharge Recommendations: Continue to assess pending progress      PT Education: Goals;PT Role;Plan of Care;Home Exercise Program    PLAN: [x] Evaluate and Treat  Frequency/Duration:  Plan  Times per week: 2  Plan weeks: 4  Current Treatment Recommendations: Strengthening, ROM, Endurance Training, Gait Training, Neuromuscular Re-education, Manual Therapy - Soft Tissue Mobilization, Home Exercise Program, Patient/Caregiver Education & Training, Modalities     Precautions:                            Patient Status:[x] Continue/ Initiate plan of Care    [] Discharge PT. Recommend pt continue with HEP. [] Additional visits requested, Please re-certify for additional visits:          Signature: Electronically signed by Greg Francis PT on 3/12/21 at 10:26 AM EST      If you have any questions or concerns, please don't hesitate to call. Thank you for your referral.    I have reviewed this plan of care and certify a need for medically necessary rehabilitation services.     Physician Signature:__________________________________________________________  Date:  Please sign and return

## 2021-03-12 NOTE — PROGRESS NOTES
[address]  PHYSICAL THERAPY EVALUATION    Date: 3/12/2021  Patient Name: Su Rosales       MRN: [id number]   Account: [de-identified]   : 1985  (28 y.o.)   Gender: female   Referring Practitioner: Jorja Apgar PA                 Diagnosis: Acute pain of left knee  Treatment Diagnosis: left knee pain, decreased ROM in hamstring, decreased left LE strength  Additional Pertinent Hx: hx of back pain, OA, MS, asthma, gall bladder removal             Past Medical History:  has a past medical history of Anxiety and depression, Asthma, Atypical chest pain, Chronic back pain, Diabetes mellitus ([facility] [address] Headache, HIV exposure, Irregular heart beat, Multiple sclerosis ([facility] [address] Osteoarthritis, and Pure hypercholesterolemia, unspecified. Past Surgical History:   has a past surgical history that includes Cholecystectomy and Upper gastrointestinal endoscopy (N/A, 2019). Vital Signs  Patient Currently in Pain: No   Pain Screening  Patient Currently in Pain: No  Pain Assessment  Pain Assessment: 0-10  Pain Level: 0                ADL Assistance: Independent  Ambulation Assistance: Independent(without AD currently)  Transfer Assistance: Independent  Additional Comments: no hx of falls        Subjective:  Subjective: Patient reports pain after stepping wrong way while line dancing onto right knee on 21. Increased pain with weightbearing at the time but has improved. Reports sometimes gets pain with stepping the wrong way and bending. X-ray of knee was negative for fracture.        Objective:   Sensation  Overall Sensation Status: WFL              Ambulation 1  Surface: carpet  Device: No Device  Assistance: Modified Independent  Quality of Gait: bilateral knee valgus  Gait Deviations: Slow Lindsey, Decreased step length, Decreased step height  Distance: clinical distance in department        Transfers  Sit to Stand: Modified independent  Stand to sit: Modified independent    Strength RLE  R Hip Flexion: 4+/5  R Hip Extension: 4+/5  R Hip ABduction: 4+/5  R Hip Internal Rotation: 4/5  R Hip External Rotation: 4/5  R Knee Flexion: 4-/5  R Knee Extension: 5/5  R Ankle Dorsiflexion: 5/5  Strength LLE  L Hip Flexion: 4/5  L Hip Extension: 4/5  L Hip ABduction: 4-/5  L Hip Internal Rotation: 4/5  L Hip External Rotation: 4/5  L Knee Flexion: 4-/5  L Knee Extension: 4+/5  L Ankle Dorsiflexion: 5/5             PROM RLE (degrees)  RLE General PROM: SLR: 70 deg     PROM LLE (degrees)  LLE General PROM: SLR: 60 deg  AROM LLE (degrees)  LLE General AROM: left knee some hyperextension observed, 125 deg of flexion                     Observation/Palpation  Posture: Fair(rounded shoulders)  Palpation: tenderness to palpation along left knee medial joint line and superior aspect of patella  Observation: bilateral knee valgus, bilateral knee hyperextension observed  Bed mobility  Supine to Sit: Independent  Sit to Supine: Independent          Additional Measures  Special Tests: +Apleys compression test on left  Other: LEFS: 55/80         Exercises:   Exercises  Exercise 1: quad sets with towel roll 5s x 5  Exercise 2: seated hamstring stretch 20s x 2, left  Exercise 3: hamstring isometrics with Pall*  Exercise 4: 3 way hip on left*  Exercise 5: clamshells with Tband*  Exercise 6: bike*  Exercise 7: LAQ, hamstring curls with Tband*  Exercise 8: step ups*  Exercise 9: rockerboard*  Exercise 20: HEP: quad sets, hamstring stretch  Modalities:  Modalities  Cryotherapy (Minutes\Location): PRN*  Ultrasound: PRN 1.2 W/cm2, 3MHz, medial joint line of left knee*  Manual:  Manual therapy  PROM: hamstring stretch on left*  *Indicates exercise,modality, or manual techniques to be initiated when appropriate  Assessment:   Body structures, Functions, Activity limitations: Decreased ROM, Decreased strength, Increased pain  Assessment: Patient reports injury to left knee mid Feburary causing increased pain and discomfort with weightbearing activity. Upon PT evaluation, patient demonstrates some tenderness to palpation along medial joint line and +Apleys compression test.  Further PT recommended to improve left LE strength for overall improved functional tolerance. Prognosis: Good  Discharge Recommendations: Continue to assess pending progress        Decision Making: Low Complexity  History: MS, OA, chronic back pain, asthma  Exam: left knee pain, decreased ROM in hamstring, decreased left LE strength  Clinical Presentation: stable        Plan  Frequency/Duration:  Plan  Times per week: 2  Plan weeks: 4  Current Treatment Recommendations: Strengthening, ROM, Endurance Training, Gait Training, Neuromuscular Re-education, Manual Therapy - Soft Tissue Mobilization, Home Exercise Program, Patient/Caregiver Education & Training, Modalities         Patient Education  New Education Provided: PT Education: Goals;PT Role;Plan of Care;Home Exercise Program    POST-PAIN     Pain Rating (0-10 pain scale):   0/10  Location and pain description same as pre-treatment unless indicated. Action: [] NA  [] Call Physician  [] Perform HEP  [] Meds as prescribed    Evaluation and patient rights have been reviewed and patient agrees with plan of care. Yes  [x]  No  []   Explain:       Saritha Fall Risk Assessment  Risk Factor Scale  Score   History of Falls [] Yes  [x] No 25  0 0   Secondary Diagnosis [] Yes  [x] No 15  0 0   Ambulatory Aid [] Furniture  [] Crutches/cane/walker  [x] None/bedrest/wheelchair/nurse 30  15  0 0   IV/Heparin Lock [] Yes  [x] No 20  0 0   Gait/Transferring [] Impaired  [x] Weak  [] Normal/bedrest/immobile 20  10  0 10   Mental Status [] Forgets limitations  [x] Oriented to own ability 15  0 0      Total: 10     Based on the Assessment score: check the appropriate box.   [x]  No intervention needed   Low =   Score of 0-24  []  Use standard prevention interventions Moderate =  Score of 24-44   [] Discuss fall prevention strategies   [] Indicate moderate falls risk on eval  []  Use high risk prevention interventions High = Score of 45 and higher   [] Discuss fall prevention strategies   [] Provide supervision during treatment time    Goals  Long term goals  Time Frame for Long term goals : 4 weeks  Long term goal 1: Patient will report 0/10 pain in left knee with all activities. Long term goal 2: Patient will increase left SLR >/= 70 degrees for improved functional tolerance. Long term goal 3: Patient will increase strength in left LE >/= 4+/5 for improved weightbearing activities. Long term goal 4: LEFS >/= 65/80 to demonstrate functional improvements. Long term goal 5: Patient will be independent with HEP.          PT Individual Minutes  Time In: 5610  Time Out: 8122  Minutes: 31  Timed Code Treatment Minutes: 6 Minutes  Procedure Minutes: 25 PT evaluation minutes     Timed Activity Minutes Units   Ther Ex  5  0   Manual          Electronically signed by Danielle Oscar PT on 3/12/21 at 10:25 AM EST

## 2021-03-17 ENCOUNTER — OFFICE VISIT (OUTPATIENT)
Dept: NEUROLOGY | Age: 36
End: 2021-03-17
Payer: COMMERCIAL

## 2021-03-17 VITALS
BODY MASS INDEX: 41.22 KG/M2 | DIASTOLIC BLOOD PRESSURE: 78 MMHG | HEART RATE: 86 BPM | SYSTOLIC BLOOD PRESSURE: 130 MMHG | WEIGHT: 271.1 LBS

## 2021-03-17 DIAGNOSIS — G56.03 BILATERAL CARPAL TUNNEL SYNDROME: ICD-10-CM

## 2021-03-17 DIAGNOSIS — M54.32 SCIATICA, LEFT SIDE: ICD-10-CM

## 2021-03-17 DIAGNOSIS — G35 MULTIPLE SCLEROSIS (HCC): Primary | ICD-10-CM

## 2021-03-17 DIAGNOSIS — G43.719 INTRACTABLE CHRONIC MIGRAINE WITHOUT AURA AND WITHOUT STATUS MIGRAINOSUS: ICD-10-CM

## 2021-03-17 PROCEDURE — 1036F TOBACCO NON-USER: CPT | Performed by: PSYCHIATRY & NEUROLOGY

## 2021-03-17 PROCEDURE — G8427 DOCREV CUR MEDS BY ELIG CLIN: HCPCS | Performed by: PSYCHIATRY & NEUROLOGY

## 2021-03-17 PROCEDURE — G8417 CALC BMI ABV UP PARAM F/U: HCPCS | Performed by: PSYCHIATRY & NEUROLOGY

## 2021-03-17 PROCEDURE — G8484 FLU IMMUNIZE NO ADMIN: HCPCS | Performed by: PSYCHIATRY & NEUROLOGY

## 2021-03-17 PROCEDURE — 99214 OFFICE O/P EST MOD 30 MIN: CPT | Performed by: PSYCHIATRY & NEUROLOGY

## 2021-03-17 RX ORDER — PEN NEEDLE, DIABETIC 31 GX5/16"
NEEDLE, DISPOSABLE MISCELLANEOUS
COMMUNITY
Start: 2021-02-19 | End: 2021-03-17

## 2021-03-17 RX ORDER — GABAPENTIN 300 MG/1
CAPSULE ORAL
COMMUNITY
Start: 2021-02-25 | End: 2021-08-06

## 2021-03-17 RX ORDER — LIRAGLUTIDE 6 MG/ML
INJECTION SUBCUTANEOUS
COMMUNITY
Start: 2021-02-19 | End: 2021-04-15 | Stop reason: ALTCHOICE

## 2021-03-17 ASSESSMENT — ENCOUNTER SYMPTOMS
VOMITING: 0
PHOTOPHOBIA: 0
COLOR CHANGE: 0
TROUBLE SWALLOWING: 0
BACK PAIN: 0
CHOKING: 0
SHORTNESS OF BREATH: 0
NAUSEA: 0

## 2021-03-17 NOTE — PROGRESS NOTES
Subjective:      Patient ID: Pauly Umana is a 28 y.o. female who presents today for:  Chief Complaint   Patient presents with    Follow-up     PT states that she has been okay. And she says the [facility] has helped with the migraines. HPI 28 yrs old right-handed female history of multiple sclerosis. Patient has borderline findings and she had a negative lumbar puncture and therefore we have been sitting on the fence in terms of treatment and she did not want to have treatment. Patient also has headaches which are intractable and the migraine headaches. As noted patient was thinking of bariatric surgery chest wall lesion on the MRI with a negative lumbar puncture. She has migraines and some of this could be migrainous as well. She has not any symptoms from the same. We had recommended Ajovy and consider for Tecfidera and she was reluctant. Not any relapse. She reports allergy to Topamax. She only uses sumatriptan. She has hypersomnolence but no session of narcolepsy.     Past Medical History:   Diagnosis Date    Anxiety and depression     Asthma     Atypical chest pain 5/31/2019    Chronic back pain     Diabetes mellitus ([facility] [address]     Headache     HIV exposure 1/6/2020    Irregular heart beat     Multiple sclerosis (HCC)     Osteoarthritis     Pure hypercholesterolemia, unspecified 5/3/2018     Past Surgical History:   Procedure Laterality Date    CHOLECYSTECTOMY      UPPER GASTROINTESTINAL ENDOSCOPY N/A 8/13/2019    EGD ESOPHAGOGASTRODUODENOSCOPY performed by Dov Fleming MD at [address] [place] [address] Marital status: Single     Spouse name: Not on file    Number of children: Not on file    Years of education: Not on file    Highest education level: Not on file   Occupational History    Not on file   Social Needs    Financial resource strain: Not hard at all    Food insecurity     Worry: Never true     Inability: Never true   [place] needs     Medical: No     Non-medical: No   Tobacco Use    Smoking status: Former Smoker     Packs/day: 0.50     Years: 10.00     Pack years: 5.00     Types: Cigarettes     Quit date: 2017     Years since quittin.1    Smokeless tobacco: Never Used   Substance and Sexual Activity    Alcohol use: Yes     Comment: occas    Drug use: No    Sexual activity: Yes     Partners: Male   Lifestyle    Physical activity     Days per week: Not on file     Minutes per session: Not on file    Stress: Not on file   Relationships    Social connections     Talks on phone: Not on file     Gets together: Not on file     Attends Confucianism service: Not on file     Active member of club or organization: Not on file     Attends meetings of clubs or organizations: Not on file     Relationship status: Not on file    Intimate partner violence     Fear of current or ex partner: Not on file     Emotionally abused: Not on file     Physically abused: Not on file     Forced sexual activity: Not on file   Other Topics Concern    Not on file   Social History Narrative    Not on file     Family History   Problem Relation Age of Onset    Cancer Mother     Arthritis Mother     Diabetes Father     Heart Disease Father     Stroke Father     High Blood Pressure Father      Allergies   Allergen Reactions    Nsaids Other (See Comments)     Waiting for bariatric procedure    Pcn [Penicillins] Hives    Phentermine      Anxiety with hospitalization    Topamax [Topiramate] Other (See Comments)     DIZZY       Current Outpatient Medications   Medication Sig Dispense Refill    gabapentin (NEURONTIN) 300 MG capsule TAKE ONE CAPSULE BY MOUTH THREE TIMES DAILY AS NEEDED FOR LEFT LEG PAIN FLARES      VICTOZA 18 MG/3ML SOPN SC injection ADMINISTER 0.6 MG UNDER THE SKIN DAILY. INCREASE TO 1.2 MG DAILY AFTER 1 WEEK      Insulin Pen Needle 31G X 5 MM MISC For use with liraglutide pen.       Rimegepant Sulfate (NURTEC) 75 MG TBDP Take 75 mg by mouth as needed (acute migraine) 8 tablet 1    albuterol sulfate  (90 Base) MCG/ACT inhaler Inhale 2 puffs into the lungs 4 times daily as needed for Wheezing 1 Inhaler 5     No current facility-administered medications for this visit. Review of Systems   Constitutional: Negative for fever. HENT: Negative for ear pain, tinnitus and trouble swallowing. Eyes: Negative for photophobia and visual disturbance. Respiratory: Negative for choking and shortness of breath. Cardiovascular: Negative for chest pain and palpitations. Gastrointestinal: Negative for nausea and vomiting. Musculoskeletal: Negative for back pain, gait problem, joint swelling, myalgias, neck pain and neck stiffness. Skin: Negative for color change. Allergic/Immunologic: Negative for food allergies. Neurological: Negative for dizziness, tremors, seizures, syncope, facial asymmetry, speech difficulty, weakness, light-headedness, numbness and headaches. Psychiatric/Behavioral: Negative for behavioral problems, confusion, hallucinations and sleep disturbance. Objective:   /78 (Site: Left Upper Arm, Position: Sitting, Cuff Size: Medium Adult)   Pulse 86   Wt 271 lb 1.6 oz (123 kg)   BMI 41.22 kg/m²     Physical Exam  Vitals signs reviewed. Eyes:      Pupils: Pupils are equal, round, and reactive to light. Neck:      Musculoskeletal: Normal range of motion. Cardiovascular:      Rate and Rhythm: Normal rate and regular rhythm. Heart sounds: No murmur. Pulmonary:      Effort: Pulmonary effort is normal.      Breath sounds: Normal breath sounds. Abdominal:      General: Bowel sounds are normal.   Musculoskeletal: Normal range of motion. Skin:     General: Skin is warm. Neurological:      Mental Status: She is alert and oriented to person, place, and time. Cranial Nerves: No cranial nerve deficit. Sensory: No sensory deficit. Motor: No abnormal muscle tone. Coordination: Coordination normal.      Deep Tendon Reflexes: Reflexes are normal and symmetric. Babinski sign absent on the right side. Babinski sign absent on the left side. Psychiatric:         Mood and Affect: Mood normal.         No results found. Lab Results   Component Value Date    WBC 8.6 01/30/2021    RBC 4.39 01/30/2021    RBC 4.55 03/09/2019    HGB 13.2 01/30/2021    HCT 39.7 01/30/2021    MCV 90.3 01/30/2021    MCH 30.1 01/30/2021    MCHC 33.3 01/30/2021    RDW 13.9 01/30/2021     01/30/2021    MPV 9.4 03/09/2019     Lab Results   Component Value Date     01/30/2021    K 4.9 01/30/2021     01/30/2021    CO2 28 01/30/2021    BUN 11 01/30/2021    CREATININE 0.75 01/30/2021    GFRAA >60.0 01/30/2021    AGRATIO 1.3 03/09/2019    LABGLOM >60.0 01/30/2021    GLUCOSE 105 01/30/2021    GLUCOSE 131 03/09/2019    PROT 6.9 01/30/2021    LABALBU 3.9 01/30/2021    LABALBU 3.8 03/09/2019    CALCIUM 9.1 01/30/2021    BILITOT <0.2 01/30/2021    ALKPHOS 118 01/30/2021    AST 25 01/30/2021    ALT 18 01/30/2021     Lab Results   Component Value Date    PROTIME 10.7 04/30/2019    INR 1.1 04/30/2019     Lab Results   Component Value Date    TSH 2.210 12/11/2017    FERRITIN 84.7 05/18/2017    IRON 38 05/18/2017    TIBC 315 05/18/2017     Lab Results   Component Value Date    TRIG 125 06/15/2020    HDL 42 06/15/2020    LDLCALC 79 06/15/2020     Lab Results   Component Value Date    LABAMPH Neg 01/30/2021    BARBSCNU Neg 01/30/2021    LABBENZ Neg 01/30/2021    LABBENZ NotDTCD 12/09/2012    LABMETH Neg 01/30/2021    OPIATESCREENURINE Neg 01/30/2021    PHENCYCLIDINESCREENURINE Neg 01/30/2021    ETOH <10 08/25/2020     No results found for: LITHIUM, DILFRTOT, VALPROATE    Assessment:       Diagnosis Orders   1. Multiple sclerosis (HCC)     2. Sciatica, left side     3. Intractable chronic migraine without aura and without status migrainosus     4.  Bilateral carpal tunnel syndrome     Multiple sclerosis diagnosed with an MRI with a negative lumbar puncture. She has 2 lesions. We had recommended treatment and consider for Tecfidera she was somewhat reluctant and did not want to consider medication at this time she is reasonable she is not any relapses or any other findings. EDSS score remains 0-1. She has intractable headaches as well and we had tried on Imitrex which was not helping but [facility] would [facility]. We continue to discuss MS medications and she would like to keep her on for now. She is contemplating bariatric surgery and there is no contraindication for the same. PT though discussed that there are complications that can happen neurologically with bariatric surgery and she should read up on this. Plan:      No orders of the defined types were placed in this encounter. No orders of the defined types were placed in this encounter. No follow-ups on file.       Sonia Stinson MD

## 2021-03-17 NOTE — LETTER
[facility] Neurology     2021  [address] [place] [address] Lucinda Prater, [address] [facility]      Tim Marquez      Patient Info: Bibiana Moses  : 1985  [address], [address] [facility]  Phone: [phone]      To whom it may concern,    In my professional opinion I feel that Gardenia Zambrano is medically cleared for bariatric surgery in a neurological stand point. If you have any questions on concerns please feel free to call my office at [phone]. Sincerely,          Tim Morrison

## 2021-03-19 ENCOUNTER — HOSPITAL ENCOUNTER (OUTPATIENT)
Dept: PHYSICAL THERAPY | Age: 36
Setting detail: THERAPIES SERIES
Discharge: HOME | End: 2021-03-19
Payer: COMMERCIAL

## 2021-03-19 PROCEDURE — 97110 THERAPEUTIC EXERCISES: CPT

## 2021-03-19 NOTE — PROGRESS NOTES
[address] [address]  Outpatient Physical Therapy    Treatment Note        Date: 3/19/2021  Patient: Tanna Izquierdo  : 1985  ACCT #: [de-identified]  Referring Practitioner: Bang FERNANDEZ  Diagnosis: Acute pain of left knee    Visit Information:  PT Visit Information  PT Insurance Information: [address]  Total # of Visits Approved: (unlimited visits based off medical necessity)  Total # of Visits to Date: 2  Plan of Care/Certification Expiration Date: 21  No Show: 0  Canceled Appointment: 1  Progress Note Counter:  (PN due 21)    Subjective: Pt stated shes not in pain until she steps on it. Comments: Pt arrived 9min late to tx. HEP Compliance:  [x] Good [] Fair [] Poor [] Reports not doing due to:    Vital Signs  Patient Currently in Pain: No   Pain Screening  Patient Currently in Pain: No    OBJECTIVE:   Exercises  Exercise 1: quad sets with towel roll 5s x 10  Exercise 2: seated hamstring stretch 30s x 3, left  Exercise 4: standing 4-way hip with YTb x10  Exercise 6: bike seat 2 x5min for ROM and endurance  Exercise 7: LAQ, hamstring curls with YTband x10 each  Strength: [x] NT  [] MMT completed:    ROM: [x] NT  [] ROM measurements:  Modalities:  Modalities  Cryotherapy (Minutes\Location): CP x10min     *Indicates exercise, modality, or manual techniques to be initiated when appropriate    Assessment: Body structures, Functions, Activity limitations: Decreased ROM, Decreased strength, Increased pain  Assessment: Iniated ex's per poc with pt good macy. Pt with good form and technique of ex's, no c/o pain in knee throughout tx just pain in back while lying supine. Shortened tx this date due to pt arriving late. Treatment Diagnosis: left knee pain, decreased ROM in hamstring, decreased left LE strength  Prognosis: Good  Goals:  Long term goals  Time Frame for Long term goals : 4 weeks  Long term goal 1: Patient will report 0/10 pain in left knee with all activities.   Long term

## 2021-03-24 ENCOUNTER — HOSPITAL ENCOUNTER (OUTPATIENT)
Dept: PHYSICAL THERAPY | Age: 36
Setting detail: THERAPIES SERIES
Discharge: HOME | End: 2021-03-24
Payer: COMMERCIAL

## 2021-03-31 ENCOUNTER — HOSPITAL ENCOUNTER (OUTPATIENT)
Dept: PHYSICAL THERAPY | Age: 36
Setting detail: THERAPIES SERIES
Discharge: HOME | End: 2021-03-31
Payer: COMMERCIAL

## 2021-03-31 PROCEDURE — 97110 THERAPEUTIC EXERCISES: CPT

## 2021-03-31 ASSESSMENT — PAIN SCALES - GENERAL: PAINLEVEL_OUTOF10: 0

## 2021-03-31 NOTE — PROGRESS NOTES
hamstring, decreased left LE strength        Goals:  Long term goals  Time Frame for Long term goals : 4 weeks  Long term goal 1: Patient will report 0/10 pain in left knee with all activities. Long term goal 2: Patient will increase left SLR >/= 70 degrees for improved functional tolerance. Long term goal 3: Patient will increase strength in left LE >/= 4+/5 for improved weightbearing activities. Long term goal 4: LEFS >/= 65/80 to demonstrate functional improvements. Long term goal 5: Patient will be independent with HEP. Progress toward goals: Progressing towards all    POST-PAIN       Pain Rating (0-10 pain scale):   0/10   Location and pain description same as pre-treatment unless indicated. Action: [] NA   [x] Perform HEP  [] Meds as prescribed  [] Modalities as prescribed   [] Call Physician     Frequency/Duration:  Plan  Times per week: 2  Plan weeks: 4  Current Treatment Recommendations: Strengthening, ROM, Endurance Training, Gait Training, Neuromuscular Re-education, Manual Therapy - Soft Tissue Mobilization, Home Exercise Program, Patient/Caregiver Education & Training, Modalities     Pt to continue current HEP. See objective section for any therapeutic exercise changes, additions or modifications this date.          PT Individual Minutes  Time In: 0900  Time Out: 0332  Minutes: 48  Timed Code Treatment Minutes: 38 Minutes  Procedure Minutes: CP x10 min     Timed Activity Minutes Units   Ther Ex 38 3       Signature:  Electronically signed by Delmy Howell PTA on 3/31/21 at 9:24 AM EDT

## 2021-04-02 ENCOUNTER — HOSPITAL ENCOUNTER (OUTPATIENT)
Dept: PHYSICAL THERAPY | Age: 36
Setting detail: THERAPIES SERIES
Discharge: HOME | End: 2021-04-02
Payer: COMMERCIAL

## 2021-04-02 ENCOUNTER — APPOINTMENT (OUTPATIENT)
Dept: CT IMAGING | Age: 36
End: 2021-04-02
Payer: COMMERCIAL

## 2021-04-02 ENCOUNTER — HOSPITAL ENCOUNTER (EMERGENCY)
Age: 36
Discharge: LEFT BEFORE DISCHARGE | End: 2021-04-02
Attending: STUDENT IN AN ORGANIZED HEALTH CARE EDUCATION/TRAINING PROGRAM
Payer: COMMERCIAL

## 2021-04-02 VITALS
TEMPERATURE: 98.4 F | BODY MASS INDEX: 42.38 KG/M2 | DIASTOLIC BLOOD PRESSURE: 96 MMHG | HEIGHT: 67 IN | WEIGHT: 270 LBS | HEART RATE: 76 BPM | RESPIRATION RATE: 18 BRPM | OXYGEN SATURATION: 99 % | SYSTOLIC BLOOD PRESSURE: 141 MMHG

## 2021-04-02 DIAGNOSIS — T17.208A FOREIGN BODY IN THROAT, INITIAL ENCOUNTER: Primary | ICD-10-CM

## 2021-04-02 DIAGNOSIS — E66.01 MORBID OBESITY WITH BMI OF 40.0-44.9, ADULT (HCC): ICD-10-CM

## 2021-04-02 LAB
ALBUMIN SERPL-MCNC: 4 G/DL (ref 3.5–4.6)
ALP BLD-CCNC: 126 U/L (ref 40–130)
ALT SERPL-CCNC: 16 U/L (ref 0–33)
ANION GAP SERPL CALCULATED.3IONS-SCNC: 8 MEQ/L (ref 9–15)
APTT: 31.2 SEC (ref 24.4–36.8)
AST SERPL-CCNC: 28 U/L (ref 0–35)
BASOPHILS ABSOLUTE: 0 K/UL (ref 0–0.2)
BASOPHILS RELATIVE PERCENT: 0.3 %
BILIRUB SERPL-MCNC: <0.2 MG/DL (ref 0.2–0.7)
BUN BLDV-MCNC: 8 MG/DL (ref 6–20)
CALCIUM SERPL-MCNC: 9 MG/DL (ref 8.5–9.9)
CHLORIDE BLD-SCNC: 104 MEQ/L (ref 95–107)
CO2: 26 MEQ/L (ref 20–31)
CREAT SERPL-MCNC: 0.71 MG/DL (ref 0.5–0.9)
EOSINOPHILS ABSOLUTE: 0.1 K/UL (ref 0–0.7)
EOSINOPHILS RELATIVE PERCENT: 1.3 %
GFR AFRICAN AMERICAN: >60
GFR NON-AFRICAN AMERICAN: >60
GLOBULIN: 3.5 G/DL (ref 2.3–3.5)
GLUCOSE BLD-MCNC: 139 MG/DL (ref 70–99)
HCG, URINE, POC: NEGATIVE
HCT VFR BLD CALC: 41.3 % (ref 37–47)
HEMOGLOBIN: 13.8 G/DL (ref 12–16)
INR BLD: 1
LYMPHOCYTES ABSOLUTE: 2.6 K/UL (ref 1–4.8)
LYMPHOCYTES RELATIVE PERCENT: 24 %
Lab: NORMAL
MCH RBC QN AUTO: 30.9 PG (ref 27–31.3)
MCHC RBC AUTO-ENTMCNC: 33.5 % (ref 33–37)
MCV RBC AUTO: 92.3 FL (ref 82–100)
MONOCYTES ABSOLUTE: 0.3 K/UL (ref 0.2–0.8)
MONOCYTES RELATIVE PERCENT: 3 %
NEGATIVE QC PASS/FAIL: NORMAL
NEUTROPHILS ABSOLUTE: 7.6 K/UL (ref 1.4–6.5)
NEUTROPHILS RELATIVE PERCENT: 71.4 %
PDW BLD-RTO: 13.8 % (ref 11.5–14.5)
PLATELET # BLD: 275 K/UL (ref 130–400)
POSITIVE QC PASS/FAIL: NORMAL
POTASSIUM SERPL-SCNC: 4.2 MEQ/L (ref 3.4–4.9)
PROTHROMBIN TIME: 13.2 SEC (ref 12.3–14.9)
RBC # BLD: 4.47 M/UL (ref 4.2–5.4)
SODIUM BLD-SCNC: 138 MEQ/L (ref 135–144)
TOTAL PROTEIN: 7.5 G/DL (ref 6.3–8)
WBC # BLD: 10.7 K/UL (ref 4.8–10.8)

## 2021-04-02 PROCEDURE — 85730 THROMBOPLASTIN TIME PARTIAL: CPT

## 2021-04-02 PROCEDURE — 85025 COMPLETE CBC W/AUTO DIFF WBC: CPT

## 2021-04-02 PROCEDURE — 97110 THERAPEUTIC EXERCISES: CPT

## 2021-04-02 PROCEDURE — 96374 THER/PROPH/DIAG INJ IV PUSH: CPT

## 2021-04-02 PROCEDURE — 99283 EMERGENCY DEPT VISIT LOW MDM: CPT

## 2021-04-02 PROCEDURE — 85610 PROTHROMBIN TIME: CPT

## 2021-04-02 PROCEDURE — 80053 COMPREHEN METABOLIC PANEL: CPT

## 2021-04-02 PROCEDURE — 36415 COLL VENOUS BLD VENIPUNCTURE: CPT

## 2021-04-02 PROCEDURE — 2500000003 HC RX 250 WO HCPCS: Performed by: STUDENT IN AN ORGANIZED HEALTH CARE EDUCATION/TRAINING PROGRAM

## 2021-04-02 PROCEDURE — 70490 CT SOFT TISSUE NECK W/O DYE: CPT

## 2021-04-02 RX ADMIN — GLUCAGON HYDROCHLORIDE 1 MG: 1 INJECTION, POWDER, FOR SOLUTION INTRAMUSCULAR; INTRAVENOUS; SUBCUTANEOUS at 16:20

## 2021-04-02 ASSESSMENT — ENCOUNTER SYMPTOMS
ABDOMINAL PAIN: 0
SINUS PRESSURE: 0
CHEST TIGHTNESS: 0
SHORTNESS OF BREATH: 0
DIARRHEA: 0
BACK PAIN: 0
VOMITING: 0
COUGH: 0
TROUBLE SWALLOWING: 1

## 2021-04-02 NOTE — ED PROVIDER NOTES
[address] [facility] ED  eMERGENCY dEPARTMENT eNCOUnter      Pt Name: Claudia Paul  MRN: [id number]  [place] 1985  Date of evaluation: 4/2/2021  Provider: Anna Marie Raman DO    CHIEF COMPLAINT       Chief Complaint   Patient presents with    Choking     fish stuck in throat. HISTORY OF PRESENT ILLNESS   (Location/Symptom, Timing/Onset,Context/Setting, Quality, Duration, Modifying Factors, Severity)  Note limiting factors. Claudia Paul is a 28 y.o. female who presents to the emergency department with c/o fishbone is stuck in her throat. Patient states that she tried making herself throw up but is just in the back of her throat feels like it is constantly there. Patient denies any shortness of breath. Patient denies any blood in her mouth or coughing up of any blood. Patient denies any modifying factors making it any better. Patient denies fever, chills or cough. Patient denies abdominal pain. The history is provided by the patient. NursingNotes were reviewed. REVIEW OF SYSTEMS    (2-9 systems for level 4, 10 or more for level 5)     Review of Systems   Constitutional: Negative for activity change, appetite change, chills, fever and unexpected weight change. HENT: Positive for trouble swallowing. Negative for drooling, ear pain, nosebleeds and sinus pressure. Respiratory: Negative for cough, chest tightness and shortness of breath. Cardiovascular: Negative for chest pain and leg swelling. Gastrointestinal: Negative for abdominal pain, diarrhea and vomiting. Endocrine: Negative for polydipsia and polyphagia. Genitourinary: Negative for dysuria, flank pain and frequency. Musculoskeletal: Negative for back pain and myalgias. Skin: Negative for pallor and rash. Neurological: Negative for syncope, weakness and headaches. Hematological: Does not bruise/bleed easily. All other systems reviewed and are negative.       Except as noted above the remainder of the review of systems was reviewed and negative. PAST MEDICAL HISTORY     Past Medical History:   Diagnosis Date    Anxiety and depression     Asthma     Atypical chest pain 5/31/2019    Chronic back pain     Diabetes mellitus ([address]     Headache     HIV exposure 1/6/2020    Irregular heart beat     Multiple sclerosis ([address]     Osteoarthritis     Pure hypercholesterolemia, unspecified 5/3/2018         SURGICALHISTORY       Past Surgical History:   Procedure Laterality Date    CHOLECYSTECTOMY      UPPER GASTROINTESTINAL ENDOSCOPY N/A 8/13/2019    EGD ESOPHAGOGASTRODUODENOSCOPY performed by Naz Suggs MD at [address]       Previous Medications    ALBUTEROL SULFATE  (90 BASE) MCG/ACT INHALER    Inhale 2 puffs into the lungs 4 times daily as needed for Wheezing    GABAPENTIN (NEURONTIN) 300 MG CAPSULE    TAKE ONE CAPSULE BY MOUTH THREE TIMES DAILY AS NEEDED FOR LEFT LEG PAIN FLARES    INSULIN PEN NEEDLE 31G X 5 MM MISC    For use with liraglutide pen. RIMEGEPANT SULFATE (NURTEC) 75 MG TBDP    Take 75 mg by mouth as needed (acute migraine)    VICTOZA 18 MG/3ML SOPN SC INJECTION    ADMINISTER 0.6 MG UNDER THE SKIN DAILY.  INCREASE TO 1.2 MG DAILY AFTER 1 WEEK       ALLERGIES     Nsaids, Pcn [penicillins], Phentermine, and Topamax [topiramate]    FAMILY HISTORY       Family History   Problem Relation Age of Onset    Cancer Mother     Arthritis Mother     Diabetes Father     Heart Disease Father     Stroke Father     High Blood Pressure Father           SOCIAL HISTORY       Social History     Socioeconomic History    Marital status: Single     Spouse name: None    Number of children: None    Years of education: None    Highest education level: None   Occupational History    None   Social Needs    Financial resource strain: Not hard at all   [place] insecurity     Worry: Never true     Inability: Never true    Transportation needs     Medical: No Non-medical: No   Tobacco Use    Smoking status: Former Smoker     Packs/day: 0.50     Years: 10.00     Pack years: 5.00     Types: Cigarettes     Quit date: 2017     Years since quittin.1    Smokeless tobacco: Never Used   Substance and Sexual Activity    Alcohol use: Yes     Comment: occas    Drug use: No    Sexual activity: Yes     Partners: Male   Lifestyle    Physical activity     Days per week: None     Minutes per session: None    Stress: None   Relationships    Social connections     Talks on phone: None     Gets together: None     Attends Anglican service: None     Active member of club or organization: None     Attends meetings of clubs or organizations: None     Relationship status: None    Intimate partner violence     Fear of current or ex partner: None     Emotionally abused: None     Physically abused: None     Forced sexual activity: None   Other Topics Concern    None   Social History Narrative    None       SCREENINGS      @FLOW([id number])@      PHYSICAL EXAM    (up to 7 for level 4, 8 or more for level 5)     ED Triage Vitals [21 1551]   BP Temp Temp Source Pulse Resp SpO2 Height Weight   (!) 148/92 98.4 °F (36.9 °C) Oral 79 20 99 % 5' 7\" (1.702 m) 270 lb (122.5 kg)       Physical Exam  Vitals signs and nursing note reviewed. Constitutional:       General: She is awake. She is in acute distress. Appearance: Normal appearance. She is well-developed and well-groomed. She is morbidly obese. She is not ill-appearing, toxic-appearing or diaphoretic. Comments: No photophobia. No phonophobia. HENT:      Head: Normocephalic and atraumatic. No raccoon eyes, Mcdaniel's sign, abrasion, contusion or masses. Right Ear: External ear normal.      Left Ear: External ear normal.      Nose: Nose normal. No congestion or rhinorrhea. Mouth/Throat:      Lips: Pink. Mouth: Mucous membranes are moist. No lacerations or angioedema.       Dentition: Does not have dentures. No gingival swelling or dental abscesses. Tongue: No lesions. Palate: No lesions. Pharynx: Oropharynx is clear. Uvula midline. No pharyngeal swelling, oropharyngeal exudate, posterior oropharyngeal erythema or uvula swelling. Tonsils: No tonsillar exudate. Eyes:      General: No scleral icterus. Right eye: No foreign body or discharge. Left eye: No discharge. Extraocular Movements: Extraocular movements intact. Conjunctiva/sclera: Conjunctivae normal.      Left eye: No exudate. Pupils: Pupils are equal, round, and reactive to light. Neck:      Musculoskeletal: Normal range of motion and neck supple. No neck rigidity. Vascular: No JVD. Trachea: No tracheal deviation. Comments: No meningismus. Cardiovascular:      Rate and Rhythm: Normal rate and regular rhythm. Pulses: Normal pulses. Heart sounds: Normal heart sounds. Heart sounds not distant. No murmur. No friction rub. No gallop. Pulmonary:      Effort: Pulmonary effort is normal. No respiratory distress. Breath sounds: Normal breath sounds. No stridor. No wheezing, rhonchi or rales. Chest:      Chest wall: No tenderness. Abdominal:      General: Abdomen is flat. Bowel sounds are normal. There is no distension. Palpations: Abdomen is soft. There is no mass. Tenderness: There is no abdominal tenderness. There is no right CVA tenderness, left CVA tenderness, guarding or rebound. Hernia: No hernia is present. Musculoskeletal: Normal range of motion. General: No swelling, tenderness, deformity or signs of injury. Lymphadenopathy:      Head:      Right side of head: No submental adenopathy. Left side of head: No submental adenopathy. Skin:     General: Skin is warm and dry. Capillary Refill: Capillary refill takes less than 2 seconds. Coloration: Skin is not jaundiced or pale.       Findings: No bruising, erythema, lesion or rash.   Neurological:      General: No focal deficit present. Mental Status: She is alert and oriented to person, place, and time. Mental status is at baseline. Cranial Nerves: No cranial nerve deficit. Sensory: No sensory deficit. Motor: No weakness. Coordination: Coordination normal.      Deep Tendon Reflexes: Reflexes are normal and symmetric. Psychiatric:         Mood and Affect: Mood normal.         Behavior: Behavior normal. Behavior is cooperative. Thought Content: Thought content normal.         Judgment: Judgment normal.         DIAGNOSTIC RESULTS     EKG: All EKG's are interpreted by the Emergency Department Physician who either signs or Co-signsthis chart in the absence of a cardiologist.        RADIOLOGY:   Non-plain filmimages such as CT, Ultrasound and MRI are read by the radiologist. Plain radiographic images are visualized and preliminarily interpreted by the emergency physician with the below findings:    Patient refused CAT scan. Interpretation per the Radiologist below, if available at the time ofthis note:    CT SOFT TISSUE NECK WO CONTRAST    (Results Pending)         ED BEDSIDE ULTRASOUND:   Performed by ED Physician - none    LABS:  Labs Reviewed   CBC WITH AUTO DIFFERENTIAL - Abnormal; Notable for the following components:       Result Value    Neutrophils Absolute 7.6 (*)     All other components within normal limits   COMPREHENSIVE METABOLIC PANEL - Abnormal; Notable for the following components:    Anion Gap 8 (*)     Glucose 139 (*)     All other components within normal limits   PROTIME-INR   APTT   POC PREGNANCY UR-QUAL       All other labs were within normal range or not returned as of this dictation.     EMERGENCY DEPARTMENT COURSE and DIFFERENTIAL DIAGNOSIS/MDM:   Vitals:    Vitals:    04/02/21 1551   BP: (!) 148/92   Pulse: 79   Resp: 20   Temp: 98.4 °F (36.9 °C)   TempSrc: Oral   SpO2: 99%   Weight: 270 lb (122.5 kg)   Height: 5' 7\" (1.702 m) MDM  Patient was given IV glucagon, she coughed up a fishbone. Stated to the nurse that she felt warm was still there. Patient then asked if she could leave and just simply get the scan later at her convenience. Patient was told that she have to go [address] because there could be a fishbone still in her tissues of the neck that could compromise her breathing, cause bleeding, they can cause a life-threatening situation resulting in death or permanent disability. Patient reported to the nurse that she did take her chances and she refused to wait for AMA discharge instructions. CONSULTS:  None    PROCEDURES:  Unless otherwise noted below, none     Procedures    FINAL IMPRESSION      1. Foreign body in throat, initial encounter    2. Morbid obesity with BMI of 40.0-44.9, adult (HCC)          DISPOSITION/PLAN   DISPOSITION        PATIENT REFERRED TO:  No follow-up provider specified.     DISCHARGE MEDICATIONS:  New Prescriptions    No medications on file          (Please note that portions of this note were completed with a voice recognition program.  Efforts were made to edit the dictations but occasionally words are mis-transcribed.)    Letitia Chao DO (electronically signed)  Attending Emergency Physician          Letitia Chao DO  04/02/21 5029

## 2021-04-02 NOTE — PROGRESS NOTES
[address] [address]  Outpatient Physical Therapy    Treatment Note        Date: 2021  Patient: Stephany Mitchell  : 1985  ACCT #: [de-identified]  Referring Practitioner: Marie FERNANDEZ  Diagnosis: Acute pain of left knee    Visit Information:  PT Visit Information  PT Insurance Information: [address]  Total # of Visits Approved: (unlimited visits based off medical necessity)  Total # of Visits to Date: 4  Plan of Care/Certification Expiration Date: 21  No Show: 1  Canceled Appointment: 2  Progress Note Counter:  (PN due 21)    Subjective: Pt reports continues to have more LBP than knee pain. States was a little sore after LV, though subsided. HEP Compliance:  [x] Good [] Fair [] Poor [] Reports not doing due to:    Vital Signs  Patient Currently in Pain: Denies   Pain Screening  Patient Currently in Pain: Denies  Pain Assessment  Pain Assessment: 0-10  Pain Level: 0    OBJECTIVE:   Exercises  Exercise 1: TKE YTB 5 sec x 10  Exercise 2: Standing hamstring stretch 30s x 3, left  Exercise 3: hamstring isometrics with Pball 5 sec x 15 Lt  Exercise 4: standing 4-way hip with YTb x12  Exercise 5: clamshells with YTB x12 Lt  Exercise 6: Nustep L2 x 5 min to improve LE strength  Exercise 7: LAQ, hamstring curls with YTband  2 x 10 each  Exercise 8: step ups 4\" x15 F/L  Exercise 10: Bridge 5 sec x 10  Exercise 19: KT tape Lt knee chondromalacia ()    Modalities:  Modalities  Cryotherapy (Minutes\Location): CP x10min     *Indicates exercise, modality, or manual techniques to be initiated when appropriate    Assessment: Body structures, Functions, Activity limitations: Decreased ROM, Decreased strength, Increased pain  Assessment: Progressed reps with multiple exercises with good tolerance. Trialed KT this date for patellar tracking. Pt instructed in and given written instructions for wear and doffing.  Mild discomfort noted with step ups, though without report of pain otherwise. Treatment Diagnosis: left knee pain, decreased ROM in hamstring, decreased left LE strength        Goals:  Long term goals  Time Frame for Long term goals : 4 weeks  Long term goal 1: Patient will report 0/10 pain in left knee with all activities. Long term goal 2: Patient will increase left SLR >/= 70 degrees for improved functional tolerance. Long term goal 3: Patient will increase strength in left LE >/= 4+/5 for improved weightbearing activities. Long term goal 4: LEFS >/= 65/80 to demonstrate functional improvements. Long term goal 5: Patient will be independent with HEP. Progress toward goals: Progressing towards all    POST-PAIN       Pain Rating (0-10 pain scale):   0/10   Location and pain description same as pre-treatment unless indicated. Action: [] NA   [x] Perform HEP  [] Meds as prescribed  [] Modalities as prescribed   [] Call Physician     Frequency/Duration:  Plan  Times per week: 2  Plan weeks: 4  Current Treatment Recommendations: Strengthening, ROM, Endurance Training, Gait Training, Neuromuscular Re-education, Manual Therapy - Soft Tissue Mobilization, Home Exercise Program, Patient/Caregiver Education & Training, Modalities     Pt to continue current HEP. See objective section for any therapeutic exercise changes, additions or modifications this date.          PT Individual Minutes  Time In: 5018  Time Out: 1055  Minutes: 48  Timed Code Treatment Minutes: 38 Minutes  Procedure Minutes: CP x10 min     Timed Activity Minutes Units   Ther Ex 38 3       Signature:  Electronically signed by Eloise Belle PTA on 4/2/21 at 10:11 AM EDT

## 2021-04-02 NOTE — ED NOTES
Patient able to remove a fish bone from throat. States she \"still feels like something is in there. \"     Grace Edwards RN  04/02/21 0289

## 2021-04-02 NOTE — ED TRIAGE NOTES
Patient arrived from home via self with complaint of fish stuck in throat for last 20 mins. Patient A&OX4. Skin pink, warm, and dry. No distress noted.

## 2021-04-09 ENCOUNTER — HOSPITAL ENCOUNTER (OUTPATIENT)
Dept: PHYSICAL THERAPY | Age: 36
Setting detail: THERAPIES SERIES
Discharge: HOME | End: 2021-04-09
Payer: COMMERCIAL

## 2021-04-09 NOTE — PROGRESS NOTES
[address]       Physical Therapy  Cancellation/No-show Note  Patient Name:  Mata Alonso  :  1985   Date:  2021  Referring Practitioner: Win FERNANDEZ  Diagnosis: Acute pain of left knee    Visit Information:  PT Visit Information  PT Insurance Information: [address]  Total # of Visits Approved: (unlimited visits based off medical necessity)  Total # of Visits to Date: 4  Plan of Care/Certification Expiration Date: 21  No Show: 2  Canceled Appointment: 3  Progress Note Counter:  (PN due 21) Cx and DC 21    For today's appointment patient:  [x]  Cancelled  []  Rescheduled appointment  []  No-show   []  Called pt to remind of next appointment     Reason given by patient:  []  Patient ill  []  Conflicting appointment  []  No transportation    []  Conflict with work  []  No reason given  [x]  Other:   Pt requests DC     Comments:       Signature: Electronically signed by Arthur Alvarez PTA on 21 at 8:57 AM EDT

## 2021-04-09 NOTE — PROGRESS NOTES
[address]     Ph: [phone]  Fax: [phone]    [] Certification  [] Recertification []  Plan of Care  [] Progress Note [x] Discharge      To: Candelario FERNANDEZ      From:  Verito Neri, PT, DPT  Patient: Natalee Delvalle     : 1985  Diagnosis: Acute pain of left knee     Date: 2021  Treatment Diagnosis: left knee pain, decreased ROM in hamstring, decreased left LE strength    Plan of Care/Certification Expiration Date: 21  Progress Report Period from: 3/12/2021 to 2021    Total # of Visits to Date: 4   No Show: 2    Canceled Appointment: 3     OBJECTIVE:     Long Term Goals - Time Frame for Long term goals : 4 weeks  Goals Current/ Discharge status Met   Long term goal 1: Patient will report 0/10 pain in left knee with all activities. Pt reports mild pain with step up activities, though otherwise without report of pain [x] yes  [] no   Long term goal 2: Patient will increase left SLR >/= 70 degrees for improved functional tolerance. 66 deg on 3/31 [] yes  [x] no   Long term goal 3: Patient will increase strength in left LE >/= 4+/5 for improved weightbearing activities. Not tested d/t unexpected DC [] yes  [] no   Long term goal 4: LEFS >/= 65/80 to demonstrate functional improvements. Not tested d/t unexpected DC [] yes  [] no   Long term goal 5: Patient will be independent with HEP. Pt reports compliance with HEP [x] yes  [] no        Body structures, Functions, Activity limitations: Decreased ROM, Decreased strength, Increased pain  Assessment: Pt requests DC from therapy at this time. Pt has tolerated most tx well, without report of increased Lt knee pain with activity. D/C per request           PLAN:   Frequency/Duration:  Plan  Plan Comment: Discharge PT     Precautions:  MS                          Patient Status:[] Continue/ Initiate plan of Care    [x] Discharge PT.   Recommend pt continue with HEP. [] Additional visits requested, Please re-certify for additional visits:          Signature: Objective information by: Electronically signed by Tessa Butt PTA on 4/9/21 at 8:59 AM EDT  Electronically signed by Clari Staley PT on 4/12/2021 at 9:22 AM        If you have any questions or concerns, please don't hesitate to call. Thank you for your referral.    I have reviewed this plan of care and certify a need for medically necessary rehabilitation services.     Physician Signature:__________________________________________________________  Date:  Please sign and return

## 2021-04-11 PROBLEM — Z11.3 ROUTINE SCREENING FOR STI (SEXUALLY TRANSMITTED INFECTION): Status: RESOLVED | Noted: 2021-03-12 | Resolved: 2021-04-11

## 2021-04-15 ENCOUNTER — OFFICE VISIT (OUTPATIENT)
Dept: FAMILY MEDICINE CLINIC | Age: 36
End: 2021-04-15
Payer: COMMERCIAL

## 2021-04-15 VITALS
WEIGHT: 277 LBS | TEMPERATURE: 98 F | RESPIRATION RATE: 18 BRPM | DIASTOLIC BLOOD PRESSURE: 82 MMHG | HEIGHT: 68 IN | HEART RATE: 85 BPM | OXYGEN SATURATION: 99 % | BODY MASS INDEX: 41.98 KG/M2 | SYSTOLIC BLOOD PRESSURE: 126 MMHG

## 2021-04-15 DIAGNOSIS — R73.03 PREDIABETES: ICD-10-CM

## 2021-04-15 DIAGNOSIS — L08.9 PIERCED BELLY BUTTON INFECTION: ICD-10-CM

## 2021-04-15 DIAGNOSIS — Z00.00 ROUTINE GENERAL MEDICAL EXAMINATION AT A HEALTH CARE FACILITY: Primary | ICD-10-CM

## 2021-04-15 DIAGNOSIS — E55.9 VITAMIN D DEFICIENCY: ICD-10-CM

## 2021-04-15 DIAGNOSIS — S31.135A PIERCED BELLY BUTTON INFECTION: ICD-10-CM

## 2021-04-15 PROBLEM — G47.10 HYPERSOMNOLENCE: Status: RESOLVED | Noted: 2020-07-09 | Resolved: 2021-04-15

## 2021-04-15 PROBLEM — Z86.69 HISTORY OF HEARING LOSS: Status: RESOLVED | Noted: 2021-03-12 | Resolved: 2021-04-15

## 2021-04-15 PROBLEM — E11.9 TYPE 2 DIABETES MELLITUS WITHOUT COMPLICATION, WITHOUT LONG-TERM CURRENT USE OF INSULIN (HCC): Status: RESOLVED | Noted: 2017-10-09 | Resolved: 2021-04-15

## 2021-04-15 PROBLEM — Z30.41 USES ORAL CONTRACEPTION: Status: RESOLVED | Noted: 2021-03-12 | Resolved: 2021-04-15

## 2021-04-15 PROBLEM — F12.11: Status: RESOLVED | Noted: 2021-02-22 | Resolved: 2021-04-15

## 2021-04-15 PROBLEM — Z72.51 UNPROTECTED SEXUAL INTERCOURSE: Status: RESOLVED | Noted: 2020-11-11 | Resolved: 2021-04-15

## 2021-04-15 PROBLEM — Z34.90 ENCOUNTER FOR SUPERVISION OF NORMAL PREGNANCY: Status: RESOLVED | Noted: 2021-03-12 | Resolved: 2021-04-15

## 2021-04-15 PROBLEM — R20.2 FACIAL PARESTHESIA: Status: RESOLVED | Noted: 2019-02-19 | Resolved: 2021-04-15

## 2021-04-15 PROBLEM — R23.8 SKIN IRRITATION: Status: RESOLVED | Noted: 2018-12-10 | Resolved: 2021-04-15

## 2021-04-15 PROBLEM — B96.89 VAGINITIS DUE TO GARDNERELLA VAGINALIS: Status: RESOLVED | Noted: 2021-03-12 | Resolved: 2021-04-15

## 2021-04-15 PROBLEM — O09.219 HIGH RISK PREGNANCY DUE TO HISTORY OF PRETERM LABOR: Status: RESOLVED | Noted: 2021-03-12 | Resolved: 2021-04-15

## 2021-04-15 PROBLEM — M60.9 MYOSITIS, UNSPECIFIED: Status: RESOLVED | Noted: 2019-03-09 | Resolved: 2021-04-15

## 2021-04-15 PROBLEM — Z30.012 EMERGENCY CONTRACEPTIVE COUNSELING AND PRESCRIPTION: Status: RESOLVED | Noted: 2020-11-11 | Resolved: 2021-04-15

## 2021-04-15 PROBLEM — Z86.2 HISTORY OF ANEMIA: Status: RESOLVED | Noted: 2021-03-12 | Resolved: 2021-04-15

## 2021-04-15 PROBLEM — A64 DISEASE WITH A PREDOMINANTLY SEXUAL MODE OF TRANSMISSION: Status: RESOLVED | Noted: 2020-11-11 | Resolved: 2021-04-15

## 2021-04-15 PROBLEM — Q76.49 TRANSITIONAL VERTEBRA: Status: RESOLVED | Noted: 2017-02-22 | Resolved: 2021-04-15

## 2021-04-15 PROBLEM — Z87.19 HISTORY OF CHOLELITHIASIS: Status: RESOLVED | Noted: 2021-03-12 | Resolved: 2021-04-15

## 2021-04-15 PROBLEM — F43.0 ACUTE STRESS REACTION: Status: RESOLVED | Noted: 2018-08-17 | Resolved: 2021-04-15

## 2021-04-15 PROBLEM — N89.8 LEUKORRHEA: Status: RESOLVED | Noted: 2021-03-12 | Resolved: 2021-04-15

## 2021-04-15 PROBLEM — N90.89 LESION OF LABIA: Status: RESOLVED | Noted: 2021-03-12 | Resolved: 2021-04-15

## 2021-04-15 PROBLEM — H91.92 UNSPECIFIED HEARING LOSS, LEFT EAR: Status: RESOLVED | Noted: 2018-12-10 | Resolved: 2021-04-15

## 2021-04-15 PROBLEM — N92.1 BREAKTHROUGH BLEEDING: Status: RESOLVED | Noted: 2017-12-11 | Resolved: 2021-04-15

## 2021-04-15 PROBLEM — R22.1 SENSATION OF LUMP IN THROAT: Status: RESOLVED | Noted: 2020-11-11 | Resolved: 2021-04-15

## 2021-04-15 PROBLEM — K62.9: Status: RESOLVED | Noted: 2021-03-12 | Resolved: 2021-04-15

## 2021-04-15 PROBLEM — R10.2 PAIN IN FEMALE PELVIS: Status: RESOLVED | Noted: 2020-11-11 | Resolved: 2021-04-15

## 2021-04-15 PROBLEM — Z87.42 HISTORY OF RECURRENT VAGINAL DISCHARGE: Status: RESOLVED | Noted: 2021-03-12 | Resolved: 2021-04-15

## 2021-04-15 PROBLEM — N76.0 VAGINITIS DUE TO GARDNERELLA VAGINALIS: Status: RESOLVED | Noted: 2021-03-12 | Resolved: 2021-04-15

## 2021-04-15 PROBLEM — R09.A2 SENSATION OF LUMP IN THROAT: Status: RESOLVED | Noted: 2020-11-11 | Resolved: 2021-04-15

## 2021-04-15 LAB — HBA1C MFR BLD: 5.7 %

## 2021-04-15 PROCEDURE — 99214 OFFICE O/P EST MOD 30 MIN: CPT | Performed by: PHYSICIAN ASSISTANT

## 2021-04-15 PROCEDURE — G8417 CALC BMI ABV UP PARAM F/U: HCPCS | Performed by: PHYSICIAN ASSISTANT

## 2021-04-15 PROCEDURE — G0439 PPPS, SUBSEQ VISIT: HCPCS | Performed by: PHYSICIAN ASSISTANT

## 2021-04-15 PROCEDURE — G8427 DOCREV CUR MEDS BY ELIG CLIN: HCPCS | Performed by: PHYSICIAN ASSISTANT

## 2021-04-15 PROCEDURE — 1036F TOBACCO NON-USER: CPT | Performed by: PHYSICIAN ASSISTANT

## 2021-04-15 PROCEDURE — 83036 HEMOGLOBIN GLYCOSYLATED A1C: CPT | Performed by: PHYSICIAN ASSISTANT

## 2021-04-15 ASSESSMENT — ENCOUNTER SYMPTOMS
ABDOMINAL DISTENTION: 0
ABDOMINAL PAIN: 0
CHEST TIGHTNESS: 0
COUGH: 0
SHORTNESS OF BREATH: 0

## 2021-04-15 ASSESSMENT — LIFESTYLE VARIABLES
HOW OFTEN DO YOU HAVE A DRINK CONTAINING ALCOHOL: NEVER
AUDIT-C TOTAL SCORE: INCOMPLETE
AUDIT TOTAL SCORE: INCOMPLETE

## 2021-04-15 NOTE — PATIENT INSTRUCTIONS
Personalized Preventive Plan for Niesha Stinson - 4/15/2021  Medicare offers a range of preventive health benefits. Some of the tests and screenings are paid in full while other may be subject to a deductible, co-insurance, and/or copay. Some of these benefits include a comprehensive review of your medical history including lifestyle, illnesses that may run in your family, and various assessments and screenings as appropriate. After reviewing your medical record and screening and assessments performed today your provider may have ordered immunizations, labs, imaging, and/or referrals for you. A list of these orders (if applicable) as well as your Preventive Care list are included within your After Visit Summary for your review. Other Preventive Recommendations:    · A preventive eye exam performed by an eye specialist is recommended every 1-2 years to screen for glaucoma; cataracts, macular degeneration, and other eye disorders. · A preventive dental visit is recommended every 6 months. · Try to get at least 150 minutes of exercise per week or 10,000 steps per day on a pedometer . · Order or download the FREE \"Exercise & Physical Activity: Your Everyday Guide\" from [facility] [employer]. Call 1-250.319.7390 or search [facility] [employer] online. · You need 8733-8399 mg of calcium and 5981-6892 IU of vitamin D per day. It is possible to meet your calcium requirement with diet alone, but a vitamin D supplement is usually necessary to meet this goal.  · When exposed to the sun, use a sunscreen that protects against both UVA and UVB radiation with an SPF of 30 or greater. Reapply every 2 to 3 hours or after sweating, drying off with a towel, or swimming. · Always wear a seat belt when traveling in a car. Always wear a helmet when riding a bicycle or motorcycle.

## 2021-04-15 NOTE — PROGRESS NOTES
Medicare Annual Wellness Visit  Name: Jaleel Marr Date: 4/15/2021   MRN: [id number] Sex: Female   Age: 28 y.o. Ethnicity: Non-/Non    : 1985 Race: Meliuz is here for [place]'s [employer] Response (Annual Exam )    Screenings for behavioral, psychosocial and functional/safety risks, and cognitive dysfunction are all negative except as indicated below. These results, as well as other patient data from the [address] form, are documented in Flowsheets linked to this Encounter. Allergies   Allergen Reactions    Nsaids Other (See Comments)     Waiting for bariatric procedure    Pcn [Penicillins] Hives    Phentermine      Anxiety with hospitalization    Topamax [Topiramate] Other (See Comments)     DIZZY         Prior to Visit Medications    Medication Sig Taking? Authorizing Provider   gabapentin (NEURONTIN) 300 MG capsule TAKE ONE CAPSULE BY MOUTH THREE TIMES DAILY AS NEEDED FOR LEFT LEG PAIN FLARES  Historical Provider, MD   VICTOZA 18 MG/3ML SOPN SC injection ADMINISTER 0.6 MG UNDER THE SKIN DAILY. INCREASE TO 1.2 MG DAILY AFTER 1 WEEK  Historical Provider, MD   Insulin Pen Needle 31G X 5 MM MISC For use with liraglutide pen.   Historical Provider, MD   Rimegepant Sulfate (NURTEC) 75 MG TBDP Take 75 mg by mouth as needed (acute migraine)  Leticia Villa PA-C   albuterol sulfate  (90 Base) MCG/ACT inhaler Inhale 2 puffs into the lungs 4 times daily as needed for Wheezing  Zackary Arechiga PA-C         Past Medical History:   Diagnosis Date    Anxiety and depression     Asthma     Atypical chest pain 2019    Chronic back pain     Diabetes mellitus ([facility] [address]     Headache     HIV exposure 2020    Irregular heart beat     Multiple sclerosis ([facility] [address]     Osteoarthritis     Pure hypercholesterolemia, unspecified 5/3/2018       Past Surgical History:   Procedure Laterality Date    CHOLECYSTECTOMY      UPPER GASTROINTESTINAL ENDOSCOPY N/A 2019    EGD ESOPHAGOGASTRODUODENOSCOPY performed [facility] Naz Suggs [facility]         Family History   Problem Relation Age of Onset    Cancer Mother     Arthritis Mother     Diabetes Father     Heart Disease Father     Stroke Father     High Blood Pressure Father        CareTeam (Including outside providers/suppliers regularly involved in providing care):   Patient Care Team:  Sharyle Britain, PA-C as PCP - General (Family Medicine)  Sharyle Britain, PA-C as PCP - [facility]    Wt Readings from Last 3 Encounters:   04/15/21 277 lb (125.6 kg)   04/02/21 270 lb (122.5 kg)   03/17/21 271 lb 1.6 oz (123 kg)     There were no vitals filed for this visit. There is no height or weight on file to calculate BMI. Based upon direct observation of the patient, evaluation of cognition reveals recent and remote memory intact. Patient's complete Health Risk Assessment and screening values have been reviewed and are found in Flowsheets. The following problems were reviewed today and where indicated follow up appointments were made and/or referrals ordered. Positive Risk Factor Screenings with Interventions:            General Health and ACP:  General  In general, how would you say your health is?: Fair  In the past 7 days, have you experienced any of the following?  New or Increased Pain, New or Increased Fatigue, Loneliness, Social Isolation, Stress or Anger?: None of These  Do you get the social and emotional support that you need?: (!) No  Do you have a Living Will?: (!) No  Advance Directives     Power of  Living Will ACP-Advance Directive ACP-Power of     Not on File Not on File Not on File Not on File      General Health Risk Interventions:  · Social isolation: patient's comments regarding inadequate social support: mom is supportive, feels isolated related to her children's father's family  · No Living Will: Patient declines ACP discussion/assistance    Health Habits/Nutrition:  Health Habits/Nutrition  Do you exercise for at least 20 minutes 2-3 times per week?: Yes  Have you lost any weight without trying in the past 3 months?: No  Do you eat only one meal per day?: No  Have you seen the dentist within the past year?: (!) No     Health Habits/Nutrition Interventions:  · Dental exam overdue:  patient encouraged to make appointment with his/her dentist    Hearing/Vision:  No exam data present  Hearing/Vision  Do you or your family notice any trouble with your hearing that hasn't been managed with hearing aids?: (!) Yes  Do you have difficulty driving, watching TV, or doing any of your daily activities because of your eyesight?: No  Have you had an eye exam within the past year?: (!) No  Hearing/Vision Interventions:  · Hearing concerns:  patient declines any further evaluation/treatment for hearing issues  · Vision concerns:  patient declines any further evaluation/treatment for this issue    Safety:  Safety  Do you have working smoke detectors?: Yes  Have all throw rugs been removed or fastened?: (!) No  Do you have non-slip mats or surfaces in all bathtubs/showers?: Yes  Do all of your stairways have a railing or banister?: Yes  Are your doorways, halls and stairs free of clutter?: Yes  Do you always fasten your seatbelt when you are in a car?: Yes  Safety Interventions:  · Patient declines any further evaluation/treatment for this issue     Personalized Preventive Plan   Current Health Maintenance Status  Immunization History   Administered Date(s) Administered    Influenza A ([id number]) Vaccine PF IM 02/12/2010        Health Maintenance   Topic Date Due    Varicella vaccine (1 of 2 - 2-dose childhood series) Never done    Pneumococcal 0-64 years Vaccine (1 of 1 - PPSV23) Never done    Diabetic foot exam  Never done    Diabetic retinal exam  Never done    COVID-19 Vaccine (1) Never done    Diabetic microalbuminuria test  Never done    Hepatitis B vaccine (1 of 3 - Risk 3-dose series) Never done   [employer] Visit (AWV)  Never done    DTaP/Tdap/Td vaccine (1 - Tdap) 05/14/2021 (Originally 9/8/2004)    Flu vaccine (Season Ended) 11/05/2021 (Originally 9/1/2021)    A1C test (Diabetic or Prediabetic)  06/15/2021    Lipid screen  06/15/2021    Cervical cancer screen  11/18/2024    Hepatitis C screen  Completed    HIV screen  Completed    Hepatitis A vaccine  Aged Out    Hib vaccine  Aged Out    Meningococcal (ACWY) vaccine  Aged Out     Recommendations for [employer] Due: see orders and patient instructions/AVS.  . Recommended screening schedule for the next 5-10 years is provided to the patient in written form: see Patient Instructions/AVS.    Carissa Gutiérrez was seen today for medicare awv. Diagnoses and all orders for this visit:    Routine general medical examination at a health care facility           [facility] GUANAKITO Villa PA-C

## 2021-04-15 NOTE — PROGRESS NOTES
Subjective  Lynn Cargo, 28 y.o. female presents today with:  Chief Complaint   Patient presents with    Follow-up     4 month follow up      HPI  Bibiana is in the office today for follow up. Last OV with me: 2/12/2021. L knee pain. Had acute L knee strain/bursitis. Attended PT, pain has improved/resolved. No further issues or concerns at this time. Migraine HA. Significantly improved with nurtec use. Denies worsening HA. Prediabetes. Due for HgbA1c. Was started on victoza per [facility].   STopped medication as she started gaining weight and was having some GI intolerance. Obesity, 40-45. Motivated to lose weight/has been attending [facility] program for the past 1-2 years. Would like second opinion with a different program.  Secondary complications from weight--vit D def, joint pain. Has been on multiple lifestyle changes with increased exercise, dietary modification/programs. Belly button piercing. Recently got umbilical re-pierced. Initially had to take piercing out due to infection. Having mild erythema around site, no drainage or pain. Review of Systems   Constitutional: Positive for unexpected weight change (up/down). Negative for appetite change, chills, diaphoresis and fatigue. Eyes: Negative for visual disturbance. Respiratory: Negative for cough, chest tightness and shortness of breath. Cardiovascular: Negative for chest pain, palpitations and leg swelling. Gastrointestinal: Negative for abdominal distention and abdominal pain. Genitourinary: Negative for decreased urine volume, difficulty urinating, genital sores, menstrual problem, pelvic pain, urgency, vaginal bleeding and vaginal discharge. Musculoskeletal: Positive for arthralgias (intermittent, L knee improved). Neurological: Negative for dizziness, tremors, weakness, light-headedness, numbness and headaches. Psychiatric/Behavioral: Negative for self-injury.      Past Medical History:   Diagnosis Date    Anxiety and depression     Asthma     Atypical chest pain 2019    Chronic back pain     Diabetes mellitus ([facility] [address]     Headache     HIV exposure 2020    Irregular heart beat     Multiple sclerosis (HCC)     Osteoarthritis     Pure hypercholesterolemia, unspecified 5/3/2018     Past Surgical History:   Procedure Laterality Date    CHOLECYSTECTOMY      UPPER GASTROINTESTINAL ENDOSCOPY N/A 2019    EGD ESOPHAGOGASTRODUODENOSCOPY performed by Grabiel Roger MD at [facility] [address] Marital status: Single     Spouse name: Not on file    Number of children: Not on file    Years of education: Not on file    Highest education level: Not on file   Occupational History    Not on file   Social Needs    Financial resource strain: Not hard at all    Food insecurity     Worry: Never true     Inability: Never true   [employer] needs     Medical: No     Non-medical: No   Tobacco Use    Smoking status: Former Smoker     Packs/day: 0.50     Years: 10.00     Pack years: 5.00     Types: Cigarettes     Quit date: 2017     Years since quittin.2    Smokeless tobacco: Never Used   Substance and Sexual Activity    Alcohol use: Yes     Comment: occas    Drug use: No    Sexual activity: Yes     Partners: Male   Lifestyle    Physical activity     Days per week: Not on file     Minutes per session: Not on file    Stress: Not on file   Relationships    Social connections     Talks on phone: Not on file     Gets together: Not on file     Attends [employer] service: Not on file     Active member of club or organization: Not on file     Attends meetings of clubs or organizations: Not on file     Relationship status: Not on file    Intimate partner violence     Fear of current or ex partner: Not on file     Emotionally abused: Not on file     Physically abused: Not on file     Forced sexual activity: Not on file Other Topics Concern    Not on file   Social History Narrative    Not on file     Family History   Problem Relation Age of Onset    Cancer Mother     Arthritis Mother     Diabetes Father     Heart Disease Father     Stroke Father     High Blood Pressure Father      Allergies   Allergen Reactions    Nsaids Other (See Comments)     Waiting for bariatric procedure    Pcn [Penicillins] Hives    Phentermine      Anxiety with hospitalization    Topamax [Topiramate] Other (See Comments)     DIZZY     Current Outpatient Medications   Medication Sig Dispense Refill    mupirocin (BACTROBAN) 2 % ointment Apply 3 times daily. 30 g 1    gabapentin (NEURONTIN) 300 MG capsule TAKE ONE CAPSULE BY MOUTH THREE TIMES DAILY AS NEEDED FOR LEFT LEG PAIN FLARES      Insulin Pen Needle 31G X 5 MM MISC For use with liraglutide pen.  Rimegepant Sulfate (NURTEC) 75 MG TBDP Take 75 mg by mouth as needed (acute migraine) 8 tablet 1    albuterol sulfate  (90 Base) MCG/ACT inhaler Inhale 2 puffs into the lungs 4 times daily as needed for Wheezing 1 Inhaler 5     No current facility-administered medications for this visit. PMH, Surgical Hx, Family Hx, and Social Hx reviewed and updated. Health Maintenance reviewed. Objective  Vitals:    04/15/21 0913   BP: 126/82   Pulse: 85   Resp: 18   Temp: 98 °F (36.7 °C)   TempSrc: Temporal   SpO2: 99%   Weight: 277 lb (125.6 kg)   Height: 5' 8\" (1.727 m)     BP Readings from Last 3 Encounters:   04/15/21 126/82   04/02/21 (!) 141/96   03/17/21 130/78     Wt Readings from Last 3 Encounters:   04/15/21 277 lb (125.6 kg)   04/02/21 270 lb (122.5 kg)   03/17/21 271 lb 1.6 oz (123 kg)     Physical Exam  Vitals signs reviewed. Constitutional:       Appearance: She is obese. HENT:      Head: Normocephalic and atraumatic. Right Ear: External ear normal. Decreased hearing noted. Left Ear: External ear normal. Decreased hearing noted.       Nose: Nose normal. Mouth/Throat:      Mouth: Mucous membranes are moist.   Cardiovascular:      Rate and Rhythm: Normal rate and regular rhythm. Pulmonary:      Effort: Pulmonary effort is normal.      Breath sounds: Normal breath sounds. Skin:     General: Skin is warm. Coloration: Skin is not pale. Findings: Erythema (around umbilical piercing) present. No lesion or rash. Neurological:      Mental Status: She is alert. Assessment & Plan   Bibiana was seen today for follow-up. Diagnoses and all orders for this visit:    BMI 40.0-44.9, adult ([facility] [address]  -     Amb External Referral To Bariatrics  -     POCT glycosylated hemoglobin (Hb A1C)    Vitamin D deficiency  -     Amb External Referral To Bariatrics    Prediabetes  -     Amb External Referral To Bariatrics  -     POCT glycosylated hemoglobin (Hb A1C)    Pierced belly button infection  -     mupirocin (BACTROBAN) 2 % ointment; Apply 3 times daily. Follow up with me in 3 months. Reviewed [facility] problem list today. [facility]. Contact office with any questions or concerns. Orders Placed This Encounter   Procedures    Amb External Referral To Bariatrics     Referral Priority:   Routine     Referral Type:   Consult for Advice and Opinion     Referral Reason:   Specialty Services Required     Requested Specialty:   Bariatric Surgery     Number of Visits Requested:   1    POCT glycosylated hemoglobin (Hb A1C)     Orders Placed This Encounter   Medications    mupirocin (BACTROBAN) 2 % ointment     Sig: Apply 3 times daily. Dispense:  30 g     Refill:  1     Medications Discontinued During This Encounter   Medication Reason    VICTOZA 18 MG/3ML SOPN SC injection Therapy completed     Return in about 3 months (around 7/15/2021) for follow up in office. Reviewed with the patient: current clinical status, medications, activities and diet.      Side effects, adverse effects of the medication prescribed today, as well as treatment plan/ rationale and result expectations have been discussed with the patient who expresses understanding and desires to proceed. Close follow up to evaluate treatment results and for coordination of care. I have reviewed the patient's medical history in detail and updated the computerized patient record.     Leticia Villa PA-C

## 2021-07-28 ENCOUNTER — OFFICE VISIT (OUTPATIENT)
Dept: NEUROLOGY | Age: 36
End: 2021-07-28
Payer: COMMERCIAL

## 2021-07-28 VITALS
SYSTOLIC BLOOD PRESSURE: 136 MMHG | BODY MASS INDEX: 41.21 KG/M2 | WEIGHT: 271 LBS | HEART RATE: 95 BPM | DIASTOLIC BLOOD PRESSURE: 84 MMHG

## 2021-07-28 DIAGNOSIS — G35 MULTIPLE SCLEROSIS (HCC): ICD-10-CM

## 2021-07-28 DIAGNOSIS — M54.32 SCIATICA, LEFT SIDE: ICD-10-CM

## 2021-07-28 DIAGNOSIS — G43.719 INTRACTABLE CHRONIC MIGRAINE WITHOUT AURA AND WITHOUT STATUS MIGRAINOSUS: Primary | ICD-10-CM

## 2021-07-28 PROBLEM — N92.6 ABNORMAL MENSTRUAL PERIODS: Status: ACTIVE | Noted: 2021-07-28

## 2021-07-28 PROBLEM — O36.80X0 PREGNANCY, LOCATION UNKNOWN: Status: ACTIVE | Noted: 2021-03-12

## 2021-07-28 PROBLEM — O26.899 CRAMPING AFFECTING PREGNANCY, ANTEPARTUM: Status: ACTIVE | Noted: 2021-07-28

## 2021-07-28 PROBLEM — O21.9 NAUSEA AND VOMITING OF PREGNANCY, ANTEPARTUM: Status: ACTIVE | Noted: 2021-07-28

## 2021-07-28 PROBLEM — B37.31 YEAST VAGINITIS: Status: ACTIVE | Noted: 2020-11-05

## 2021-07-28 PROBLEM — L29.2 VULVAR ITCHING: Status: ACTIVE | Noted: 2020-11-11

## 2021-07-28 PROBLEM — R10.9 CRAMPING AFFECTING PREGNANCY, ANTEPARTUM: Status: ACTIVE | Noted: 2021-07-28

## 2021-07-28 PROCEDURE — G8417 CALC BMI ABV UP PARAM F/U: HCPCS | Performed by: PSYCHIATRY & NEUROLOGY

## 2021-07-28 PROCEDURE — 99214 OFFICE O/P EST MOD 30 MIN: CPT | Performed by: PSYCHIATRY & NEUROLOGY

## 2021-07-28 PROCEDURE — G8427 DOCREV CUR MEDS BY ELIG CLIN: HCPCS | Performed by: PSYCHIATRY & NEUROLOGY

## 2021-07-28 PROCEDURE — 1036F TOBACCO NON-USER: CPT | Performed by: PSYCHIATRY & NEUROLOGY

## 2021-07-28 RX ORDER — ONDANSETRON 4 MG/1
TABLET, FILM COATED ORAL
COMMUNITY
Start: 2021-07-26 | End: 2021-09-14

## 2021-07-28 RX ORDER — GENTAMICIN SULFATE 3 MG/ML
SOLUTION/ DROPS OPHTHALMIC
COMMUNITY
Start: 2021-06-28 | End: 2021-08-06

## 2021-07-28 ASSESSMENT — ENCOUNTER SYMPTOMS
NAUSEA: 0
VOMITING: 0
TROUBLE SWALLOWING: 0
COLOR CHANGE: 0
BACK PAIN: 0
SHORTNESS OF BREATH: 0
PHOTOPHOBIA: 0
CHOKING: 0

## 2021-07-28 NOTE — PROGRESS NOTES
Subjective:      Patient ID: Maame Arnold is a 28 y.o. female who presents today for:  Chief Complaint   Patient presents with    Follow-up     Pt states that things have been up and down, says she cant complain to much. She says at this time she has no concerns. HPI 28 right-handed female with a history of sclerosis with borderline findings with a negative lumbar puncture. We will sitting on the fence on this. She has headaches as well and she has migraine headaches MRI could be Sastry of vasculopathy of migraine. Last seen we had recommended Ajovy and she was contemplating bariatric surgery for which there was no contraindication. Patient is not pregnant and she does not recall trying the Ajovy. She takes Nurtec as and when required but she does not require as much Nurtec now as her headaches are somewhat better.   She is not on any other medication    Past Medical History:   Diagnosis Date    Anxiety and depression     Asthma     Atypical chest pain 2019    Chronic back pain     Diabetes mellitus ([facility] [address]     Headache     HIV exposure 2020    Irregular heart beat     Multiple sclerosis (HCC)     Osteoarthritis     Pure hypercholesterolemia, unspecified 5/3/2018     Past Surgical History:   Procedure Laterality Date    CHOLECYSTECTOMY      UPPER GASTROINTESTINAL ENDOSCOPY N/A 2019    EGD ESOPHAGOGASTRODUODENOSCOPY performed by Jade Quinn MD at [address] [place] [address] Marital status: Single     Spouse name: Not on file    Number of children: Not on file    Years of education: Not on file    Highest education level: Not on file   Occupational History    Not on file   Tobacco Use    Smoking status: Former Smoker     Packs/day: 0.50     Years: 10.00     Pack years: 5.00     Types: Cigarettes     Quit date: 2017     Years since quittin.5    Smokeless tobacco: Never Used   Vaping Use    Vaping Use: Never used   Substance and Sexual Activity    Alcohol use: Yes     Comment: occas    Drug use: No    Sexual activity: Yes     Partners: Male   Other Topics Concern    Not on file   Social History Narrative    Not on file     Social Determinants of Health     Financial Resource Strain: Low Risk     Difficulty of Paying Living Expenses: Not hard at all   Food Insecurity: No Food Insecurity    Worried About Running Out of Food in the Last Year: Never true    Radha of Food in the Last Year: Never true   Transportation Needs: No Transportation Needs    Lack of Transportation (Medical): No    Lack of Transportation (Non-Medical):  No   Physical Activity:     Days of Exercise per Week:     Minutes of Exercise per Session:    Stress:     Feeling of Stress :    Social Connections:     Frequency of Communication with Friends and Family:     Frequency of Social Gatherings with Friends and Family:     Attends Advent Services:     Active Member of Clubs or Organizations:     Attends Club or Organization Meetings:     Marital Status:    Intimate Partner Violence:     Fear of Current or Ex-Partner:     Emotionally Abused:     Physically Abused:     Sexually Abused:      Family History   Problem Relation Age of Onset    Cancer Mother     Arthritis Mother     Diabetes Father     Heart Disease Father     Stroke Father     High Blood Pressure Father      Allergies   Allergen Reactions    Amphetamine     Ibuprofen Other (See Comments)    Nsaids Other (See Comments)     Waiting for bariatric procedure    Penicillins Hives and Other (See Comments)    Phentermine Other (See Comments)     Anxiety with hospitalization    Topiramate Other (See Comments)     DIZZY       Current Outpatient Medications   Medication Sig Dispense Refill    gentamicin (GARAMYCIN) 0.3 % ophthalmic solution       ondansetron (ZOFRAN) 4 MG tablet TAKE 1 TABLET BY MOUTH FOUR TIMES DAILY FOR 4 DAYS      triamcinolone (KENALOG) 0.1 % ointment Triamcinolone Acetonide 0.1 % External Ointment APPLY AND GENTLY MASSAGE INTO AFFECTED AREA(S) TWICE DAILY. Quantity: 30 Refills: 3 Ordered: 30-Nov-2020 Kasey Melgoza Start : 30-Nov-2020 Active      gabapentin (NEURONTIN) 300 MG capsule TAKE ONE CAPSULE BY MOUTH THREE TIMES DAILY AS NEEDED FOR LEFT LEG PAIN FLARES      Insulin Pen Needle 31G X 5 MM MISC For use with liraglutide pen.  Rimegepant Sulfate (NURTEC) 75 MG TBDP Take 75 mg by mouth as needed (acute migraine) 8 tablet 1    albuterol sulfate  (90 Base) MCG/ACT inhaler Inhale 2 puffs into the lungs 4 times daily as needed for Wheezing 1 Inhaler 5     No current facility-administered medications for this visit. Review of Systems   Constitutional: Negative for fever. HENT: Negative for ear pain, tinnitus and trouble swallowing. Eyes: Negative for photophobia and visual disturbance. Respiratory: Negative for choking and shortness of breath. Cardiovascular: Negative for chest pain and palpitations. Gastrointestinal: Negative for nausea and vomiting. Musculoskeletal: Negative for back pain, gait problem, joint swelling, myalgias, neck pain and neck stiffness. Skin: Negative for color change. Allergic/Immunologic: Negative for food allergies. Neurological: Negative for dizziness, tremors, seizures, syncope, facial asymmetry, speech difficulty, weakness, light-headedness, numbness and headaches. Psychiatric/Behavioral: Negative for behavioral problems, confusion, hallucinations and sleep disturbance. Objective:   /84 (Site: Left Upper Arm, Position: Sitting, Cuff Size: Large Adult)   Pulse 95   Wt 271 lb (122.9 kg)   BMI 41.21 kg/m²     Physical Exam  Vitals reviewed. Eyes:      Pupils: Pupils are equal, round, and reactive to light. Cardiovascular:      Rate and Rhythm: Normal rate and regular rhythm. Heart sounds: No murmur heard.      Pulmonary:      Effort: Pulmonary effort is normal. Breath sounds: Normal breath sounds. Abdominal:      General: Bowel sounds are normal.   Musculoskeletal:         General: Normal range of motion. Cervical back: Normal range of motion. Skin:     General: Skin is warm. Neurological:      Mental Status: She is alert and oriented to person, place, and time. Cranial Nerves: No cranial nerve deficit. Sensory: No sensory deficit. Motor: No abnormal muscle tone. Coordination: Coordination normal.      Deep Tendon Reflexes: Reflexes are normal and symmetric. Babinski sign absent on the right side. Babinski sign absent on the left side. Psychiatric:         Mood and Affect: Mood normal.         No results found.     Lab Results   Component Value Date    WBC 12.9 07/25/2021    WBC 10.7 04/02/2021    RBC 4.21 07/25/2021    RBC 4.55 03/09/2019    HGB 12.8 07/25/2021    HCT 39.2 07/25/2021    MCV 93 07/25/2021    MCH 30.9 04/02/2021    MCHC 32.7 07/25/2021    RDW 13.8 04/02/2021     07/25/2021    MPV 9.4 03/09/2019     Lab Results   Component Value Date     07/25/2021    K 3.6 07/25/2021     07/25/2021    CO2 26 04/02/2021    BUN 8 04/02/2021    CREATININE 0.81 07/25/2021    GFRAA >60 07/25/2021    AGRATIO 1.3 03/09/2019    LABGLOM >60 07/25/2021    GLUCOSE 121 07/25/2021    PROT 6.7 07/25/2021    LABALBU 3.8 07/25/2021    CALCIUM 8.5 07/25/2021    BILITOT 0.3 07/25/2021    ALKPHOS 88 07/25/2021    AST 19 07/25/2021    ALT 10 07/25/2021     Lab Results   Component Value Date    PROTIME 13.2 04/02/2021    INR 1.0 04/02/2021     Lab Results   Component Value Date    TSH 2.210 12/11/2017    FERRITIN 84.7 05/18/2017    IRON 38 05/18/2017    TIBC 315 05/18/2017     Lab Results   Component Value Date    TRIG 125 06/15/2020    HDL 42 06/15/2020    LDLCALC 79 06/15/2020     Lab Results   Component Value Date    LABAMPH Neg 01/30/2021    BARBSCNU Neg 01/30/2021    LABBENZ Neg 01/30/2021    LABBENZ NotDTCD 12/09/2012    LABMETH Neg 01/30/2021    OPIATESCREENURINE Neg 01/30/2021    PHENCYCLIDINESCREENURINE Neg 01/30/2021    ETOH <10 08/25/2020     No results found for: LITHIUM, DILFRTOT, VALPROATE    Assessment:       Diagnosis Orders   1. Intractable chronic migraine without aura and without status migrainosus     2. Multiple sclerosis (HCC)     3. Sciatica, left side       Intractable migraine headaches which had not responded to current line of treatment. We did try her on Ajovy though she does not recall using Ajovy. She is on Nurtec now and she said her headaches are very infrequent and she only has required about 3 no takes a month. She is now pregnant. We do not have any registry for Nurtec but have not seen anything in the literature. Recommend that she use this sparingly. Multiple sclerosis we sit on the fence as her symptoms and MRI results are equivocal with a negative lumbar puncture. It is likely that the white matter disease could be vasculopathy of migraines as well. We recommended repeat MRI though she is pregnant now and our management is unlikely to change and therefore we had not recommended evaluation till her pregnancy is over when we can again reevaluate and decide regarding her medications her EDSS score is 0. We will continue to keep observation and follow her      [facility] R. Kate Jacobson MD, Margy Kim, American Board of Psychiatry & Neurology  Board Certified in Vascular Neurology  Board Certified in Neuromuscular Medicine  Certified in [address] [place] [address] Ave:      No orders of the defined types were placed in this encounter. No orders of the defined types were placed in this encounter. Return in about 6 months (around 1/28/2022).       Olvin Reyes MD

## 2021-08-06 ENCOUNTER — OFFICE VISIT (OUTPATIENT)
Dept: FAMILY MEDICINE CLINIC | Age: 36
End: 2021-08-06
Payer: COMMERCIAL

## 2021-08-06 VITALS
OXYGEN SATURATION: 98 % | RESPIRATION RATE: 18 BRPM | SYSTOLIC BLOOD PRESSURE: 120 MMHG | TEMPERATURE: 96.5 F | HEIGHT: 68 IN | BODY MASS INDEX: 41.83 KG/M2 | DIASTOLIC BLOOD PRESSURE: 78 MMHG | HEART RATE: 72 BPM | WEIGHT: 276 LBS

## 2021-08-06 DIAGNOSIS — O02.9 ABNORMAL PRODUCT OF CONCEPTION, UNSPECIFIED: ICD-10-CM

## 2021-08-06 DIAGNOSIS — O26.91 ABNORMAL PREGNANCY, FIRST TRIMESTER: ICD-10-CM

## 2021-08-06 DIAGNOSIS — N91.2 AMENORRHEA: Primary | ICD-10-CM

## 2021-08-06 PROCEDURE — 99214 OFFICE O/P EST MOD 30 MIN: CPT | Performed by: PHYSICIAN ASSISTANT

## 2021-08-06 PROCEDURE — 1036F TOBACCO NON-USER: CPT | Performed by: PHYSICIAN ASSISTANT

## 2021-08-06 PROCEDURE — G8427 DOCREV CUR MEDS BY ELIG CLIN: HCPCS | Performed by: PHYSICIAN ASSISTANT

## 2021-08-06 PROCEDURE — G8417 CALC BMI ABV UP PARAM F/U: HCPCS | Performed by: PHYSICIAN ASSISTANT

## 2021-08-06 ASSESSMENT — ENCOUNTER SYMPTOMS: NAUSEA: 1

## 2021-08-06 NOTE — PROGRESS NOTES
Subjective  Bibiana Criss Mayer, 28 y.o. female presents today with:  Chief Complaint   Patient presents with    Abdominal Pain     routine follow up but pt would like to discuss knot in her eye      HPI    Amenorrhea. LMP: 6/26-7/1/21. Was sexually active with two different partners on 7/1 and then on 7/9/21. Positive pregnancy. +nausea, abdominal pain/cramping. Presented to [facility] ER on 7/26/21. Beta HCG was elevated/positive. U/s did not show intrauterine pregnancy. Denies any type of vaginal bleeding. Continues to have pelvic cramping. Taking zofran for nausea. Review of Systems   Constitutional: Positive for activity change, appetite change and fatigue. Cardiovascular: Negative for chest pain. Gastrointestinal: Positive for nausea. Genitourinary: Positive for menstrual problem and pelvic pain. Negative for vaginal bleeding. Neurological: Negative for dizziness and light-headedness. Psychiatric/Behavioral: The patient is nervous/anxious.       Past Medical History:   Diagnosis Date    Anxiety and depression     Asthma     Atypical chest pain 5/31/2019    Chronic back pain     Diabetes mellitus ([facility] [address]     Headache     HIV exposure 1/6/2020    Irregular heart beat     Multiple sclerosis (HCC)     Osteoarthritis     Pure hypercholesterolemia, unspecified 5/3/2018     Past Surgical History:   Procedure Laterality Date    CHOLECYSTECTOMY      UPPER GASTROINTESTINAL ENDOSCOPY N/A 8/13/2019    EGD ESOPHAGOGASTRODUODENOSCOPY performed by Shadia Gurrola MD at [address] [place] [address] Marital status: Single     Spouse name: Not on file    Number of children: Not on file    Years of education: Not on file    Highest education level: Not on file   Occupational History    Not on file   Tobacco Use    Smoking status: Former Smoker     Packs/day: 0.50     Years: 10.00     Pack years: 5.00     Types: Cigarettes     Quit date: 1/27/2017 Years since quittin.5    Smokeless tobacco: Never Used   Vaping Use    Vaping Use: Never used   Substance and Sexual Activity    Alcohol use: Yes     Comment: occas    Drug use: No    Sexual activity: Yes     Partners: Male   Other Topics Concern    Not on file   Social History Narrative    Not on file     Social Determinants of Health     Financial Resource Strain: Low Risk     Difficulty of Paying Living Expenses: Not hard at all   Food Insecurity: No Food Insecurity    Worried About [address] in the Last Year: Never true    [email or web address] in the Last Year: Never true   Transportation Needs: No Transportation Needs    Lack of Transportation (Medical): No    Lack of Transportation (Non-Medical):  No   Physical Activity:     Days of Exercise per Week:     Minutes of Exercise per Session:    Stress:     Feeling of Stress :    Social Connections:     Frequency of Communication with Friends and Family:     Frequency of Social Gatherings with Friends and Family:     Attends Yarsanism Services:     Active Member of Clubs or Organizations:     Attends Club or Organization Meetings:     Marital Status:    Intimate Partner Violence:     Fear of Current or Ex-Partner:     Emotionally Abused:     Physically Abused:     Sexually Abused:      Family History   Problem Relation Age of Onset    Cancer Mother     Arthritis Mother     Diabetes Father     Heart Disease Father     Stroke Father     High Blood Pressure Father      Allergies   Allergen Reactions    Amphetamine     Ibuprofen Other (See Comments)    Nsaids Other (See Comments)     Waiting for bariatric procedure    Penicillins Hives and Other (See Comments)    Phentermine Other (See Comments)     Anxiety with hospitalization    Topiramate Other (See Comments)     DIZZY     Current Outpatient Medications   Medication Sig Dispense Refill    Prenatal Vit-Fe Fumarate-FA (PRENATAL PO) Take by mouth      ondansetron (ZOFRAN) This Encounter   Procedures    HCG, Quantitative, Pregnancy     Standing Status:   Future     Standing Expiration Date:   8/6/2022     No orders of the defined types were placed in this encounter. Medications Discontinued During This Encounter   Medication Reason    gentamicin (GARAMYCIN) 0.3 % ophthalmic solution LIST CLEANUP    triamcinolone (KENALOG) 0.1 % ointment LIST CLEANUP    gabapentin (NEURONTIN) 300 MG capsule LIST CLEANUP    Insulin Pen Needle 31G X 5 MM MISC LIST CLEANUP    Rimegepant Sulfate (NURTEC) 75 MG TBDP LIST CLEANUP    albuterol sulfate  (90 Base) MCG/ACT inhaler LIST CLEANUP     No follow-ups on file. Reviewed with the patient: current clinical status, medications, activities and diet. Side effects, adverse effects of the medication prescribed today, as well as treatment plan/ rationale and result expectations have been discussed with the patient who expresses understanding and desires to proceed. Close follow up to evaluate treatment results and for coordination of care. I have reviewed the patient's medical history in detail and updated the computerized patient record.     Leticia Villa PA-C

## 2021-08-27 PROBLEM — Z11.3 SCREENING FOR STD (SEXUALLY TRANSMITTED DISEASE): Status: RESOLVED | Noted: 2021-03-12 | Resolved: 2021-08-27

## 2021-09-14 ENCOUNTER — OFFICE VISIT (OUTPATIENT)
Dept: FAMILY MEDICINE CLINIC | Age: 36
End: 2021-09-14
Payer: COMMERCIAL

## 2021-09-14 VITALS
SYSTOLIC BLOOD PRESSURE: 120 MMHG | WEIGHT: 263 LBS | DIASTOLIC BLOOD PRESSURE: 80 MMHG | TEMPERATURE: 96.8 F | HEIGHT: 68 IN | OXYGEN SATURATION: 98 % | BODY MASS INDEX: 39.86 KG/M2 | HEART RATE: 74 BPM

## 2021-09-14 DIAGNOSIS — H00.014 HORDEOLUM EXTERNUM OF LEFT UPPER EYELID: Primary | ICD-10-CM

## 2021-09-14 PROCEDURE — 1036F TOBACCO NON-USER: CPT | Performed by: PHYSICIAN ASSISTANT

## 2021-09-14 PROCEDURE — G8417 CALC BMI ABV UP PARAM F/U: HCPCS | Performed by: PHYSICIAN ASSISTANT

## 2021-09-14 PROCEDURE — G8427 DOCREV CUR MEDS BY ELIG CLIN: HCPCS | Performed by: PHYSICIAN ASSISTANT

## 2021-09-14 PROCEDURE — 99213 OFFICE O/P EST LOW 20 MIN: CPT | Performed by: PHYSICIAN ASSISTANT

## 2021-09-14 RX ORDER — ERYTHROMYCIN 5 MG/G
OINTMENT OPHTHALMIC
Qty: 3.5 G | Refills: 0 | Status: SHIPPED | OUTPATIENT
Start: 2021-09-14 | End: 2021-09-24

## 2021-09-14 ASSESSMENT — ENCOUNTER SYMPTOMS
PHOTOPHOBIA: 0
ABDOMINAL PAIN: 0
DOUBLE VISION: 0
SINUS PRESSURE: 0
EYE DISCHARGE: 0
SORE THROAT: 0
SHORTNESS OF BREATH: 0
BACK PAIN: 0
COUGH: 0
SINUS PAIN: 0
BLURRED VISION: 0
EYE REDNESS: 1
NAUSEA: 0
CHEST TIGHTNESS: 0
FOREIGN BODY SENSATION: 0
EYE ITCHING: 0
VOMITING: 0
DIARRHEA: 0

## 2021-09-14 ASSESSMENT — VISUAL ACUITY: OU: 1

## 2021-09-14 NOTE — PATIENT INSTRUCTIONS
Soak a clean washcloth in hot water and hold it to your eyelid for 1015 minutes at a time, 35 times a day. Keep the cloth warm by soaking it in hot water often. For a chalazion, this warm compress helps the clogged oil gland to open and drain. You can help the gland clear itself by gently massaging around the area with your clean finger. Do not squeeze or try to pop a stye or chalazion  Doing so could spread the infection into your eyelid. Do not wear eye makeup or contact lenses while you have a stye or chalazion.

## 2021-09-14 NOTE — PROGRESS NOTES
[address] [facility] Encounter  CHIEF COMPLAINT       Chief Complaint   Patient presents with   Corean Severance     left eye small little not on eyelid, patient seen previous with varies treatments        HISTORY OF PRESENT ILLNESS   Bibiana Michele is a 39 y.o. female who presents with:  Eye Problem   The left eye is affected. This is a new problem. The current episode started more than 1 month ago. The problem occurs constantly. The problem has been waxing and waning. There was no injury mechanism. The pain is mild. There is no known exposure to pink eye. She does not wear contacts. Associated symptoms include eye redness. Pertinent negatives include no blurred vision, eye discharge, double vision, fever, foreign body sensation, itching, nausea, photophobia, recent URI, vomiting or weakness. Associated symptoms comments: Stye  . Treatments tried: washing, compress to the area. The treatment provided mild relief. REVIEW OF SYSTEMS     Review of Systems   Constitutional: Negative for activity change, appetite change, chills and fever. HENT: Negative for congestion, drooling, sinus pressure, sinus pain and sore throat. Eyes: Positive for redness. Negative for blurred vision, double vision, photophobia, discharge, itching and visual disturbance. Respiratory: Negative for cough, chest tightness and shortness of breath. Cardiovascular: Negative for chest pain. Gastrointestinal: Negative for abdominal pain, diarrhea, nausea and vomiting. Endocrine: Negative for cold intolerance. Genitourinary: Negative for dysuria, flank pain, frequency and hematuria. Musculoskeletal: Negative for arthralgias and back pain. Skin: Negative for rash. Allergic/Immunologic: Negative for food allergies. Neurological: Negative for weakness, light-headedness, numbness and headaches. Hematological: Does not bruise/bleed easily.      PAST MEDICAL HISTORY         Diagnosis Date    Anxiety and depression     Asthma     Atypical chest pain 5/31/2019    Chronic back pain     Diabetes mellitus ([facility] [address]     Headache     HIV exposure 1/6/2020    Irregular heart beat     Multiple sclerosis ([facility] [address]     Osteoarthritis     Pure hypercholesterolemia, unspecified 5/3/2018     SURGICAL HISTORY     Patient  has a past surgical history that includes Cholecystectomy and Upper gastrointestinal endoscopy (N/A, 8/13/2019). CURRENT MEDICATIONS       Previous Medications    PRENATAL VIT-FE FUMARATE-FA (PRENATAL PO)    Take by mouth     ALLERGIES     Patient is is allergic to amphetamine, ibuprofen, nsaids, penicillins, phentermine, and topiramate. FAMILY HISTORY     Patient'sfamily history includes Arthritis in her mother; Cancer in her mother; Diabetes in her father; Heart Disease in her father; High Blood Pressure in her father; Stroke in her father. SOCIAL HISTORY     Patient  reports that she quit smoking about 4 years ago. Her smoking use included cigarettes. She has a 5.00 pack-year smoking history. She has never used smokeless tobacco. She reports current alcohol use. She reports that she does not use drugs. PHYSICAL EXAM     VITALS  BP: 120/80, Temp: 96.8 °F (36 °C), Pulse: 74,  , SpO2: 98 %  Physical Exam  Vitals and nursing note reviewed. Constitutional:       General: She is awake. She is not in acute distress. Appearance: Normal appearance. She is well-developed. She is not ill-appearing, toxic-appearing or diaphoretic. HENT:      Head: Normocephalic and atraumatic. Right Ear: Hearing and external ear normal.      Left Ear: Hearing and external ear normal.      Nose: Nose normal.   Eyes:      General: Lids are normal. Vision grossly intact. Gaze aligned appropriately. Left eye: Hordeolum present. Conjunctiva/sclera: Conjunctivae normal.     Cardiovascular:      Rate and Rhythm: Normal rate and regular rhythm. Pulses: Normal pulses.       Heart sounds: Normal heart sounds, S1 normal and S2 normal.   Pulmonary:      Effort: Pulmonary effort is normal.      Breath sounds: Normal breath sounds and air entry. Musculoskeletal:      Cervical back: Normal range of motion. Skin:     General: Skin is warm. Capillary Refill: Capillary refill takes less than 2 seconds. Neurological:      Mental Status: She is alert and oriented to person, place, and time. Gait: Gait is intact. Psychiatric:         Attention and Perception: Attention normal.         Mood and Affect: Mood normal.         Speech: Speech normal.         Behavior: Behavior normal. Behavior is cooperative. READY CARE COURSE   Labs:  No results found for this visit on 09/14/21. IMAGING:  No orders to display     Scheduled Meds:  Continuous Infusions:  PRN Meds:. PROCEDURES:  FINAL IMPRESSION      1. Hordeolum externum of left upper eyelid      DISPOSITION/PLAN   1. Abx provided. Recommended throwing out all makeup and brushes that come in contact with that eye. Recommend washing the area, warm compress, and apply abx. Went over possible s/e of the medications. Patient would like to proceed with therapy. Discussed signs and symptoms which require immediate follow-up in ED/call to 911. Patient verbalized understanding. On this date 9/14/2021 I have spent 20 minutes reviewing previous notes, test results and face to face with the patient discussing the diagnosis and importance of compliance with the treatment plan as well as documenting on the day of the visit. PATIENT REFERRED TO:  Return if symptoms worsen or fail to improve. DISCHARGE MEDICATIONS:  New Prescriptions    ERYTHROMYCIN (ROMYCIN) 5 MG/GM OPHTHALMIC OINTMENT    Apply to the affected area four times daily. Cannot display discharge medications since this is not an admission.        Donnell Andino

## 2021-10-17 ENCOUNTER — APPOINTMENT (OUTPATIENT)
Dept: GENERAL RADIOLOGY | Age: 36
End: 2021-10-17
Payer: COMMERCIAL

## 2021-10-17 ENCOUNTER — HOSPITAL ENCOUNTER (EMERGENCY)
Age: 36
Discharge: HOME | End: 2021-10-17
Payer: COMMERCIAL

## 2021-10-17 ENCOUNTER — APPOINTMENT (OUTPATIENT)
Dept: ULTRASOUND IMAGING | Age: 36
End: 2021-10-17
Payer: COMMERCIAL

## 2021-10-17 VITALS
SYSTOLIC BLOOD PRESSURE: 130 MMHG | HEIGHT: 68 IN | HEART RATE: 77 BPM | DIASTOLIC BLOOD PRESSURE: 83 MMHG | TEMPERATURE: 97.8 F | WEIGHT: 272 LBS | OXYGEN SATURATION: 99 % | BODY MASS INDEX: 41.22 KG/M2 | RESPIRATION RATE: 18 BRPM

## 2021-10-17 DIAGNOSIS — R07.9 CHEST PAIN, UNSPECIFIED TYPE: Primary | ICD-10-CM

## 2021-10-17 LAB
ALBUMIN SERPL-MCNC: 3.4 G/DL (ref 3.5–4.6)
ALP BLD-CCNC: 108 U/L (ref 40–130)
ALT SERPL-CCNC: 18 U/L (ref 0–33)
ANION GAP SERPL CALCULATED.3IONS-SCNC: 11 MEQ/L (ref 9–15)
AST SERPL-CCNC: 20 U/L (ref 0–35)
BASOPHILS ABSOLUTE: 0 K/UL (ref 0–0.2)
BASOPHILS RELATIVE PERCENT: 0.2 %
BILIRUB SERPL-MCNC: <0.2 MG/DL (ref 0.2–0.7)
BILIRUBIN URINE: NEGATIVE
BLOOD, URINE: NEGATIVE
BUN BLDV-MCNC: 6 MG/DL (ref 6–20)
CALCIUM SERPL-MCNC: 8.7 MG/DL (ref 8.5–9.9)
CHLORIDE BLD-SCNC: 99 MEQ/L (ref 95–107)
CLARITY: CLEAR
CO2: 23 MEQ/L (ref 20–31)
COLOR: ABNORMAL
CREAT SERPL-MCNC: 0.58 MG/DL (ref 0.5–0.9)
EOSINOPHILS ABSOLUTE: 0.1 K/UL (ref 0–0.7)
EOSINOPHILS RELATIVE PERCENT: 1.2 %
GFR AFRICAN AMERICAN: >60
GFR NON-AFRICAN AMERICAN: >60
GLOBULIN: 2.8 G/DL (ref 2.3–3.5)
GLUCOSE BLD-MCNC: 84 MG/DL (ref 70–99)
GLUCOSE URINE: NEGATIVE MG/DL
HCT VFR BLD CALC: 35.8 % (ref 37–47)
HEMOGLOBIN: 12.1 G/DL (ref 12–16)
KETONES, URINE: ABNORMAL MG/DL
LEUKOCYTE ESTERASE, URINE: NEGATIVE
LIPASE: 19 U/L (ref 12–95)
LYMPHOCYTES ABSOLUTE: 2.1 K/UL (ref 1–4.8)
LYMPHOCYTES RELATIVE PERCENT: 17.4 %
MAGNESIUM: 1.7 MG/DL (ref 1.7–2.4)
MCH RBC QN AUTO: 30.2 PG (ref 27–31.3)
MCHC RBC AUTO-ENTMCNC: 33.8 % (ref 33–37)
MCV RBC AUTO: 89.4 FL (ref 82–100)
MONOCYTES ABSOLUTE: 0.4 K/UL (ref 0.2–0.8)
MONOCYTES RELATIVE PERCENT: 3.6 %
NEUTROPHILS ABSOLUTE: 9.4 K/UL (ref 1.4–6.5)
NEUTROPHILS RELATIVE PERCENT: 77.6 %
NITRITE, URINE: NEGATIVE
PDW BLD-RTO: 13.9 % (ref 11.5–14.5)
PH UA: 5.5 (ref 5–9)
PLATELET # BLD: 290 K/UL (ref 130–400)
POTASSIUM SERPL-SCNC: 3.2 MEQ/L (ref 3.4–4.9)
PROTEIN UA: ABNORMAL MG/DL
RBC # BLD: 4.01 M/UL (ref 4.2–5.4)
SARS-COV-2, NAAT: NOT DETECTED
SODIUM BLD-SCNC: 133 MEQ/L (ref 135–144)
SPECIFIC GRAVITY UA: 1.04 (ref 1–1.03)
TOTAL PROTEIN: 6.2 G/DL (ref 6.3–8)
TROPONIN: <0.01 NG/ML (ref 0–0.01)
UROBILINOGEN, URINE: 0.2 E.U./DL
WBC # BLD: 12.2 K/UL (ref 4.8–10.8)

## 2021-10-17 PROCEDURE — 71045 X-RAY EXAM CHEST 1 VIEW: CPT

## 2021-10-17 PROCEDURE — 6370000000 HC RX 637 (ALT 250 FOR IP)

## 2021-10-17 PROCEDURE — 80053 COMPREHEN METABOLIC PANEL: CPT

## 2021-10-17 PROCEDURE — 81003 URINALYSIS AUTO W/O SCOPE: CPT

## 2021-10-17 PROCEDURE — 93970 EXTREMITY STUDY: CPT

## 2021-10-17 PROCEDURE — 84484 ASSAY OF TROPONIN QUANT: CPT

## 2021-10-17 PROCEDURE — 83690 ASSAY OF LIPASE: CPT

## 2021-10-17 PROCEDURE — 87635 SARS-COV-2 COVID-19 AMP PRB: CPT

## 2021-10-17 PROCEDURE — 83735 ASSAY OF MAGNESIUM: CPT

## 2021-10-17 PROCEDURE — 99285 EMERGENCY DEPT VISIT HI MDM: CPT

## 2021-10-17 PROCEDURE — 36415 COLL VENOUS BLD VENIPUNCTURE: CPT

## 2021-10-17 PROCEDURE — 93005 ELECTROCARDIOGRAM TRACING: CPT | Performed by: EMERGENCY MEDICINE

## 2021-10-17 PROCEDURE — 2580000003 HC RX 258

## 2021-10-17 PROCEDURE — 85025 COMPLETE CBC W/AUTO DIFF WBC: CPT

## 2021-10-17 RX ORDER — 0.9 % SODIUM CHLORIDE 0.9 %
1000 INTRAVENOUS SOLUTION INTRAVENOUS ONCE
Status: COMPLETED | OUTPATIENT
Start: 2021-10-17 | End: 2021-10-17

## 2021-10-17 RX ORDER — POTASSIUM CHLORIDE 20 MEQ/1
40 TABLET, EXTENDED RELEASE ORAL ONCE
Status: COMPLETED | OUTPATIENT
Start: 2021-10-17 | End: 2021-10-17

## 2021-10-17 RX ADMIN — SODIUM CHLORIDE 1000 ML: 9 INJECTION, SOLUTION INTRAVENOUS at 18:30

## 2021-10-17 RX ADMIN — POTASSIUM CHLORIDE 40 MEQ: 1500 TABLET, EXTENDED RELEASE ORAL at 18:30

## 2021-10-17 ASSESSMENT — ENCOUNTER SYMPTOMS
RHINORRHEA: 0
PHOTOPHOBIA: 0
DIARRHEA: 0
COUGH: 0
ABDOMINAL PAIN: 0
CONSTIPATION: 0
SHORTNESS OF BREATH: 1
VOMITING: 0
NAUSEA: 0

## 2021-10-17 ASSESSMENT — PAIN DESCRIPTION - DESCRIPTORS: DESCRIPTORS: TIGHTNESS

## 2021-10-17 ASSESSMENT — PAIN SCALES - GENERAL: PAINLEVEL_OUTOF10: 6

## 2021-10-17 ASSESSMENT — PAIN DESCRIPTION - PAIN TYPE: TYPE: ACUTE PAIN

## 2021-10-17 ASSESSMENT — PAIN DESCRIPTION - LOCATION: LOCATION: CHEST

## 2021-10-17 ASSESSMENT — PAIN DESCRIPTION - ORIENTATION: ORIENTATION: MID;LEFT

## 2021-10-17 NOTE — ED TRIAGE NOTES
Pt arrives to ED ambulatory in regards to shortness of breath and chest tightness that began last night. Pt denies cardiac history. A&Ox4. Skin pink, w/d. Resps even and unlabored on room air.

## 2021-10-17 NOTE — ED PROVIDER NOTES
[address] [facility] ED  eMERGENCY dEPARTMENT eNCOUnter      Pt Name: Gladys Nunez  MRN: [id number]  Izzygffortunato 1985  Date of evaluation: 10/17/2021  Provider: Marly Mcmullen is a 39 y.o. female per chart review has ah/o anxiety, depression. Patient reports 1 day history of moderate, constant dyspnea and central chest pain. She denies cough, congestion, abdominal pain, vaginal bleeding, n/v/d, weakness, discharge. Patient is 16 weeks pregnant. Denies change in fetal activity. REVIEW OF SYSTEMS       Review of Systems   Constitutional: Negative for chills and fever. HENT: Negative for congestion and rhinorrhea. Eyes: Negative for photophobia. Respiratory: Positive for shortness of breath. Negative for cough. Cardiovascular: Positive for chest pain. Gastrointestinal: Negative for abdominal pain, constipation, diarrhea, nausea and vomiting. Genitourinary: Negative for difficulty urinating, pelvic pain, vaginal bleeding, vaginal discharge and vaginal pain. Musculoskeletal: Negative for myalgias. Neurological: Negative for dizziness, light-headedness and headaches. Psychiatric/Behavioral: Negative for behavioral problems. Except as noted above the remainder of the review of systems was reviewed and negative.        PAST MEDICAL HISTORY     Past Medical History:   Diagnosis Date    Anxiety and depression     Asthma     Atypical chest pain 5/31/2019    Chronic back pain     Diabetes mellitus ([address]     Headache     HIV exposure 1/6/2020    Irregular heart beat     [facility] sclerosis [facility]     Osteoarthritis     Pure hypercholesterolemia, unspecified 5/3/2018         SURGICAL HISTORY       Past Surgical History:   Procedure Laterality Date    CHOLECYSTECTOMY      UPPER GASTROINTESTINAL ENDOSCOPY N/A 8/13/2019    EGD ESOPHAGOGASTRODUODENOSCOPY performed by Demarco Swann MD at [address] Previous Medications    PRENATAL VIT-FE FUMARATE-FA (PRENATAL PO)    Take by mouth       ALLERGIES     Amphetamine, Ibuprofen, Nsaids, Penicillins, Phentermine, and Topiramate    FAMILY HISTORY       Family History   Problem Relation Age of Onset    Cancer Mother     Arthritis Mother     Diabetes Father     Heart Disease Father     Stroke Father     High Blood Pressure Father           SOCIAL HISTORY       Social History     Socioeconomic History    Marital status: Single     Spouse name: None    Number of children: None    Years of education: None    Highest education level: None   Occupational History    None   Tobacco Use    Smoking status: Former Smoker     Packs/day: 0.50     Years: 10.00     Pack years: 5.00     Types: Cigarettes     Quit date: 2017     Years since quittin.7    Smokeless tobacco: Never Used   Vaping Use    Vaping Use: Never used   Substance and Sexual Activity    Alcohol use: Yes     Comment: occas    Drug use: No    Sexual activity: Yes     Partners: Male   Other Topics Concern    None   Social History Narrative    None     Social Determinants of Health     Financial Resource Strain: Low Risk     Difficulty of Paying Living Expenses: Not hard at all   Food Insecurity: No Food Insecurity    Worried About Running Out of Food in the Last Year: Never true    Radha of Food in the Last Year: Never true   Transportation Needs: No Transportation Needs    Lack of Transportation (Medical): No    Lack of Transportation (Non-Medical):  No   Physical Activity:     Days of Exercise per Week:     Minutes of Exercise per Session:    Stress:     Feeling of Stress :    Social Connections:     Frequency of Communication with Friends and Family:     Frequency of Social Gatherings with Friends and Family:     Attends Druze Services:     Active Member of Clubs or Organizations:     Attends Club or Organization Meetings:     Marital Status:    Intimate Partner Violence:     Fear of Current or Ex-Partner:     Emotionally Abused:     Physically Abused:     Sexually Abused:          PHYSICAL EXAM        ED Triage Vitals [10/17/21 1523]   BP Temp Temp Source Pulse Resp SpO2 Height Weight   121/73 97.8 °F (36.6 °C) Oral 92 20 100 % 5' 8\" (1.727 m) 272 lb (123.4 kg)       Physical Exam  Constitutional:       General: She is not in acute distress. Appearance: Normal appearance. She is well-developed. She is not ill-appearing. HENT:      Head: Normocephalic and atraumatic. Right Ear: External ear normal.      Left Ear: External ear normal.      Nose: Nose normal.      Mouth/Throat:      Mouth: Mucous membranes are moist.      Pharynx: Oropharynx is clear. Eyes:      Extraocular Movements: Extraocular movements intact. Cardiovascular:      Rate and Rhythm: Normal rate and regular rhythm. Pulses: Normal pulses. Heart sounds: Normal heart sounds. Pulmonary:      Effort: Pulmonary effort is normal. No tachypnea, accessory muscle usage or respiratory distress. Breath sounds: Normal breath sounds. Abdominal:      General: Bowel sounds are normal.      Palpations: Abdomen is soft. Tenderness: There is no abdominal tenderness. Musculoskeletal:         General: No tenderness. Normal range of motion. Cervical back: Normal range of motion. Right lower leg: No edema. Left lower leg: No edema. Skin:     General: Skin is warm. Neurological:      Mental Status: She is alert and oriented to person, place, and time.    Psychiatric:         Mood and Affect: Mood normal.           LABS:  Labs Reviewed   COMPREHENSIVE METABOLIC PANEL - Abnormal; Notable for the following components:       Result Value    Sodium 133 (*)     Potassium 3.2 (*)     Total Protein 6.2 (*)     Albumin 3.4 (*)     All other components within normal limits   CBC WITH AUTO DIFFERENTIAL - Abnormal; Notable for the following components:    WBC 12.2 (*)     RBC 4.01 (*)     Hematocrit 35.8 (*)     Neutrophils Absolute 9.4 (*)     All other components within normal limits   URINALYSIS - Abnormal; Notable for the following components:    Color, UA DARK YELLOW (*)     Ketones, Urine TRACE (*)     Protein, UA TRACE (*)     All other components within normal limits   COVID-19, RAPID   TROPONIN   MAGNESIUM   LIPASE         MDM:   Vitals:    Vitals:    10/17/21 1523 10/17/21 1600 10/17/21 1831   BP: 121/73 133/81    Pulse: 92 84 81   Resp: 20 17 19   Temp: 97.8 °F (36.6 °C)     TempSrc: Oral     SpO2: 100% 98% 99%   Weight: 272 lb (123.4 kg)     Height: 5' 8\" (1.727 m)         39 y.o. female per chart review has ah/o anxiety, depression. Patient reports 1 day history of moderate, constant dyspnea and central chest pain. She denies cough, congestion, abdominal pain, vaginal bleeding, n/v/d, weakness, discharge. Patient is 16 weeks pregnant. Denies change in fetal activity. Afebrile, hemodynamically stable. EKG NSR HR 86, regular intervals, no axis deviation, no ST abnormality. CXR negative for acute process. Labs remarkable for K+ 3.2, given PO potassium. Given IVF. Due to persistent reported dyspnea and risk factors bilateral venous ultrasound of the legs is done and negative. Throughout ED stay patient maintains % O2 saturations on room air. No tachycardia. With [facility] criteria and negative diagnostics low suspicion for acute pulmonary embolus. Low suspicion for ACS. Discussed with patient continued OB/GYN followup for care. Patient agrees to plan. CRITICAL CARE TIME   Total CriticalCare time was 0 minutes, excluding separately reportable procedures. There was a high probability of clinically significant/life threatening deterioration in the patient's condition which required my urgent intervention. PROCEDURES:  Unlessotherwise noted below, none      Procedures      FINAL IMPRESSION      1.  Chest pain, unspecified type          DISPOSITION/PLAN   DISPOSITION Decision To Discharge 10/17/2021 07:44:29 PM          Katty Nieto (electronically signed)  Attending Emergency Physician        [facility], [address]  10/17/21 2013

## 2021-10-18 LAB
EKG ATRIAL RATE: 86 BPM
EKG P AXIS: 31 DEGREES
EKG P-R INTERVAL: 156 MS
EKG Q-T INTERVAL: 370 MS
EKG QRS DURATION: 70 MS
EKG QTC CALCULATION (BAZETT): 442 MS
EKG R AXIS: 10 DEGREES
EKG T AXIS: 6 DEGREES
EKG VENTRICULAR RATE: 86 BPM

## 2021-10-18 PROCEDURE — 93010 ELECTROCARDIOGRAM REPORT: CPT | Performed by: INTERNAL MEDICINE

## 2021-10-19 ENCOUNTER — NURSE TRIAGE (OUTPATIENT)
Dept: OTHER | Facility: CLINIC | Age: 36
End: 2021-10-19

## 2021-10-19 NOTE — TELEPHONE ENCOUNTER
No triage, no contact made. Staff left message on voicemail      Reason for Disposition   No answer. First attempt to contact caller. Follow-up call scheduled within 15 minutes.     Protocols used: NO CONTACT OR DUPLICATE CONTACT CALL-ADULT-[place]

## 2022-01-24 PROBLEM — E66.01 MORBID OBESITY (HCC): Status: ACTIVE | Noted: 2017-12-08

## 2022-01-24 PROBLEM — O21.0 HYPEREMESIS GRAVIDARUM: Status: ACTIVE | Noted: 2021-07-28
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
## 2022-01-26 ENCOUNTER — OFFICE VISIT (OUTPATIENT)
Dept: NEUROLOGY | Age: 37
End: 2022-01-26
Payer: COMMERCIAL

## 2022-01-26 VITALS
DIASTOLIC BLOOD PRESSURE: 80 MMHG | HEART RATE: 95 BPM | BODY MASS INDEX: 42.42 KG/M2 | WEIGHT: 279 LBS | SYSTOLIC BLOOD PRESSURE: 120 MMHG

## 2022-01-26 DIAGNOSIS — G43.719 INTRACTABLE CHRONIC MIGRAINE WITHOUT AURA AND WITHOUT STATUS MIGRAINOSUS: Primary | ICD-10-CM

## 2022-01-26 DIAGNOSIS — R93.89 ABNORMAL MRI: ICD-10-CM

## 2022-01-26 DIAGNOSIS — R42 DIZZINESS AND GIDDINESS: ICD-10-CM

## 2022-01-26 DIAGNOSIS — M54.32 SCIATICA, LEFT SIDE: ICD-10-CM

## 2022-01-26 PROCEDURE — G8417 CALC BMI ABV UP PARAM F/U: HCPCS | Performed by: PSYCHIATRY & NEUROLOGY

## 2022-01-26 PROCEDURE — 99213 OFFICE O/P EST LOW 20 MIN: CPT | Performed by: PSYCHIATRY & NEUROLOGY

## 2022-01-26 PROCEDURE — G8484 FLU IMMUNIZE NO ADMIN: HCPCS | Performed by: PSYCHIATRY & NEUROLOGY

## 2022-01-26 PROCEDURE — 1036F TOBACCO NON-USER: CPT | Performed by: PSYCHIATRY & NEUROLOGY

## 2022-01-26 PROCEDURE — G8427 DOCREV CUR MEDS BY ELIG CLIN: HCPCS | Performed by: PSYCHIATRY & NEUROLOGY

## 2022-01-26 ASSESSMENT — ENCOUNTER SYMPTOMS
COLOR CHANGE: 0
CHOKING: 0
VOMITING: 0
BACK PAIN: 0
PHOTOPHOBIA: 0
NAUSEA: 0
SHORTNESS OF BREATH: 0
TROUBLE SWALLOWING: 0

## 2022-01-26 NOTE — PROGRESS NOTES
Subjective:      Patient ID: Marco Chapa is a 39 y.o. female who presents today for:  Chief Complaint   Patient presents with    Follow-up     pt states shes doing good       HPI 36-year right-handed male with a history of multiple sclerosis with borderline findings with a negative lumbar puncture. We are sitting on the fence. She has headaches and the MRI findings could be vasculopathy of migraine. We had recommended Ajovy in the past.  Also takes Nurtec patient reports she is doing well  Patient is not on medication now she is 8 to 9 months pregnant she has not taken her Nurtec for quite some time. This is her pregnancy and most of her pregnancy nothing is occurred. She is quite afraid that after the pregnancy she may get relapse though again we are not quite sure if truly this is a case of multiple sclerosis  Past Medical History:   Diagnosis Date    Anxiety and depression     Asthma     Atypical chest pain 2019    Chronic back pain     Diabetes mellitus ([facility]     Headache     HIV exposure 2020    Irregular heart beat     Multiple sclerosis ([facility]     Osteoarthritis     Pure hypercholesterolemia, unspecified 5/3/2018     Past Surgical History:   Procedure Laterality Date    CHOLECYSTECTOMY      UPPER GASTROINTESTINAL ENDOSCOPY N/A 2019    EGD ESOPHAGOGASTRODUODENOSCOPY performed by Jaymie Song MD at [address] [place] [address] Marital status: Single     Spouse name: Not on file    Number of children: Not on file    Years of education: Not on file    Highest education level: Not on file   Occupational History    Not on file   Tobacco Use    Smoking status: Former Smoker     Packs/day: 0.50     Years: 10.00     Pack years: 5.00     Types: Cigarettes     Quit date: 2017     Years since quittin.0    Smokeless tobacco: Never Used   Vaping Use    Vaping Use: Never used   Substance and Sexual Activity    Alcohol use:  Yes Comment: occas    Drug use: No    Sexual activity: Yes     Partners: Male   Other Topics Concern    Not on file   Social History Narrative    Not on file     Social Determinants of Health     Financial Resource Strain:     Difficulty of Paying Living Expenses: Not on file   Food Insecurity:     Worried About Running Out of Food in the Last Year: Not on file    Radha of Food in the Last Year: Not on file   Transportation Needs:     Lack of Transportation (Medical): Not on file    Lack of Transportation (Non-Medical):  Not on file   Physical Activity:     Days of Exercise per Week: Not on file    Minutes of Exercise per Session: Not on file   Stress:     Feeling of Stress : Not on file   Social Connections:     Frequency of Communication with Friends and Family: Not on file    Frequency of Social Gatherings with Friends and Family: Not on file    Attends Worship Services: Not on file    Active Member of [address] [place] [address] [employer] Social or Organizations: Not on file    Attends Club or Organization Meetings: Not on file    Marital Status: Not on file   Intimate Partner Violence:     Fear of Current or Ex-Partner: Not on file    Emotionally Abused: Not on file    Physically Abused: Not on file    Sexually Abused: Not on file   Housing Stability:     Unable to Pay for Housing in the Last Year: Not on file    Number of [place] in the Last Year: Not on file    Unstable Housing in the Last Year: Not on file     Family History   Problem Relation Age of Onset    Cancer Mother     Arthritis Mother     Diabetes Father     Heart Disease Father     Stroke Father     High Blood Pressure Father      Allergies   Allergen Reactions    Amphetamine     Ibuprofen Other (See Comments)    Nsaids Other (See Comments)     Waiting for bariatric procedure    Penicillins Hives and Other (See Comments)    Phentermine Other (See Comments)     Anxiety with hospitalization    Topiramate Other (See Comments)     DIZZY       Current Outpatient Medications   Medication Sig Dispense Refill    Prenatal Vit-Fe Fumarate-FA (PRENATAL PO) Take by mouth       No current facility-administered medications for this visit. Review of Systems   Constitutional: Negative for fever. HENT: Negative for ear pain, tinnitus and trouble swallowing. Eyes: Negative for photophobia and visual disturbance. Respiratory: Negative for choking and shortness of breath. Cardiovascular: Negative for chest pain and palpitations. Gastrointestinal: Negative for nausea and vomiting. Musculoskeletal: Negative for back pain, gait problem, joint swelling, myalgias, neck pain and neck stiffness. Skin: Negative for color change. Allergic/Immunologic: Negative for food allergies. Neurological: Negative for dizziness, tremors, seizures, syncope, facial asymmetry, speech difficulty, weakness, light-headedness, numbness and headaches. Psychiatric/Behavioral: Negative for behavioral problems, confusion, hallucinations and sleep disturbance. Objective:   /80 (Site: Left Upper Arm, Position: Sitting, Cuff Size: Medium Adult)   Pulse 95   Wt 279 lb (126.6 kg)   BMI 42.42 kg/m²     Physical Exam  Vitals reviewed. Eyes:      Pupils: Pupils are equal, round, and reactive to light. Cardiovascular:      Rate and Rhythm: Normal rate and regular rhythm. Heart sounds: No murmur heard. Pulmonary:      Effort: Pulmonary effort is normal.      Breath sounds: Normal breath sounds. Abdominal:      General: Bowel sounds are normal.   Musculoskeletal:         General: Normal range of motion. Cervical back: Normal range of motion. Skin:     General: Skin is warm. Neurological:      Mental Status: She is alert and oriented to person, place, and time. Cranial Nerves: No cranial nerve deficit. Sensory: No sensory deficit. Motor: No abnormal muscle tone.       Coordination: Coordination normal.      Deep Tendon Reflexes: Reflexes are normal and symmetric. Babinski sign absent on the right side. Babinski sign absent on the left side. Psychiatric:         Mood and Affect: Mood normal.         XR CHEST (SINGLE VIEW FRONTAL)    Result Date: 10/17/2021  EXAMINATION: XR CHEST (SINGLE VIEW FRONTAL) CLINICAL HISTORY: REDNESS OF BREATH COMPARISONS: AUGUST 25, 2012 FINDINGS: Osseous structures intact. Cardiopericardial silhouette normal. Pulmonary vasculature normal. Lungs clear. NO ACUTE CARDIOPULMONARY DISEASE.    US DUP LOWER EXTREMITIES BILATERAL VENOUS    Result Date: 10/17/2021  US DUP LOWER EXTREMITIES BILATERAL VENOUS CLINICAL HISTORY:  dyspnea; pregnancy COMPARISONS: June 8, 2011 FINDINGS: Duplex color ultrasound as well as  both gray scale and spectral Doppler ultrasound of the deep venous system of both the left land right lower extremity from the inguinal ligaments to the popliteal fossa was performed. There are no findings of deep venous thrombus in the visualized vessels of the left or right  lower extremity. NO FINDINGS OF  DEEP VENOUS THROMBUS IN THE VISUALIZED VESSELS OF THE LEFT OR RIGHT  LOWER EXTREMITY.       Lab Results   Component Value Date    WBC 11.7 01/10/2022    WBC 12.2 10/17/2021    RBC 3.89 01/10/2022    RBC 4.55 03/09/2019    HGB 11.8 01/10/2022    HCT 37.4 01/10/2022    MCV 96 01/10/2022    MCH 30.2 10/17/2021    MCHC 31.6 01/10/2022    RDW 13.9 10/17/2021     01/10/2022    MPV 9.4 03/09/2019     Lab Results   Component Value Date     10/17/2021    K 3.2 10/17/2021    CL 99 10/17/2021    CO2 23 10/17/2021    BUN 6 10/17/2021    CREATININE 0.58 10/17/2021    GFRAA >60.0 10/17/2021    AGRATIO 1.3 03/09/2019    LABGLOM >60.0 10/17/2021    GLUCOSE 84 10/17/2021    GLUCOSE 121 07/25/2021    PROT 6.2 10/17/2021    LABALBU 3.4 10/17/2021    LABALBU 3.8 07/25/2021    CALCIUM 8.7 10/17/2021    BILITOT <0.2 10/17/2021    ALKPHOS 108 10/17/2021    AST 20 10/17/2021    ALT 18 10/17/2021     Lab Results Component Value Date    PROTIME 13.2 04/02/2021    INR 1.0 04/02/2021     Lab Results   Component Value Date    TSH 2.210 12/11/2017    FERRITIN 84.7 05/18/2017    IRON 38 05/18/2017    TIBC 315 05/18/2017     Lab Results   Component Value Date    TRIG 125 06/15/2020    HDL 42 06/15/2020    LDLCALC 79 06/15/2020     Lab Results   Component Value Date    LABAMPH Neg 01/30/2021    BARBSCNU Neg 01/30/2021    LABBENZ Neg 01/30/2021    LABBENZ NotDTCD 12/09/2012    LABMETH Neg 01/30/2021    OPIATESCREENURINE Neg 01/30/2021    PHENCYCLIDINESCREENURINE Neg 01/30/2021    ETOH <10 08/25/2020     No results found for: LITHIUM, DILFRTOT, VALPROATE    Assessment:       Diagnosis Orders   1. Intractable chronic migraine without aura and without status migrainosus     2. Sciatica, left side     3. Dizziness and giddiness     4. Abnormal MRI     Intractable chronic migraine headaches with abnormal MRI likely representing vasculopathy of migraine headaches. We have investigated her for MS and is it on the fence with repeat MRIs as her lumbar puncture was negative. There is no suggestion of neuromyelitis optica either. Patient is now 7 months pregnant and is not taking any migraine medications. She is quite concerned that she may have a relapse after the pregnancy though she has had 4 previous pregnancy nothing has occurred and if this truly is not MS nothing is likely to occur. We will keep an eye on this and reassured her that we will deal with it when time comes. To the pregnancy will repeat her MRI. Patient's back has not been bothersome during pregnancy      Plan:      No orders of the defined types were placed in this encounter. No orders of the defined types were placed in this encounter. Return in about 6 months (around 7/26/2022).       Chayito Blankenship MD

## 2022-03-03 ENCOUNTER — OFFICE VISIT (OUTPATIENT)
Dept: FAMILY MEDICINE CLINIC | Age: 37
End: 2022-03-03
Payer: COMMERCIAL

## 2022-03-03 VITALS
RESPIRATION RATE: 16 BRPM | DIASTOLIC BLOOD PRESSURE: 82 MMHG | TEMPERATURE: 96 F | WEIGHT: 268 LBS | HEIGHT: 68 IN | OXYGEN SATURATION: 95 % | BODY MASS INDEX: 40.62 KG/M2 | HEART RATE: 94 BPM | SYSTOLIC BLOOD PRESSURE: 118 MMHG

## 2022-03-03 DIAGNOSIS — O99.340 DEPRESSION AFFECTING PREGNANCY: Primary | ICD-10-CM

## 2022-03-03 DIAGNOSIS — F32.A DEPRESSION AFFECTING PREGNANCY: Primary | ICD-10-CM

## 2022-03-03 PROCEDURE — 99214 OFFICE O/P EST MOD 30 MIN: CPT | Performed by: PHYSICIAN ASSISTANT

## 2022-03-03 PROCEDURE — G8484 FLU IMMUNIZE NO ADMIN: HCPCS | Performed by: PHYSICIAN ASSISTANT

## 2022-03-03 PROCEDURE — 1036F TOBACCO NON-USER: CPT | Performed by: PHYSICIAN ASSISTANT

## 2022-03-03 PROCEDURE — G8417 CALC BMI ABV UP PARAM F/U: HCPCS | Performed by: PHYSICIAN ASSISTANT

## 2022-03-03 PROCEDURE — G8427 DOCREV CUR MEDS BY ELIG CLIN: HCPCS | Performed by: PHYSICIAN ASSISTANT

## 2022-03-03 SDOH — ECONOMIC STABILITY: FOOD INSECURITY: WITHIN THE PAST 12 MONTHS, YOU WORRIED THAT YOUR FOOD WOULD RUN OUT BEFORE YOU GOT MONEY TO BUY MORE.: NEVER TRUE

## 2022-03-03 SDOH — ECONOMIC STABILITY: FOOD INSECURITY: WITHIN THE PAST 12 MONTHS, THE FOOD YOU BOUGHT JUST DIDN'T LAST AND YOU DIDN'T HAVE MONEY TO GET MORE.: NEVER TRUE

## 2022-03-03 ASSESSMENT — SOCIAL DETERMINANTS OF HEALTH (SDOH): HOW HARD IS IT FOR YOU TO PAY FOR THE VERY BASICS LIKE FOOD, HOUSING, MEDICAL CARE, AND HEATING?: NOT HARD AT ALL

## 2022-03-03 NOTE — PROGRESS NOTES
Subjective  Roney Macdonald, 39 y.o. female presents today with:  Chief Complaint   Patient presents with    Depression     depression and anxiety follow up       HPI  In office today to discuss depression. Patient is currently in her 3rd trimester of pregnancy. History of post-partum anxiety/depression. Worried about her mood after delivery. Gestational diabetes--stopped insulin for a couple of weeks, has since resumed. Does not have the best support network. Open to therapist.     Denies SI/HI. Stressors include her children--two youngest are having behavior issues in school and at home. Review of Systems   Constitutional: Negative for chills. Cardiovascular: Negative for chest pain, palpitations and leg swelling. Genitourinary: Positive for menstrual problem (pregnant). Neurological: Negative for dizziness, weakness, light-headedness and headaches. Psychiatric/Behavioral: Positive for dysphoric mood. Negative for agitation, confusion, decreased concentration, self-injury, sleep disturbance and suicidal ideas. The patient is nervous/anxious. The patient is not hyperactive.         Past Medical History:   Diagnosis Date    Anxiety and depression     Asthma     Atypical chest pain 5/31/2019    Chronic back pain     Diabetes mellitus ([facility]     Headache     HIV exposure 1/6/2020    Irregular heart beat     Multiple sclerosis (HCC)     Osteoarthritis     Pure hypercholesterolemia, unspecified 5/3/2018     Past Surgical History:   Procedure Laterality Date    CHOLECYSTECTOMY      UPPER GASTROINTESTINAL ENDOSCOPY N/A 8/13/2019    EGD ESOPHAGOGASTRODUODENOSCOPY performed by Paul Roman MD at [address] [place] [address] Marital status: Single     Spouse name: Not on file    Number of children: Not on file    Years of education: Not on file    Highest education level: Not on file   Occupational History    Not on file   Tobacco Use    Smoking status: Former Smoker     Packs/day: 0.50     Years: 10.00     Pack years: 5.00     Types: Cigarettes     Quit date: 2017     Years since quittin.0    Smokeless tobacco: Never Used   Vaping Use    Vaping Use: Never used   Substance and Sexual Activity    Alcohol use: Yes     Comment: occas    Drug use: No    Sexual activity: Yes     Partners: Male   Other Topics Concern    Not on file   Social History Narrative    Not on file     Social Determinants of Health     Financial Resource Strain: Low Risk     Difficulty of Paying Living Expenses: Not hard at all   Food Insecurity: No Food Insecurity    Worried About Running Out of Food in the Last Year: Never true    Radha of Food in the Last Year: Never true   Transportation Needs:     Lack of Transportation (Medical): Not on file    Lack of Transportation (Non-Medical):  Not on file   Physical Activity:     Days of Exercise per Week: Not on file    Minutes of Exercise per Session: Not on file   Stress:     Feeling of Stress : Not on file   Social Connections:     Frequency of Communication with Friends and Family: Not on file    Frequency of Social Gatherings with Friends and Family: Not on file    Attends Worship Services: Not on file    Active Member of [address] [place] [address] [employer] or Organizations: Not on file    Attends Club or Organization Meetings: Not on file    Marital Status: Not on file   Intimate Partner Violence:     Fear of Current or Ex-Partner: Not on file    Emotionally Abused: Not on file    Physically Abused: Not on file    Sexually Abused: Not on file   Housing Stability:     Unable to Pay for Housing in the Last Year: Not on file    Number of [place] in the Last Year: Not on file    Unstable Housing in the Last Year: Not on file     Family History   Problem Relation Age of Onset    Cancer Mother     Arthritis Mother     Diabetes Father     Heart Disease Father     Stroke Father     High Blood Pressure Father      Allergies Allergen Reactions    Amphetamine     Ibuprofen Other (See Comments)    Nsaids Other (See Comments)     Waiting for bariatric procedure    Penicillins Hives and Other (See Comments)    Phentermine Other (See Comments)     Anxiety with hospitalization    Topiramate Other (See Comments)     DIZZY     Current Outpatient Medications   Medication Sig Dispense Refill    Prenatal Vit-Fe Fumarate-FA (PRENATAL PO) Take by mouth       No current facility-administered medications for this visit. PMH, Surgical Hx, Family Hx, and Social Hx reviewed and updated. Health Maintenance reviewed. Objective  Vitals:    03/03/22 1117   BP: 118/82   Site: Left Upper Arm   Position: Sitting   Cuff Size: Large Adult   Pulse: 94   Resp: 16   Temp: 96 °F (35.6 °C)   TempSrc: Temporal   SpO2: 95%   Weight: 268 lb (121.6 kg)   Height: 5' 8\" (1.727 m)     BP Readings from Last 3 Encounters:   03/03/22 118/82   01/26/22 120/80   10/17/21 130/83     Wt Readings from Last 3 Encounters:   03/03/22 268 lb (121.6 kg)   01/26/22 279 lb (126.6 kg)   10/17/21 272 lb (123.4 kg)     Physical Exam  Vitals reviewed. Constitutional:       Appearance: She is obese. HENT:      Head: Normocephalic and atraumatic. Nose: Nose normal.   Eyes:      Conjunctiva/sclera: Conjunctivae normal.   Cardiovascular:      Rate and Rhythm: Normal rate and regular rhythm. Pulmonary:      Effort: Pulmonary effort is normal.      Breath sounds: Normal breath sounds. Neurological:      Mental Status: She is alert. Psychiatric:         Attention and Perception: Attention normal.         Mood and Affect: Mood is depressed. Mood is not elated. Affect is tearful. Affect is not blunt or angry. Speech: Speech normal.         Behavior: Behavior normal.         Thought Content:  Thought content normal.         Cognition and Memory: Cognition normal.         Judgment: Judgment normal.      Comments: Discussed private concerns; would like to see therapist        Assessment & Plan   Bibiana was seen today for depression. Diagnoses and all orders for this visit:    Depression affecting pregnancy  -     Ananth Anthony, PhD, PsychologyMelina    >50% of 25 minutes was spent spent on counseling, answering questions, instructions on meds, examining, coordinating the care based on my plan and assessment as noted. Orders Placed This Encounter   Procedures   Ananth Anthony, PhD, Psychology, [place]     Referral Priority:   Routine     Referral Type:   Eval and Treat     Referral Reason:   Specialty Services Required     Referred to Provider:   Bibiana Merino, PhD     Requested Specialty:   Psychology     Number of Visits Requested:   1     No orders of the defined types were placed in this encounter. There are no discontinued medications. Return in about 2 weeks (around 3/17/2022) for follow up in office. Reviewed with the patient: current clinical status, medications, activities and diet. Side effects, adverse effects of the medication prescribed today, as well as treatment plan/ rationale and result expectations have been discussed with the patient who expresses understanding and desires to proceed. Close follow up to evaluate treatment results and for coordination of care. I have reviewed the patient's medical history in detail and updated the computerized patient record.     Leticia Villa PA-C

## 2022-03-04 ENCOUNTER — PATIENT MESSAGE (OUTPATIENT)
Dept: FAMILY MEDICINE CLINIC | Age: 37
End: 2022-03-04

## 2022-03-04 DIAGNOSIS — L98.9 SKIN LESIONS: Primary | ICD-10-CM

## 2022-03-04 NOTE — TELEPHONE ENCOUNTER
From: Luz Coats  To: Arlen Villa  Sent: 3/4/2022 6:56 AM EST  Subject: Antibiotics     Goodmorning. Kathy, does not have a prescription prescription for me.

## 2022-03-07 RX ORDER — MUPIROCIN CALCIUM 20 MG/G
CREAM TOPICAL
Qty: 30 G | Refills: 1 | Status: SHIPPED | OUTPATIENT
Start: 2022-03-07 | End: 2022-04-06

## 2022-03-17 ENCOUNTER — HOSPITAL ENCOUNTER (EMERGENCY)
Age: 37
Discharge: HOME | End: 2022-03-17
Payer: COMMERCIAL

## 2022-03-17 ENCOUNTER — APPOINTMENT (OUTPATIENT)
Dept: CT IMAGING | Age: 37
End: 2022-03-17
Payer: COMMERCIAL

## 2022-03-17 VITALS
BODY MASS INDEX: 40.92 KG/M2 | HEART RATE: 64 BPM | RESPIRATION RATE: 14 BRPM | OXYGEN SATURATION: 99 % | TEMPERATURE: 97.6 F | SYSTOLIC BLOOD PRESSURE: 128 MMHG | HEIGHT: 68 IN | DIASTOLIC BLOOD PRESSURE: 87 MMHG | WEIGHT: 270 LBS

## 2022-03-17 DIAGNOSIS — R07.89 ATYPICAL CHEST PAIN: Primary | ICD-10-CM

## 2022-03-17 LAB
ALBUMIN SERPL-MCNC: 3.2 G/DL (ref 3.5–4.6)
ALP BLD-CCNC: 159 U/L (ref 40–130)
ALT SERPL-CCNC: 41 U/L (ref 0–33)
ANION GAP SERPL CALCULATED.3IONS-SCNC: 12 MEQ/L (ref 9–15)
AST SERPL-CCNC: 36 U/L (ref 0–35)
BASOPHILS ABSOLUTE: 0 K/UL (ref 0–0.2)
BASOPHILS RELATIVE PERCENT: 0.4 %
BILIRUB SERPL-MCNC: <0.2 MG/DL (ref 0.2–0.7)
BUN BLDV-MCNC: 10 MG/DL (ref 6–20)
CALCIUM SERPL-MCNC: 8.7 MG/DL (ref 8.5–9.9)
CHLORIDE BLD-SCNC: 104 MEQ/L (ref 95–107)
CO2: 21 MEQ/L (ref 20–31)
CREAT SERPL-MCNC: 0.66 MG/DL (ref 0.5–0.9)
EKG ATRIAL RATE: 73 BPM
EKG P AXIS: 8 DEGREES
EKG P-R INTERVAL: 140 MS
EKG Q-T INTERVAL: 370 MS
EKG QRS DURATION: 76 MS
EKG QTC CALCULATION (BAZETT): 407 MS
EKG R AXIS: 7 DEGREES
EKG T AXIS: 17 DEGREES
EKG VENTRICULAR RATE: 73 BPM
EOSINOPHILS ABSOLUTE: 0.3 K/UL (ref 0–0.7)
EOSINOPHILS RELATIVE PERCENT: 3.8 %
GFR AFRICAN AMERICAN: >60
GFR AFRICAN AMERICAN: >60
GFR NON-AFRICAN AMERICAN: >60
GFR NON-AFRICAN AMERICAN: >60
GLOBULIN: 2.9 G/DL (ref 2.3–3.5)
GLUCOSE BLD-MCNC: 103 MG/DL (ref 70–99)
HCT VFR BLD CALC: 36.9 % (ref 37–47)
HEMOGLOBIN: 12.2 G/DL (ref 12–16)
LYMPHOCYTES ABSOLUTE: 2.5 K/UL (ref 1–4.8)
LYMPHOCYTES RELATIVE PERCENT: 31.4 %
MCH RBC QN AUTO: 29.7 PG (ref 27–31.3)
MCHC RBC AUTO-ENTMCNC: 33.2 % (ref 33–37)
MCV RBC AUTO: 89.4 FL (ref 82–100)
MONOCYTES ABSOLUTE: 0.4 K/UL (ref 0.2–0.8)
MONOCYTES RELATIVE PERCENT: 5.1 %
NEUTROPHILS ABSOLUTE: 4.7 K/UL (ref 1.4–6.5)
NEUTROPHILS RELATIVE PERCENT: 59.3 %
PDW BLD-RTO: 14.6 % (ref 11.5–14.5)
PERFORMED ON: NORMAL
PLATELET # BLD: 286 K/UL (ref 130–400)
POC CREATININE WHOLE BLOOD: 0.7
POC CREATININE: 0.7 MG/DL (ref 0.6–1.2)
POC SAMPLE TYPE: NORMAL
POTASSIUM SERPL-SCNC: 3.9 MEQ/L (ref 3.4–4.9)
RBC # BLD: 4.12 M/UL (ref 4.2–5.4)
SODIUM BLD-SCNC: 137 MEQ/L (ref 135–144)
TOTAL PROTEIN: 6.1 G/DL (ref 6.3–8)
TROPONIN: <0.01 NG/ML (ref 0–0.01)
WBC # BLD: 8 K/UL (ref 4.8–10.8)

## 2022-03-17 PROCEDURE — 99285 EMERGENCY DEPT VISIT HI MDM: CPT

## 2022-03-17 PROCEDURE — 71275 CT ANGIOGRAPHY CHEST: CPT

## 2022-03-17 PROCEDURE — 96374 THER/PROPH/DIAG INJ IV PUSH: CPT

## 2022-03-17 PROCEDURE — 6360000002 HC RX W HCPCS: Performed by: PHYSICIAN ASSISTANT

## 2022-03-17 PROCEDURE — 80053 COMPREHEN METABOLIC PANEL: CPT

## 2022-03-17 PROCEDURE — 96375 TX/PRO/DX INJ NEW DRUG ADDON: CPT

## 2022-03-17 PROCEDURE — 6360000004 HC RX CONTRAST MEDICATION: Performed by: PHYSICIAN ASSISTANT

## 2022-03-17 PROCEDURE — 36415 COLL VENOUS BLD VENIPUNCTURE: CPT

## 2022-03-17 PROCEDURE — 93005 ELECTROCARDIOGRAM TRACING: CPT | Performed by: PHYSICIAN ASSISTANT

## 2022-03-17 PROCEDURE — 84484 ASSAY OF TROPONIN QUANT: CPT

## 2022-03-17 PROCEDURE — 85025 COMPLETE CBC W/AUTO DIFF WBC: CPT

## 2022-03-17 RX ORDER — MORPHINE SULFATE 2 MG/ML
2 INJECTION, SOLUTION INTRAMUSCULAR; INTRAVENOUS ONCE
Status: COMPLETED | OUTPATIENT
Start: 2022-03-17 | End: 2022-03-17

## 2022-03-17 RX ORDER — ONDANSETRON 2 MG/ML
4 INJECTION INTRAMUSCULAR; INTRAVENOUS ONCE
Status: COMPLETED | OUTPATIENT
Start: 2022-03-17 | End: 2022-03-17

## 2022-03-17 RX ADMIN — IOPAMIDOL 100 ML: 612 INJECTION, SOLUTION INTRAVENOUS at 07:21

## 2022-03-17 RX ADMIN — MORPHINE SULFATE 2 MG: 2 INJECTION, SOLUTION INTRAMUSCULAR; INTRAVENOUS at 06:46

## 2022-03-17 RX ADMIN — ONDANSETRON 4 MG: 2 INJECTION INTRAMUSCULAR; INTRAVENOUS at 06:46

## 2022-03-17 ASSESSMENT — PAIN DESCRIPTION - PAIN TYPE
TYPE: ACUTE PAIN
TYPE: ACUTE PAIN

## 2022-03-17 ASSESSMENT — PAIN DESCRIPTION - FREQUENCY: FREQUENCY: CONTINUOUS

## 2022-03-17 ASSESSMENT — PAIN SCALES - GENERAL
PAINLEVEL_OUTOF10: 7
PAINLEVEL_OUTOF10: 7
PAINLEVEL_OUTOF10: 3

## 2022-03-17 ASSESSMENT — PAIN DESCRIPTION - LOCATION
LOCATION: CHEST
LOCATION: CHEST

## 2022-03-17 ASSESSMENT — ENCOUNTER SYMPTOMS
DIARRHEA: 0
COUGH: 0
EYE PAIN: 0
PHOTOPHOBIA: 0
RHINORRHEA: 0
VOMITING: 0
SORE THROAT: 0
NAUSEA: 0
SHORTNESS OF BREATH: 0
BACK PAIN: 0
ABDOMINAL PAIN: 0

## 2022-03-17 ASSESSMENT — PAIN DESCRIPTION - DESCRIPTORS: DESCRIPTORS: PRESSURE

## 2022-03-17 NOTE — ED PROVIDER NOTES
[address] [facility] ED  eMERGENCY dEPARTMENTeNCOUnter      Pt Name: Altagracia Adkins  MRN: [id number]  [place] 1985  Date ofevaluation: 3/17/2022  Provider: Tuan Jensen PA-C    CHIEF COMPLAINT       Chief Complaint   Patient presents with    Chest Pain    Shortness of Breath         HISTORY OF PRESENT ILLNESS   (Location/Symptom, Timing/Onset,Context/Setting, Quality, Duration, Modifying Factors, Severity)  Note limiting factors. Altagracia Adkins is a 39 y.o. female who presents to the emergency department pounding chest pain that woke her up from her sleep just pta. Pt states it feels like someone punched her in the chest as a sore feeling. She denines any injuries. She was sob when she woke up but this resolved. She denies leg swelling. Of note she have a prolonged vaginal delivery 4 days ago, with high blood pressure and gestational diabetes. She is not breast feeding. Denies feeling of engorgement. HPI    NursingNotes were reviewed. REVIEW OF SYSTEMS    (2-9 systems for level 4, 10 or more for level 5)     Review of Systems   Constitutional: Negative for chills, diaphoresis, fatigue and fever. HENT: Negative for congestion, rhinorrhea and sore throat. Eyes: Negative for photophobia and pain. Respiratory: Negative for cough and shortness of breath. Cardiovascular: Positive for chest pain. Negative for palpitations. Gastrointestinal: Negative for abdominal pain, diarrhea, nausea and vomiting. Genitourinary: Negative for dysuria and flank pain. Musculoskeletal: Negative for back pain. Skin: Negative for rash. Neurological: Negative for dizziness, light-headedness and headaches. Psychiatric/Behavioral: Negative. All other systems reviewed and are negative. Except as noted above the remainder of the review of systems was reviewed and negative.        PAST MEDICAL HISTORY     Past Medical History:   Diagnosis Date    Anxiety and depression     Asthma     Atypical chest pain 2019    Chronic back pain     Diabetes mellitus ([facility]     Headache     HIV exposure 2020    Irregular heart beat     Multiple sclerosis ([facility]     Osteoarthritis     Pure hypercholesterolemia, unspecified 5/3/2018         SURGICALHISTORY       Past Surgical History:   Procedure Laterality Date    CHOLECYSTECTOMY      UPPER GASTROINTESTINAL ENDOSCOPY N/A 2019    EGD ESOPHAGOGASTRODUODENOSCOPY performed by Marquise Rodriguez MD at [address]       Previous Medications    MUPIROCIN (BACTROBAN) 2 % CREAM    Apply 3 times daily.     PRENATAL VIT-FE FUMARATE-FA (PRENATAL PO)    Take by mouth       ALLERGIES     Amphetamine, Ibuprofen, Nsaids, Penicillins, Phentermine, and Topiramate    FAMILY HISTORY       Family History   Problem Relation Age of Onset    Cancer Mother     Arthritis Mother     Diabetes Father     Heart Disease Father     Stroke Father     High Blood Pressure Father           SOCIAL HISTORY       Social History     Socioeconomic History    Marital status: Single     Spouse name: None    Number of children: None    Years of education: None    Highest education level: None   Occupational History    None   Tobacco Use    Smoking status: Former Smoker     Packs/day: 0.50     Years: 10.00     Pack years: 5.00     Types: Cigarettes     Quit date: 2017     Years since quittin.1    Smokeless tobacco: Never Used   Vaping Use    Vaping Use: Never used   Substance and Sexual Activity    Alcohol use: Yes     Comment: occas    Drug use: No    Sexual activity: Yes     Partners: Male   Other Topics Concern    None   Social History Narrative    None     Social Determinants of Health     Financial Resource Strain: Low Risk     Difficulty of Paying Living Expenses: Not hard at all   Food Insecurity: No Food Insecurity    Worried About Running Out of Food in the Last Year: Never true    Radha of Food in the Last Year: Never true Transportation Needs:     Lack of Transportation (Medical): Not on file    Lack of Transportation (Non-Medical): Not on file   Physical Activity:     Days of Exercise per Week: Not on file    Minutes of Exercise per Session: Not on file   Stress:     Feeling of Stress : Not on file   Social Connections:     Frequency of Communication with Friends and Family: Not on file    Frequency of Social Gatherings with Friends and Family: Not on file    Attends Sikhism Services: Not on file    Active Member of [address] [place] [address] [employer] or Organizations: Not on file    Attends Club or Organization Meetings: Not on file    Marital Status: Not on file   Intimate Partner Violence:     Fear of Current or Ex-Partner: Not on file    Emotionally Abused: Not on file    Physically Abused: Not on file    Sexually Abused: Not on file   Housing Stability:     Unable to Pay for Housing in the Last Year: Not on file    Number of [place] in the Last Year: Not on file    Unstable Housing in the Last Year: Not on file       SCREENINGS    [place] Coma Scale  Eye Opening: Spontaneous  Best Verbal Response: Oriented  Best Motor Response: Obeys commands  [place] Coma Scale Score: 15 @FLOW([id number])@      PHYSICAL EXAM    (up to 7 for level 4, 8 or more for level 5)     ED Triage Vitals [03/17/22 0618]   BP Temp Temp Source Pulse Resp SpO2 Height Weight   127/83 97.6 °F (36.4 °C) Oral 80 20 97 % 5' 8\" (1.727 m) 270 lb (122.5 kg)       Physical Exam  Vitals and nursing note reviewed. Constitutional:       General: She is not in acute distress. Appearance: Normal appearance. She is well-developed. She is not diaphoretic. HENT:      Head: Normocephalic and atraumatic. Eyes:      General: Lids are normal.      Conjunctiva/sclera: Conjunctivae normal.   Cardiovascular:      Rate and Rhythm: Normal rate and regular rhythm. Pulses: Normal pulses. Heart sounds: Normal heart sounds.    Pulmonary:      Effort: Pulmonary effort is normal.      Breath sounds: Normal breath sounds. Chest:          Comments: No engorgement. Redness noted but pt recwently had a heating pad on it so will reassess. Abdominal:      General: Bowel sounds are normal.      Palpations: Abdomen is soft. Tenderness: There is no abdominal tenderness. Musculoskeletal:      Cervical back: Normal range of motion and neck supple. Lymphadenopathy:      Cervical: No cervical adenopathy. Skin:     General: Skin is warm and dry. Capillary Refill: Capillary refill takes less than 2 seconds. Findings: No rash. Neurological:      Mental Status: She is alert and oriented to person, place, and time. Psychiatric:         Thought Content: Thought content normal.         Judgment: Judgment normal.         DIAGNOSTIC RESULTS     EKG: All EKG's are interpreted by the Emergency Department Physician who either signs or Co-signsthis chart in the absence of a cardiologist.    nsr 73bpm no acute st changes no ectopy    RADIOLOGY:   Non-plain filmimages such as CT, Ultrasound and MRI are read by the radiologist. Plain radiographic images are visualized and preliminarily interpreted by the emergency physician with the below findings:        Interpretation per the Radiologist below, if available at the time ofthis note:    CTA Chest W WO  (PE study)   Final Result      No CT evidence of pulmonary embolism or acute findings in the thorax.                         ED BEDSIDE ULTRASOUND:   Performed by ED Physician - none    LABS:  Labs Reviewed   COMPREHENSIVE METABOLIC PANEL - Abnormal; Notable for the following components:       Result Value    Glucose 103 (*)     Total Protein 6.1 (*)     Albumin 3.2 (*)     Alkaline Phosphatase 159 (*)     ALT 41 (*)     AST 36 (*)     All other components within normal limits   CBC WITH AUTO DIFFERENTIAL - Abnormal; Notable for the following components:    RBC 4.12 (*)     Hematocrit 36.9 (*)     RDW 14.6 (*)     All other components within normal limits   POCT CREATININE - URINE - Normal   TROPONIN       All other labs were within normal range or not returned as of this dictation. EMERGENCY DEPARTMENT COURSE and DIFFERENTIAL DIAGNOSIS/MDM:   Vitals:    Vitals:    03/17/22 0618 03/17/22 0630 03/17/22 0700   BP: 127/83 (!) 122/94 129/75   Pulse: 80 80 80   Resp: 20 28 22   Temp: 97.6 °F (36.4 °C)     TempSrc: Oral     SpO2: 97% 100% 98%   Weight: 270 lb (122.5 kg)     Height: 5' 8\" (1.727 m)             MDM    Patient is nontoxic no acute distress afebrile he medically stable just complains of a ache soreness in her chest at this time. She was provided with IV Zofran and 2 mg of morphine as she has allergies to NSAIDs. She is not breast-feeding. On repeat evaluation patient's pain is completely resolved she is feeling much better her work-up today including labs with troponin negative EKG normal sinus rhythm CTA of the chest to rule out PE pulmonary edema are all negative. Patient is encouraged to follow-up with her family doctor in the next 2 days and to return to the ED for any new worsening or concerning symptoms. Patient verbalized understanding patient stable for discharge. REASSESSMENT          CRITICAL CARE TIME   Total Critical Care time was  minutes, excluding separatelyreportable procedures. There was a high probability ofclinically significant/life threatening deterioration in the patient's condition which required my urgent intervention. CONSULTS:  None    PROCEDURES:  Unless otherwise noted below, none     Procedures    FINAL IMPRESSION      1.  Atypical chest pain          DISPOSITION/PLAN   DISPOSITION Decision To Discharge 03/17/2022 08:09:36 AM      PATIENT REFERREDTO:  TYLER Burks. [address]  [phone]    Schedule an appointment as soon as possible for a visit in 2 days        DISCHARGEMEDICATIONS:  New Prescriptions    No medications on file          (Please note that portions of this note were completed with a voice recognition program.  Efforts were made to edit the dictations but occasionally words are mis-transcribed.)    Trae Hoang PA-C (electronically signed)  Attending Emergency Physician         Trae Hoang PA-C  03/17/22 [id number]

## 2022-03-17 NOTE — ED TRIAGE NOTES
Presents to ED for a complaint of chest pain and shortness of breath that woke her up from sleep approximately 10 minutes pta. States she had a baby vaginally 4 days ago.

## 2022-03-21 ENCOUNTER — OFFICE VISIT (OUTPATIENT)
Dept: FAMILY MEDICINE CLINIC | Age: 37
End: 2022-03-21
Payer: COMMERCIAL

## 2022-03-21 VITALS
WEIGHT: 270 LBS | RESPIRATION RATE: 16 BRPM | OXYGEN SATURATION: 99 % | TEMPERATURE: 97.3 F | DIASTOLIC BLOOD PRESSURE: 82 MMHG | BODY MASS INDEX: 40.92 KG/M2 | HEIGHT: 68 IN | SYSTOLIC BLOOD PRESSURE: 122 MMHG | HEART RATE: 61 BPM

## 2022-03-21 DIAGNOSIS — M62.838 MUSCLE SPASM: ICD-10-CM

## 2022-03-21 PROCEDURE — 1036F TOBACCO NON-USER: CPT | Performed by: PHYSICIAN ASSISTANT

## 2022-03-21 PROCEDURE — G8484 FLU IMMUNIZE NO ADMIN: HCPCS | Performed by: PHYSICIAN ASSISTANT

## 2022-03-21 PROCEDURE — G8417 CALC BMI ABV UP PARAM F/U: HCPCS | Performed by: PHYSICIAN ASSISTANT

## 2022-03-21 PROCEDURE — G8427 DOCREV CUR MEDS BY ELIG CLIN: HCPCS | Performed by: PHYSICIAN ASSISTANT

## 2022-03-21 PROCEDURE — 99214 OFFICE O/P EST MOD 30 MIN: CPT | Performed by: PHYSICIAN ASSISTANT

## 2022-03-21 RX ORDER — VILAZODONE HYDROCHLORIDE 10 MG-20MG
KIT ORAL
Qty: 1 KIT | Refills: 0 | Status: SHIPPED | OUTPATIENT
Start: 2022-03-21 | End: 2022-04-04 | Stop reason: DRUGHIGH

## 2022-03-21 RX ORDER — IBUPROFEN 800 MG/1
800 TABLET ORAL 2 TIMES DAILY PRN
Qty: 60 TABLET | Refills: 0 | Status: SHIPPED | OUTPATIENT
Start: 2022-03-21 | End: 2022-05-02

## 2022-03-21 RX ORDER — METHOCARBAMOL 500 MG/1
500 TABLET, FILM COATED ORAL 4 TIMES DAILY
Qty: 40 TABLET | Refills: 0 | Status: SHIPPED | OUTPATIENT
Start: 2022-03-21 | End: 2022-03-31

## 2022-03-21 ASSESSMENT — ENCOUNTER SYMPTOMS
BACK PAIN: 1
COUGH: 0
CHEST TIGHTNESS: 0
SHORTNESS OF BREATH: 0

## 2022-03-21 NOTE — PROGRESS NOTES
Subjective  Suraj Canales, 39 y.o. female presents today with:  Chief Complaint   Patient presents with    Depression     2 week follow up, states she is feeling better since last visit      HPI  Bibiana is in the office for 2 weeks f/u. Last OV with me: 3/3/2022. History of post-partum depression  Delivered her daughter 3/11/22. Since coming home from hospital, reports anhedonia/dysthymia. Denies SI/HI. Has not seen daughter in 2 days--mom has her.    +crying/tearful. Not currently taking medication. Did not speak with OB about history. Open to medication. Referral to Dr. Heide Merino. Went to ED on 3/17 for atypical chest pain on 3/17. EKG normal.   CTA of chest negative for PE. Was given medication at ED and discharged home. C/o cervical neck pain/spasms. Also having pain in low back and LLE. Spasm are worse at night. No recent injury/trauma. Remote history of trauma to head/neck. Review of Systems   Constitutional: Negative for chills. Respiratory: Negative for cough, chest tightness and shortness of breath. Cardiovascular: Negative for chest pain, palpitations and leg swelling. Genitourinary: Positive for vaginal bleeding (PP). Negative for dysuria and menstrual problem. Musculoskeletal: Positive for back pain, myalgias, neck pain and neck stiffness. Neurological: Negative for dizziness, weakness, light-headedness and headaches. Psychiatric/Behavioral: Positive for dysphoric mood. Negative for agitation, confusion, decreased concentration, self-injury, sleep disturbance and suicidal ideas. The patient is nervous/anxious. The patient is not hyperactive.       Past Medical History:   Diagnosis Date    Anxiety and depression     Asthma     Atypical chest pain 5/31/2019    Chronic back pain     Diabetes mellitus ([facility] [address]     Headache     HIV exposure 1/6/2020    Irregular heart beat     Multiple sclerosis (HCC)     Osteoarthritis     Pure hypercholesterolemia, unspecified 5/3/2018     Past Surgical History:   Procedure Laterality Date    CHOLECYSTECTOMY      UPPER GASTROINTESTINAL ENDOSCOPY N/A 2019    EGD ESOPHAGOGASTRODUODENOSCOPY performed by Andrey Elmore MD at [address] [place] [address] Marital status: Single     Spouse name: Not on file    Number of children: Not on file    Years of education: Not on file    Highest education level: Not on file   Occupational History    Not on file   Tobacco Use    Smoking status: Former Smoker     Packs/day: 0.50     Years: 10.00     Pack years: 5.00     Types: Cigarettes     Quit date: 2017     Years since quittin.1    Smokeless tobacco: Never Used   Vaping Use    Vaping Use: Never used   Substance and Sexual Activity    Alcohol use: Yes     Comment: occas    Drug use: No    Sexual activity: Yes     Partners: Male   Other Topics Concern    Not on file   Social History Narrative    Not on file     Social Determinants of Health     Financial Resource Strain: Low Risk     Difficulty of Paying Living Expenses: Not hard at all   Food Insecurity: No Food Insecurity    Worried About [address] [employer] in the Last Year: Never true    [address] in the Last Year: Never true   Transportation Needs:     Lack of Transportation (Medical): Not on file    Lack of Transportation (Non-Medical):  Not on file   Physical Activity:     Days of Exercise per Week: Not on file    Minutes of Exercise per Session: Not on file   Stress:     Feeling of Stress : Not on file   Social Connections:     Frequency of Communication with Friends and Family: Not on file    Frequency of Social Gatherings with Friends and Family: Not on file    Attends Jain Services: Not on file    Active Member of Clubs or Organizations: Not on file    Attends Club or Organization Meetings: Not on file    Marital Status: Not on file   Intimate Partner Violence:     Fear of Current or Ex-Partner: Not on file    Emotionally Abused: Not on file    Physically Abused: Not on file    Sexually Abused: Not on file   Housing Stability:     Unable to Pay for Housing in the Last Year: Not on file    Number of Places Lived in the Last Year: Not on file    Unstable Housing in the Last Year: Not on file     Family History   Problem Relation Age of Onset    Cancer Mother     Arthritis Mother     Diabetes Father     Heart Disease Father     Stroke Father     High Blood Pressure Father      Allergies   Allergen Reactions    Amphetamine     Penicillins Hives and Other (See Comments)    Phentermine Other (See Comments)     Anxiety with hospitalization    Topiramate Other (See Comments)     DIZZY     Current Outpatient Medications   Medication Sig Dispense Refill    Vilazodone HCl (VIIBRYD STARTER PACK) 10 & 20 MG KIT Take as directed for starter kit. 1 kit 0    methocarbamol (ROBAXIN) 500 MG tablet Take 1 tablet by mouth 4 times daily for 10 days 40 tablet 0    ibuprofen (ADVIL;MOTRIN) 800 MG tablet Take 1 tablet by mouth 2 times daily as needed for Pain 60 tablet 0    mupirocin (BACTROBAN) 2 % cream Apply 3 times daily. 30 g 1    Prenatal Vit-Fe Fumarate-FA (PRENATAL PO) Take by mouth       No current facility-administered medications for this visit. PMH, Surgical Hx, Family Hx, and Social Hx reviewed and updated. Health Maintenance reviewed. Objective  Vitals:    03/21/22 1056   BP: 122/82   Site: Left Lower Arm   Position: Sitting   Cuff Size: Medium Adult   Pulse: 61   Resp: 16   Temp: 97.3 °F (36.3 °C)   TempSrc: Temporal   SpO2: 99%   Weight: 270 lb (122.5 kg)   Height: 5' 8\" (1.727 m)     BP Readings from Last 3 Encounters:   03/21/22 122/82   03/17/22 128/87   03/03/22 118/82     Wt Readings from Last 3 Encounters:   03/21/22 270 lb (122.5 kg)   03/17/22 270 lb (122.5 kg)   03/03/22 268 lb (121.6 kg)     Physical Exam  Vitals reviewed.    Constitutional:       Appearance: She is obese.   HENT:      Head: Normocephalic and atraumatic. Right Ear: Decreased hearing noted. Left Ear: Decreased hearing noted. Cardiovascular:      Rate and Rhythm: Normal rate and regular rhythm. Pulmonary:      Effort: Pulmonary effort is normal.      Breath sounds: Normal breath sounds. Neurological:      Mental Status: She is alert. Psychiatric:         Attention and Perception: Attention normal.         Mood and Affect: Mood is depressed. Affect is blunt and tearful. Speech: Speech normal.         Behavior: Behavior normal.         Thought Content: Thought content normal. Thought content is not delusional. Thought content does not include suicidal ideation. Cognition and Memory: Cognition normal.      Comments: Sad, crying in office today. Does not want to see daughter at this time; mom has been caring for her. Feels guilty/bad. Denies SI/HI. Not a strong support network. Has her mom and other children. Open to medication. Assessment & Plan   Bibiana was seen today for depression. Diagnoses and all orders for this visit:    Postpartum depression  -     Vilazodone HCl (VIIBRYD STARTER PACK) 10 & 20 MG KIT; Take as directed for starter kit. -     Georgia Tobin, PhD, Psychology, [place]    Muscle spasm  -     methocarbamol (ROBAXIN) 500 MG tablet; Take 1 tablet by mouth 4 times daily for 10 days    Other orders  -     ibuprofen (ADVIL;MOTRIN) 800 MG tablet; Take 1 tablet by mouth 2 times daily as needed for Pain    Start antidepressant. Referral to Dr. Janeth Lloyd. Trial robaxin and 800 mg ibuprofen. 2 week follow up with me. Orders Placed This Encounter   Procedures   Georgia Tobin, , Psychology, [place]     Referral Priority:   Routine     Referral Type:   Eval and Treat     Referral Reason:   Specialty Services Required     Referred to Provider:   Bibiana Lloyd, PhD     Requested Specialty:   Psychology     Number of Visits Requested:   1 Orders Placed This Encounter   Medications    Vilazodone HCl (VIIBRYD STARTER PACK) 10 & 20 MG KIT     Sig: Take as directed for starter kit. Dispense:  1 kit     Refill:  0    methocarbamol (ROBAXIN) 500 MG tablet     Sig: Take 1 tablet by mouth 4 times daily for 10 days     Dispense:  40 tablet     Refill:  0    ibuprofen (ADVIL;MOTRIN) 800 MG tablet     Sig: Take 1 tablet by mouth 2 times daily as needed for Pain     Dispense:  60 tablet     Refill:  0     There are no discontinued medications. Return in about 2 weeks (around 4/4/2022) for follow up in office. Reviewed with the patient: current clinical status, medications, activities and diet. Side effects, adverse effects of the medication prescribed today, as well as treatment plan/ rationale and result expectations have been discussed with the patient who expresses understanding and desires to proceed. Close follow up to evaluate treatment results and for coordination of care. I have reviewed the patient's medical history in detail and updated the computerized patient record.     Leticia Villa PA-C

## 2022-03-28 ENCOUNTER — HOSPITAL ENCOUNTER (INPATIENT)
Age: 37
LOS: 1 days | Discharge: HOME | DRG: 776 | End: 2022-03-29
Attending: STUDENT IN AN ORGANIZED HEALTH CARE EDUCATION/TRAINING PROGRAM | Admitting: OBSTETRICS & GYNECOLOGY
Payer: COMMERCIAL

## 2022-03-28 ENCOUNTER — APPOINTMENT (OUTPATIENT)
Dept: CT IMAGING | Age: 37
DRG: 776 | End: 2022-03-28
Payer: COMMERCIAL

## 2022-03-28 ENCOUNTER — APPOINTMENT (OUTPATIENT)
Dept: GENERAL RADIOLOGY | Age: 37
DRG: 776 | End: 2022-03-28
Payer: COMMERCIAL

## 2022-03-28 DIAGNOSIS — R51.9 NONINTRACTABLE HEADACHE, UNSPECIFIED CHRONICITY PATTERN, UNSPECIFIED HEADACHE TYPE: ICD-10-CM

## 2022-03-28 LAB
ABO/RH: NORMAL
ALBUMIN SERPL-MCNC: 4.1 G/DL (ref 3.5–4.6)
ALP BLD-CCNC: 170 U/L (ref 40–130)
ALT SERPL-CCNC: 26 U/L (ref 0–33)
ANION GAP SERPL CALCULATED.3IONS-SCNC: 16 MEQ/L (ref 9–15)
ANTIBODY SCREEN: NORMAL
APTT: 23.5 SEC (ref 24.4–36.8)
AST SERPL-CCNC: 34 U/L (ref 0–35)
BACTERIA: NEGATIVE /HPF
BASOPHILS ABSOLUTE: 0 K/UL (ref 0–0.2)
BASOPHILS RELATIVE PERCENT: 0.1 %
BILIRUB SERPL-MCNC: <0.2 MG/DL (ref 0.2–0.7)
BILIRUBIN URINE: NEGATIVE
BLOOD, URINE: ABNORMAL
BUN BLDV-MCNC: 8 MG/DL (ref 6–20)
CALCIUM SERPL-MCNC: 8.9 MG/DL (ref 8.5–9.9)
CHLORIDE BLD-SCNC: 104 MEQ/L (ref 95–107)
CLARITY: CLEAR
CO2: 25 MEQ/L (ref 20–31)
COLOR: YELLOW
CREAT SERPL-MCNC: 0.95 MG/DL (ref 0.5–0.9)
EOSINOPHILS ABSOLUTE: 0.1 K/UL (ref 0–0.7)
EOSINOPHILS RELATIVE PERCENT: 1.1 %
EPITHELIAL CELLS, UA: ABNORMAL /HPF (ref 0–5)
FIBRINOGEN: 363 MG/DL (ref 235–507)
GFR AFRICAN AMERICAN: >60
GFR NON-AFRICAN AMERICAN: >60
GLOBULIN: 3.2 G/DL (ref 2.3–3.5)
GLUCOSE BLD-MCNC: 122 MG/DL (ref 70–99)
GLUCOSE URINE: NEGATIVE MG/DL
HCT VFR BLD CALC: 40.5 % (ref 37–47)
HEMOGLOBIN: 13.4 G/DL (ref 12–16)
HYALINE CASTS: ABNORMAL /HPF (ref 0–5)
INR BLD: 1
KETONES, URINE: NEGATIVE MG/DL
LEUKOCYTE ESTERASE, URINE: ABNORMAL
LYMPHOCYTES ABSOLUTE: 2.3 K/UL (ref 1–4.8)
LYMPHOCYTES RELATIVE PERCENT: 23.2 %
MAGNESIUM: 1.8 MG/DL (ref 1.7–2.4)
MCH RBC QN AUTO: 29.5 PG (ref 27–31.3)
MCHC RBC AUTO-ENTMCNC: 33 % (ref 33–37)
MCV RBC AUTO: 89.4 FL (ref 82–100)
MONOCYTES ABSOLUTE: 0.4 K/UL (ref 0.2–0.8)
MONOCYTES RELATIVE PERCENT: 4.4 %
NEUTROPHILS ABSOLUTE: 7.1 K/UL (ref 1.4–6.5)
NEUTROPHILS RELATIVE PERCENT: 71.2 %
NITRITE, URINE: NEGATIVE
PDW BLD-RTO: 14.1 % (ref 11.5–14.5)
PH UA: 5 (ref 5–9)
PLATELET # BLD: 323 K/UL (ref 130–400)
POTASSIUM SERPL-SCNC: 3.8 MEQ/L (ref 3.4–4.9)
PRO-BNP: 61 PG/ML
PROTEIN UA: NEGATIVE MG/DL
PROTHROMBIN TIME: 13.4 SEC (ref 12.3–14.9)
RBC # BLD: 4.53 M/UL (ref 4.2–5.4)
RBC UA: ABNORMAL /HPF (ref 0–5)
SODIUM BLD-SCNC: 145 MEQ/L (ref 135–144)
SPECIFIC GRAVITY UA: 1 (ref 1–1.03)
TOTAL PROTEIN: 7.3 G/DL (ref 6.3–8)
TROPONIN: <0.01 NG/ML (ref 0–0.01)
URIC ACID, SERUM: 6.5 MG/DL (ref 2.4–5.7)
URINE REFLEX TO CULTURE: ABNORMAL
UROBILINOGEN, URINE: 0.2 E.U./DL
WBC # BLD: 9.9 K/UL (ref 4.8–10.8)
WBC UA: ABNORMAL /HPF (ref 0–5)

## 2022-03-28 PROCEDURE — 84550 ASSAY OF BLOOD/URIC ACID: CPT

## 2022-03-28 PROCEDURE — 85610 PROTHROMBIN TIME: CPT

## 2022-03-28 PROCEDURE — 96375 TX/PRO/DX INJ NEW DRUG ADDON: CPT

## 2022-03-28 PROCEDURE — 71045 X-RAY EXAM CHEST 1 VIEW: CPT

## 2022-03-28 PROCEDURE — 96366 THER/PROPH/DIAG IV INF ADDON: CPT

## 2022-03-28 PROCEDURE — 36415 COLL VENOUS BLD VENIPUNCTURE: CPT

## 2022-03-28 PROCEDURE — 86850 RBC ANTIBODY SCREEN: CPT

## 2022-03-28 PROCEDURE — 83880 ASSAY OF NATRIURETIC PEPTIDE: CPT

## 2022-03-28 PROCEDURE — 83735 ASSAY OF MAGNESIUM: CPT

## 2022-03-28 PROCEDURE — 81001 URINALYSIS AUTO W/SCOPE: CPT

## 2022-03-28 PROCEDURE — 70450 CT HEAD/BRAIN W/O DYE: CPT

## 2022-03-28 PROCEDURE — 6360000002 HC RX W HCPCS: Performed by: STUDENT IN AN ORGANIZED HEALTH CARE EDUCATION/TRAINING PROGRAM

## 2022-03-28 PROCEDURE — 80053 COMPREHEN METABOLIC PANEL: CPT

## 2022-03-28 PROCEDURE — 86900 BLOOD TYPING SEROLOGIC ABO: CPT

## 2022-03-28 PROCEDURE — 6370000000 HC RX 637 (ALT 250 FOR IP): Performed by: STUDENT IN AN ORGANIZED HEALTH CARE EDUCATION/TRAINING PROGRAM

## 2022-03-28 PROCEDURE — 93005 ELECTROCARDIOGRAM TRACING: CPT | Performed by: STUDENT IN AN ORGANIZED HEALTH CARE EDUCATION/TRAINING PROGRAM

## 2022-03-28 PROCEDURE — 85384 FIBRINOGEN ACTIVITY: CPT

## 2022-03-28 PROCEDURE — 99284 EMERGENCY DEPT VISIT MOD MDM: CPT

## 2022-03-28 PROCEDURE — 2580000003 HC RX 258: Performed by: STUDENT IN AN ORGANIZED HEALTH CARE EDUCATION/TRAINING PROGRAM

## 2022-03-28 PROCEDURE — 84484 ASSAY OF TROPONIN QUANT: CPT

## 2022-03-28 PROCEDURE — 85025 COMPLETE CBC W/AUTO DIFF WBC: CPT

## 2022-03-28 PROCEDURE — 86901 BLOOD TYPING SEROLOGIC RH(D): CPT

## 2022-03-28 PROCEDURE — 85730 THROMBOPLASTIN TIME PARTIAL: CPT

## 2022-03-28 PROCEDURE — 96365 THER/PROPH/DIAG IV INF INIT: CPT

## 2022-03-28 RX ORDER — DEXAMETHASONE SODIUM PHOSPHATE 10 MG/ML
10 INJECTION INTRAMUSCULAR; INTRAVENOUS ONCE
Status: COMPLETED | OUTPATIENT
Start: 2022-03-28 | End: 2022-03-28

## 2022-03-28 RX ORDER — DIPHENHYDRAMINE HYDROCHLORIDE 50 MG/ML
25 INJECTION INTRAMUSCULAR; INTRAVENOUS ONCE
Status: COMPLETED | OUTPATIENT
Start: 2022-03-28 | End: 2022-03-28

## 2022-03-28 RX ORDER — 0.9 % SODIUM CHLORIDE 0.9 %
250 INTRAVENOUS SOLUTION INTRAVENOUS ONCE
Status: COMPLETED | OUTPATIENT
Start: 2022-03-28 | End: 2022-03-28

## 2022-03-28 RX ORDER — MAGNESIUM SULFATE IN WATER 40 MG/ML
4000 INJECTION, SOLUTION INTRAVENOUS ONCE
Status: COMPLETED | OUTPATIENT
Start: 2022-03-28 | End: 2022-03-28

## 2022-03-28 RX ORDER — MAGNESIUM SULFATE IN WATER 40 MG/ML
4000 INJECTION, SOLUTION INTRAVENOUS ONCE
Status: DISCONTINUED | OUTPATIENT
Start: 2022-03-28 | End: 2022-03-28 | Stop reason: SDUPTHER

## 2022-03-28 RX ORDER — MAGNESIUM SULFATE 1 G/100ML
1000 INJECTION INTRAVENOUS
Status: DISCONTINUED | OUTPATIENT
Start: 2022-03-28 | End: 2022-03-28 | Stop reason: RX

## 2022-03-28 RX ORDER — ACETAMINOPHEN 500 MG
1000 TABLET ORAL ONCE
Status: COMPLETED | OUTPATIENT
Start: 2022-03-28 | End: 2022-03-28

## 2022-03-28 RX ADMIN — DEXAMETHASONE SODIUM PHOSPHATE 10 MG: 10 INJECTION INTRAMUSCULAR; INTRAVENOUS at 20:56

## 2022-03-28 RX ADMIN — ACETAMINOPHEN 1000 MG: 500 TABLET ORAL at 21:00

## 2022-03-28 RX ADMIN — DIPHENHYDRAMINE HYDROCHLORIDE 25 MG: 50 INJECTION, SOLUTION INTRAMUSCULAR; INTRAVENOUS at 20:57

## 2022-03-28 RX ADMIN — MAGNESIUM SULFATE HEPTAHYDRATE 1000 MG/HR: 40 INJECTION, SOLUTION INTRAVENOUS at 21:27

## 2022-03-28 RX ADMIN — SODIUM CHLORIDE 250 ML: 9 INJECTION, SOLUTION INTRAVENOUS at 20:55

## 2022-03-28 RX ADMIN — MAGNESIUM SULFATE HEPTAHYDRATE 4000 MG: 4 INJECTION, SOLUTION INTRAVENOUS at 20:55

## 2022-03-28 ASSESSMENT — ENCOUNTER SYMPTOMS
COUGH: 0
NAUSEA: 1
CHEST TIGHTNESS: 1
RHINORRHEA: 0
ABDOMINAL PAIN: 0
SORE THROAT: 0
EYE PAIN: 0
BACK PAIN: 0
DIARRHEA: 0
VOMITING: 0
SHORTNESS OF BREATH: 1
PHOTOPHOBIA: 0

## 2022-03-28 ASSESSMENT — PAIN SCALES - GENERAL
PAINLEVEL_OUTOF10: 10
PAINLEVEL_OUTOF10: 10

## 2022-03-28 ASSESSMENT — PAIN - FUNCTIONAL ASSESSMENT: PAIN_FUNCTIONAL_ASSESSMENT: 0-10

## 2022-03-29 VITALS
WEIGHT: 270 LBS | RESPIRATION RATE: 20 BRPM | OXYGEN SATURATION: 96 % | DIASTOLIC BLOOD PRESSURE: 86 MMHG | HEART RATE: 63 BPM | TEMPERATURE: 98.1 F | SYSTOLIC BLOOD PRESSURE: 146 MMHG | HEIGHT: 68 IN | BODY MASS INDEX: 40.92 KG/M2

## 2022-03-29 PROBLEM — R51.9 HEADACHE: Status: ACTIVE | Noted: 2022-03-29

## 2022-03-29 PROCEDURE — G0378 HOSPITAL OBSERVATION PER HR: HCPCS

## 2022-03-29 PROCEDURE — 1220000000 HC SEMI PRIVATE OB R&B

## 2022-03-29 PROCEDURE — 99235 HOSP IP/OBS SAME DATE MOD 70: CPT | Performed by: OBSTETRICS & GYNECOLOGY

## 2022-03-29 PROCEDURE — 6360000002 HC RX W HCPCS: Performed by: OBSTETRICS & GYNECOLOGY

## 2022-03-29 PROCEDURE — 96375 TX/PRO/DX INJ NEW DRUG ADDON: CPT

## 2022-03-29 RX ORDER — RIMEGEPANT SULFATE 75 MG/75MG
TABLET, ORALLY DISINTEGRATING ORAL
COMMUNITY
End: 2022-05-02

## 2022-03-29 RX ORDER — SODIUM CHLORIDE 9 MG/ML
INJECTION, SOLUTION INTRAVENOUS PRN
Status: DISCONTINUED | OUTPATIENT
Start: 2022-03-29 | End: 2022-03-29 | Stop reason: HOSPADM

## 2022-03-29 RX ORDER — ACETAMINOPHEN 500 MG
1000 TABLET ORAL EVERY 6 HOURS PRN
Status: DISCONTINUED | OUTPATIENT
Start: 2022-03-29 | End: 2022-03-29 | Stop reason: HOSPADM

## 2022-03-29 RX ORDER — ONDANSETRON 4 MG/1
4 TABLET, ORALLY DISINTEGRATING ORAL EVERY 8 HOURS PRN
Status: DISCONTINUED | OUTPATIENT
Start: 2022-03-29 | End: 2022-03-29 | Stop reason: HOSPADM

## 2022-03-29 RX ORDER — SODIUM CHLORIDE 0.9 % (FLUSH) 0.9 %
5-40 SYRINGE (ML) INJECTION EVERY 12 HOURS SCHEDULED
Status: DISCONTINUED | OUTPATIENT
Start: 2022-03-29 | End: 2022-03-29 | Stop reason: HOSPADM

## 2022-03-29 RX ORDER — ONDANSETRON 2 MG/ML
4 INJECTION INTRAMUSCULAR; INTRAVENOUS EVERY 6 HOURS PRN
Status: DISCONTINUED | OUTPATIENT
Start: 2022-03-29 | End: 2022-03-29 | Stop reason: HOSPADM

## 2022-03-29 RX ORDER — SODIUM CHLORIDE 0.9 % (FLUSH) 0.9 %
5-40 SYRINGE (ML) INJECTION PRN
Status: DISCONTINUED | OUTPATIENT
Start: 2022-03-29 | End: 2022-03-29 | Stop reason: HOSPADM

## 2022-03-29 RX ORDER — ACETAMINOPHEN 325 MG/1
650 TABLET ORAL EVERY 4 HOURS PRN
Status: DISCONTINUED | OUTPATIENT
Start: 2022-03-29 | End: 2022-03-29

## 2022-03-29 RX ADMIN — ONDANSETRON 4 MG: 2 INJECTION INTRAMUSCULAR; INTRAVENOUS at 04:39

## 2022-03-29 NOTE — PROGRESS NOTES
DVT / VTE PROPHYLAXIS EVALUATION    Estimated Creatinine Clearance: 113 mL/min (A) (based on SCr of 0.95 mg/dL (H)). Recent Labs     03/28/22 2045   BUN 8   CREATININE 0.95*      HGB 13.4   HCT 40.5   INR 1.0     ADMITTING DX OR CHIEF COMPLAINT? Post-partum preeclampsia  WARFARIN? DOAC'S? no  ANY APPARENT BLEEDING? post-partum 3/13/22  SCHEDULED SURGERY? no     Current order:  Enoxaparin 40 mg SUBQ once daily      Plan:  Pharmacologic VTE prophylaxis modified based on patient weight per [facility]/P&T approved protocol     Patient Weight (kg)      50.9 and below .9 101-150.9 151-174.9 175 or greater   Estimated   CrCl  (ml/min) 30 or greater []   30 mg   SUBQ daily   []   40 mg   SUBQ daily [x]  30 mg SUBQ   BID  []  40 mg   SUBQ   BID []  60mg SUBQ BID    15-29.9 []  UFH 5000   units SUBQ BID []  30 mg   SUBQ daily [] 30 mg SUBQ   daily []  40 mg SUBQ   daily [] 60 mg SUBQ   daily    Less than 15 or dialysis []  UFH 5000   units SUBQ BID [] UFH 5000 units SUBQ TID []  UFH 7500   units   SUBQ TID     TABITHA Thomas Ph.  3/29/2022  3:26 AM

## 2022-03-29 NOTE — ED PROVIDER NOTES
[address]  eMERGENCY dEPARTMENT eNCOUnter      Pt Name: Gabe Winn  MRN: [id number]  [place] 1985  Date of evaluation: 3/28/2022  Provider: Nella Yi MD      HISTORY OF PRESENT ILLNESS      Chief Complaint   Patient presents with    Migraine       The history is provided by the Patient. Gabe Winn is a 39 y.o. female with a PMH clinically significant for DMII, Asthma, Migraine HA's, MS, OA, HLD presenting to the ED c/o multiple complaints including migraine headache associated with lightheadedness, intermittent dizziness, nausea, intermittent chest pain, fatigue and malaise that has been worsening over the past several days. States she does have a history of headaches, but has never had one that was this consistent and severe. States it is left-sided retro-orbital and throbbing. Also sometimes right-sided. Denies any associated photophobia, vision changes. States intermittent chest tightness/pressure that is diffuse. States symptoms are atypical, does not usually have any chest pain. Does have some associated shortness of breath with this. No abdominal pain, vomiting, changes in bowel movements. Also without urinary symptoms. Does have continued vaginal discharge since her pregnancy which she considers normal.  Has been taking Tylenol, and migraine headache medication without significant relief. Was recently started on Vilazodone for postpartum depression. No other new medications. States she did have some high blood pressure during her pregnancy that they thought was fairly unremarkable. Has never been treated in the past for her hypertension. Denies history of preeclampsia. States delivered on 3/13/2022. Per Chart Review: Most recent evaluation in the ED on 3/22 appreciated. Evaluation largely unremarkable at that time.   Normal CTA of the chest.    REVIEW OF SYSTEMS       Review of Systems   Constitutional: Positive for activity change, appetite change and fatigue. Negative for chills, diaphoresis and fever. HENT: Negative for rhinorrhea and sore throat. Eyes: Negative for photophobia, pain and visual disturbance. Respiratory: Positive for chest tightness and shortness of breath. Negative for cough. Cardiovascular: Positive for chest pain. Negative for palpitations and leg swelling. Gastrointestinal: Positive for nausea. Negative for abdominal pain, diarrhea and vomiting. Genitourinary: Positive for vaginal discharge. Negative for difficulty urinating and dysuria. Musculoskeletal: Negative for back pain and neck pain. Skin: Negative for rash. Neurological: Positive for dizziness, light-headedness, numbness (intermittent, LUE. none currently) and headaches. Negative for weakness.        PAST MEDICAL HISTORY     Past Medical History:   Diagnosis Date    Anxiety and depression     Asthma     Atypical chest pain 5/31/2019    Chronic back pain     Diabetes mellitus ([facility] [address]     Headache     HIV exposure 1/6/2020    Irregular heart beat     Multiple sclerosis (HCC)     Osteoarthritis     Pure hypercholesterolemia, unspecified 5/3/2018       SURGICAL HISTORY       Past Surgical History:   Procedure Laterality Date    CHOLECYSTECTOMY      UPPER GASTROINTESTINAL ENDOSCOPY N/A 8/13/2019    EGD ESOPHAGOGASTRODUODENOSCOPY performed by Jasmeet Chen MD at [facility] History   Problem Relation Age of Onset    Cancer Mother     Arthritis Mother     Diabetes Father     Heart Disease Father     Stroke Father     High Blood Pressure Father        SOCIAL HISTORY       Social History     Socioeconomic History    Marital status: Single     Spouse name: None    Number of children: None    Years of education: None    Highest education level: None   Occupational History    None   Tobacco Use    Smoking status: Former Smoker     Packs/day: 0.50     Years: 10.00     Pack years: 5.00     Types: Cigarettes     Quit date: 2017     Years since quittin.1    Smokeless tobacco: Never Used   Vaping Use    Vaping Use: Never used   Substance and Sexual Activity    Alcohol use: Yes     Comment: occas    Drug use: Yes     Frequency: 3.0 times per week     Types: Marijuana Mansi Cannon)    Sexual activity: Yes     Partners: Male   Other Topics Concern    None   Social History Narrative    None     Social Determinants of Health     Financial Resource Strain: Low Risk     Difficulty of Paying Living Expenses: Not hard at all   Food Insecurity: No Food Insecurity    Worried About Running Out of Food in the Last Year: Never true    Radha of Food in the Last Year: Never true   Transportation Needs:     Lack of Transportation (Medical): Not on file    Lack of Transportation (Non-Medical):  Not on file   Physical Activity:     Days of Exercise per Week: Not on file    Minutes of Exercise per Session: Not on file   Stress:     Feeling of Stress : Not on file   Social Connections:     Frequency of Communication with Friends and Family: Not on file    Frequency of Social Gatherings with Friends and Family: Not on file    Attends Hindu Services: Not on file    Active Member of [address] [place] [address] or Organizations: Not on file    Attends Club or Organization Meetings: Not on file    Marital Status: Not on file   Intimate Partner Violence:     Fear of Current or Ex-Partner: Not on file    Emotionally Abused: Not on file    Physically Abused: Not on file    Sexually Abused: Not on file   Housing Stability:     Unable to Pay for Housing in the Last Year: Not on file    Number of [place] in the Last Year: Not on file    Unstable Housing in the Last Year: Not on file       CURRENT MEDICATIONS       Current Discharge Medication List      CONTINUE these medications which have NOT CHANGED    Details   Rimegepant Sulfate (NURTEC) 75 MG TBDP Take by mouth      Vilazodone HCl (VIIBRYD STARTER PACK) 10 & 20 MG KIT Take as directed for starter kit. Qty: 1 kit, Refills: 0    Associated Diagnoses: Postpartum depression      methocarbamol (ROBAXIN) 500 MG tablet Take 1 tablet by mouth 4 times daily for 10 days  Qty: 40 tablet, Refills: 0    Associated Diagnoses: Muscle spasm      ibuprofen (ADVIL;MOTRIN) 800 MG tablet Take 1 tablet by mouth 2 times daily as needed for Pain  Qty: 60 tablet, Refills: 0      mupirocin (BACTROBAN) 2 % cream Apply 3 times daily. Qty: 30 g, Refills: 1    Associated Diagnoses: Skin lesions      Prenatal Vit-Fe Fumarate-FA (PRENATAL PO) Take by mouth             ALLERGIES     Amphetamine, Penicillins, Phentermine, and Topiramate      PHYSICAL EXAM       ED Triage Vitals   BP Temp Temp Source Pulse Resp SpO2 Height Weight   03/28/22 2021 03/28/22 2017 03/28/22 2017 03/28/22 2017 03/28/22 2017 03/28/22 2017 03/28/22 2017 03/28/22 2017   (!) 148/89 98.5 °F (36.9 °C) Oral 86 18 96 % 5' 8\" (1.727 m) 270 lb (122.5 kg)       Physical Exam  Vitals and nursing note reviewed. Constitutional:       General: She is not in acute distress. Appearance: She is obese. She is not toxic-appearing or diaphoretic. HENT:      Head: Normocephalic and atraumatic. Mouth/Throat:      Mouth: Mucous membranes are moist.      Pharynx: Oropharynx is clear. Eyes:      Extraocular Movements: Extraocular movements intact. Conjunctiva/sclera: Conjunctivae normal.      Pupils: Pupils are equal, round, and reactive to light. Cardiovascular:      Rate and Rhythm: Normal rate and regular rhythm. Pulses: Normal pulses. Heart sounds: Normal heart sounds. Pulmonary:      Effort: Pulmonary effort is normal. No respiratory distress. Breath sounds: Normal breath sounds. Abdominal:      General: There is no distension. Palpations: Abdomen is soft. Tenderness: There is no abdominal tenderness. There is no guarding or rebound. Musculoskeletal:         General: No tenderness.       Cervical back: Normal range of motion and neck supple. Right lower leg: No edema. Left lower leg: No edema. Skin:     General: Skin is warm and dry. Capillary Refill: Capillary refill takes less than 2 seconds. Neurological:      General: No focal deficit present. Mental Status: She is alert and oriented to person, place, and time.    Psychiatric:         Mood and Affect: Mood normal.         Behavior: Behavior normal.         MDM:   Chart Reviewed: PMH and additional information as noted in HPI obtained from chart review    Vitals:    Vitals:    03/29/22 0245 03/29/22 0300 03/29/22 0347 03/29/22 0348   BP: 127/82  131/82 (!) 146/86   Pulse: 73 71 58 63   Resp: 17 22 18 20   Temp:   98.1 °F (36.7 °C)    TempSrc:   Oral    SpO2:   96%    Weight:       Height:           PROCEDURES:  Unless otherwise noted below, none  Procedures    LABS:  Labs Reviewed   COMPREHENSIVE METABOLIC PANEL - Abnormal; Notable for the following components:       Result Value    Sodium 145 (*)     Anion Gap 16 (*)     Glucose 122 (*)     CREATININE 0.95 (*)     Alkaline Phosphatase 170 (*)     All other components within normal limits   CBC WITH AUTO DIFFERENTIAL - Abnormal; Notable for the following components:    Neutrophils Absolute 7.1 (*)     All other components within normal limits   APTT - Abnormal; Notable for the following components:    aPTT 23.5 (*)     All other components within normal limits   URIC ACID - Abnormal; Notable for the following components:    Uric Acid, Serum 6.5 (*)     All other components within normal limits   URINALYSIS WITH REFLEX TO CULTURE - Abnormal; Notable for the following components:    Blood, Urine MODERATE (*)     Leukocyte Esterase, Urine MODERATE (*)     All other components within normal limits   MICROSCOPIC URINALYSIS - Abnormal; Notable for the following components:    WBC, UA 6-9 (*)     RBC, UA 3-5 (*)     All other components within normal limits   MAGNESIUM   TROPONIN   BRAIN NATRIURETIC PEPTIDE PROTIME-INR   FIBRINOGEN   TYPE AND SCREEN       XR CHEST PORTABLE    (Results Pending)   CT HEAD WO CONTRAST    (Results Pending)       ED Course as of 03/29/22 0624   Mon Mar 28, 2022   2127 EKG 12 Lead  EKG showing normal sinus rhythm with sinus arrhythmia. Rate of 79 bpm.  Normal axis, normal intervals. No gross acute ST-T wave abnormalities. [NA]   2158 Creatinine(!): 0.95  AKBAR [NA]   2158 Uric Acid, Serum(!): 6.5  Mildly elevated uric acid. [NA]   2158 Pro-BNP: 61  Low suspicion for acute decompensated heart failure [NA]   2158 Troponin: <0.010  Low suspicion for ACS [NA]   2158 Magnesium: 1.8 [NA]   2158 Fibrinogen: 363.0 [NA]   2159 aPTT(!): 23.5 [NA]   2159 INR: 1.0 [NA]   2159 CBC with Auto Differential(!):    WBC 9.9   RBC 4.53   Hemoglobin Quant 13.4   Hematocrit 40.5   MCV 89.4   MCH 29.5   MCHC 33.0   RDW 14.1   Platelet Count 830   Neutrophils % 71.2   Lymphocyte % 23.2   Monocytes % 4.4   Eosinophils % 1.1   Basophils % 0.1   Neutrophils Absolute 7.1(!)   Lymphocytes Absolute 2.3   Monocytes Absolute 0.4   Eosinophils Absolute 0.1   Basophils Absolute 0.0  Largely unremarkable. [NA]   2245 Leukocyte Esterase, Urine(!): MODERATE [NA]   2245 Bacteria, UA: Negative [NA]   2319 CT HEAD WO CONTRAST  Preliminary radiology read: Mucosal thickening and partial opacification of several of the ethmoid air cells and mild mucosal thickening of the left maxillary sinus suggesting possible mild sinusitis. Remaining paranasal sinuses and mastoid air cells are normal.  No skull fracture or scalp hematoma is seen. Normal appearance of the brain. No intracranial hemorrhage or infarct is seen.  [NA]      ED Course User Index  [NA] Ana Rivero MD       39 y.o. female with a PMH clinically significant for DMII, Asthma, Migraine HA's, MS, OA, HLD presenting to the ED c/o multiple complaints including migraine headache associated with lightheadedness, intermittent dizziness, nausea, intermittent chest pain, fatigue and malaise that has been worsening over the past several days. Upon initial evaluation, Pt hypertensive, but otherwise Afebrile, HDS and in NAD. PE as noted above. Labs, , EKG, and Imaging as noted above. Given findings, clinical presentation most concerning for possible Preecelampsia vs Migraine HA. Also maintained possible MS flare on the ddx, but Pt without significant weakness, numbness or vision changes at this time. Very low suspicion for Meningitis/Encephalitis. Initiated protocol for preeclampsia in the ED. Found to have mild AKBAR, but without proteinuria or transaminitis. Blood pressure improved following initial bolus of magnesium in the ED. Patient was discussed with Dr. Kari Guadalupe who maintained low suspicion for preeclampsia, but was amenable to accepting the patient for observation overnight. Patient understanding and amenable to the POC. Pt was administered   Medications   sodium chloride flush 0.9 % injection 5-40 mL (has no administration in time range)   sodium chloride flush 0.9 % injection 5-40 mL (has no administration in time range)   0.9 % sodium chloride infusion (has no administration in time range)   ondansetron (ZOFRAN-ODT) disintegrating tablet 4 mg ( Oral See Alternative 3/29/22 0439)     Or   ondansetron (ZOFRAN) injection 4 mg (4 mg IntraVENous Given 3/29/22 0439)   acetaminophen (TYLENOL) tablet 1,000 mg (has no administration in time range)   acetaminophen (TYLENOL) tablet 1,000 mg (1,000 mg Oral Given 3/28/22 2100)   dexamethasone (DECADRON) injection 10 mg (10 mg IntraVENous Given 3/28/22 2056)   diphenhydrAMINE (BENADRYL) injection 25 mg (25 mg IntraVENous Given 3/28/22 2057)   0.9 % sodium chloride bolus (0 mLs IntraVENous Stopped 3/28/22 2138)   magnesium sulfate 4000 mg in 100 mL IVPB premix (0 mg IntraVENous Stopped 3/28/22 2138)       Plan: Admit to OB for further evaluation and management. Report given to Dr. Kari Guadalupe.  and Patient understanding and amenable to the POC. CRITICAL CARE TIME   Total CriticalCare time was 0 minutes, excluding separately reportable procedures. There was a high probability of clinically significant/life threatening deterioration in the patient's condition which required my urgent intervention. FINAL IMPRESSION      1. Pre-eclampsia in postpartum period    2.  Nonintractable headache, unspecified chronicity pattern, unspecified headache type          DISPOSITION/PLAN   DISPOSITION Admitted 03/29/2022 02:37:59 AM      Current Discharge Medication List           MD Rakan Lomeli MD  03/29/22 8844

## 2022-03-29 NOTE — ED TRIAGE NOTES
Pt presents with complaint of migraine x 1 hour. States feels like a typical migraine to her.  Postpartum-- delivered on 3/13/22

## 2022-03-29 NOTE — DISCHARGE SUMMARY
DISCHARGE SUMMARY:     Pt doing well. Pt observed for headache from ED. Pt was started on magnesium sulfate by ED and it was discontinued on admission to L&D (no criteria for [facility] treatment). Pt without severe ranged BPs. BP (!) 146/86 Comment: right arm  Pulse 63   Temp 98.1 °F (36.7 °C) (Oral)   Resp 20   Ht 5' 8\" (1.727 m)   Wt 270 lb (122.5 kg)   LMP 03/17/2022 (Approximate)   SpO2 96%   BMI 41.05 kg/m²   Hemoglobin   Date Value Ref Range Status   03/28/2022 13.4 12.0 - 16.0 g/dL Final     CVS: RRR  Lungs: CTAB  ABD: Uterus firm at umbilicus  EXT: no c/c/e    39 y.o. [id number] female postpartum 2wks with headache. 1. Discharge pt to home  2. Rx Ibuprofen  3.  Follow up with OB as scheduled    Viji Marie MD

## 2022-03-29 NOTE — PROGRESS NOTES
Call made to Dr. Mandie Azul regarding admission orders. Dr. Mandie Azul states that the magnesium can be discontinued, pt may ambulate as tolerated, Q4 vitals, 4 mg zofran Q6, and 2 extra strength tylenol Q4-6. Dr. Mandie Azul states to discontinue the lovenox.

## 2022-03-29 NOTE — H&P
Department of Obstetrics and Gynecology   History & Physical    Pt Name: Adelaida Perdomo  MRN: [id number] Anson Rajan #: [de-identified]  YOB: 1985  Estimated Date of Delivery: None noted. HPI: The patient is a 39 y.o. [id number] postpartum x 2wks who presented to main ED for headache with intermittent elevated BPs (no severe ranges) to r/o PIH. Pt with no evidence of PIH at this time. Allergies: Allergies as of 03/28/2022 - Fully Reviewed 03/28/2022   Allergen Reaction Noted    Amphetamine  07/28/2021    Penicillins Hives and Other (See Comments) 11/11/2016    Phentermine Other (See Comments) 04/19/2018    Topiramate Other (See Comments) 08/17/2018       Medications:    Current Facility-Administered Medications:     sodium chloride flush 0.9 % injection 5-40 mL, 5-40 mL, IntraVENous, 2 times per day, Emerson Hinojosas, DO    sodium chloride flush 0.9 % injection 5-40 mL, 5-40 mL, IntraVENous, PRN, Norman Sreedhar Jewel, DO    0.9 % sodium chloride infusion, , IntraVENous, PRN, Norman Sreedhar [place]opedale, DO    ondansetron (ZOFRAN-ODT) disintegrating tablet 4 mg, 4 mg, Oral, Q8H PRN **OR** ondansetron (ZOFRAN) injection 4 mg, 4 mg, IntraVENous, Q6H PRN, Leelee Sreedhar Jewel, DO, 4 mg at 03/29/22 0439    acetaminophen (TYLENOL) tablet 1,000 mg, 1,000 mg, Oral, Q6H PRN, Emerson Alejo,     OB History: [id number]    Gyn History: Denies h/o abnormal pap smear, h/o STDs.      Past Medical History:   Past Medical History:   Diagnosis Date    Anxiety and depression     Asthma     Atypical chest pain 5/31/2019    Chronic back pain     Diabetes mellitus ([address]     Headache     HIV exposure 1/6/2020    Irregular heart beat     Multiple sclerosis (HCC)     Osteoarthritis     Pure hypercholesterolemia, unspecified 5/3/2018       Past Surgical History:   Past Surgical History:   Procedure Laterality Date    CHOLECYSTECTOMY      UPPER GASTROINTESTINAL ENDOSCOPY N/A 8/13/2019    EGD ESOPHAGOGASTRODUODENOSCOPY performed [facility]al [facility]       Social History:   Social History     Tobacco Use   Smoking Status Former Smoker    Packs/day: 0.50    Years: 10.00    Pack years: 5.00    Types: Cigarettes    Quit date: 2017    Years since quittin.1   Smokeless Tobacco Never Used        Family History: Noncontributory; Denies h/o cancer. ROS:  Negative except as stated in HPI, denies nausea, vomiting, fever, chills, headache or dysuria. PE:  Vitals:    22 0348   BP: (!) 146/86   Pulse: 63   Resp: 20   Temp:    SpO2:        General: well nourished, well developed, in no acute distress  CV: Normal heart sounds  Resp: breathing unlabored  Abdomen: Nontender, no rebound, no guarding    Labs:   Blood Type/Rh: O POS    Assessment:   39 y.o. [id number] female postpartum with headache.  No evidence of PIH    Plan:   Obs for pain control    Jack Lebron MD

## 2022-03-30 LAB
EKG ATRIAL RATE: 79 BPM
EKG P AXIS: 40 DEGREES
EKG P-R INTERVAL: 154 MS
EKG Q-T INTERVAL: 394 MS
EKG QRS DURATION: 82 MS
EKG QTC CALCULATION (BAZETT): 451 MS
EKG R AXIS: 8 DEGREES
EKG T AXIS: 21 DEGREES
EKG VENTRICULAR RATE: 79 BPM

## 2022-03-30 PROCEDURE — 93010 ELECTROCARDIOGRAM REPORT: CPT | Performed by: INTERNAL MEDICINE

## 2022-04-04 ENCOUNTER — OFFICE VISIT (OUTPATIENT)
Dept: FAMILY MEDICINE CLINIC | Age: 37
End: 2022-04-04
Payer: COMMERCIAL

## 2022-04-04 VITALS
TEMPERATURE: 97.1 F | BODY MASS INDEX: 40.39 KG/M2 | OXYGEN SATURATION: 98 % | HEART RATE: 73 BPM | WEIGHT: 266.5 LBS | SYSTOLIC BLOOD PRESSURE: 114 MMHG | HEIGHT: 68 IN | DIASTOLIC BLOOD PRESSURE: 70 MMHG

## 2022-04-04 DIAGNOSIS — F41.1 GAD (GENERALIZED ANXIETY DISORDER): ICD-10-CM

## 2022-04-04 DIAGNOSIS — M62.838 MUSCLE SPASMS OF NECK: ICD-10-CM

## 2022-04-04 DIAGNOSIS — M54.32 SCIATICA, LEFT SIDE: ICD-10-CM

## 2022-04-04 DIAGNOSIS — F32.A DEPRESSIVE DISORDER: Primary | ICD-10-CM

## 2022-04-04 PROBLEM — K62.9: Status: RESOLVED | Noted: 2021-03-12 | Resolved: 2022-04-04

## 2022-04-04 PROBLEM — N83.202 CYST OF LEFT OVARY: Status: RESOLVED | Noted: 2021-02-12 | Resolved: 2022-04-04

## 2022-04-04 PROBLEM — N92.1 BREAKTHROUGH BLEEDING: Status: RESOLVED | Noted: 2017-12-11 | Resolved: 2022-04-04

## 2022-04-04 PROBLEM — O21.0 HYPEREMESIS GRAVIDARUM: Status: RESOLVED | Noted: 2021-07-28 | Resolved: 2022-04-04

## 2022-04-04 PROBLEM — A64 SEXUALLY TRANSMITTED DISEASE: Status: RESOLVED | Noted: 2020-11-11 | Resolved: 2022-04-04

## 2022-04-04 PROBLEM — A59.01 VAGINAL TRICHOMONIASIS: Status: RESOLVED | Noted: 2020-11-05 | Resolved: 2022-04-04

## 2022-04-04 PROBLEM — R51.9 HEADACHE: Status: RESOLVED | Noted: 2022-03-29 | Resolved: 2022-04-04

## 2022-04-04 PROBLEM — L29.2 PRURITUS OF VULVA: Status: RESOLVED | Noted: 2020-11-11 | Resolved: 2022-04-04

## 2022-04-04 PROBLEM — N92.6 ABNORMAL MENSTRUAL PERIODS: Status: RESOLVED | Noted: 2021-07-28 | Resolved: 2022-04-04

## 2022-04-04 PROBLEM — N89.8 VAGINAL DISCHARGE: Status: RESOLVED | Noted: 2017-12-25 | Resolved: 2022-04-04

## 2022-04-04 PROBLEM — N89.8 VAGINAL LEUKORRHEA: Status: RESOLVED | Noted: 2021-03-12 | Resolved: 2022-04-04
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
## 2022-04-04 PROBLEM — E66.01 MORBID OBESITY (HCC): Status: RESOLVED | Noted: 2017-12-08 | Resolved: 2022-04-04

## 2022-04-04 PROBLEM — N90.89 LESION OF LABIA: Status: RESOLVED | Noted: 2021-03-12 | Resolved: 2022-04-04

## 2022-04-04 PROBLEM — R11.0 NAUSEA: Status: RESOLVED | Noted: 2018-08-06 | Resolved: 2022-04-04

## 2022-04-04 PROBLEM — R42 DIZZINESS AND GIDDINESS: Status: RESOLVED | Noted: 2018-08-06 | Resolved: 2022-04-04

## 2022-04-04 PROBLEM — O36.80X0 PREGNANCY, LOCATION UNKNOWN: Status: RESOLVED | Noted: 2021-03-12 | Resolved: 2022-04-04

## 2022-04-04 PROBLEM — R23.8 SKIN IRRITATION: Status: RESOLVED | Noted: 2018-12-10 | Resolved: 2022-04-04

## 2022-04-04 PROBLEM — R93.89 ABNORMAL MRI: Status: RESOLVED | Noted: 2022-01-26 | Resolved: 2022-04-04

## 2022-04-04 PROBLEM — O09.219 HIGH RISK PREGNANCY DUE TO HISTORY OF PRETERM LABOR: Status: RESOLVED | Noted: 2021-03-12 | Resolved: 2022-04-04

## 2022-04-04 PROCEDURE — 1111F DSCHRG MED/CURRENT MED MERGE: CPT | Performed by: PHYSICIAN ASSISTANT

## 2022-04-04 PROCEDURE — 99214 OFFICE O/P EST MOD 30 MIN: CPT | Performed by: PHYSICIAN ASSISTANT

## 2022-04-04 PROCEDURE — G8417 CALC BMI ABV UP PARAM F/U: HCPCS | Performed by: PHYSICIAN ASSISTANT

## 2022-04-04 PROCEDURE — G8427 DOCREV CUR MEDS BY ELIG CLIN: HCPCS | Performed by: PHYSICIAN ASSISTANT

## 2022-04-04 PROCEDURE — 1036F TOBACCO NON-USER: CPT | Performed by: PHYSICIAN ASSISTANT

## 2022-04-04 RX ORDER — VILAZODONE HYDROCHLORIDE 10 MG/1
10 TABLET ORAL DAILY
Qty: 90 TABLET | Refills: 1 | Status: SHIPPED | OUTPATIENT
Start: 2022-04-04 | End: 2022-07-12

## 2022-04-04 RX ORDER — GABAPENTIN 100 MG/1
100 CAPSULE ORAL 2 TIMES DAILY PRN
Qty: 180 CAPSULE | Refills: 1 | Status: SHIPPED | OUTPATIENT
Start: 2022-04-04 | End: 2022-05-02

## 2022-04-04 RX ORDER — VALACYCLOVIR HYDROCHLORIDE 500 MG/1
TABLET, FILM COATED ORAL
COMMUNITY
Start: 2022-03-08 | End: 2022-05-02

## 2022-04-04 ASSESSMENT — ENCOUNTER SYMPTOMS
SHORTNESS OF BREATH: 0
CHEST TIGHTNESS: 0
BACK PAIN: 1
COUGH: 0

## 2022-04-04 NOTE — PROGRESS NOTES
Subjective  Gabe Winn, 39 y.o. female presents today with:  Chief Complaint   Patient presents with    2 Week Follow-Up    Spasms     still coming and going     HPI   2 week follow up with me. Last OV: 3/21/22. Recently was seen at [facility] on 3/28/22 for intractable HA. Was admitted to elevated BP and pre-eclampsia in PP period. She was kept overnight for observation. Work-up was done and was essentially negative. She was discharged home in stable condition on 3/29/22. Since that time, denies any worsening HA. Started viibryd starter pack for reactive depression/post-partum. Took for about 9 days. Tolerated 10 mg tablets, started to feel 'funny' when she bumped up to 20 mg. Has felt better about her daughter. Initially did not want to spend any time with her. Open to going back on 10 mg tablets. Denies worsening depressed mood. Has baby with her today and has been spending more time with her daughter. Other children at home have been indifferent about the baby. Robaxin was sent last OV for neck and lower back spasms. These are chronic/intermittent. Took medication. Does not want to take anything on a daily basis. Would like something for PRN use, if possible. Review of Systems   Constitutional: Negative for chills. Respiratory: Negative for cough, chest tightness and shortness of breath. Cardiovascular: Negative for chest pain, palpitations and leg swelling. Genitourinary: Positive for vaginal bleeding (PP). Negative for dysuria and menstrual problem. Musculoskeletal: Positive for back pain, myalgias, neck pain and neck stiffness. Neurological: Positive for numbness (intermittent, sciatic). Negative for dizziness, weakness, light-headedness and headaches. Psychiatric/Behavioral: Negative for agitation, confusion, decreased concentration, dysphoric mood, self-injury, sleep disturbance and suicidal ideas.  The patient is not nervous/anxious and is not hyperactive. Past Medical History:   Diagnosis Date    Anxiety and depression     Asthma     Atypical chest pain 2019    Chronic back pain     Diabetes mellitus ([facility] [address]     Headache     HIV exposure 2020    Irregular heart beat     Multiple sclerosis (HCC)     Osteoarthritis     Pure hypercholesterolemia, unspecified 5/3/2018     Past Surgical History:   Procedure Laterality Date    CHOLECYSTECTOMY      UPPER GASTROINTESTINAL ENDOSCOPY N/A 2019    EGD ESOPHAGOGASTRODUODENOSCOPY performed by Poncho Gibson MD at [address] [place] [address] Marital status: Single     Spouse name: Not on file    Number of children: Not on file    Years of education: Not on file    Highest education level: Not on file   Occupational History    Not on file   Tobacco Use    Smoking status: Former Smoker     Packs/day: 0.50     Years: 10.00     Pack years: 5.00     Types: Cigarettes     Quit date: 2017     Years since quittin.1    Smokeless tobacco: Never Used   Vaping Use    Vaping Use: Never used   Substance and Sexual Activity    Alcohol use: Yes     Comment: occas    Drug use: Yes     Frequency: 3.0 times per week     Types: Marijuana Mansi Cannon)    Sexual activity: Yes     Partners: Male   Other Topics Concern    Not on file   Social History Narrative    Not on file     Social Determinants of Health     Financial Resource Strain: Low Risk     Difficulty of Paying Living Expenses: Not hard at all   Food Insecurity: No Food Insecurity    Worried About Running Out of Food in the Last Year: Never true    Radha of Food in the Last Year: Never true   Transportation Needs:     Lack of Transportation (Medical): Not on file    Lack of Transportation (Non-Medical):  Not on file   Physical Activity:     Days of Exercise per Week: Not on file    Minutes of Exercise per Session: Not on file   Stress:     Feeling of Stress : Not on file   Social Connections:     Frequency of Communication with Friends and Family: Not on file    Frequency of Social Gatherings with Friends and Family: Not on file    Attends Denominational Services: Not on file    Active Member of Clubs or Organizations: Not on file    Attends Club or Organization Meetings: Not on file    Marital Status: Not on file   Intimate Partner Violence:     Fear of Current or Ex-Partner: Not on file    Emotionally Abused: Not on file    Physically Abused: Not on file    Sexually Abused: Not on file   Housing Stability:     Unable to Pay for Housing in the Last Year: Not on file    Number of [place] in the Last Year: Not on file    Unstable Housing in the Last Year: Not on file     Family History   Problem Relation Age of Onset    Cancer Mother     Arthritis Mother     Diabetes Father     Heart Disease Father     Stroke Father     High Blood Pressure Father      Allergies   Allergen Reactions    Amphetamine     Penicillins Hives and Other (See Comments)    Phentermine Other (See Comments)     Anxiety with hospitalization    Topiramate Other (See Comments)     DIZZY     Current Outpatient Medications   Medication Sig Dispense Refill    valACYclovir (VALTREX) 500 MG tablet TAKE 1 TABLET BY MOUTH TWICE DAILY      vilazodone HCl (VILAZODONE HCL) 10 MG TABS Take 1 tablet by mouth daily 90 tablet 1    gabapentin (NEURONTIN) 100 MG capsule Take 1 capsule by mouth 2 times daily as needed (Leg pain/cramps) for up to 30 days. Intended supply: 90 days 180 capsule 1    Rimegepant Sulfate (NURTEC) 75 MG TBDP Take by mouth      ibuprofen (ADVIL;MOTRIN) 800 MG tablet Take 1 tablet by mouth 2 times daily as needed for Pain 60 tablet 0    mupirocin (BACTROBAN) 2 % cream Apply 3 times daily. 30 g 1    Prenatal Vit-Fe Fumarate-FA (PRENATAL PO) Take by mouth       No current facility-administered medications for this visit.      PMH, Surgical Hx, Family Hx, and Social Hx reviewed and updated. Health Maintenance reviewed. Objective  Vitals:    04/04/22 0838   BP: 114/70   Pulse: 73   Temp: 97.1 °F (36.2 °C)   SpO2: 98%   Weight: 266 lb 8 oz (120.9 kg)   Height: 5' 8\" (1.727 m)     BP Readings from Last 3 Encounters:   04/04/22 114/70   03/29/22 (!) 146/86   03/21/22 122/82     Wt Readings from Last 3 Encounters:   04/04/22 266 lb 8 oz (120.9 kg)   03/28/22 270 lb (122.5 kg)   03/21/22 270 lb (122.5 kg)     Physical Exam  Vitals reviewed. Constitutional:       Appearance: She is obese. HENT:      Head: Normocephalic and atraumatic. Right Ear: External ear normal. Decreased hearing noted. Left Ear: External ear normal. Decreased hearing noted. Nose: Nose normal.   Cardiovascular:      Rate and Rhythm: Normal rate and regular rhythm. Pulmonary:      Effort: Pulmonary effort is normal.      Breath sounds: Normal breath sounds. Neurological:      Mental Status: She is alert. Psychiatric:         Attention and Perception: Attention normal.         Mood and Affect: Mood normal. Mood is not anxious or depressed. Affect is blunt. Affect is not labile or tearful. Speech: Speech normal.         Behavior: Behavior normal.         Thought Content: Thought content normal. Thought content is not delusional. Thought content does not include suicidal ideation. Cognition and Memory: Cognition normal.      Comments: Improved mood noted today. Daughter with her. Tolerated 10 mg viibryd dose, initially. Open to restarting medication. Assessment & Plan   Bibiana was seen today for 2 week follow-up and spasms. Diagnoses and all orders for this visit:    Depressive disorder  -     vilazodone HCl (VILAZODONE HCL) 10 MG TABS; Take 1 tablet by mouth daily    Post partum depression  -     vilazodone HCl (VILAZODONE HCL) 10 MG TABS; Take 1 tablet by mouth daily    Muscle spasms of neck  -     gabapentin (NEURONTIN) 100 MG capsule;  Take 1 capsule by mouth 2 times

## 2022-05-02 ENCOUNTER — OFFICE VISIT (OUTPATIENT)
Dept: FAMILY MEDICINE CLINIC | Age: 37
End: 2022-05-02
Payer: COMMERCIAL

## 2022-05-02 VITALS
SYSTOLIC BLOOD PRESSURE: 118 MMHG | BODY MASS INDEX: 40.92 KG/M2 | WEIGHT: 270 LBS | DIASTOLIC BLOOD PRESSURE: 80 MMHG | RESPIRATION RATE: 16 BRPM | OXYGEN SATURATION: 99 % | HEART RATE: 64 BPM | HEIGHT: 68 IN

## 2022-05-02 PROCEDURE — G8417 CALC BMI ABV UP PARAM F/U: HCPCS | Performed by: PHYSICIAN ASSISTANT

## 2022-05-02 PROCEDURE — 99214 OFFICE O/P EST MOD 30 MIN: CPT | Performed by: PHYSICIAN ASSISTANT

## 2022-05-02 PROCEDURE — G8427 DOCREV CUR MEDS BY ELIG CLIN: HCPCS | Performed by: PHYSICIAN ASSISTANT

## 2022-05-02 PROCEDURE — 1036F TOBACCO NON-USER: CPT | Performed by: PHYSICIAN ASSISTANT

## 2022-05-02 ASSESSMENT — PATIENT HEALTH QUESTIONNAIRE - PHQ9
6. FEELING BAD ABOUT YOURSELF - OR THAT YOU ARE A FAILURE OR HAVE LET YOURSELF OR YOUR FAMILY DOWN: 2
SUM OF ALL RESPONSES TO PHQ QUESTIONS 1-9: 14
1. LITTLE INTEREST OR PLEASURE IN DOING THINGS: 0
5. POOR APPETITE OR OVEREATING: 1
SUM OF ALL RESPONSES TO PHQ QUESTIONS 1-9: 14
7. TROUBLE CONCENTRATING ON THINGS, SUCH AS READING THE NEWSPAPER OR WATCHING TELEVISION: 1
8. MOVING OR SPEAKING SO SLOWLY THAT OTHER PEOPLE COULD HAVE NOTICED. OR THE OPPOSITE, BEING SO FIGETY OR RESTLESS THAT YOU HAVE BEEN MOVING AROUND A LOT MORE THAN USUAL: 1
10. IF YOU CHECKED OFF ANY PROBLEMS, HOW DIFFICULT HAVE THESE PROBLEMS MADE IT FOR YOU TO DO YOUR WORK, TAKE CARE OF THINGS AT HOME, OR GET ALONG WITH OTHER PEOPLE: 1
SUM OF ALL RESPONSES TO PHQ9 QUESTIONS 1 & 2: 3
2. FEELING DOWN, DEPRESSED OR HOPELESS: 3
9. THOUGHTS THAT YOU WOULD BE BETTER OFF DEAD, OR OF HURTING YOURSELF: 0
SUM OF ALL RESPONSES TO PHQ QUESTIONS 1-9: 14
4. FEELING TIRED OR HAVING LITTLE ENERGY: 3
SUM OF ALL RESPONSES TO PHQ QUESTIONS 1-9: 14
3. TROUBLE FALLING OR STAYING ASLEEP: 3

## 2022-05-02 ASSESSMENT — ENCOUNTER SYMPTOMS
COUGH: 0
BACK PAIN: 1
SHORTNESS OF BREATH: 0
CHEST TIGHTNESS: 0
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
## 2022-05-02 NOTE — Clinical Note
Patient is considering adoption for her  daughter; asking after some resources/information. Unsure if we had anything we can guide her on. Has 4 other kids at home; no great support. I had Lorna Lopez speak with her; any help would be greatly appreciated.

## 2022-05-03 ENCOUNTER — CARE COORDINATION (OUTPATIENT)
Dept: CARE COORDINATION | Age: 37
End: 2022-05-03

## 2022-05-03 NOTE — CARE COORDINATION
Initial Contact Social Work Note - Ambulatory  5/3/2022      Date of referral: 5/2/2022  Referral received from: Nilay Alonzo PA-C  Reason for referral: Adoption    Previous SW referral: No  If yes, brief summary of outcome:     Two Identifiers Verified: Yes    Insurance Provider: Alison:  Limited     Status: Not discussed. Community Providers: Not discussed    ADL Assistance Needed: Not discussed    Housing/Living Concerns or Home Modification Needs: Not discussed. Transportation Concern: Not discussed. Medication Cost Concern:  Not discussed. Medication Adherence Concern:     Financial Concern(s): Income (only if applicable):     Ability to Read/Write: Yes    Advance Care Plan:  not discussed    Other: Patient is considering Adoption at this time for her one [de-identified] old and in need of assistance with exploring this option. Identified Needs:   Adoption information         Social Work Plan:   Provided patient with the following resource information for adoption Assistance:  Giuliano Migdalia [phone]), Jennifer [phone]), [facility] [place] [phone]) and [facility] [phone]). [address] [place] [address] Program    Next Steps: Ongoing assessment.   Method of Communication With Provider (if appropriate): None      Goals Addressed    None

## 2022-05-09 ENCOUNTER — CARE COORDINATION (OUTPATIENT)
Dept: CARE COORDINATION | Age: 37
End: 2022-05-09

## 2022-05-09 NOTE — CARE COORDINATION
Confirmed with patient her email and plan to send her information. Emailed patient information sent to this writer by leeanne Belle. This information included on questions about adoption and area adoption agencies.

## 2022-05-09 NOTE — LETTER
Aurora Huynh  [address]       Dear Sarina Altamirano,    I hope this letter finds you doing well! I am sending you resource information on Adoption. I hope you find the information helpful. Please look them over and let me know if you have any questions or need further assistance. You can contact me at any of the numbers listed below. Sincerely,    FRIDA Schultz  , [facility]  Cell Phone: [phone]  Email: [email or web address]

## 2022-05-09 NOTE — CARE COORDINATION
Telephone call to patient. Left voicemail explaining that email was undeliverable and calling to confirm email address. Also stated in email would be sending out information to her by mail. Requested return phone call. Call back number was provided.

## 2022-05-16 ENCOUNTER — CARE COORDINATION (OUTPATIENT)
Dept: CARE COORDINATION | Age: 37
End: 2022-05-16

## 2022-05-23 ENCOUNTER — CARE COORDINATION (OUTPATIENT)
Dept: CARE COORDINATION | Age: 37
End: 2022-05-23

## 2022-05-23 NOTE — CARE COORDINATION
Telephone call with patient. She indicated that she did receive the information sent to her in the mail about adoption. She has decided not to do the adoption option at this time. Discussed plan to discharge from from [facility]/ and patient was in agreement.

## 2022-06-13 ENCOUNTER — CARE COORDINATION (OUTPATIENT)
Dept: CARE COORDINATION | Age: 37
End: 2022-06-13

## 2022-06-13 ENCOUNTER — TELEPHONE (OUTPATIENT)
Dept: FAMILY MEDICINE CLINIC | Age: 37
End: 2022-06-13

## 2022-06-13 NOTE — CARE COORDINATION
Telephone call to [facility]. Representative indicated that an  would need to return phone call. Provided contact information for this writer.
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
## 2022-06-17 ENCOUNTER — TELEPHONE (OUTPATIENT)
Dept: FAMILY MEDICINE CLINIC | Age: 37
End: 2022-06-17

## 2022-06-17 DIAGNOSIS — F41.1 GAD (GENERALIZED ANXIETY DISORDER): ICD-10-CM

## 2022-06-17 NOTE — TELEPHONE ENCOUNTER
----- Message from Astrid Rivera sent at 6/17/2022  1:12 PM EDT -----  Subject: Referral Request    QUESTIONS   Reason for referral request? Counseling   Has the physician seen you for this condition before? Yes  Select a date? 2022-03-03  Select the Provider the patient wants to be referred to, if known (PCP or   Specialist)? Outside Physician - previous counselor if available   Preferred Specialist (if applicable)? Do you already have an appointment scheduled? No  Additional Information for Provider? Patient is going through some things   with her children and needs to be seen by a counselor as soon as possible. Please call to discuss. ---------------------------------------------------------------------------  --------------  Deondre Muse INFO  What is the best way for the office to contact you? OK to leave message on   voicemail  Preferred Call Back Phone Number? [phone]  ---------------------------------------------------------------------------  --------------  SCRIPT ANSWERS  Relationship to Patient?  Self

## 2022-06-20 NOTE — TELEPHONE ENCOUNTER
Patient needs emergent evaluation please. Dr. Stacy Vaca will most likely give recommendations for referral onward, but Bibiana does need initially assessment please.

## 2022-06-20 NOTE — TELEPHONE ENCOUNTER
Referral to Dr. Kaushik Garibay. Please get her in this week, if possible. Bibiana can also call [facility] or [facility] too.

## 2022-06-27 ENCOUNTER — TELEMEDICINE (OUTPATIENT)
Dept: BEHAVIORAL/MENTAL HEALTH CLINIC | Age: 37
End: 2022-06-27
Payer: COMMERCIAL

## 2022-06-27 DIAGNOSIS — F33.1 MAJOR DEPRESSIVE DISORDER, RECURRENT EPISODE, MODERATE WITH ANXIOUS DISTRESS (HCC): Primary | ICD-10-CM

## 2022-06-27 PROCEDURE — 90791 PSYCH DIAGNOSTIC EVALUATION: CPT | Performed by: PSYCHOLOGIST

## 2022-06-27 ASSESSMENT — PATIENT HEALTH QUESTIONNAIRE - PHQ9
SUM OF ALL RESPONSES TO PHQ9 QUESTIONS 1 & 2: 3
3. TROUBLE FALLING OR STAYING ASLEEP: 3
SUM OF ALL RESPONSES TO PHQ QUESTIONS 1-9: 16
7. TROUBLE CONCENTRATING ON THINGS, SUCH AS READING THE NEWSPAPER OR WATCHING TELEVISION: 2
1. LITTLE INTEREST OR PLEASURE IN DOING THINGS: 1
2. FEELING DOWN, DEPRESSED OR HOPELESS: 2
SUM OF ALL RESPONSES TO PHQ QUESTIONS 1-9: 16
10. IF YOU CHECKED OFF ANY PROBLEMS, HOW DIFFICULT HAVE THESE PROBLEMS MADE IT FOR YOU TO DO YOUR WORK, TAKE CARE OF THINGS AT HOME, OR GET ALONG WITH OTHER PEOPLE: 1
4. FEELING TIRED OR HAVING LITTLE ENERGY: 3
8. MOVING OR SPEAKING SO SLOWLY THAT OTHER PEOPLE COULD HAVE NOTICED. OR THE OPPOSITE, BEING SO FIGETY OR RESTLESS THAT YOU HAVE BEEN MOVING AROUND A LOT MORE THAN USUAL: 1
6. FEELING BAD ABOUT YOURSELF - OR THAT YOU ARE A FAILURE OR HAVE LET YOURSELF OR YOUR FAMILY DOWN: 2
SUM OF ALL RESPONSES TO PHQ QUESTIONS 1-9: 16
5. POOR APPETITE OR OVEREATING: 2
9. THOUGHTS THAT YOU WOULD BE BETTER OFF DEAD, OR OF HURTING YOURSELF: 0
SUM OF ALL RESPONSES TO PHQ QUESTIONS 1-9: 16

## 2022-06-27 NOTE — PROGRESS NOTES
Behavioral Health Consultation  Bibiana Oscar, Ph.D., [facility]  Psychologist  6/27/22  2:11 PM EDT      Time spent with Patient: 30 minutes  This is patient's first  [facility] appointment. Reason for Consult:  depression, anxiety and stress  Referring Provider: Nataliya Duran PA-C    Pt (and/or legal guardian) provided informed consent for the behavioral health program. Discussed with patient model of service to include the limits of confidentiality (i.e. abuse reporting, suicide intervention, etc.) and short-term intervention focused approach. Pt (and/or legal guardian) indicated understanding. Feedback given to PCP. TELEHEALTH EVALUATION -- Audio and/or Visual (During JRDWW-55 public health emergency)    Due to COVID 19 outbreak, patient's office visit was converted to a virtual visit. Patient was contacted and agreed to proceed with a virtual visit via Telephone Visit. Patient reports that they are located at home. Provider Location [place] at [place] in [place]. The risks and benefits of converting to a virtual visit were discussed in light of the current infectious disease epidemic. Patient (and/or legal guardian) also understood that insurance coverage and co-pays are up to their individual insurance plans. Patient (and/or legal guardian) provides verbal consent for this visit to be billed to insurance. Pursuant to the emergency declaration under the [facility], [facility]5 waiver authority and the [employer] Act, this Virtual  Visit was conducted, with patient's consent, to reduce the patient's risk of exposure to COVID-19 and provide continuity of care for an established patient. Services were provided through an audio synchronous discussion virtually to substitute for in-person clinic visit. Pt initially connected on Doxy via video but there was an audio issue therefore the appt was switched to phone-only.     Pt arrived on time to the appt but was in a vehicle and it was explained the appt was not able to conducted if she was driving. After pt returned home the appt was restarted for the remainder of the appt time. S:         Pt notes that she believes her PCP referred her several times for several different reasons but is now seeking services related to [facility] involvement. Pt reports children services is involved with her right now, notes she'll need documentation that she is receiving [facility] services. Clarified that documentation can only be provided to [facility] if she signs a DAPHNE. Pt notes understanding. Pt reports she has a 4 month old  and notes she has been struggling with post partum depression and situational stress. Pt lives with her 17yo son, 15yo and 6yo daughters, 5yos and 4 month old daughter and their puppy but then later states that her children are currently in the care of her mother. Pt is never , from [facility] originally and is not currently in a relationship. Pt reports reports having extended family that lives local. Pt does not work. Pt notes that she is hearing impaired and does not always wear her hearing aids. Pt notes she has MS and has treatment for that medical condition. Pt reports one best friend in her life, some acquaintances. Pt is active on social media. Pt reports enjoying \"everything\" when asked of hobbies. Pt reports she is not community involved, may consider returning to [employer] in the future. Pt reports occasional etoh use, 2 drinks in the past three months. Pt uses THC  \"almost daily\", averaging about 1 blunt throughout the day. Pt denies cigarettes, vaping. Pt reports she has taken \"all kinds\" of MH medication in the past, denies taking it currently and states she prefers to not take medication. When asked for the reason for [facility]S involvement, Pt notes she had \"an episode\", when she yelled and was aggressive with her children who she describes as often misbehaving.  Pt states she was holding the baby while yelling at the older children, felt overwhelmed. Pt's eldest son called pt's mother who then called [facility]. Pt denies that she harmed her child in anyway but acknowledges she was being aggressive while holding her. Pt notes currently all of her children are in her mother's care reporting a \"safety plan\" while she is seeking help. Pt reports she has a history of MH treatment but does not recall/provide any details to include a timeframe or last episode of treatment. Pt states she is arranging for services for her 8yo son at [place]. Pt denies SI/HI. O:  MSE:    Appearance    Initially assessed until doxy problems then switched to audio  Appetite abnormal  Sleep disturbance Yes  Fatigue No  Loss of pleasure No  Impulsive behavior Yes  Speech    spontaneous, normal rate and normal volume  Mood    Anxious  Depressed  Affect    depressed affect  Thought Content    intact  Thought Process    coherent  Associations    logical connections  Insight    Fair  Judgment    Intact  Orientation    oriented to person, place, time, and general circumstances  Memory    recent and remote memory intact  Attention/Concentration    impaired  Morbid ideation No  Suicide Assessment    no suicidal ideation      History:    Medications:   Current Outpatient Medications   Medication Sig Dispense Refill    vilazodone HCl (VILAZODONE HCL) 10 MG TABS Take 1 tablet by mouth daily 90 tablet 1     No current facility-administered medications for this visit.        Social History:   Social History     Socioeconomic History    Marital status: Single     Spouse name: Not on file    Number of children: Not on file    Years of education: Not on file    Highest education level: Not on file   Occupational History    Not on file   Tobacco Use    Smoking status: Former Smoker     Packs/day: 0.50     Years: 10.00     Pack years: 5.00     Types: Cigarettes     Quit date: 1/27/2017     Years since quittin.4    Smokeless tobacco: Never Used   Vaping Use    Vaping Use: Never used   Substance and Sexual Activity    Alcohol use: Yes     Comment: occas    Drug use: Yes     Frequency: 3.0 times per week     Types: Marijuana Gordokeiko Pérez)    Sexual activity: Yes     Partners: Male   Other Topics Concern    Not on file   Social History Narrative    Not on file     Social Determinants of Health     Financial Resource Strain: Low Risk     Difficulty of Paying Living Expenses: Not hard at all   Food Insecurity: No Food Insecurity    Worried About Running Out of Food in the Last Year: Never true    Radha of Food in the Last Year: Never true   Transportation Needs:     Lack of Transportation (Medical): Not on file    Lack of Transportation (Non-Medical): Not on file   Physical Activity:     Days of Exercise per Week: Not on file    Minutes of Exercise per Session: Not on file   Stress:     Feeling of Stress : Not on file   Social Connections:     Frequency of Communication with Friends and Family: Not on file    Frequency of Social Gatherings with Friends and Family: Not on file    Attends Mu-ism Services: Not on file    Active Member of [address] [place] [address] or Organizations: Not on file    Attends Club or Organization Meetings: Not on file    Marital Status: Not on file   Intimate Partner Violence:     Fear of Current or Ex-Partner: Not on file    Emotionally Abused: Not on file    Physically Abused: Not on file    Sexually Abused: Not on file   Housing Stability:     Unable to Pay for Housing in the Last Year: Not on file    Number of [place] in the Last Year: Not on file    Unstable Housing in the Last Year: Not on file       TOBACCO:   reports that she quit smoking about 5 years ago. Her smoking use included cigarettes. She has a 5.00 pack-year smoking history. She has never used smokeless tobacco.  ETOH:   reports current alcohol use.     Family History:   Family History   Problem Relation Age of Onset    Cancer Mother     Arthritis Mother     Diabetes Father     Heart Disease Father     Stroke Father     High Blood Pressure Father          A:  Administered the PHQ9 which indicates a self report of moderately severe symptom distress. Pt would benefit from [facility] services to increase coping skills to provide symptom management/control/relief. PHQ Scores 6/27/2022 5/2/2022 4/15/2021 12/17/2020 6/15/2020 5/2/2020 2/18/2020   PHQ2 Score 3 3 0 0 2 2 2   PHQ9 Score 16 14 0 0 2 2 2     Interpretation of Total Score Depression Severity: 1-4 = Minimal depression, 5-9 = Mild depression, 10-14 = Moderate depression, 15-19 = Moderately severe depression, 20-27 = Severe depression      Diagnosis:    Major depressive disorder; recurrent, moderate with post partum onset and some anxious distress  R/O anxiety dx, R/O AOD dx        Diagnosis Date    Anxiety and depression     Asthma     Atypical chest pain 5/31/2019    Chronic back pain     Diabetes mellitus ([facility]     Headache     HIV exposure 1/6/2020    Irregular heart beat     Multiple sclerosis ([facility]     Osteoarthritis     Pure hypercholesterolemia, unspecified 5/3/2018           Plan:  Pt interventions:  Established rapport, Conducted functional assessment, [place]-setting to identify pt's primary goals for [facility] visit / overall health, Supportive techniques and Provided Psychoeducation re: [facility] services      Pt Behavioral Change Plan:  1. Follow up as discussed      Please note this report has been partially produced using speech recognition software  And may cause contain errors related to that system including grammar, punctuation and spelling as well as words and phrases that may seem inappropriate. If there are questions or concerns please feel free to contact me to clarify.

## 2022-07-01 ENCOUNTER — TELEMEDICINE (OUTPATIENT)
Dept: BEHAVIORAL/MENTAL HEALTH CLINIC | Age: 37
End: 2022-07-01
Payer: COMMERCIAL

## 2022-07-01 DIAGNOSIS — F33.1 MAJOR DEPRESSIVE DISORDER, RECURRENT EPISODE, MODERATE WITH ANXIOUS DISTRESS (HCC): Primary | ICD-10-CM

## 2022-07-01 PROCEDURE — 90832 PSYTX W PT 30 MINUTES: CPT | Performed by: PSYCHOLOGIST

## 2022-07-01 ASSESSMENT — PATIENT HEALTH QUESTIONNAIRE - PHQ9
2. FEELING DOWN, DEPRESSED OR HOPELESS: 1
10. IF YOU CHECKED OFF ANY PROBLEMS, HOW DIFFICULT HAVE THESE PROBLEMS MADE IT FOR YOU TO DO YOUR WORK, TAKE CARE OF THINGS AT HOME, OR GET ALONG WITH OTHER PEOPLE: 1
SUM OF ALL RESPONSES TO PHQ QUESTIONS 1-9: 11
7. TROUBLE CONCENTRATING ON THINGS, SUCH AS READING THE NEWSPAPER OR WATCHING TELEVISION: 1
SUM OF ALL RESPONSES TO PHQ9 QUESTIONS 1 & 2: 2
4. FEELING TIRED OR HAVING LITTLE ENERGY: 2
SUM OF ALL RESPONSES TO PHQ QUESTIONS 1-9: 11
5. POOR APPETITE OR OVEREATING: 1
SUM OF ALL RESPONSES TO PHQ QUESTIONS 1-9: 11
1. LITTLE INTEREST OR PLEASURE IN DOING THINGS: 1
6. FEELING BAD ABOUT YOURSELF - OR THAT YOU ARE A FAILURE OR HAVE LET YOURSELF OR YOUR FAMILY DOWN: 1
SUM OF ALL RESPONSES TO PHQ QUESTIONS 1-9: 11
8. MOVING OR SPEAKING SO SLOWLY THAT OTHER PEOPLE COULD HAVE NOTICED. OR THE OPPOSITE, BEING SO FIGETY OR RESTLESS THAT YOU HAVE BEEN MOVING AROUND A LOT MORE THAN USUAL: 1
3. TROUBLE FALLING OR STAYING ASLEEP: 3
9. THOUGHTS THAT YOU WOULD BE BETTER OFF DEAD, OR OF HURTING YOURSELF: 0

## 2022-07-01 NOTE — PATIENT INSTRUCTIONS
1. Dedicate 5 minutes 3x/day to practice the deep breathing    2. Review the list of apps and give some a try! [facility] Box, CBTi  and progressive muscle relaxation for sleep, etc.)    3. Read the below information about stress management    4. F/U as scheduled    \"The entire autonomic nervous system (and through it, our internal organs and glands) is largely driven by our breathing patterns. By changing our breathing we can influence millions of biochemical reactions in our body, producing more relaxing substances such as endorphins and fewer anxiety-producing ones like adrenaline and higher blood acidity. Mindfulness of the breath is so effective that it is common to all meditative and prayer traditions. \" Anxiety Fear & Breathing - Breathing. com    \"When overcoming high levels of anxiety, it is important to learn the techniques of correct breathing. Many people who live with high levels of anxiety are known to breathe through their chest. Shallow breathing through the chest means you are disrupting the balance of oxygen and carbon dioxide necessary to be in a relaxed state. This type of breathing will perpetuate the symptoms of anxiety. \" [employer]. com      Diaphragmatic Breathing             _____________________________________________________________________________  1. Sit in a comfortable position    2. Place one hand on your stomach and the other on your chest    3. Try to breathe so that only your stomach rises and falls    As you inhale, concentrate on your chest remaining relatively still while your stomach rises. It may be helpful for you to imagine that your pants are too big and you need to push your stomach out to hold them up. When exhaling, allow your stomach to fall in and the air to fully escape. Inhale slowly. You may choose to hold the air in for about a second. Exhale slowly. Dont push the air out, but just let the natural pressure of your body slowly move it out.     It is normal for this healthy method of breathing to feel a little awkward at first.  With practice, it will feel more natural.    4. Get your mind on your side      One other important factor in getting relaxed is your mind. Your mind and body are connected. The mind influences the body and the body influences the mind. What you do with your mind when you are trying to relax is very important. The key is to avoid thinking about stressful things. You can think about       Neutral things (e.g., counting, saying a word like calm or relax)    Pleasant things (e.g., imagining a pleasant place)     5. It is recommended that you practice 2 times per day, 10 minutes each time. ----------------------------------------------------------------------------------------------------------------------  ----------------------------------------------------------------------------------------------------------------------  ----------------------------------------------------------------------------------------------------------------------  ----------------------------------------------------------------------------------------------------------------------      CONTROLLED or MEASURED BREATHING EXERCISE: (YOU MAY WANT TO DOWNLOAD THE FREE FARHAD \"VIRTUAL HOPE BOX\" TO PRACTICE THIS EXERCISE)     You can sit or stand, but be sure to soften up a little before you begin. Make sure your hands are relaxed, and your knees are soft.  Drop your shoulders and let your jaw relax.  Now breath in slowly through your nose and count to three, keep your shoulders down and allow your stomach to expand as you breathe in.   Hold the breath for a moment.  Now release your breath slowly and smoothly as you count to six.  Repeat for a couple of minutes        It can be very helpful to use tools like relaxed breathing, muscle relaxation, and guided imagery/visualization to cope with stress, pain, anxiety and depression.  I recommend to all my patients that they try different techniques to find the ones that work best for them. Below are 2 websites that have several breathing, relaxation, and visualization exercises that you can listen to and download for free.    NetworkAffair.[email or web address] html  · Deep Breathing & Guided Relaxation Exercises (3)  · Guided Imagery/Visualization Exercises (5)  · Mindfulness & Meditation Exercises (3)  · Progressive Muscle Relaxation   · Soothing Instrumental Music (11)    [email or web address]  · Diaphragmatic Breathing   · Deep Breathing I   · Deep Breathing II   · Progressive Muscle Relaxation   · Guided Imagery: The [employer]   · Guided Imagery: [facility]   · Relaxing Phrases   · Just This Breath   · Increasing Awareness   · Sending Thoughts Away on Clouds  · Sending Thoughts Away on Leaves  · Sorting Into Boxes     -----------------------------------------------------  Below are several apps that you can download to your smartphone to help with relaxation and mood coping. [id number]  Platform: [employer] Well[employer]ca  Cost: Free  Your breathing has a profound effect on your body. Syd Funk know this fact to be true if youve ever taken deep breaths to calm yourself down when you were upset. That exercise can often make you feel more centered, and its proof that breathing is powerful. The Duuomuq0Cqbck leonardo uses guided breathing exercises to help reduce symptoms of an anxiety attack. If an attack is coming or the symptoms are unbearable, slip away into a quiet room, open your leonardo, and let the worry and stress slip away with each breath. Universal Breathing - Pranayama Free  Platform: [employer]ra  Cost: Free  Focused breathing exercises can help you regain composure during an anxiety attack. They can also help you prevent an anxiety attack before one starts. Pranayama breathing techniques are common in yoga and have powerful benefits.  If youre a beginner, you can benefit from the leonardos guided breathing instruction. Raffaele Alarcon learn how to breathe deeply, hold, and then release with better control. If it works for you, you can purchase the full course which gives you access to the entire program.  Breathing Zone   Platform: Appetise & Android  Cost: $3.99   Breathing Zone uses a clinically proven therapeutic breathing exercise that decreases your heart rate, and with daily use can help manage high blood pressure.    ----------------------------------------------  Self-Help for Anxiety Management (MARCELINA)  Platform: iPhone & Android  Cost: Free  The Self-Help for Anxiety Management (MACRELINA) leonardo from the [employer] can help you regain control of your anxiety and emotions. Tell the leonardo how youre feeling, how anxious you are, or how worried you are. Then let the leonardos self-help features walk you through some calming or relaxation practices. If you want, you can connect with a social network of other [facility] [place] users. Dont worry, the network isnt connected to larger networks like Twitter or [employer]. Stop Panic & Anxiety Help  Platform: Android  Cost: Free  If panic and anxiety attacks have a  on your life, this leonardo might help you let them go. The Stop Panic & Anxiety Help Android leonardo uses emotion and relaxation training audio tracks to help you fight your fears and find a state of calm. When youve overcome the attack, use the [place] journal to record what caused the attack and how you were able to get through it. Then use this journal to learn from your experiences and prepare for the future. I Can Be Fearless by [employer]  Platform: Appetise  Cost: Free  When you were younger, your parents might have told you that you could do anything you put your mind to. This leonardo might not help you be an astronaut or a world famous actress, but it can help you break through your anxiety, fears, and worries to a place of calm and confidence.  Open your Apple device and select what you want to be right now -- calm, motivated, and confident are among the options -- then let the audio hypnosis guide you through a session. Anti-Anxiety   Platform: Android  Cost: Free  You can tell the leonardo your problems by taking a diagnostic quiz about your level of stress and anxiety. Using your answers, the leonardo will design a custom treatment plan for you. Follow instructional self-help videos like How to Tolerate and Lessen Anxiety.  Keep a daily journal of your anxiety and worries, and track your progress as you learn to regain a sense of calm. Worry Box - Anxiety Self-Help  Platform: Android  Cost: Free  Have you ever wished you could put all your worries in a box, leave them there, and walk away? The Worry Box leonardo may let you do just that. The leonardo functions a lot like a journal: Write down your thoughts, anxieties, and worries, and let the leonardo help you think them through. It will ask questions, give specific anxiety-reducing help, and can even direct you to help you reduce your worries and anxiety. It is all password protected, so you can feel safe sharing the details of your stresses. -----------------------------------------------  Relax Melodies  Platform: iPhone and [employer]iser (formerly WiseP[employer]er)  Cost: Free  Anxiety can disrupt healthy sleep patterns in more than one way. First, people who dont get enough sleep tend to feel more anxious. Then, people who are more anxious have a difficult time sleeping. Creating a calming environment may help you fall asleep and stay asleep. Relax to one of this leonardos 50 sounds. Need the music to stop once youre asleep? Set a timer, and it will stop playing. Set an alarm when you need to be awake. Then, enjoy the benefits of a good nights sleep, free from anxiety. Relaxing Sounds of Nature - Lite  Platform: iPhone  Cost: Free  You can find rest and relaxation without having to travel.  The leonardo comes with 35 nature tracks, which include soothing classics like crickets chirping, breaking waves, and a serene lake. You can download more free tracks to [employer], and customize a favorite combination that helps you reduce your anxiety in a peaceful setting. Allow the sounds of nature to sweep you away from your worries in the comfort of your living room, office, or bedroom. Nature Sounds Relax and Sleep  Platform: Android  Cost: Free  If you find yourself longing for the sound of the ocean to help you relax, the Aram Hannifin and Sleep leonardo is for you. Open this leonardo whenever youre feeling anxious or stressed. You can select locations or sounds like the jungle, ocean, or thunder and slip away into a place of relaxation and comfort. If the sounds make you feel sleepy, even better. Use the leonardo to doze off into a relaxing slumber  Calming Music to Simplicity  Platform: Android  Cost: Free  [employer] arent the only relaxing tunes in smartphone apps. Music, especially some traditional Evisors music, can be relaxing and soothing. The Calming Music to Simplicity leonardo contains nine traditional Evisors music selections. Press play and let your worries melt away. Relax Ocean Waves Sleep by [employer]  Platform: Android  Cost: Free  Living far from a beach doesnt mean you have to be far from total relaxation. Slip on a set of headphones and drift into a distant sand-and-suds oasis. Whether youre trying to head off an anxiety attack or just need to get some good sleep after a few anxious days, the Relax Ocean Waves Sleep leonardo helps you find a place of serenity.  --------------------------------------------------------------  Shaquille Flare Codes Meditation Relaxation  Platform: Android  Cost: Free  Shaquille Meres is a traditional [facility] health system that brings together posture, breathing, and the mind to reduce anxiety. This Android leonardo connects users with a library of relaxation videos that contain instructions for relaxing and clearing the mind.  The videos are created by Dr. Erica Acosta, a psychologist with more than 20 years of experience. In addition to viewing Dr. Bhaskar Bennett videos, you can read a variety of articles related to anxiety, meditation, and stress management. Anxiety Free  Platform: [employer]   Cost: Free  Meditation requires mindfulness and a sense of presence in your thoughts. Hypnosis is one step beyond meditation. It works by sending signals to your brain and transforming it almost unknowingly. The creators of this leonardo say that its audio recordings contain subliminal signals that speak to the subconscious with powerful effect.  Hypnosis, like meditation, requires practice, and the goal is to get each user to a place where self-hypnosis is possible in order to reduce anxiety. Relax & Rest Guided Meditations  Platform: [employer] and [employer]  Cost: $0.99  While group meetings and discussions are always an option, some people find relaxation more easily on their own. This leonardo lets you relax in the space of your own home or office with three guided meditations. Breath Awareness Guided Meditation (5 minutes), Deep Rested Guided Meditation (13 minutes), and Whole Body Guided Relaxation (24 minutes) are designed specifically to help you relax and sink into a peaceful meditation moment. Equanimity - Meditation Timer & Tracker  Platform: [employer]   Cost: $4.99  Meditation is one way you can cope with the symptoms and side effects of anxiety. People who meditate can eventually train themselves to stay calm when feeling stressed or anxious. Equanimity - Meditation Timer & Tracker is simple and straightforward. Time each session and watch for visual light cues to let you know how long youve been meditating. Take notes about each session, and watch your progress as you learn to manage your stress and anxiety.   Virtual Hope Box  Platform: [employer] and [employer]  Cost: Free  The Automatic Data Box (VHB) is a smartphone application that contains simple tools to help with coping, relaxation, distraction, and positive thinking via personalized supportive audio, video, pictures, games, mindfulness exercises, positive messages and activity planning, inspirational quotes, coping statements, and other tools. Acupressure: Heal Yourself  Platform: iPhone and Android  Cost: $1.99  Acupressure is a natural healing strategy in which you target specific areas of the body in order to alleviate pain or unwanted symptoms. Acupressure can also increase blood flow, which can boost your mood and your health. This leonardo helps you find your bodys acupressure points. Apply pressure on those points when youre feeling overwhelmed, and receive the positive, calming benefit  PTSD : Self-Management of Posttraumatic Stress  Platform: iPhone and Android  Cost: Free  This leonardo can help you learn about and manage symptoms that often occur after trauma. Provides information and coping skills for common kinds of posttraumatic stress symptoms and problems, including systematic relaxation and self-help techniques. [place]: Feel better and live happier today  Platform: iPhone  Cost: Free  Daily mood and health tracking as well as journaling. Activities are based on mindfulness, relaxation and Cognitive Behavioral Therapy. Online support, networking and emails. CBT-i : Cognitive Behavioral Therapy for Insomnia  Platform: iPhone  Cost: Free  Identify sleep patterns with a sleep diary and assessment, tools to create new sleep habits, quiet your mind and prevent insomnia in the future. You can set reminders and learn about healthy sleep habits. Mindfulness   Platform: iPhone  Cost: Free  Mindfulness practice to decrease stress, manage emotional discomfort, depression, physical pain, and other problems. It offers exercises, information, and a tracking log to that you can optimize practice.    Mindsail  Platform: iPPhobiousne  Cost: Free for the first month then $5.99/month for full access  [employer] is a platform to discover original content from top thought-leaders and experts across relationship, career, anxiety, sleep, addiction and more. Their proprietary Programs and Moodboosts help users gain new perspectives and tools to change behaviors and live life in forward motion. [employer] features:  Programs   Created exclusively for [employer], programs are designed to give users unique insights and tools to feed their appetite for personal growth. Users can listen to bite-sized Coaching sessions and learn new tools such as meditations, breathing exercises, visualizations and affirmations. By offering a holistic, multi-faceted approach, users will be able to reach peak mental performance and gain a deeper understanding of their physical, emotional and spiritual well-being. Moodboost   On a daily basis, people experience a wide range of emotions. Whether they're nervous about a work meeting, having trouble sleeping, or need an extra boost of confidence before their date, [employer]'s quick daily Moodboosts can help turn a user's negative thoughts into a positive state of mind. [employer]   Users can track progress and star their favorite sessions and Moodboosts to help them stay on course of their personal growth journey. Users can create a daily mental wellness routine to help them form healthier habits and change behaviors. Ask the Experts   Users can submit personal questions to be answered by [employer] experts and see those posed by the community. They will also get Smooth Sailing tips and Words of [place] to help users navigate their day.

## 2022-07-01 NOTE — PROGRESS NOTES
Behavioral Health Consultation  Bibiana Bennett, Ph.D., [facility]  Psychologist  7/1/22  8:32 AM EDT      Time spent with Patient: 30 minutes  This is patient's second  [facility] appointment. Reason for Consult:  depression, anxiety and stress  Referring Provider: Afsaneh Vargas PA-C    Feedback given to PCP. TELEHEALTH EVALUATION -- Audio and/or Visual (During TDSWQ-51 public health emergency)    Due to COVID 19 outbreak, patient's office visit was converted to a virtual visit. Patient was contacted and agreed to proceed with a virtual visit via [employer] until audio problems led to a telephone call only appt. Patient reports that they are located at home. Provider Location is at her office in [place]. The risks and benefits of converting to a virtual visit were discussed in light of the current infectious disease epidemic. Patient (and/or legal guardian) also understood that insurance coverage and co-pays are up to their individual insurance plans. Patient (and/or legal guardian) provides verbal consent for this visit to be billed to insurance. Pursuant to the emergency declaration under the [facility] waiver authority and the [employer] Act, this Virtual  Visit was conducted, with patient's consent, to reduce the patient's risk of exposure to COVID-19 and provide continuity of care for an established patient. Services were provided through a video then audio synchronous discussion virtually to substitute for in-person clinic visit. S:      Pt reports she is doing \"good\". Pt notes that she has been staying busy since her last appt. Pt again connects for the appt from her car, stating that she is providing a ride to a friend and there were connection issues leading to switching to an audio only appt. Pt provides as an update on functioning, no major changes to her stress.            Pt denies SI/HI    O:  MSE:    Appearance    Not assessed  Appetite normal  Sleep disturbance Yes  Fatigue Yes  Loss of pleasure No  Impulsive behavior at times, anger reaction  Speech    spontaneous, normal rate and normal volume  Mood    appropriate  Affect    Not assessed  Thought Content    intact  Thought Process    coherent  Associations    logical connections  Insight    Fair  Judgment    Intact  Orientation    oriented to person, place, time, and general circumstances  Memory    recent and remote memory intact  Attention/Concentration    Impaired related to numerous distractions at pt's location for the appt  Morbid ideation No  Suicide Assessment    no suicidal ideation      History:    Medications:   Current Outpatient Medications   Medication Sig Dispense Refill    vilazodone HCl (VILAZODONE HCL) 10 MG TABS Take 1 tablet by mouth daily 90 tablet 1     No current facility-administered medications for this visit.        Social History:   Social History     Socioeconomic History    Marital status: Single     Spouse name: Not on file    Number of children: Not on file    Years of education: Not on file    Highest education level: Not on file   Occupational History    Not on file   Tobacco Use    Smoking status: Former Smoker     Packs/day: 0.50     Years: 10.00     Pack years: 5.00     Types: Cigarettes     Quit date: 2017     Years since quittin.4    Smokeless tobacco: Never Used   Vaping Use    Vaping Use: Never used   Substance and Sexual Activity    Alcohol use: Yes     Comment: occas    Drug use: Yes     Frequency: 3.0 times per week     Types: Marijuana Juanetta Raiford)    Sexual activity: Yes     Partners: Male   Other Topics Concern    Not on file   Social History Narrative    Not on file     Social Determinants of Health     Financial Resource Strain: Low Risk     Difficulty of Paying Living Expenses: Not hard at all   Food Insecurity: No Food Insecurity    Worried About Running Out of Food in the Last Year: Never true   [address] in the Last Year: Never true   Transportation Needs:     Lack of Transportation (Medical): Not on file    Lack of Transportation (Non-Medical): Not on file   Physical Activity:     Days of Exercise per Week: Not on file    Minutes of Exercise per Session: Not on file   Stress:     Feeling of Stress : Not on file   Social Connections:     Frequency of Communication with Friends and Family: Not on file    Frequency of Social Gatherings with Friends and Family: Not on file    Attends Druze Services: Not on file    Active Member of [address] [place] [address] [employer] or Organizations: Not on file    Attends Club or Organization Meetings: Not on file    Marital Status: Not on file   Intimate Partner Violence:     Fear of Current or Ex-Partner: Not on file    Emotionally Abused: Not on file    Physically Abused: Not on file    Sexually Abused: Not on file   Housing Stability:     Unable to Pay for Housing in the Last Year: Not on file    Number of [place] in the Last Year: Not on file    Unstable Housing in the Last Year: Not on file       TOBACCO:   reports that she quit smoking about 5 years ago. Her smoking use included cigarettes. She has a 5.00 pack-year smoking history. She has never used smokeless tobacco.  ETOH:   reports current alcohol use. Family History:   Family History   Problem Relation Age of Onset    Cancer Mother     Arthritis Mother     Diabetes Father     Heart Disease Father     Stroke Father     High Blood Pressure Father          A:  Administered the PHQ9 which indicates a self report of moderate symptom distress. Pt would benefit from [facility] services to increase coping skills to provide symptom management/control/relief.        PHQ Scores 7/1/2022 6/27/2022 5/2/2022 4/15/2021 12/17/2020 6/15/2020 5/2/2020   PHQ2 Score 2 3 3 0 0 2 2   PHQ9 Score 11 16 14 0 0 2 2     Interpretation of Total Score Depression Severity: 1-4 = Minimal depression, 5-9 = Mild depression, 10-14 = Moderate depression, 15-19 = Moderately severe depression, 20-27 = Severe depression      Diagnosis:    Major depressive disorder; recurrent, moderate with post partum onset and some anxious distress  R/O anxiety dx, R/O AOD dx      Diagnosis Date    Anxiety and depression     Asthma     Atypical chest pain 5/31/2019    Chronic back pain     Diabetes mellitus ([facility]     Headache     HIV exposure 1/6/2020    Irregular heart beat     Multiple sclerosis ([facility]     Osteoarthritis     Pure hypercholesterolemia, unspecified 5/3/2018           Plan:  Pt interventions:  Conducted functional assessment, [place]-setting to identify pt's primary goals for [facility] [employer] [facility] visit / overall health, Supportive techniques, Provided Psychoeducation re: anxiety, depression and stress management, Explained relaxed breathing technique in detail and practiced this with pt in visit, Emphasized importance of regular practice of relaxation strategies to target / promote symptom relief and Provided pt list of websites and several smartphone leonardo resources for further practicing guided meditations and breathing exercises      Pt Behavioral Change Plan:  1. Dedicate 5 minutes 3x/day to practice the deep breathing    2. Review the list of apps and give some a try! [facility], CBTi  and progressive muscle relaxation for sleep, etc.)    3. Read the below information about stress management    4. F/U as scheduled      Please note this report has been partially produced using speech recognition software  And may cause contain errors related to that system including grammar, punctuation and spelling as well as words and phrases that may seem inappropriate. If there are questions or concerns please feel free to contact me to clarify.

## 2022-07-12 ENCOUNTER — OFFICE VISIT (OUTPATIENT)
Dept: BEHAVIORAL/MENTAL HEALTH CLINIC | Age: 37
End: 2022-07-12
Payer: COMMERCIAL

## 2022-07-12 ENCOUNTER — OFFICE VISIT (OUTPATIENT)
Dept: FAMILY MEDICINE CLINIC | Age: 37
End: 2022-07-12
Payer: COMMERCIAL

## 2022-07-12 VITALS
HEIGHT: 68 IN | BODY MASS INDEX: 39.86 KG/M2 | HEART RATE: 73 BPM | OXYGEN SATURATION: 98 % | SYSTOLIC BLOOD PRESSURE: 118 MMHG | RESPIRATION RATE: 16 BRPM | DIASTOLIC BLOOD PRESSURE: 82 MMHG | WEIGHT: 263 LBS

## 2022-07-12 DIAGNOSIS — F41.1 GAD (GENERALIZED ANXIETY DISORDER): ICD-10-CM

## 2022-07-12 DIAGNOSIS — F43.0 ACUTE STRESS REACTION: ICD-10-CM

## 2022-07-12 DIAGNOSIS — F33.1 MAJOR DEPRESSIVE DISORDER, RECURRENT EPISODE, MODERATE WITH ANXIOUS DISTRESS (HCC): Primary | ICD-10-CM

## 2022-07-12 PROCEDURE — G8417 CALC BMI ABV UP PARAM F/U: HCPCS | Performed by: PHYSICIAN ASSISTANT

## 2022-07-12 PROCEDURE — 1036F TOBACCO NON-USER: CPT | Performed by: PSYCHOLOGIST

## 2022-07-12 PROCEDURE — 90832 PSYTX W PT 30 MINUTES: CPT | Performed by: PSYCHOLOGIST

## 2022-07-12 PROCEDURE — 99215 OFFICE O/P EST HI 40 MIN: CPT | Performed by: PHYSICIAN ASSISTANT

## 2022-07-12 PROCEDURE — 1036F TOBACCO NON-USER: CPT | Performed by: PHYSICIAN ASSISTANT

## 2022-07-12 PROCEDURE — G8427 DOCREV CUR MEDS BY ELIG CLIN: HCPCS | Performed by: PHYSICIAN ASSISTANT

## 2022-07-12 RX ORDER — PNV,CALCIUM 72/IRON/FOLIC ACID 27 MG-1 MG
TABLET ORAL
COMMUNITY
Start: 2022-07-09

## 2022-07-12 ASSESSMENT — PATIENT HEALTH QUESTIONNAIRE - PHQ9
1. LITTLE INTEREST OR PLEASURE IN DOING THINGS: 1
SUM OF ALL RESPONSES TO PHQ QUESTIONS 1-9: 13
SUM OF ALL RESPONSES TO PHQ QUESTIONS 1-9: 13
2. FEELING DOWN, DEPRESSED OR HOPELESS: 1
5. POOR APPETITE OR OVEREATING: 1
10. IF YOU CHECKED OFF ANY PROBLEMS, HOW DIFFICULT HAVE THESE PROBLEMS MADE IT FOR YOU TO DO YOUR WORK, TAKE CARE OF THINGS AT HOME, OR GET ALONG WITH OTHER PEOPLE: 2
SUM OF ALL RESPONSES TO PHQ QUESTIONS 1-9: 13
SUM OF ALL RESPONSES TO PHQ QUESTIONS 1-9: 13
9. THOUGHTS THAT YOU WOULD BE BETTER OFF DEAD, OR OF HURTING YOURSELF: 0
7. TROUBLE CONCENTRATING ON THINGS, SUCH AS READING THE NEWSPAPER OR WATCHING TELEVISION: 2
3. TROUBLE FALLING OR STAYING ASLEEP: 3
SUM OF ALL RESPONSES TO PHQ9 QUESTIONS 1 & 2: 2
6. FEELING BAD ABOUT YOURSELF - OR THAT YOU ARE A FAILURE OR HAVE LET YOURSELF OR YOUR FAMILY DOWN: 3
4. FEELING TIRED OR HAVING LITTLE ENERGY: 1
8. MOVING OR SPEAKING SO SLOWLY THAT OTHER PEOPLE COULD HAVE NOTICED. OR THE OPPOSITE, BEING SO FIGETY OR RESTLESS THAT YOU HAVE BEEN MOVING AROUND A LOT MORE THAN USUAL: 1

## 2022-07-12 ASSESSMENT — ENCOUNTER SYMPTOMS
CHEST TIGHTNESS: 0
SHORTNESS OF BREATH: 0
BACK PAIN: 0
COUGH: 0

## 2022-07-12 NOTE — Clinical Note
I am recommending pt have higher level of care for her behavioral health services- would benefit from specialty Kishore Mann and will discuss this at next appt.  Thanks Maude
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
## 2022-07-13 NOTE — PROGRESS NOTES
Sounds like a good plan. I will support that, she is supposed to follow up with me in 2 weeks. Thanks.

## 2022-07-19 ENCOUNTER — TELEPHONE (OUTPATIENT)
Dept: FAMILY MEDICINE CLINIC | Age: 37
End: 2022-07-19

## 2022-07-19 NOTE — TELEPHONE ENCOUNTER
Patients mother called stating the patient received bad news regarding her child that has been placed with family. The family has covid and Bibiana will not be able to see her child for awhile. Bibiana is having a break down and is requesting to speak with you. I did advise that you were out of office and I also advised that if it is felt needed they can visit the ER.     Call back [phone]

## 2022-07-20 NOTE — TELEPHONE ENCOUNTER
Patient states Children Services told her Dr. Karolyn Shea does not feel that Danella Books will benefit from her services. Bibiana states she is feeling better and will talk to you at her next visit.

## 2022-07-22 PROBLEM — A60.09 HERPESVIRAL INFECTION OF OTHER UROGENITAL TRACT: Status: ACTIVE | Noted: 2022-03-14

## 2022-07-22 PROBLEM — A60.00 GENITAL HERPES SIMPLEX: Status: ACTIVE | Noted: 2022-07-22

## 2022-07-22 PROBLEM — R10.32 LEFT LOWER QUADRANT PAIN: Status: ACTIVE | Noted: 2021-07-26

## 2022-07-22 PROBLEM — Z90.49 ACQUIRED ABSENCE OF OTHER SPECIFIED PARTS OF DIGESTIVE TRACT: Status: ACTIVE | Noted: 2022-03-14
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
## 2022-07-22 PROBLEM — O24.419 GESTATIONAL DIABETES MELLITUS IN PREGNANCY, UNSPECIFIED CONTROL: Status: ACTIVE | Noted: 2022-02-14

## 2022-07-22 PROBLEM — E66.9 OBESITY, UNSPECIFIED: Status: ACTIVE | Noted: 2017-12-11

## 2022-07-22 PROBLEM — M54.6 PAIN IN THORACIC SPINE: Status: ACTIVE | Noted: 2021-07-26

## 2022-07-22 PROBLEM — O99.351: Status: ACTIVE | Noted: 2022-01-24

## 2022-07-22 PROBLEM — Z20.822 CONTACT WITH AND (SUSPECTED) EXPOSURE TO COVID-19: Status: ACTIVE | Noted: 2022-03-14
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
## 2022-07-22 PROBLEM — O09.523 SUPERVISION OF ELDERLY MULTIGRAVIDA, THIRD TRIMESTER: Status: ACTIVE | Noted: 2022-02-09

## 2022-07-22 PROBLEM — S20.461A: Status: ACTIVE | Noted: 2021-09-12

## 2022-07-22 PROBLEM — R53.83 OTHER FATIGUE: Status: ACTIVE | Noted: 2022-03-31

## 2022-07-22 PROBLEM — O36.8330 MATERNAL CARE FOR ABNORMALITIES OF THE FETAL HEART RATE OR RHYTHM, THIRD TRIMESTER, NOT APPLICABLE OR UNSPECIFIED: Status: ACTIVE | Noted: 2022-02-09

## 2022-07-25 PROBLEM — G43.719 INTRACTABLE CHRONIC MIGRAINE WITHOUT AURA AND WITHOUT STATUS MIGRAINOSUS: Status: ACTIVE | Noted: 2022-07-25

## 2022-07-26 ENCOUNTER — OFFICE VISIT (OUTPATIENT)
Dept: BEHAVIORAL/MENTAL HEALTH CLINIC | Age: 37
End: 2022-07-26
Payer: COMMERCIAL

## 2022-07-26 DIAGNOSIS — F33.1 MAJOR DEPRESSIVE DISORDER, RECURRENT EPISODE, MODERATE WITH ANXIOUS DISTRESS (HCC): Primary | ICD-10-CM

## 2022-07-26 PROCEDURE — 1036F TOBACCO NON-USER: CPT | Performed by: PSYCHOLOGIST

## 2022-07-26 PROCEDURE — 90832 PSYTX W PT 30 MINUTES: CPT | Performed by: PSYCHOLOGIST

## 2022-07-26 ASSESSMENT — PATIENT HEALTH QUESTIONNAIRE - PHQ9
2. FEELING DOWN, DEPRESSED OR HOPELESS: 3
4. FEELING TIRED OR HAVING LITTLE ENERGY: 1
SUM OF ALL RESPONSES TO PHQ QUESTIONS 1-9: 14
7. TROUBLE CONCENTRATING ON THINGS, SUCH AS READING THE NEWSPAPER OR WATCHING TELEVISION: 2
SUM OF ALL RESPONSES TO PHQ QUESTIONS 1-9: 14
3. TROUBLE FALLING OR STAYING ASLEEP: 3
5. POOR APPETITE OR OVEREATING: 1
9. THOUGHTS THAT YOU WOULD BE BETTER OFF DEAD, OR OF HURTING YOURSELF: 0
10. IF YOU CHECKED OFF ANY PROBLEMS, HOW DIFFICULT HAVE THESE PROBLEMS MADE IT FOR YOU TO DO YOUR WORK, TAKE CARE OF THINGS AT HOME, OR GET ALONG WITH OTHER PEOPLE: 1
SUM OF ALL RESPONSES TO PHQ QUESTIONS 1-9: 14
SUM OF ALL RESPONSES TO PHQ9 QUESTIONS 1 & 2: 3
6. FEELING BAD ABOUT YOURSELF - OR THAT YOU ARE A FAILURE OR HAVE LET YOURSELF OR YOUR FAMILY DOWN: 3
8. MOVING OR SPEAKING SO SLOWLY THAT OTHER PEOPLE COULD HAVE NOTICED. OR THE OPPOSITE, BEING SO FIGETY OR RESTLESS THAT YOU HAVE BEEN MOVING AROUND A LOT MORE THAN USUAL: 1
1. LITTLE INTEREST OR PLEASURE IN DOING THINGS: 0
SUM OF ALL RESPONSES TO PHQ QUESTIONS 1-9: 14

## 2022-07-26 NOTE — PATIENT INSTRUCTIONS
daisy Wang and psych and lifestance, [facility]  Consider the benefit of genesight testing  Follow up as discussed                Nikunj Francy 95:    Emergency or crisis issues for mental health concerns should be handled through the 24-Hour Hotline 9 77 476982    A new crisis text resource available for [facility]: Text 4HOPE to 157110 and get a reply from a trained Libia Rumallika Lugo: Dial 988 from any phone     You can identify providers or availability of services at the [facility] Non-Emergency Navigator: [phone]    AGENCIES SERVING CHILDREN    [facility]:  Tristian Babe  [facility],   [address]  [address] is moving 2/15/18:  New location: On license of [facility], [facility], [facility]  [facility]  [phone]  [facility]:  [facility]  [phone]  [address] [place] [address]. Cleave Sell  [facility]  [phone]    AGENCIES SERVING ADULTS    AMHERST:  [facility]  [phone]  Ardella Babe  [phone]  ELYRIA:  [facility]  [phone]  [place]:  [facility]  [phone]  [address] [place] [address]. [address]  56  [address] (two locations)  [address] [place] [address] 43, [address] [place] [address]  [phone]  (Services include: Psychiatry, adult & pediatrics, therapy, evaluation, addiction services)    Souleymane Lucero (through the [facility])  [phone]    The Souleymane Lucero is a peer resource available Monday through Friday, from 1pm-8pm. The Souleymane Lucero provides a safe, confidential place to talk and be heard. To speak to a , call [phone] and ask to be connected to the [facility].     Javier Lorenz Intensive Outpatient Services (IOP)  [phone] [address] [place],   16[address] [facility]  [address]  [phone],  [phone]  (Services include: Psychiatry, therapy, evaluation, addiction services)    Replaced by [facility]  [address] [facility], [address]  [phone]  (Services include: Psychiatry, adult & pediatrics, therapy, evaluation, addiction services)    [facility] (2 locations)     [facility]   [facility]  [phone]  (Services include: Psychiatry, adult & pediatrics, therapy, evaluation, addiction services)     Psych and Psych  [address], [facility] [address]  [phone]  (Services include: Psychiatry, adult & pediatrics, therapy, evaluation, addiction services)    [facility] 7, 295 [facility], [address] [place] [address] Street  [phone]  (Services include: PEDIATRIC Psychiatry, family, in home & school based services)    50 [facility], 562 [facility], [facility] [facility] [address]  [phone]  (Services include: [address] Psychiatry, family, in home & school based services)    Intensive Outpatient Services (IOP)    [facility]  To contact the [address] [place], [place] [address]vd program or to make a referral, please call the IOP office at [phone]. We are located at:  Mood Disorders Intensive Outpatient Program  [address]  [address] [place] [address],[address]  [place] [address]. [facility]  For more information about [facility], call [phone]. [address] .[phone]   [facility] Daytime .[phone]   [facility] Afternoon .[phone]   [facility] .[phone]      Psych [place], [facility], [address]  Phone: [phone]    Adolescent IOP  Six-Week Program  18 Sessions  Tuesday and Thursday  3:00 to 6:00 p.m.   Saturday  10:00 a.m. to 1:00 p.m.  Parents join on Saturday    Adult IOP  Six-Week Program  18 Sessions   Monday, Wednesday and Friday  5:30 to 8:30 p.m. For more information about the IOP services in [facility],   please call the Intake Department at [phone]. [facility]:    [facility]   500 [facility] [address]  [facility], 12[facility], 1266 [facility]  [phone]    [address] [facility]:    [facility] (2 locations)    701 [facility] 112, [address] [facility], [address] [place] [address]  [phone]      Private Providers    [facility] (numerous locations)    [facility]GGY ([facility])  [facility] #5       Numerous locations  [facility] 16[address]       [address] [place] [address] (2 locations)  [facility]    [address] [facility] [address]., [address] [place] [address] or  [phone]    Dr Caesar Schneider, [facility] 42  [address] [place] [address] [facility]  [phone]    [facility] Psychiatry  Numerous locations  and virtual appointments  [phone]  www. [employer]    Dr. Nick Cortez  [address] [facility] [address]  [address]  [phone]    Dr. Greg Yancey and Dr. Dennis Jensen     [address] [place] [address] [address]     [phone]         Substance Abuse Resources    Alcoholics Anonymous   Narcotic Anonymous  www.a[place]or[place]county. org   www.[place]. org  [phone]     [place] (numerous locations)  www.smartrecovery. org   Anthony Singh 91   [phone]    8 [facility], [address] [facility]         [phone]   [address]  [address]   612 [facility], [address] [place] [address]  [phone]    Road to [place] [address]  [phone]    Emergency or crisis issues for substance abuse or addiction should be handled through the 24-Hour Hotline (396) 524-3151 or  1025 97 06 31    [facility]     Navigator line for Alcohol and Other Drug Services Non-Emergency Line: [phone]    24/7 Alcohol and Other Drug Helpline for [facility]: [facility] centers in [place] offer a suboxone program,  [phone] offers a suboxone program- [facility]  [address], [address] 70  [phone] Phone    [facility] on  Alcoholism and Drug Abuse  [phone]    [address] on [address] Jacques Quiles of [facility]     [address], [address], [address] [facility]   [phone]    [facility] on Aging  http://ooa. org  Address: [address] [place] [address], [address] [place] [address]  Phone: [phone]    [facility], [address] [place] [address]  [phone] Toll-Free   Administrative/Helpline  ([phone] [phone] Voice   Administrative/Helpline  ([phone] Voice   24-Hour Helpline  http://www. FloTimeer. org  Service description: Provides weekly domestic violence support groups to educate, support and assist women experiencing domestic violence and other similar situations. Offers a support group specific to LGBTQI. Intake procedure: Call the main office for meeting times and locations. Fees:Free. Eligibility :Serves victims of domestic violence in [facility]. Hours: Varies. Call for details.  RESPITE    The [address]  [address] [facility] [address] [place], [address] [address]  Phone: [phone]    https://Waitsup/    3411 [facility] Crisis Hotline:  7-199-204-570-194-1014    National Suicide Prevention Lifeline:  (330) 354-WBPN (4394)  www.suicidepreventionlifeline. [address] Hotline:  (919) Phyllistine Zacharyk

## 2022-07-26 NOTE — PROGRESS NOTES
Behavioral Health Consultation  Bibiana Cool, Ph.D., [facility]  Psychologist  7/26/22  9:43 AM EDT      Time spent with Patient: 30 minutes  This is patient's fourth  [facility] appointment. Reason for Consult:  depression, anxiety, and stress  Referring Provider: Nilsa Ziegler PA-C    Feedback given to PCP. Pt arrived late to her appt but was still seen immediately        S:  Pt notes she is doing \"a little better\" and notes being \"fed up\". Explored need for higher level of care. Pt notes that she feels most people in her life are not being honest with her, detailed frustrations. Pt notes interest in legal representation. Explored her previously noted resistance to medication recommendations, possibility of genesight testing. Pt denies SI/HI    O:  MSE:    Appearance    alert, cooperative  Appetite normal  Sleep disturbance Yes  Fatigue No  Loss of pleasure No  Impulsive behavior Yes  Speech    spontaneous, normal rate, and normal volume  Mood    Anxious  Depressed  Affect    full  Thought Content    intact  Thought Process    coherent and tangential  Associations    logical connections, tangential connections  Insight    Fair  Judgment    Intact  Orientation    oriented to person, place, time, and general circumstances  Memory    recent and remote memory intact  Attention/Concentration    impaired  Morbid ideation No  Suicide Assessment    no suicidal ideation      History:    Medications:   Current Outpatient Medications   Medication Sig Dispense Refill    Prenatal Vit-Fe Fumarate-FA (WESTAB PLUS) 27-1 MG TABS        No current facility-administered medications for this visit.        Social History:   Social History     Socioeconomic History    Marital status: Single     Spouse name: Not on file    Number of children: Not on file    Years of education: Not on file    Highest education level: Not on file   Occupational History    Not on file   Tobacco Use    Smoking status: Former     Packs/day: 0.50     Years: depression, 20-27 = Severe depression      Diagnosis:    Major depressive disorder; recurrent, moderate with post partum onset and some anxious distress  R/O anxiety dx, R/O AOD dx      Diagnosis Date    Anxiety and depression     Asthma     Atypical chest pain 5/31/2019    Chronic back pain     Diabetes mellitus ([facility]     Headache     HIV exposure 1/6/2020    Irregular heart beat     Multiple sclerosis ([facility]     Osteoarthritis     Pure hypercholesterolemia, unspecified 5/3/2018           Plan:  Pt interventions:  Conducted functional assessment, [place]-setting to identify pt's primary goals for [facility] visit / overall health, Supportive techniques, Provided Psychoeducation re: genesight testing, Collaborative treatment planning,Clarified role of [facility] in primary care,Recommended that pt establish with a mental health clinician with whom they can meet regularly for psychotherapy services, and Reviewed options for identifying appropriate providers. Pt Behavioral Change Plan:    Kimmie Estrada far [employer] and psych and lifestance, [facility]  Consider the benefit of genesight testing  Follow up as discussed    Please note this report has been partially produced using speech recognition software  And may cause contain errors related to that system including grammar, punctuation and spelling as well as words and phrases that may seem inappropriate. If there are questions or concerns please feel free to contact me to clarify.

## 2022-08-08 ENCOUNTER — OFFICE VISIT (OUTPATIENT)
Dept: FAMILY MEDICINE CLINIC | Age: 37
End: 2022-08-08
Payer: COMMERCIAL

## 2022-08-08 ENCOUNTER — CARE COORDINATION (OUTPATIENT)
Dept: CARE COORDINATION | Age: 37
End: 2022-08-08

## 2022-08-08 VITALS
DIASTOLIC BLOOD PRESSURE: 80 MMHG | HEART RATE: 86 BPM | RESPIRATION RATE: 16 BRPM | SYSTOLIC BLOOD PRESSURE: 120 MMHG | HEIGHT: 68 IN | WEIGHT: 261 LBS | BODY MASS INDEX: 39.56 KG/M2 | OXYGEN SATURATION: 96 %

## 2022-08-08 DIAGNOSIS — R11.0 NAUSEA: ICD-10-CM

## 2022-08-08 DIAGNOSIS — A64 STI (SEXUALLY TRANSMITTED INFECTION): ICD-10-CM

## 2022-08-08 DIAGNOSIS — F43.0 ACUTE STRESS REACTION: ICD-10-CM

## 2022-08-08 DIAGNOSIS — R10.2 PELVIC PAIN: ICD-10-CM

## 2022-08-08 DIAGNOSIS — F41.1 GAD (GENERALIZED ANXIETY DISORDER): ICD-10-CM

## 2022-08-08 PROBLEM — G43.719 INTRACTABLE CHRONIC MIGRAINE WITHOUT AURA AND WITHOUT STATUS MIGRAINOSUS: Status: RESOLVED | Noted: 2022-07-25 | Resolved: 2022-08-08
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
## 2022-08-08 PROBLEM — O36.8330 MATERNAL CARE FOR ABNORMALITIES OF THE FETAL HEART RATE OR RHYTHM, THIRD TRIMESTER, NOT APPLICABLE OR UNSPECIFIED: Status: RESOLVED | Noted: 2022-02-09 | Resolved: 2022-08-08

## 2022-08-08 PROBLEM — O09.523 SUPERVISION OF ELDERLY MULTIGRAVIDA, THIRD TRIMESTER: Status: RESOLVED | Noted: 2022-02-09 | Resolved: 2022-08-08

## 2022-08-08 PROBLEM — R73.03 PREDIABETES: Status: RESOLVED | Noted: 2019-02-19 | Resolved: 2022-08-08

## 2022-08-08 PROBLEM — Z90.49 ACQUIRED ABSENCE OF OTHER SPECIFIED PARTS OF DIGESTIVE TRACT: Status: RESOLVED | Noted: 2022-03-14 | Resolved: 2022-08-08

## 2022-08-08 PROBLEM — O99.351: Status: RESOLVED | Noted: 2022-01-24 | Resolved: 2022-08-08

## 2022-08-08 PROBLEM — O24.419 GESTATIONAL DIABETES MELLITUS IN PREGNANCY, UNSPECIFIED CONTROL: Status: RESOLVED | Noted: 2022-02-14 | Resolved: 2022-08-08

## 2022-08-08 PROBLEM — S20.461A: Status: RESOLVED | Noted: 2021-09-12 | Resolved: 2022-08-08
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
## 2022-08-08 PROBLEM — E66.9 OBESITY, UNSPECIFIED: Status: RESOLVED | Noted: 2017-12-11 | Resolved: 2022-08-08

## 2022-08-08 PROBLEM — R10.32 LEFT LOWER QUADRANT PAIN: Status: RESOLVED | Noted: 2021-07-26 | Resolved: 2022-08-08

## 2022-08-08 PROBLEM — M54.6 PAIN IN THORACIC SPINE: Status: RESOLVED | Noted: 2021-07-26 | Resolved: 2022-08-08

## 2022-08-08 PROBLEM — R51.9 HEADACHE, UNSPECIFIED: Status: RESOLVED | Noted: 2022-03-29 | Resolved: 2022-08-08

## 2022-08-08 PROBLEM — A60.09 HERPESVIRAL INFECTION OF OTHER UROGENITAL TRACT: Status: RESOLVED | Noted: 2022-03-14 | Resolved: 2022-08-08

## 2022-08-08 PROBLEM — Z20.822 CONTACT WITH AND (SUSPECTED) EXPOSURE TO COVID-19: Status: RESOLVED | Noted: 2022-03-14 | Resolved: 2022-08-08

## 2022-08-08 LAB
CONTROL: NORMAL
HCG(URINE) PREGNANCY TEST: NEGATIVE
PREGNANCY TEST URINE, POC: NEGATIVE

## 2022-08-08 PROCEDURE — 81025 URINE PREGNANCY TEST: CPT | Performed by: PHYSICIAN ASSISTANT

## 2022-08-08 PROCEDURE — 99214 OFFICE O/P EST MOD 30 MIN: CPT | Performed by: PHYSICIAN ASSISTANT

## 2022-08-08 PROCEDURE — G8417 CALC BMI ABV UP PARAM F/U: HCPCS | Performed by: PHYSICIAN ASSISTANT

## 2022-08-08 PROCEDURE — G8427 DOCREV CUR MEDS BY ELIG CLIN: HCPCS | Performed by: PHYSICIAN ASSISTANT

## 2022-08-08 PROCEDURE — 1036F TOBACCO NON-USER: CPT | Performed by: PHYSICIAN ASSISTANT

## 2022-08-08 RX ORDER — VILAZODONE HYDROCHLORIDE 10 MG/1
TABLET ORAL
COMMUNITY
Start: 2022-07-28 | End: 2022-09-20

## 2022-08-08 ASSESSMENT — ENCOUNTER SYMPTOMS
NAUSEA: 1
SHORTNESS OF BREATH: 0
CHEST TIGHTNESS: 0
BACK PAIN: 0
COUGH: 0

## 2022-08-08 NOTE — PROGRESS NOTES
Subjective  Bibiana Contreras, 39 y.o. female presents today with:  Chief Complaint   Patient presents with    Follow-up     Postpartum depression      Nausea     Has been having nausea when eating with vomiting onset x3-4 days      HPI  Bibiana is in the office today for routine follow up. Last OV with me: 7/12/22. Depression. Restarted 10 mg viibryd. She has been having nausea, not eating with medication. Reports not having much of an appetite. Was going to Dr. Vasquez but has since been referred to [facility] due to complexity of her symptoms/case. Has upcoming visit this week. Denies SI/HI. Continue without her children--removed from her home due to concern about some post-partum symptoms/reactions. Works with . At this time, no date for her to have children back. Voices frustration with lack of communication. Does not feel like she is being kept in contact with the care/plans of her children. During this time, Nidhi Conrad has been trying to care for and work on her mental and physical health. Recently was treated for trichomonas and gonorrhea. She is having some lingering pelvic pain. Asking to be rechecked today. She is able to leave a urine sample. Review of Systems   Constitutional:  Positive for appetite change. Negative for activity change, chills, diaphoresis, fatigue, fever and unexpected weight change. Respiratory:  Negative for cough, chest tightness and shortness of breath. Cardiovascular:  Negative for chest pain, palpitations and leg swelling. Gastrointestinal:  Positive for nausea. Genitourinary:  Positive for pelvic pain. Negative for dysuria, menstrual problem and vaginal bleeding. Musculoskeletal:  Positive for neck pain and neck stiffness. Negative for back pain and myalgias. Neurological:  Positive for numbness (intermittent, sciatic). Negative for dizziness, weakness, light-headedness and headaches.    Psychiatric/Behavioral: Positive for dysphoric mood. Negative for agitation, confusion, decreased concentration, self-injury, sleep disturbance and suicidal ideas. The patient is not nervous/anxious and is not hyperactive.          Baseline; denies worsening symptoms     Past Medical History:   Diagnosis Date    Anxiety and depression     Asthma     Atypical chest pain 2019    Chronic back pain     Diabetes mellitus ([facility]     Headache     HIV exposure 2020    Irregular heart beat     Multiple sclerosis (HCC)     Osteoarthritis     Pure hypercholesterolemia, unspecified 5/3/2018     Past Surgical History:   Procedure Laterality Date    CHOLECYSTECTOMY      UPPER GASTROINTESTINAL ENDOSCOPY N/A 2019    EGD ESOPHAGOGASTRODUODENOSCOPY performed by Tiana Ray MD at [address] History    Marital status: Single     Spouse name: Not on file    Number of children: Not on file    Years of education: Not on file    Highest education level: Not on file   Occupational History    Not on file   Tobacco Use    Smoking status: Former     Packs/day: 0.50     Years: 10.00     Pack years: 5.00     Types: Cigarettes     Quit date: 2017     Years since quittin.5    Smokeless tobacco: Never   Vaping Use    Vaping Use: Never used   Substance and Sexual Activity    Alcohol use: Yes     Comment: occas    Drug use: Yes     Frequency: 3.0 times per week     Types: Marijuana Charmayne Stai)    Sexual activity: Yes     Partners: Male   Other Topics Concern    Not on file   Social History Narrative    Not on file     Social Determinants of Health     Financial Resource Strain: Low Risk     Difficulty of Paying Living Expenses: Not hard at all   Food Insecurity: No Food Insecurity    Worried About Running Out of Food in the Last Year: Never true    Ran Out of Food in the Last Year: Never true   Transportation Needs: Not on file   Physical Activity: Not on file   Stress: Not on file   Social Connections: Not on file   Intimate Partner Violence: Not on file   Housing Stability: Not on file     Family History   Problem Relation Age of Onset    Cancer Mother     Arthritis Mother     Diabetes Father     Heart Disease Father     Stroke Father     High Blood Pressure Father      Allergies   Allergen Reactions    Amphetamine     Penicillins Hives and Other (See Comments)    Phentermine Other (See Comments)     Anxiety with hospitalization    Topiramate Other (See Comments)     DIZZY     Current Outpatient Medications   Medication Sig Dispense Refill    vilazodone HCl (VIIBRYD) 10 MG TABS TAKE 1 TABLET BY MOUTH DAILY      Prenatal Vit-Fe Fumarate-FA (WESTAB PLUS) 27-1 MG TABS        No current facility-administered medications for this visit. PMH, Surgical Hx, Family Hx, and Social Hx reviewed and updated. Health Maintenance reviewed. Objective  Vitals:    08/08/22 0926   BP: 120/80   Site: Left Upper Arm   Position: Sitting   Cuff Size: Large Adult   Pulse: 86   Resp: 16   SpO2: 96%   Weight: 261 lb (118.4 kg)   Height: 5' 8\" (1.727 m)     BP Readings from Last 3 Encounters:   08/08/22 120/80   07/12/22 118/82   05/02/22 118/80     Wt Readings from Last 3 Encounters:   08/08/22 261 lb (118.4 kg)   07/12/22 263 lb (119.3 kg)   05/02/22 270 lb (122.5 kg)     Physical Exam  Vitals reviewed. Constitutional:       Appearance: She is obese. HENT:      Head: Normocephalic and atraumatic. Right Ear: External ear normal.      Left Ear: External ear normal.      Nose: Nose normal.      Mouth/Throat:      Mouth: Mucous membranes are moist.   Eyes:      Conjunctiva/sclera: Conjunctivae normal.   Cardiovascular:      Rate and Rhythm: Normal rate and regular rhythm. Pulmonary:      Effort: Pulmonary effort is normal.      Breath sounds: Normal breath sounds. Musculoskeletal:         General: Normal range of motion. Skin:     General: Skin is warm and dry. Neurological:      Mental Status: She is alert.    Psychiatric: to evaluate treatment results and for coordination of care. I have reviewed the patient's medical history in detail and updated the computerized patient record.     Leticia Villa PA-C

## 2022-08-08 NOTE — Clinical Note
Jesus Dhaliwal if there is anything we can do to help Libia Seaman. Children have been under the care of . Patient is feeling like she is not being included in the communication and care of her children. Was wondering if there was a way we can get better communication to help ease her mind/worry.   THank you, [place] ([place])

## 2022-08-11 LAB
SPECIMEN SOURCE: NORMAL
T. VAGINALIS AMPLIFIED: NEGATIVE

## 2022-08-16 LAB
C. TRACHOMATIS DNA ,URINE: NEGATIVE
N. GONORRHOEAE DNA, URINE: NEGATIVE

## 2022-08-22 ENCOUNTER — HOSPITAL ENCOUNTER (EMERGENCY)
Age: 37
Discharge: HOME | End: 2022-08-23
Attending: EMERGENCY MEDICINE
Payer: COMMERCIAL

## 2022-08-22 VITALS
RESPIRATION RATE: 18 BRPM | WEIGHT: 260 LBS | BODY MASS INDEX: 39.4 KG/M2 | TEMPERATURE: 97.3 F | DIASTOLIC BLOOD PRESSURE: 84 MMHG | SYSTOLIC BLOOD PRESSURE: 123 MMHG | HEIGHT: 68 IN | OXYGEN SATURATION: 98 % | HEART RATE: 91 BPM

## 2022-08-22 DIAGNOSIS — M79.609 PAIN IN EXTREMITY, UNSPECIFIED EXTREMITY: Primary | ICD-10-CM

## 2022-08-22 PROCEDURE — 36415 COLL VENOUS BLD VENIPUNCTURE: CPT

## 2022-08-22 PROCEDURE — 96372 THER/PROPH/DIAG INJ SC/IM: CPT

## 2022-08-22 PROCEDURE — 85379 FIBRIN DEGRADATION QUANT: CPT

## 2022-08-22 PROCEDURE — 99284 EMERGENCY DEPT VISIT MOD MDM: CPT

## 2022-08-22 ASSESSMENT — PAIN DESCRIPTION - ORIENTATION: ORIENTATION: RIGHT

## 2022-08-22 ASSESSMENT — PAIN DESCRIPTION - DESCRIPTORS: DESCRIPTORS: SHARP

## 2022-08-22 ASSESSMENT — PAIN DESCRIPTION - PAIN TYPE: TYPE: ACUTE PAIN

## 2022-08-22 ASSESSMENT — PAIN DESCRIPTION - FREQUENCY: FREQUENCY: INTERMITTENT

## 2022-08-22 ASSESSMENT — PAIN DESCRIPTION - LOCATION: LOCATION: LEG

## 2022-08-22 ASSESSMENT — PAIN - FUNCTIONAL ASSESSMENT: PAIN_FUNCTIONAL_ASSESSMENT: 0-10

## 2022-08-22 ASSESSMENT — PAIN SCALES - GENERAL: PAINLEVEL_OUTOF10: 10

## 2022-08-23 LAB — D DIMER: 0.45 MG/L FEU (ref 0–0.5)

## 2022-08-23 PROCEDURE — 6360000002 HC RX W HCPCS: Performed by: EMERGENCY MEDICINE

## 2022-08-23 PROCEDURE — 6370000000 HC RX 637 (ALT 250 FOR IP): Performed by: EMERGENCY MEDICINE

## 2022-08-23 RX ORDER — OXYCODONE HYDROCHLORIDE AND ACETAMINOPHEN 5; 325 MG/1; MG/1
1 TABLET ORAL ONCE
Status: COMPLETED | OUTPATIENT
Start: 2022-08-23 | End: 2022-08-23

## 2022-08-23 RX ORDER — ASPIRIN 81 MG/1
324 TABLET, CHEWABLE ORAL ONCE
Status: DISCONTINUED | OUTPATIENT
Start: 2022-08-23 | End: 2022-08-23

## 2022-08-23 RX ORDER — LIDOCAINE 50 MG/G
1 PATCH TOPICAL DAILY
Qty: 30 PATCH | Refills: 0 | Status: SHIPPED | OUTPATIENT
Start: 2022-08-23 | End: 2022-09-20

## 2022-08-23 RX ORDER — KETOROLAC TROMETHAMINE 30 MG/ML
30 INJECTION, SOLUTION INTRAMUSCULAR; INTRAVENOUS ONCE
Status: COMPLETED | OUTPATIENT
Start: 2022-08-23 | End: 2022-08-23

## 2022-08-23 RX ORDER — NITROGLYCERIN 0.4 MG/1
0.4 TABLET SUBLINGUAL EVERY 5 MIN PRN
Status: DISCONTINUED | OUTPATIENT
Start: 2022-08-23 | End: 2022-08-23

## 2022-08-23 RX ADMIN — KETOROLAC TROMETHAMINE 30 MG: 30 INJECTION, SOLUTION INTRAMUSCULAR; INTRAVENOUS at 00:55

## 2022-08-23 RX ADMIN — OXYCODONE AND ACETAMINOPHEN 1 TABLET: 5; 325 TABLET ORAL at 00:55

## 2022-08-23 ASSESSMENT — PAIN SCALES - GENERAL: PAINLEVEL_OUTOF10: 8

## 2022-08-23 ASSESSMENT — ENCOUNTER SYMPTOMS: VOMITING: 0

## 2022-08-23 NOTE — ED PROVIDER NOTES
[address] [facility] ED  EMERGENCY DEPARTMENT ENCOUNTER      Pt Name: Antony Guardado  MRN: [id number]  [place] 1985  Date of evaluation: 8/22/2022  Provider: Louise Mccurdy MD    CHIEF COMPLAINT       Chief Complaint   Patient presents with    Other     Pt c/o lump in right leg for 4 days         HISTORY OF PRESENT ILLNESS   (Location/Symptom, Timing/Onset, Context/Setting, Quality, Duration, Modifying Factors, Severity)  Note limiting factors. Antony Guardado is a 39 y.o. female who presents to the emergency department via private vehicle for evaluation of concern for blood clot. History of PCOS, multiple sclerosis. She is concerned because her mother and sister have had multiple blood clots. She noticed that over the past couple of days she has had a \" lump\" that she says is painful especially when palpating it. She denies any recent surgeries or history of DVT or PE. Denies trauma to the area. No associated fever, chest pain or difficulty breathing, numbness or weakness. HPI    Nursing Notes were reviewed. REVIEW OF SYSTEMS    (2-9 systems for level 4, 10 or more for level 5)     Review of Systems   Constitutional:  Negative for fever. Right leg mass pain   Gastrointestinal:  Negative for vomiting. Neurological:  Negative for weakness and numbness. All other systems reviewed and are negative. Except as noted above the remainder of the review of systems was reviewed and negative.        PAST MEDICAL HISTORY     Past Medical History:   Diagnosis Date    Anxiety and depression     Asthma     Atypical chest pain 5/31/2019    Chronic back pain     Diabetes mellitus ([address]     Headache     HIV exposure 1/6/2020    Irregular heart beat     Multiple sclerosis ([address]     Osteoarthritis     Pure hypercholesterolemia, unspecified 5/3/2018         SURGICAL HISTORY       Past Surgical History:   Procedure Laterality Date    CHOLECYSTECTOMY      UPPER GASTROINTESTINAL ENDOSCOPY N/A 8/13/2019    EGD ESOPHAGOGASTRODUODENOSCOPY performed by Jose Aguiar MD at [address]       Previous Medications    PRENATAL VIT-FE FUMARATE-FA (WESTAB PLUS) 27-1 MG TABS        VILAZODONE HCL (VIIBRYD) 10 MG TABS    TAKE 1 TABLET BY MOUTH DAILY       ALLERGIES     Amphetamine, Penicillins, Phentermine, and Topiramate    FAMILY HISTORY       Family History   Problem Relation Age of Onset    Cancer Mother     Arthritis Mother     Diabetes Father     Heart Disease Father     Stroke Father     High Blood Pressure Father           SOCIAL HISTORY       Social History     Socioeconomic History    Marital status: Single   Tobacco Use    Smoking status: Former     Packs/day: 0.50     Years: 10.00     Pack years: 5.00     Types: Cigarettes     Quit date: 2017     Years since quittin.5    Smokeless tobacco: Never   Vaping Use    Vaping Use: Never used   Substance and Sexual Activity    Alcohol use: Yes     Comment: occas    Drug use: Yes     Frequency: 3.0 times per week     Types: Marijuana (Weed)    Sexual activity: Yes     Partners: Male     Social Determinants of Health     Financial Resource Strain: Low Risk     Difficulty of Paying Living Expenses: Not hard at all   Food Insecurity: No Food Insecurity    Worried About Running Out of Food in the Last Year: Never true    Ran Out of Food in the Last Year: Never true       SCREENINGS         Clare Coma Scale  Eye Opening: Spontaneous  Best Verbal Response: Oriented  Best Motor Response: Obeys commands  Obi Coma Scale Score: 15                     CIWA Assessment  BP: 123/84  Heart Rate: 91                 PHYSICAL EXAM    (up to 7 for level 4, 8 or more for level 5)     ED Triage Vitals [22 2326]   BP Temp Temp Source Heart Rate Resp SpO2 Height Weight   123/84 97.3 °F (36.3 °C) Oral 91 18 98 % 5' 8\" (1.727 m) 260 lb (117.9 kg)       Physical Exam  Constitutional:       General: She is not in acute distress.      Appearance: She is obese. She is not toxic-appearing or diaphoretic. HENT:      Head: Normocephalic and atraumatic. Nose: Nose normal.      Mouth/Throat:      Mouth: Mucous membranes are moist.   Eyes:      General: No scleral icterus. Musculoskeletal:         General: Tenderness present. Right lower leg: No edema. Left lower leg: No edema. Comments: Right medial thigh has an area of less than 5 mm superficial nodule without overlying erythema/warmth/induration or fluctuance. No crepitus. This is minimally tender. She has no joint effusion. DP intact. Compartments soft. Neurovascular tact right lower extremity. No palpable calf tenderness or overt swelling   Skin:     Capillary Refill: Capillary refill takes less than 2 seconds. Findings: No rash. Neurological:      General: No focal deficit present. Sensory: Sensory deficit present. Motor: No weakness. Psychiatric:         Mood and Affect: Mood normal.       DIAGNOSTIC RESULTS         Interpretation per the Radiologist below, if available at the time of this note:    No orders to display         ED BEDSIDE ULTRASOUND:   Performed by ED Physician - none    LABS:  Labs Reviewed   D-DIMER, QUANTITATIVE       All other labs were within normal range or not returned as of this dictation. EMERGENCY DEPARTMENT COURSE and DIFFERENTIAL DIAGNOSIS/MDM:   Vitals:    Vitals:    08/22/22 2326   BP: 123/84   Pulse: 91   Resp: 18   Temp: 97.3 °F (36.3 °C)   TempSrc: Oral   SpO2: 98%   Weight: 260 lb (117.9 kg)   Height: 5' 8\" (1.727 m)         DVT sufficiently ruled out  MDM  Patient presents emergency department with pain to her right leg. Clinical exam not consistent with cellulitis/septic joint, acute arterial occlusion. Patient will be treated symptomatically, understands return precautions, need for ultrasound for persistent symptoms.       Given appropriate analgesia here      CONSULTS:  None    PROCEDURES:  Unless otherwise noted below, none     Procedures        FINAL IMPRESSION      1. Pain in extremity, unspecified extremity          DISPOSITION/PLAN   DISPOSITION Decision To Discharge 08/23/2022 12:36:50 AM      PATIENT REFERRED TO:  Malcom Negron PA-C  [address]  [phone]          DISCHARGE MEDICATIONS:  New Prescriptions    LIDOCAINE (LIDODERM) 5 %    Place 1 patch onto the skin daily 12 hours on, 12 hours off. Controlled Substances Monitoring:     RX Monitoring 6/25/2019   Attestation -   Periodic Controlled Substance Monitoring Possible medication side effects, risk of tolerance/dependence & alternative treatments discussed. ;No signs of potential drug abuse or diversion identified.;Obtaining appropriate analgesic effect of treatment.        (Please note that portions of this note were completed with a voice recognition program.  Efforts were made to edit the dictations but occasionally words are mis-transcribed.)    Alanis Townsend MD (electronically signed)  Attending Emergency Physician            Michael Serrato MD  08/23/22 Skyler Serrato MD  08/23/22 0168

## 2022-08-23 NOTE — DISCHARGE INSTRUCTIONS
Return for worsening symptoms or concerns. He may need further testing for persistent symptoms. Thank you.

## 2022-08-23 NOTE — ED TRIAGE NOTES
Pt c/o small bump in right leg for last 4 days. Pt is concerned about possible blood clot. Pt is a/o x 4 calm, skin p/w/d resp even and non-labored. Pt has small red bump on right thigh.

## 2022-09-20 ENCOUNTER — OFFICE VISIT (OUTPATIENT)
Dept: FAMILY MEDICINE CLINIC | Age: 37
End: 2022-09-20
Payer: COMMERCIAL

## 2022-09-20 VITALS
SYSTOLIC BLOOD PRESSURE: 110 MMHG | RESPIRATION RATE: 16 BRPM | BODY MASS INDEX: 38.49 KG/M2 | DIASTOLIC BLOOD PRESSURE: 80 MMHG | WEIGHT: 254 LBS | OXYGEN SATURATION: 97 % | HEART RATE: 73 BPM | HEIGHT: 68 IN

## 2022-09-20 DIAGNOSIS — S46.912A LEFT SHOULDER STRAIN, INITIAL ENCOUNTER: ICD-10-CM

## 2022-09-20 DIAGNOSIS — G35 MULTIPLE SCLEROSIS (HCC): Primary | ICD-10-CM

## 2022-09-20 DIAGNOSIS — M79.9 SOFT TISSUE LESION: ICD-10-CM

## 2022-09-20 DIAGNOSIS — F43.0 ACUTE STRESS REACTION: ICD-10-CM

## 2022-09-20 DIAGNOSIS — F41.1 GAD (GENERALIZED ANXIETY DISORDER): ICD-10-CM

## 2022-09-20 PROBLEM — M70.52 OTHER BURSITIS OF KNEE, LEFT KNEE: Status: RESOLVED | Noted: 2019-03-09 | Resolved: 2022-09-20

## 2022-09-20 PROBLEM — M62.838 MUSCLE SPASMS OF NECK: Status: RESOLVED | Noted: 2018-08-30 | Resolved: 2022-09-20

## 2022-09-20 PROCEDURE — G8417 CALC BMI ABV UP PARAM F/U: HCPCS | Performed by: PHYSICIAN ASSISTANT

## 2022-09-20 PROCEDURE — G8427 DOCREV CUR MEDS BY ELIG CLIN: HCPCS | Performed by: PHYSICIAN ASSISTANT

## 2022-09-20 PROCEDURE — 99214 OFFICE O/P EST MOD 30 MIN: CPT | Performed by: PHYSICIAN ASSISTANT

## 2022-09-20 PROCEDURE — 1036F TOBACCO NON-USER: CPT | Performed by: PHYSICIAN ASSISTANT

## 2022-09-20 RX ORDER — DIAZEPAM 5 MG/1
5 TABLET ORAL EVERY 12 HOURS PRN
Qty: 30 TABLET | Refills: 0 | Status: ON HOLD | OUTPATIENT
Start: 2022-09-20 | End: 2022-09-26 | Stop reason: HOSPADM

## 2022-09-20 ASSESSMENT — ENCOUNTER SYMPTOMS
COUGH: 0
BACK PAIN: 0
CHEST TIGHTNESS: 0
NAUSEA: 1
SHORTNESS OF BREATH: 0

## 2022-09-20 NOTE — PROGRESS NOTES
sciatic). Negative for dizziness, weakness, light-headedness and headaches. Psychiatric/Behavioral:  Negative for agitation, confusion, decreased concentration, dysphoric mood, self-injury, sleep disturbance and suicidal ideas. The patient is not nervous/anxious and is not hyperactive.          Baseline; denies worsening symptoms     Past Medical History:   Diagnosis Date    Anxiety and depression     Asthma     Atypical chest pain 2019    Chronic back pain     Diabetes mellitus ([facility]     Headache     HIV exposure 2020    Irregular heart beat     Multiple sclerosis ([facility]     Osteoarthritis     Post partum depression 2019    Pure hypercholesterolemia, unspecified 5/3/2018     Past Surgical History:   Procedure Laterality Date    CHOLECYSTECTOMY      UPPER GASTROINTESTINAL ENDOSCOPY N/A 2019    EGD ESOPHAGOGASTRODUODENOSCOPY performed by Eliel Banks MD at [address] History    Marital status: Single     Spouse name: Not on file    Number of children: Not on file    Years of education: Not on file    Highest education level: Not on file   Occupational History    Not on file   Tobacco Use    Smoking status: Former     Packs/day: 0.50     Years: 10.00     Pack years: 5.00     Types: Cigarettes     Quit date: 2017     Years since quittin.6    Smokeless tobacco: Never   Vaping Use    Vaping Use: Never used   Substance and Sexual Activity    Alcohol use: Yes     Comment: occas    Drug use: Yes     Frequency: 3.0 times per week     Types: Marijuana Yojana Ricky)    Sexual activity: Yes     Partners: Male   Other Topics Concern    Not on file   Social History Narrative    Not on file     Social Determinants of Health     Financial Resource Strain: Low Risk     Difficulty of Paying Living Expenses: Not hard at all   Food Insecurity: No Food Insecurity    Worried About Running Out of Food in the Last Year: Never true    [address] [facility] [address] in the Last Year: Never true   Transportation Needs: Not on file   Physical Activity: Not on file   Stress: Not on file   Social Connections: Not on file   Intimate Partner Violence: Not on file   Housing Stability: Not on file     Family History   Problem Relation Age of Onset    Cancer Mother     Arthritis Mother     Diabetes Father     Heart Disease Father     Stroke Father     High Blood Pressure Father      Allergies   Allergen Reactions    Amphetamine     Penicillins Hives and Other (See Comments)    Phentermine Other (See Comments)     Anxiety with hospitalization    Topiramate Other (See Comments)     DIZZY     Current Outpatient Medications   Medication Sig Dispense Refill    diazePAM (VALIUM) 5 MG tablet Take 1 tablet by mouth every 12 hours as needed for Anxiety or Sleep for up to 10 days. 30 tablet 0    Prenatal Vit-Fe Fumarate-FA (WESTAB PLUS) 27-1 MG TABS        No current facility-administered medications for this visit. PMH, Surgical Hx, Family Hx, and Social Hx reviewed and updated. Health Maintenance reviewed. Objective  Vitals:    09/20/22 1118   BP: 110/80   Site: Left Upper Arm   Position: Sitting   Cuff Size: Large Adult   Pulse: 73   Resp: 16   SpO2: 97%   Weight: 254 lb (115.2 kg)   Height: 5' 8\" (1.727 m)     BP Readings from Last 3 Encounters:   09/20/22 110/80   08/22/22 123/84   08/08/22 120/80     Wt Readings from Last 3 Encounters:   09/20/22 254 lb (115.2 kg)   08/22/22 260 lb (117.9 kg)   08/08/22 261 lb (118.4 kg)     Physical Exam  Vitals reviewed. Constitutional:       Appearance: She is obese. HENT:      Head: Normocephalic and atraumatic. Right Ear: External ear normal.      Left Ear: External ear normal.      Nose: Nose normal.      Mouth/Throat:      Mouth: Mucous membranes are moist.   Eyes:      Conjunctiva/sclera: Conjunctivae normal.   Cardiovascular:      Rate and Rhythm: Normal rate and regular rhythm.    Pulmonary:      Effort: Pulmonary effort is normal.      Breath sounds: Normal breath sounds. Musculoskeletal:         General: Tenderness (ROM intact, pain with abduction past 90 degress at L shoulder joint, no swelling) present. Normal range of motion. Skin:     General: Skin is warm and dry. Findings: Bruising present. Comments: Palpable, 3 mm soft tissue lesion of dermis of R upper leg. Neurological:      General: No focal deficit present. Mental Status: She is alert. Psychiatric:         Attention and Perception: Attention normal.         Mood and Affect: Mood normal.         Speech: Speech normal.         Behavior: Behavior normal.         Thought Content: Thought content normal.      Comments: Going to therapy; seeing children weekly. Assessment & Plan   Bibiana was seen today for follow-up and fall. Diagnoses and all orders for this visit:    Multiple sclerosis ([facility]  -     MRI BRAIN [address]; Future    Soft tissue lesion  -     US SOFT TISSUE LIMITED AREA; Future    Acute stress reaction  -     diazePAM (VALIUM) 5 MG tablet; Take 1 tablet by mouth every 12 hours as needed for Anxiety or Sleep for up to 10 days. CARMEN (generalized anxiety disorder)  -     diazePAM (VALIUM) 5 MG tablet; Take 1 tablet by mouth every 12 hours as needed for Anxiety or Sleep for up to 10 days. Overall, improving. Light shoulder activity, monitor. Repeat MRI for monitoring. Will u/s skin lesion. 8 week follow up with me. Orders Placed This Encounter   Procedures    US SOFT TISSUE LIMITED AREA     This procedure can be scheduled via [employer]. Access your [employer] account by visiting [email or web address]. Standing Status:   Future     Standing Expiration Date:   9/20/2023     Order Specific Question:   Reason for exam:     Answer:   soft tissue lesion of R upper leg. MRI BRAIN WO CONTRAST     Standing Status:   Future     Standing Expiration Date:   9/20/2023     Order Specific Question:   Reason for exam:     Answer:   MS, dizziness.      Order Specific Question:   What is the sedation requirement? Answer:   None     Orders Placed This Encounter   Medications    diazePAM (VALIUM) 5 MG tablet     Sig: Take 1 tablet by mouth every 12 hours as needed for Anxiety or Sleep for up to 10 days. Dispense:  30 tablet     Refill:  0       Medications Discontinued During This Encounter   Medication Reason    lidocaine (LIDODERM) 5 % LIST CLEANUP    vilazodone HCl (VIIBRYD) 10 MG TABS LIST CLEANUP     No follow-ups on file. Reviewed with the patient: current clinical status, medications, activities and diet. Side effects, adverse effects of the medication prescribed today, as well as treatment plan/ rationale and result expectations have been discussed with the patient who expresses understanding and desires to proceed. Close follow up to evaluate treatment results and for coordination of care. I have reviewed the patient's medical history in detail and updated the computerized patient record.     Leticia Villa PA-C

## 2022-09-23 ENCOUNTER — HOSPITAL ENCOUNTER (INPATIENT)
Age: 37
LOS: 3 days | Discharge: HOME | DRG: 885 | End: 2022-09-26
Attending: PSYCHIATRY & NEUROLOGY | Admitting: PSYCHIATRY & NEUROLOGY
Payer: COMMERCIAL

## 2022-09-23 DIAGNOSIS — F33.2 SEVERE EPISODE OF RECURRENT MAJOR DEPRESSIVE DISORDER, WITHOUT PSYCHOTIC FEATURES (HCC): Primary | ICD-10-CM

## 2022-09-23 PROBLEM — F33.9 MAJOR DEPRESSION, RECURRENT (HCC): Status: ACTIVE | Noted: 2022-09-23

## 2022-09-23 LAB
ACETAMINOPHEN LEVEL: <5 UG/ML (ref 10–30)
ALBUMIN SERPL-MCNC: 4 G/DL (ref 3.5–4.6)
ALP BLD-CCNC: 151 U/L (ref 40–130)
ALT SERPL-CCNC: 11 U/L (ref 0–33)
AMPHETAMINE SCREEN, URINE: ABNORMAL
ANION GAP SERPL CALCULATED.3IONS-SCNC: 10 MEQ/L (ref 9–15)
AST SERPL-CCNC: 22 U/L (ref 0–35)
BACTERIA: ABNORMAL /HPF
BARBITURATE SCREEN URINE: ABNORMAL
BASOPHILS ABSOLUTE: 0 K/UL (ref 0–0.2)
BASOPHILS RELATIVE PERCENT: 0.5 %
BENZODIAZEPINE SCREEN, URINE: ABNORMAL
BILIRUB SERPL-MCNC: 0.4 MG/DL (ref 0.2–0.7)
BILIRUBIN URINE: NEGATIVE
BLOOD, URINE: ABNORMAL
BUN BLDV-MCNC: 8 MG/DL (ref 6–20)
CALCIUM SERPL-MCNC: 9 MG/DL (ref 8.5–9.9)
CANNABINOID SCREEN URINE: POSITIVE
CHLORIDE BLD-SCNC: 105 MEQ/L (ref 95–107)
CHOLESTEROL, TOTAL: 200 MG/DL (ref 0–199)
CLARITY: CLEAR
CO2: 25 MEQ/L (ref 20–31)
COCAINE METABOLITE SCREEN URINE: ABNORMAL
COLOR: YELLOW
CREAT SERPL-MCNC: 0.7 MG/DL (ref 0.5–0.9)
EOSINOPHILS ABSOLUTE: 0.2 K/UL (ref 0–0.7)
EOSINOPHILS RELATIVE PERCENT: 1.8 %
EPITHELIAL CELLS, UA: ABNORMAL /HPF (ref 0–5)
ETHANOL PERCENT: NORMAL G/DL
ETHANOL: <10 MG/DL (ref 0–0.08)
FENTANYL SCREEN, URINE: ABNORMAL
GFR AFRICAN AMERICAN: >60
GFR NON-AFRICAN AMERICAN: >60
GLOBULIN: 3 G/DL (ref 2.3–3.5)
GLUCOSE BLD-MCNC: 101 MG/DL (ref 70–99)
GLUCOSE URINE: NEGATIVE MG/DL
HCG(URINE) PREGNANCY TEST: NEGATIVE
HCT VFR BLD CALC: 39.8 % (ref 37–47)
HDLC SERPL-MCNC: 62 MG/DL (ref 40–59)
HEMOGLOBIN: 13.2 G/DL (ref 12–16)
HYALINE CASTS: ABNORMAL /HPF (ref 0–5)
KETONES, URINE: ABNORMAL MG/DL
LDL CHOLESTEROL CALCULATED: 116 MG/DL (ref 0–129)
LEUKOCYTE ESTERASE, URINE: ABNORMAL
LYMPHOCYTES ABSOLUTE: 2.2 K/UL (ref 1–4.8)
LYMPHOCYTES RELATIVE PERCENT: 24.3 %
Lab: ABNORMAL
MCH RBC QN AUTO: 30.3 PG (ref 27–31.3)
MCHC RBC AUTO-ENTMCNC: 33.2 % (ref 33–37)
MCV RBC AUTO: 91.2 FL (ref 82–100)
METHADONE SCREEN, URINE: ABNORMAL
MONOCYTES ABSOLUTE: 0.4 K/UL (ref 0.2–0.8)
MONOCYTES RELATIVE PERCENT: 3.9 %
NEUTROPHILS ABSOLUTE: 6.4 K/UL (ref 1.4–6.5)
NEUTROPHILS RELATIVE PERCENT: 69.5 %
NITRITE, URINE: NEGATIVE
OPIATE SCREEN URINE: ABNORMAL
OXYCODONE URINE: ABNORMAL
PDW BLD-RTO: 15.2 % (ref 11.5–14.5)
PH UA: 6 (ref 5–9)
PHENCYCLIDINE SCREEN URINE: ABNORMAL
PLATELET # BLD: 282 K/UL (ref 130–400)
POTASSIUM SERPL-SCNC: 3.9 MEQ/L (ref 3.4–4.9)
PROPOXYPHENE SCREEN: ABNORMAL
PROTEIN UA: ABNORMAL MG/DL
RBC # BLD: 4.36 M/UL (ref 4.2–5.4)
RBC UA: ABNORMAL /HPF (ref 0–5)
SALICYLATE, SERUM: <0.3 MG/DL (ref 15–30)
SARS-COV-2, NAAT: NOT DETECTED
SODIUM BLD-SCNC: 140 MEQ/L (ref 135–144)
SPECIFIC GRAVITY UA: 1.02 (ref 1–1.03)
TOTAL CK: 208 U/L (ref 0–170)
TOTAL PROTEIN: 7 G/DL (ref 6.3–8)
TRIGL SERPL-MCNC: 111 MG/DL (ref 0–150)
TSH SERPL DL<=0.05 MIU/L-ACNC: 0.99 UIU/ML (ref 0.44–3.86)
URINE REFLEX TO CULTURE: YES
UROBILINOGEN, URINE: 0.2 E.U./DL
WBC # BLD: 9.2 K/UL (ref 4.8–10.8)
WBC UA: ABNORMAL /HPF (ref 0–5)

## 2022-09-23 PROCEDURE — 1240000000 HC EMOTIONAL WELLNESS R&B

## 2022-09-23 PROCEDURE — 99285 EMERGENCY DEPT VISIT HI MDM: CPT

## 2022-09-23 PROCEDURE — 80053 COMPREHEN METABOLIC PANEL: CPT

## 2022-09-23 PROCEDURE — 87635 SARS-COV-2 COVID-19 AMP PRB: CPT

## 2022-09-23 PROCEDURE — 82077 ASSAY SPEC XCP UR&BREATH IA: CPT

## 2022-09-23 PROCEDURE — 81001 URINALYSIS AUTO W/SCOPE: CPT

## 2022-09-23 PROCEDURE — 82550 ASSAY OF CK (CPK): CPT

## 2022-09-23 PROCEDURE — 85025 COMPLETE CBC W/AUTO DIFF WBC: CPT

## 2022-09-23 PROCEDURE — 80307 DRUG TEST PRSMV CHEM ANLYZR: CPT

## 2022-09-23 PROCEDURE — 87086 URINE CULTURE/COLONY COUNT: CPT

## 2022-09-23 PROCEDURE — 84703 CHORIONIC GONADOTROPIN ASSAY: CPT

## 2022-09-23 PROCEDURE — 6370000000 HC RX 637 (ALT 250 FOR IP): Performed by: PSYCHIATRY & NEUROLOGY

## 2022-09-23 PROCEDURE — 80179 DRUG ASSAY SALICYLATE: CPT

## 2022-09-23 PROCEDURE — 84443 ASSAY THYROID STIM HORMONE: CPT

## 2022-09-23 PROCEDURE — 80143 DRUG ASSAY ACETAMINOPHEN: CPT

## 2022-09-23 PROCEDURE — 80061 LIPID PANEL: CPT

## 2022-09-23 PROCEDURE — 36415 COLL VENOUS BLD VENIPUNCTURE: CPT

## 2022-09-23 RX ORDER — TRAZODONE HYDROCHLORIDE 50 MG/1
50 TABLET ORAL NIGHTLY PRN
Status: DISCONTINUED | OUTPATIENT
Start: 2022-09-23 | End: 2022-09-26 | Stop reason: HOSPADM

## 2022-09-23 RX ORDER — BENZTROPINE MESYLATE 1 MG/ML
2 INJECTION INTRAMUSCULAR; INTRAVENOUS 2 TIMES DAILY PRN
Status: DISCONTINUED | OUTPATIENT
Start: 2022-09-23 | End: 2022-09-26 | Stop reason: HOSPADM

## 2022-09-23 RX ORDER — MAGNESIUM HYDROXIDE/ALUMINUM HYDROXICE/SIMETHICONE 120; 1200; 1200 MG/30ML; MG/30ML; MG/30ML
30 SUSPENSION ORAL PRN
Status: DISCONTINUED | OUTPATIENT
Start: 2022-09-23 | End: 2022-09-26 | Stop reason: HOSPADM

## 2022-09-23 RX ORDER — ACETAMINOPHEN 325 MG/1
650 TABLET ORAL EVERY 4 HOURS PRN
Status: DISCONTINUED | OUTPATIENT
Start: 2022-09-23 | End: 2022-09-26 | Stop reason: HOSPADM

## 2022-09-23 RX ORDER — HALOPERIDOL 5 MG/ML
5 INJECTION INTRAMUSCULAR EVERY 6 HOURS PRN
Status: DISCONTINUED | OUTPATIENT
Start: 2022-09-23 | End: 2022-09-26 | Stop reason: HOSPADM

## 2022-09-23 RX ORDER — HALOPERIDOL 5 MG
5 TABLET ORAL EVERY 6 HOURS PRN
Status: DISCONTINUED | OUTPATIENT
Start: 2022-09-23 | End: 2022-09-26 | Stop reason: HOSPADM

## 2022-09-23 RX ORDER — HYDROXYZINE PAMOATE 50 MG/1
50 CAPSULE ORAL EVERY 6 HOURS PRN
Status: DISCONTINUED | OUTPATIENT
Start: 2022-09-23 | End: 2022-09-26 | Stop reason: HOSPADM

## 2022-09-23 RX ORDER — NITROFURANTOIN 25; 75 MG/1; MG/1
100 CAPSULE ORAL EVERY 12 HOURS SCHEDULED
Status: DISCONTINUED | OUTPATIENT
Start: 2022-09-23 | End: 2022-09-26 | Stop reason: HOSPADM

## 2022-09-23 RX ORDER — HYDROXYZINE HYDROCHLORIDE 50 MG/ML
50 INJECTION, SOLUTION INTRAMUSCULAR EVERY 6 HOURS PRN
Status: DISCONTINUED | OUTPATIENT
Start: 2022-09-23 | End: 2022-09-26 | Stop reason: HOSPADM

## 2022-09-23 RX ADMIN — NITROFURANTOIN MONOHYDRATE/MACROCRYSTALLINE 100 MG: 25; 75 CAPSULE ORAL at 21:44

## 2022-09-23 ASSESSMENT — PAIN - FUNCTIONAL ASSESSMENT: PAIN_FUNCTIONAL_ASSESSMENT: NONE - DENIES PAIN

## 2022-09-23 ASSESSMENT — ENCOUNTER SYMPTOMS
CONSTIPATION: 0
EYE DISCHARGE: 0
SORE THROAT: 0
ABDOMINAL DISTENTION: 0
ABDOMINAL PAIN: 0
SHORTNESS OF BREATH: 0
RHINORRHEA: 0
COLOR CHANGE: 0

## 2022-09-23 ASSESSMENT — PATIENT HEALTH QUESTIONNAIRE - PHQ9
SUM OF ALL RESPONSES TO PHQ QUESTIONS 1-9: 16
SUM OF ALL RESPONSES TO PHQ QUESTIONS 1-9: 16

## 2022-09-23 ASSESSMENT — SLEEP AND FATIGUE QUESTIONNAIRES
DO YOU USE A SLEEP AID: NO
SLEEP PATTERN: DIFFICULTY FALLING ASLEEP;DISTURBED/INTERRUPTED SLEEP;NIGHTMARES/TERRORS
AVERAGE NUMBER OF SLEEP HOURS: 2
DO YOU HAVE DIFFICULTY SLEEPING: YES

## 2022-09-23 NOTE — ED NOTES
Patient reviewed with Dr. Fina Donald. Discussed events leading to admit, current assessment, previous history and labs. Received order to admit to 3W with no 1:1 at this time.       Libia Marie RN  09/23/22 [id number]

## 2022-09-23 NOTE — ED NOTES
Chart to ER Zone 1 Medical Staff. Lab notified of need for blood draw.      Marilynn Contreras RN  09/23/22 7565

## 2022-09-23 NOTE — ED NOTES
Patient admitted to [facility] bed 1. Changed into psych safe clothing and skin check completed. No contraband found. Patient irritable however cooperative with Covid screen and was able to provide a urine specimen. Patient oriented to unit.       Holli Ceballos RN  09/23/22 0648
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
## 2022-09-23 NOTE — ED NOTES
Report given to [facility] on 3W. Call placed to [facility] for belongings and escort to 3W.      Kiki Webster RN  09/23/22 0353

## 2022-09-24 LAB — URINE CULTURE, ROUTINE: NORMAL

## 2022-09-24 PROCEDURE — 93005 ELECTROCARDIOGRAM TRACING: CPT | Performed by: PSYCHIATRY & NEUROLOGY

## 2022-09-24 PROCEDURE — 6370000000 HC RX 637 (ALT 250 FOR IP): Performed by: PSYCHIATRY & NEUROLOGY

## 2022-09-24 PROCEDURE — 1240000000 HC EMOTIONAL WELLNESS R&B

## 2022-09-24 RX ADMIN — NITROFURANTOIN MONOHYDRATE/MACROCRYSTALLINE 100 MG: 25; 75 CAPSULE ORAL at 21:18

## 2022-09-24 RX ADMIN — NITROFURANTOIN MONOHYDRATE/MACROCRYSTALLINE 100 MG: 25; 75 CAPSULE ORAL at 09:30

## 2022-09-24 RX ADMIN — ACETAMINOPHEN 650 MG: 325 TABLET ORAL at 10:14

## 2022-09-24 ASSESSMENT — PAIN DESCRIPTION - DESCRIPTORS: DESCRIPTORS: ACHING;SHARP

## 2022-09-24 ASSESSMENT — LIFESTYLE VARIABLES
HOW MANY STANDARD DRINKS CONTAINING ALCOHOL DO YOU HAVE ON A TYPICAL DAY: 1 OR 2
HOW OFTEN DO YOU HAVE A DRINK CONTAINING ALCOHOL: 2-4 TIMES A MONTH

## 2022-09-24 ASSESSMENT — PAIN DESCRIPTION - LOCATION: LOCATION: SHOULDER

## 2022-09-24 ASSESSMENT — PAIN DESCRIPTION - ORIENTATION: ORIENTATION: LEFT

## 2022-09-24 ASSESSMENT — PAIN SCALES - GENERAL
PAINLEVEL_OUTOF10: 6
PAINLEVEL_OUTOF10: 0

## 2022-09-24 NOTE — PROGRESS NOTES
Talked about dinner time med latuda, pt denied any anxiety , depression, reports feeling sad because she felt she was trying everything to get her for her kids, and made a statement she said in the moment and denies wanting to hurt anyone, pt reports feeling helpless ,hopeless,  Pt stated she doesn't want to take meds and be dependant on anything, pt was encouraged to think about taking med to stabilize her mood. Gave tylenol for left shoulder pain #5,reports having xrays ordered for Tues. Pt reports if staff talks loud she can understand the words.

## 2022-09-24 NOTE — PROGRESS NOTES
Comprehensive Nutrition Assessment    Type and Reason for Visit:  Initial, Positive Nutrition Screen (wt loss/poor appetite)    Nutrition Recommendations/Plan:   Obtain update bed scale weight   Trial diabetic supplement daily at lunch   Modify diet to carb control 4 with noted DM dx     Malnutrition Assessment:  Malnutrition Status:  Insufficient data (09/24/22 1010)    Context:  Social/Environmental Circumstances     Findings of the 6 clinical characteristics of malnutrition:  Energy Intake:  Mild decrease in energy intake (Comment)  Weight Loss:  Unable to assess     Body Fat Loss:  No significant body fat loss     Muscle Mass Loss:  No significant muscle mass loss    Fluid Accumulation:  No significant fluid accumulation     Strength:  Not Performed    Nutrition Assessment:    Positive malnutrition screen received for reported poor appetite and weight loss. UTD malnutrition status with available information, no updated bed scale weight recorded. Noted DM dx, RD to modify diet to carb control and trial diabetic supplement daily. Nutrition Related Findings:    Pmhx: DM, depression, MS, OA, hypercholesterolemia. Gluc WNL 9/23. Meds reviewed. Wound Type: None       Current Nutrition Intake & Therapies:    Average Meal Intake: Unable to assess  Average Supplements Intake: None Ordered  ADULT DIET;  Regular    Anthropometric Measures:  Height: 5' 8\" (172.7 cm)  Ideal Body Weight (IBW): 140 lbs (64 kg)    Admission Body Weight: 254 lb (115.2 kg) (stated)  Current Body Weight: 254 lb (115.2 kg) (9/23),    Weight Source: Stated  Current BMI (kg/m2): 38.6  Usual Body Weight: 266 lb 8 oz (120.9 kg) (4/2022 bed)  % Weight Change (Calculated): -4.7                    BMI Categories: Obese Class 2 (BMI 35.0 -39.9)    Estimated Daily Nutrient Needs:  Energy Requirements Based On: Kcal/kg  Weight Used for Energy Requirements: Current  Energy (kcal/day): 1728-1843kcal (15-16kcal/kg)  Weight Used for Protein Requirements: Ideal  Protein (g/day): 76-82g (1.2-1.3g/kg)  Method Used for Fluid Requirements: 1 ml/kcal  Fluid (ml/day): ~1800ml    Nutrition Diagnosis:   Unintended weight loss related to psychological cause or life stress as evidenced by poor intake prior to admission (reported wt loss)      Nutrition Interventions:   Food and/or Nutrient Delivery: Modify Current Diet, Start Oral Nutrition Supplement  Nutrition Education/Counseling: No recommendation at this time  Coordination of Nutrition Care: Continue to monitor while inpatient       Goals:     Goals: PO intake 75% or greater       Nutrition Monitoring and Evaluation:   Behavioral-Environmental Outcomes: None Identified  Food/Nutrient Intake Outcomes: Food and Nutrient Intake, Supplement Intake  Physical Signs/Symptoms Outcomes: Biochemical Data, Weight, Meal Time Behavior    Discharge Planning:    No discharge needs at this time     Irlanda Monk MS, RD, LD

## 2022-09-24 NOTE — CARE COORDINATION
Brief Intervention and Referral to Treatment Summary    Patient was provided PHQ-9, AUDIT-C and DAST Screening:      PHQ-9 Score: 16  AUDIT-C Score:  2  DAST Score:  0  UDS positve for THC   pt has medical marijuana card    Patients substance use is considered     Low Risk/Healthy X  Moderate Risk  Harmful  Dependent    Patients depression is considered:     Minimal  Mild   Moderate  Moderately Severe X  Severe    Brief Education Was Provided    Patient was receptive X  Patient was not receptive      Brief Intervention Is Provided (Only for AUDIT-C or DAST) N/A    Patient reports readiness to decrease and/or stop use and a plan was discussed   Patient denies readiness to decrease and/or stop use and a plan was not discussed      Recommendations/Referrals for Brief and/or Specialized Treatment Provided to Patient    Patient denied drug and alcohol use. As a result, no recommendations or referrals were provided to the patient at this time. UDS positve for THC   Pt has medical marijuana card  Patient is linked with [place]  She plans to discharge to her home.

## 2022-09-24 NOTE — PLAN OF CARE
Bibiana can be observed sitting alone or isolating to her room. Upon approach she is friendly and verbal about her issues. She admits that she said things that she should not have said and denies any intent to harm herself or others. She expresses being very upset about not having her children and feeling like the system is against her. She admits that she felt overwhelmed and that she needed support but feels her greatest challenge is with her 6year old son who has severe behavior problems. She expressed trying to do right by her children and struggles to manage his difficult behavior with the new baby and with her mothers illness/surgery. She feels others wrong her and she has concerns that are ignored. One example was at visitation with baby for 2 hours and the  who has child showed up 1 1/2 hours late forcing her to loose visit time. Appetite remains poor and so is energy. She is tearful discussing her issues but is afraid to cry for fear it will be held against her. She has a hearing on Monday and feels need to prepare her documents and prove her issues/concerns at that time. She does not want to be here and miss it.

## 2022-09-24 NOTE — PROGRESS NOTES
Pt in bed, denies current SI,HI,A/V hallucinations. Affect sad,depressed. Contracts for safety on the unit. Reports poor sleep though declined prn medication.  Pt is GUY

## 2022-09-24 NOTE — PLAN OF CARE
Nutrition Problem #1: Unintended weight loss  Intervention: Food and/or Nutrient Delivery: Modify Current Diet, Start Oral Nutrition Supplement  Nutritional  : Po intake >75%. Obtain actual weight.

## 2022-09-24 NOTE — PROGRESS NOTES
Patient did not attend group despite staff encouragement.  Electronically signed by Hamlet Patel on 9/24/2022 at 2:22 PM
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
## 2022-09-24 NOTE — PROGRESS NOTES
Morning Community Meeting Topics    Molly Nj attended the morning community meeting on 9/24/22. Topics discussed today     [x] Introduction  Day of the week and date  Mask distribution  Current mask requirements  [x]Teams  Explanation of  Green and Blue team criteria  Nurses assigned to each team for today  Explanation about green and blue paper  Date  Patient's Name  Patient's Nurse  Goals  [x] Visitation  Announce the visiting hours for the day  Announce which team is allowed to have visitors for the day  Review any updated Covid 19 requirements for visitors during visitation  Vaccine Card or negative Covid test within 48 hours of visit  State Identification  Patients are reminded to alert the  at least 1 hour before visitation   [x] Unit Orientation  Coffee use  Phone location and etiquette  Shower locations  [place] and dryer location and process  Common area expectations  Staff rounds expectation  [x] Meals   Educate patient to the menu  The patient is encouraged to fill out the menu to get preferences at mealtime  The patient is educated that if they do not fill out the menu, they will get the standard tray  The coffee pot is decaf, patient encouraged to order regular coffee from menu.   Educate patient to the meal process  Patient encouraged to eat snacks provided twice daily  Snacks may stay in patient room     [x] Discharge Process  Discharge expectations  Fill out the survey after discharge   [x] Hygiene  Daily showers encouraged  Showers availability discussed   Daily dressing encouraged  Discussed wearing street clothing  Education provided on where to place linens and clothing  Linens in the hamper  personal clothing does not go into the linen hamper  [x] Group   Patient encouraged to attend group provided  Time of Group Meetings discussed  Gentle reminder that attendance is a Physician order  [x] Movement  Chair exercises completed  Stretching completed  Notes:  Electronically signed by Darby Yi on 9/24/2022 at 9:48 AM

## 2022-09-24 NOTE — PROGRESS NOTES
Patient did not attend group despite staff encouragement.  Electronically signed by Sivakumar Jackman on 9/24/2022 at 12:52 PM

## 2022-09-24 NOTE — GROUP NOTE
Group Therapy Note    Date: 9/23/2022    Group Start Time: 2045  Group End Time: 2100  Group Topic: Wrap-Up    [facility]    Laura Bloom RN        Group Therapy Note    Attendees: 12/15       Patient's Goal:  Pt wants an update on her child. Notes:  Pt upset she doesn't have good communication with family members    Status After Intervention:  Unchanged    Participation Level:  Active Listener    Participation Quality: Appropriate      Speech:  normal      Thought Process/Content: Logical      Affective Functioning: Congruent      Mood: euthymic      Level of consciousness:  Alert      Response to Learning: Progressing to goal      Endings: None Reported    Modes of Intervention: Support      Discipline Responsible: Registered Nurse      Signature:  Laura Bloom RN

## 2022-09-24 NOTE — GROUP NOTE
Group Therapy Note    Date: 9/23/2022    Group Start Time: 1945  Group End Time: 2015  Group Topic: Healthy Living/Wellness    [facility]    Deven Dixon RN        Group Therapy Note    Attendees: 12/16       Patient's Goal:  Discussed setting various boundaries    Notes:  Good participation    Status After Intervention:  Unchanged    Participation Level:  Active Listener and Interactive    Participation Quality: Appropriate and Attentive      Speech:  normal      Thought Process/Content: Logical      Affective Functioning: Congruent      Mood: euthymic      Level of consciousness:  Alert and Oriented x4      Response to Learning: Progressing to goal      Endings: None Reported    Modes of Intervention: Support      Discipline Responsible: Registered Nurse      Signature:  Deven Dixon RN

## 2022-09-24 NOTE — PROGRESS NOTES
Pt seen in her room and agreeable to leisure assessment, though had poor eye contact and provided short/quick answers throughout. Pt identified cooking and dancing as her leisure activities and engages in them frequently. Pt stated has a good support system and her preference re: spending time alone vs with others is dependent on the situation. Pt stated \"I go with the flow\" when questioned how she deals with her feelings. Pt stated her only coping strategy is \"I remove myself from the situation\". Pt unable to identify personal strengths or weaknesses and stated \"I'm alright\" when asked about her perception of herself. When questioned about what changes are needed to occur for a positive outlook and for recovery, Pt stated \"I just need to get out of here\".     Electronically signed by Emily Saez OT on 9/24/2022 at 6:07 PM
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
## 2022-09-24 NOTE — PROGRESS NOTES
Behavioral Services  Medicare Certification Upon Admission    I certify that this patient's inpatient psychiatric hospital admission is medically necessary for:    [x] (1) Treatment which could reasonably be expected to improve this patient's condition,       [x] (2) Or for diagnostic study;     AND     [x](2) The inpatient psychiatric services are provided while the individual is under the care of a physician and are included in the individualized plan of care.     Estimated length of stay/service 3-5    Plan for post-hospital care OP care    Electronically signed by Airam Michelle MD on 9/24/2022 at 9:31 AM

## 2022-09-24 NOTE — GROUP NOTE
Group Therapy Note    Date: 9/23/2022    Group Start Time: 2015  Group End Time: 2045  Group Topic: Recreational    [facility]    Sigifredo Callahan RN        Group Therapy Note    Attendees: 12/16       Patient's Goal:  To play the Wii    Notes:  Good participation    Status After Intervention:  Unchanged    Participation Level:  Active Listener    Participation Quality: Appropriate      Speech:  normal      Thought Process/Content: Logical      Affective Functioning: Congruent      Mood: euthymic      Level of consciousness:  Alert      Response to Learning: Progressing to goal      Endings: None Reported    Modes of Intervention: Socialization      Discipline Responsible: Registered Nurse      Signature:  Sigifredo Callahan RN

## 2022-09-24 NOTE — PROGRESS NOTES
Pt mom Darian Mcpherson) called and stated she dropped off belongings and paper with info about a zoom meeting with children services on Monday at 2pm. Copy placed inpt chart and pt given original

## 2022-09-25 PROCEDURE — 99232 SBSQ HOSP IP/OBS MODERATE 35: CPT | Performed by: PSYCHIATRY & NEUROLOGY

## 2022-09-25 PROCEDURE — 90833 PSYTX W PT W E/M 30 MIN: CPT | Performed by: PSYCHIATRY & NEUROLOGY

## 2022-09-25 PROCEDURE — 1240000000 HC EMOTIONAL WELLNESS R&B

## 2022-09-25 PROCEDURE — 6370000000 HC RX 637 (ALT 250 FOR IP): Performed by: PSYCHIATRY & NEUROLOGY

## 2022-09-25 RX ADMIN — ALUMINA, MAGNESIA, AND SIMETHICONE ORAL SUSPENSION REGULAR STRENGTH 30 ML: 1200; 1200; 120 SUSPENSION ORAL at 22:58

## 2022-09-25 RX ADMIN — NITROFURANTOIN MONOHYDRATE/MACROCRYSTALLINE 100 MG: 25; 75 CAPSULE ORAL at 21:25

## 2022-09-25 RX ADMIN — NITROFURANTOIN MONOHYDRATE/MACROCRYSTALLINE 100 MG: 25; 75 CAPSULE ORAL at 10:08

## 2022-09-25 RX ADMIN — ACETAMINOPHEN 650 MG: 325 TABLET ORAL at 10:08

## 2022-09-25 ASSESSMENT — PAIN DESCRIPTION - ORIENTATION: ORIENTATION: LEFT

## 2022-09-25 ASSESSMENT — PAIN SCALES - GENERAL: PAINLEVEL_OUTOF10: 5

## 2022-09-25 ASSESSMENT — PAIN DESCRIPTION - DESCRIPTORS: DESCRIPTORS: ACHING

## 2022-09-25 ASSESSMENT — PAIN DESCRIPTION - LOCATION: LOCATION: SHOULDER

## 2022-09-25 NOTE — PLAN OF CARE
Problem: Chronic Conditions and Co-morbidities  Goal: Patient's chronic conditions and co-morbidity symptoms are monitored and maintained or improved  9/25/2022 0206 by Anna Marie West RN  Outcome: Progressing  9/24/2022 1637 by KANDI Patricio  Outcome: Progressing  Flowsheets (Taken 9/24/2022 1630)  Care Plan - Patient's Chronic Conditions and Co-Morbidity Symptoms are Monitored and Maintained or Improved:   Monitor and assess patient's chronic conditions and comorbid symptoms for stability, deterioration, or improvement   Collaborate with multidisciplinary team to address chronic and comorbid conditions and prevent exacerbation or deterioration   Update acute care plan with appropriate goals if chronic or comorbid symptoms are exacerbated and prevent overall improvement and discharge     Problem: Depression  Goal: Will be euthymic at discharge  Description: INTERVENTIONS:  1. Administer medication as ordered  2. Provide emotional support via 1:1 interaction with staff  3. Encourage involvement in milieu/groups/activities  4. Monitor for social isolation  9/25/2022 0206 by Anna Marie West RN  Outcome: Progressing  9/24/2022 1637 by KANDI Patricio  Outcome: Progressing     Problem: Anxiety  Goal: Will report anxiety at manageable levels  Description: INTERVENTIONS:  1. Administer medication as ordered  2. Teach and rehearse alternative coping skills  3.  Provide emotional support with 1:1 interaction with staff  9/25/2022 0206 by Anna Marie West RN  Outcome: Progressing  9/24/2022 1637 by KANDI Patricio  Outcome: Progressing  Flowsheets (Taken 9/24/2022 1630)  Will report anxiety at manageable levels:   Administer medication as ordered   Provide emotional support with 1:1 interaction with staff   Teach and rehearse alternative coping skills     Problem: Nutrition Deficit:  Goal: Optimize nutritional status  9/25/2022 0206 by Anna Marie West RN  Outcome: Progressing  9/24/2022 1637 by KANDI Granados - CNS  Outcome: Progressing     Patient visible and social in common areas of unit throughout evening. Reports feeling \"okay\". Minimal interaction and conversation with this writer. Mood depressed, appears tearful but labile at times. Denies SI/HI or AVH at this time. Affect flat, blunted. Poverty of content with poor insight. Reports good sleep & appetite. No physical concerns reported. States newly started medication has made her feel \"sleepy\".

## 2022-09-25 NOTE — GROUP NOTE
Group Therapy Note    Date: 9/24/2022    Group Start Time: 2055  Group End Time: 2125  Group Topic: Wrap-Up    [facility]    Boni Webster RN        Group Therapy Note    Attendees: 11/17       Patient's Goal:  Regroup/rest    Notes:  Pt reports goal met.     Status After Intervention:  Improved    Participation Level: Interactive    Participation Quality: Appropriate and Sharing      Speech:  normal      Thought Process/Content: Linear      Affective Functioning: Flat      Mood: depressed      Level of consciousness:  Oriented x4      Response to Learning: Able to verbalize/acknowledge new learning      Endings: None Reported    Modes of Intervention: Support      Discipline Responsible: Registered Nurse      Signature:  Boni Webster RN

## 2022-09-25 NOTE — PROGRESS NOTES
Pt. declined to attend the 0900 community meeting, despite staff encouragement. Patient's goal is to go home.

## 2022-09-25 NOTE — PLAN OF CARE
Problem: Nutrition Deficit:  Goal: Optimize nutritional status  Outcome: Not Progressing   Patient reports not liking the hospital food and claims that they are not bringing her what she wants. This AM at breakfast she reported that she felt staff was slipping psych medications into her juice. Others had sealed juice containers and hers was a cup with a lid. She can demonstrate an irritable edge and minimal tolerance if things don't go her way. She denies SI, HI, or hallucinations. She is focused on being released and being able to keep her appointment with Children Services.

## 2022-09-25 NOTE — PROGRESS NOTES
[facility]. FOLLOW-UP NOTE       9/25/2022     Patient was seen and examined in person, Chart reviewed   Patient's case discussed with staff/team    Chief Complaint: Tana    Interim History:     Pt refusing to take meds to control her impulsivity and anger issue  She believe in therapy and has been seeing therapist  Pt report that she wanted to open up her feeling to the therapist but did not mean to harm others  She has build in anger at the situation she is in  She denies ever want to harm physically but can get verbal when stressed out  Pt denies any suicidal thoughts  Worried about her hearing tomorrow  Micha Sas permission to talk to her family- mom  Appetite:   [x] Normal/Unchanged  [] Increased  [] Decreased      Sleep:       [] Normal/Unchanged  [x] Fair       [] Poor              Energy:    [x] Normal/Unchanged  [] Increased  [] Decreased        SI [] Present  [x] Absent    HI  []Present  [x] Absent     Aggression:  [] yes  [x] no    Patient is [x] able  [] unable to CONTRACT FOR SAFETY     PAST MEDICAL/PSYCHIATRIC HISTORY:   Past Medical History:   Diagnosis Date    Anxiety and depression     Asthma     Atypical chest pain 5/31/2019    Chronic back pain     Diabetes mellitus ([facility] [address]     Headache     HIV exposure 1/6/2020    Irregular heart beat     Multiple sclerosis ([facility]     Osteoarthritis     Post partum depression 4/2/2019    Pure hypercholesterolemia, unspecified 5/3/2018       FAMILY/SOCIAL HISTORY:  Family History   Problem Relation Age of Onset    Cancer Mother     Arthritis Mother     Diabetes Father     Heart Disease Father     Stroke Father     High Blood Pressure Father      Social History     Socioeconomic History    Marital status: Single     Spouse name: Not on file    Number of children: Not on file    Years of education: Not on file    Highest education level: Not on file   Occupational History    Not on file   Tobacco Use    Smoking status: Former Packs/day: 0.50     Years: 10.00     Pack years: 5.00     Types: Cigarettes     Quit date: 2017     Years since quittin.6    Smokeless tobacco: Never   Vaping Use    Vaping Use: Never used   Substance and Sexual Activity    Alcohol use: Yes     Comment: occas    Drug use: Yes     Frequency: 3.0 times per week     Types: Marijuana Shawn Carmen)    Sexual activity: Yes     Partners: Male   Other Topics Concern    Not on file   Social History Narrative    Not on file     Social Determinants of Health     Financial Resource Strain: Low Risk     Difficulty of Paying Living Expenses: Not hard at all   Food Insecurity: No Food Insecurity    Worried About Running Out of Food in the Last Year: Never true    Ran Out of Food in the Last Year: Never true   Transportation Needs: Not on file   Physical Activity: Not on file   Stress: Not on file   Social Connections: Not on file   Intimate Partner Violence: Not on file   Housing Stability: Not on file           ROS:  [x] All negative/unchanged except if checked.  Explain positive(checked items) below:  [] Constitutional  [] Eyes  [] Ear/Nose/Mouth/Throat  [] Respiratory  [] CV  [] GI  []   [] Musculoskeletal  [] Skin/Breast  [] Neurological  [] Endocrine  [] Heme/Lymph  [] Allergic/Immunologic    Explanation:     MEDICATIONS:    Current Facility-Administered Medications:     lurasidone (LATUDA) tablet 20 mg, 20 mg, Oral, Dinner, Loi Bolivar MD    acetaminophen (TYLENOL) tablet 650 mg, 650 mg, Oral, Q4H PRN, Loi Bolivar MD, 650 mg at 22 1008    magnesium hydroxide (MILK OF MAGNESIA) 400 MG/5ML suspension 30 mL, 30 mL, Oral, Daily PRN, Loi Bolivar MD    aluminum & magnesium hydroxide-simethicone (MAALOX) 200-200-20 MG/5ML suspension 30 mL, 30 mL, Oral, PRN, Loi Bolivar MD    haloperidol (HALDOL) tablet 5 mg, 5 mg, Oral, Q6H PRN **OR** haloperidol lactate (HALDOL) injection 5 mg, 5 mg, IntraMUSCular, Q6H PRN, Loi Bolivar MD    benztropine mesylate (COGENTIN) injection 2 mg, 2 mg, IntraMUSCular, BID PRN, Daneyll Rayo MD    traZODone (DESYREL) tablet 50 mg, 50 mg, Oral, Nightly PRN, Danyell Rayo MD    hydrOXYzine pamoate (VISTARIL) capsule 50 mg, 50 mg, Oral, Q6H PRN **OR** hydrOXYzine (VISTARIL) injection 50 mg, 50 mg, IntraMUSCular, Q6H PRN, Danyell Rayo MD    nitrofurantoin (macrocrystal-monohydrate) (MACROBID) capsule 100 mg, 100 mg, Oral, 2 times per day, Danyell Rayo MD, 100 mg at 09/25/22 1008      Examination:  /78   Pulse 60   Temp 98.4 °F (36.9 °C) (Oral)   Resp 18   Ht 5' 8\" (1.727 m)   Wt 251 lb (113.9 kg)   SpO2 98%   BMI 38.16 kg/m²   Gait - steady  Medication side effects(SE): no    Mental Status Examination:    Level of consciousness:  within normal limits   Appearance:  fair grooming and fair hygiene  Behavior/Motor:  no abnormalities noted  Attitude toward examiner:  attentive  Speech:  spontaneous and normal rate   Mood: anxious  Affect:  mood congruent  Thought processes:  coherent   Thought content:  Homocidal ideation denies  Suicidal Ideation:  denies suicidal ideation  Delusions:  no evidence of delusions  Perceptual Disturbance:  denies any perceptual disturbance  Cognition:  oriented to person, place, and time   Concentration intact  Insight fair   Judgement good     ASSESSMENT:   Patient symptoms are:  [] Well controlled  [] Improving  [] Worsening  [] No change      Diagnosis:   Bipolar disorder mixed episode    LABS:    Recent Labs     09/23/22  1225   WBC 9.2   HGB 13.2        Recent Labs     09/23/22  1225      K 3.9      CO2 25   BUN 8   CREATININE 0.70   GLUCOSE 101*     Recent Labs     09/23/22  1225   BILITOT 0.4   ALKPHOS 151*   AST 22   ALT 11     Lab Results   Component Value Date/Time    LABAMPH Neg 09/23/2022 12:15 PM    BARBSCNU Neg 09/23/2022 12:15 PM    LABBENZ Neg 09/23/2022 12:15 PM    LABBENZ NotDTCD 12/09/2012 03:53 PM    LABMETH Neg 09/23/2022 12:15 PM OPIATESCREENURINE Neg 09/23/2022 12:15 PM    PHENCYCLIDINESCREENURINE Neg 09/23/2022 12:15 PM    ETOH <10 09/23/2022 12:25 PM     Lab Results   Component Value Date/Time    TSH 0.993 09/23/2022 12:25 PM     No results found for: LITHIUM  No results found for: VALPROATE, CBMZ    RISK ASSESSMENT: risk of impulsive behavior when stressed out    Treatment Plan:  Reviewed current Medications with the patient. Encourage patient to take meds - but refusing want to try therapy route first  Pt denies SI or HI- was just venting her feeling in therapy session    Risks, benefits, side effects, drug-to-drug interactions and alternatives to treatment were discussed. Collateral information: mom pending  CD evaluation  Encourage patient to attend group and other milieu activities.   Discharge planning discussed with the patient and treatment team.    PSYCHOTHERAPY/COUNSELING:  [x] Therapeutic interview  [x] Supportive  [] CBT  [] Ongoing  [] Other    Patient was seen 1:1 for 20 minutes, other than E&M time spent, focusing on      - coping skills techniques     - Anxiety management techniques discussed including deep breathing exercise and PMR     - discussing patients strength and weakness        - Focusing on negative cognition and maladaptive thoughts, which is feeding and maintaining the depression symptoms         [x] Patient continues to need, on a daily basis, active treatment furnished directly by or requiring the supervision of inpatient psychiatric personnel      Anticipated Length of stay:            Electronically signed by Gabriela Jurado MD on 9/25/2022 at 6:19 PM

## 2022-09-25 NOTE — PROGRESS NOTES
Pt is walking the pollard now, becomes tearful when talking about her situation, reports she wants her baby back, pt denied all , refused offer for vistaril, had tylenol this am for left arm pain that her doctor suggested P.T. to work with it, pt denied problems with sleep.

## 2022-09-25 NOTE — CARE COORDINATION
FAMILY COLLATERAL NOTE    Family/Support Name: Earl Oates #: [phone]  Relationship to Pt[de-identified] mother        Family/Support contact aware of hospitalization: Yes. Presenting Symptoms/Current Concerns:  Post Partum Depression  Situational Anxieties  Familial/Interpersonal Breakdown    Top 3 Life Stressors:   Unruly/Conduct Disordered Children  Unaddressed Post Partum Depression  Physical/Medical Stressors    Background History Relevant to Current Hospitalization:  Informant stated she was contacted by patient regarding the behavior of her three school age children. Patient became frustrated because her attempt at parenting the children was impeded by their disruptive behaviors. Informant reached out to the police in an effort to help patient gain control and decrease the children's disruption of her household. Per informant, instead of helping patient with the situation, the police contacted CPS to intervene. CPS took the older children and placed them with paternal grandmothers. Patient has an infant that was placed in [facility]. Patient in turn made comments that were threats of harm to others. Apparently, patient's comments were taken seriously and she was brought to the [facility] ER for further evaluation. Family Mental Health/Substance Use History:   Informant stated there is no history of mental illness in the family. Support Network's Goal for Hospitalization:   Informant stated patient needs to rest and/or sleep. Discharge Plan:   Informant is willing to help patient return to her home. She is willing to provide emotional, financial, and support for having patient reunited with her children. Support Network Supportive of Discharge Plan:   Yes    Support can confirm Safety of Location and Security of Weapons:   Per informant, there are no weapons at the discharge location.      Support agreeable to Safeguard and Monitor Medications (including Prescription and OTC): Informant does live with patient and cannot monitor or safeguard medication in patient's home. Although, patient is currently refusing to take any kind of prescription drugs. Identified Barriers to Compliance with Discharge Plan:   Informant is concerned that CPS will attempt to utilize this current hospitalization against patient's desire to be reunified with her children. Recommendations for Support Network:   Please be available for follow-up contact from social service if necessary.        ANTHONY Benites

## 2022-09-25 NOTE — CARE COORDINATION
Group Therapy Note    Date: 9/25/2022  Start Time: 1100  End Time:  5750    Number of Participants: 7    Type of Group: Psychotherapy    Patient's Goal:  To participate in a goal oriented group. Notes: Patient declined to attend psychoeducation group at 1100 despite encouragement by staff.      Discipline Responsible: /Counselor    ANTHONY Duarte

## 2022-09-25 NOTE — GROUP NOTE
Group Therapy Note    Date: 9/25/2022    Group Start Time: 0220  Group End Time: 5017  Group Topic: Healthy Living/Wellness    [facility]    Squire Risk        Group Therapy Note    Attendees: 13       Patient's Goal:  To discuss coping skills and participate in an activity sharing with peers    Notes:  Pt participated in group activity    Status After Intervention:  Improved    Participation Level:  Active Listener and Interactive    Participation Quality: Appropriate and Attentive      Speech:  normal      Thought Process/Content: Logical      Affective Functioning: Congruent      Mood: euthymic      Level of consciousness:  Alert and Attentive      Response to Learning: Progressing to goal      Endings: None Reported    Modes of Intervention: Education, Support, and Socialization      Discipline Responsible: Behavorial Health Tech      Signature:  Squire Risk

## 2022-09-25 NOTE — GROUP NOTE
Group Therapy Note    Date: 9/25/2022    Group Start Time: 1750  Group End Time: 1559  Group Topic: Recreational    [facility]    Renan Cohen        Group Therapy Note    Attendees: 11       Patient's Goal:  To play wii [id number] with peers     Notes:  Pt watched peers play game     Status After Intervention:  Unchanged    Participation Level:  Active Listener    Participation Quality: Appropriate      Speech:  normal      Thought Process/Content: Logical      Affective Functioning: Congruent      Mood: euthymic      Level of consciousness:  Alert      Response to Learning: Progressing to goal      Endings: None Reported    Modes of Intervention: Socialization and Activity      Discipline Responsible: Behavorial Health Tech      Signature:  Renan Cohen

## 2022-09-25 NOTE — PROGRESS NOTES
Pt noted to have exacto knife in belongings, police were notified and they took it into their possession.

## 2022-09-26 VITALS
SYSTOLIC BLOOD PRESSURE: 154 MMHG | BODY MASS INDEX: 38.04 KG/M2 | HEART RATE: 68 BPM | RESPIRATION RATE: 18 BRPM | OXYGEN SATURATION: 99 % | WEIGHT: 251 LBS | HEIGHT: 68 IN | DIASTOLIC BLOOD PRESSURE: 112 MMHG | TEMPERATURE: 98 F

## 2022-09-26 LAB
EKG ATRIAL RATE: 52 BPM
EKG P AXIS: 12 DEGREES
EKG P-R INTERVAL: 136 MS
EKG Q-T INTERVAL: 448 MS
EKG QRS DURATION: 80 MS
EKG QTC CALCULATION (BAZETT): 416 MS
EKG R AXIS: 15 DEGREES
EKG T AXIS: 18 DEGREES
EKG VENTRICULAR RATE: 52 BPM

## 2022-09-26 PROCEDURE — 99239 HOSP IP/OBS DSCHRG MGMT >30: CPT | Performed by: PSYCHIATRY & NEUROLOGY

## 2022-09-26 PROCEDURE — 6370000000 HC RX 637 (ALT 250 FOR IP): Performed by: PSYCHIATRY & NEUROLOGY

## 2022-09-26 PROCEDURE — 93010 ELECTROCARDIOGRAM REPORT: CPT | Performed by: INTERNAL MEDICINE

## 2022-09-26 RX ORDER — NITROFURANTOIN 25; 75 MG/1; MG/1
100 CAPSULE ORAL EVERY 12 HOURS SCHEDULED
Qty: 7 CAPSULE | Refills: 0 | Status: SHIPPED | OUTPATIENT
Start: 2022-09-26 | End: 2022-09-29

## 2022-09-26 RX ADMIN — NITROFURANTOIN MONOHYDRATE/MACROCRYSTALLINE 100 MG: 25; 75 CAPSULE ORAL at 10:10

## 2022-09-26 NOTE — PLAN OF CARE
Problem: Chronic Conditions and Co-morbidities  Goal: Patient's chronic conditions and co-morbidity symptoms are monitored and maintained or improved  9/26/2022 0340 by Araceli Carrera RN  Outcome: Progressing  9/25/2022 1657 by KANDI Lamb  Outcome: Progressing  Flowsheets (Taken 9/25/2022 1646)  Care Plan - Patient's Chronic Conditions and Co-Morbidity Symptoms are Monitored and Maintained or Improved:   Monitor and assess patient's chronic conditions and comorbid symptoms for stability, deterioration, or improvement   Collaborate with multidisciplinary team to address chronic and comorbid conditions and prevent exacerbation or deterioration     Problem: Depression  Goal: Will be euthymic at discharge  Description: INTERVENTIONS:  1. Administer medication as ordered  2. Provide emotional support via 1:1 interaction with staff  3. Encourage involvement in milieu/groups/activities  4. Monitor for social isolation  9/26/2022 0340 by Araceli Carrera RN  Outcome: Progressing  9/25/2022 1657 by KANDI Lamb  Outcome: Progressing     Problem: Anxiety  Goal: Will report anxiety at manageable levels  Description: INTERVENTIONS:  1. Administer medication as ordered  2. Teach and rehearse alternative coping skills  3.  Provide emotional support with 1:1 interaction with staff  9/26/2022 0340 by Araceli Carrera RN  Outcome: Progressing  9/25/2022 1657 by KANDI Lamb  Outcome: Progressing  Flowsheets (Taken 9/25/2022 1646)  Will report anxiety at manageable levels:   Administer medication as ordered   Teach and rehearse alternative coping skills     Problem: Nutrition Deficit:  Goal: Optimize nutritional status  9/26/2022 0340 by Araceli Carrera RN  Outcome: Progressing  9/25/2022 1657 by KANDI Lamb  Outcome: Not Progressing     Problem: Nutrition Deficit:  Goal: Optimize nutritional status  9/26/2022 0340 by Araceli Carrera RN  Outcome: Progressing  9/25/2022 1657 by KANDI Reyes - CNS  Outcome: Not Progressing    Patient intermittently social in common areas of unit throughout evening. Appears irritable, guarded and mistrusting of staff when approached. Reports feeling \"better\", with no elaboration. Mood irritable and confrontational when requests cannot be met or met immediately. Denies SI/HI or AVH. Affect flat.

## 2022-09-26 NOTE — DISCHARGE INSTR - DIET

## 2022-09-26 NOTE — PROGRESS NOTES
Patient did not attend group despite staff encouragement.  Electronically signed by Jakob Valenzuela on 9/26/2022 at 11:36 AM

## 2022-09-26 NOTE — CARE COORDINATION
Discharge instructions reviewed verbally and in writing including f/u appointments. Patient verbalizes understanding and signed as such. All belongings returned for discharge. Patient denies SI, HI, A/V hallucinations, mood is stable.     Discharged mother for transport home

## 2022-09-26 NOTE — DISCHARGE SUMMARY
DISCHARGE SUMMARY      Patient ID:  Donta Naik  [id number]  47 y.o.  1985      Admit date: 9/23/2022    Discharge date and time: 9/26/2022    Admitting Physician: Souleymane Whitman MD     Discharge Physician: Dr Claudene Dance MD    Admission Diagnoses: Severe episode of recurrent major depressive disorder, without psychotic features ([facility] [address] [F33.2]  Major depression, recurrent ([facility] [address] [F33.9]    Admission Condition: poor    Discharged Condition: stable    Admission Circumstance:       Patient presents to the ED for a psychiatric evaluation after she was assessed by the [facility] team and pink slipped. Patient reports she called her therapist today due to anger and frustration with her CPS worker and her daughter's paternal grandmother regarding how her CPS case is being handled with her 4 minor children. She reports she called her therapist to prevent her from calling the people she was angry with and making her case with CPS worse. Admits to telling her therapist that she wanted these people dead. Patient told her therapist she felt like buying a gun and shooting up ethority and that she \"is a weapon and will use anything to hurt that bitch. \" Patient arrived agitated and irritable however was receptive to support and reassurance from staff. Patient cooperative and controlled during assessment. Thought process organized and answers questions appropriately. Mood depressed with flat affect. Patient frequently tearful during assessment. Denies current SI or HI. Patient states \"I have these thoughts but I will not act on them. I know that will not help me or my kids. \" Patient is hopeless and helpless. Verbalizes she feels like no one is supportive and the people involved in her CPS case are working against her and preventing her from doing what she needs to do to get her children back. Denies A/V hallucinations.  Patient reports she has been having difficulty falling and staying asleep with an average of 2 hours nightly. She states she has had a decreased appetite with poor intake     Patient lives in her own home in [place] and has 5 children ages 23years old down to 7 months old. She states the fathers are not involved in the children's lives and has very little support from family or friends. Reports she had an affair with a man and became pregnant then found out he was  and had 10 baby mother's and has 15 children. She had difficulty bonding with her  daughter due to the circumstances with the father and reached out to her family for help in caring for her other children who 2 have behavioral problems. Patient states she told her mother that if no one helped she didn't know what would happen to the children. CPS was contacted and her  was removed from the home and a safety plan was completed. CPS eventually removed her other 3 minor children and placed in 2 different homes. Her  was placed with her 6year old's paternal grandmother and she had been having conflict with her for several years. The patient states she has a  and a GAL appointed to the case however she feels that everything is being held against her to prevent her from getting her children back. Patient is hearing impaired. HISTORY OF PRESENT ILLNESS:       The patient is a 40 y.o. female live alone 4 of her children with CPS with significant past history of MDD and CARMEN     Pt has hearing impairment. Pt had emotional breakdown stressed out with her children 23year old to 7 months old. Pt was getting angry and irritable sec to stressor. CPS was involved and she got angry with CPS worker. Pt gets easily irritable and angry when stressed out. Was working with her therapist reg anger management. Was talking with her therapist about her wish that  [address]. Has been in conflict with her paternal grandmother with whom she has conflict with for many years and she take care of her 6year old.    Does not mean to hurt anyone but say something out of anger     Stressors:related to custody of her children     The patient is not currently receiving care for the above psychiatric illness. Medications Prior to Admission:     Prescriptions Prior to Admission   Medications Prior to Admission: diazePAM (VALIUM) 5 MG tablet, Take 1 tablet by mouth every 12 hours as needed for Anxiety or Sleep for up to 10 days. Prenatal Vit-Fe Fumarate-FA (WESTAB PLUS) 27-1 MG TABS,         Compliance:n/a     Psychiatric Review of Systems       Depression: yes     Tana or Hypomania:  yes      Panic Attacks:  yes      Phobias:  no     Obsessions and Compulsions:  no     PTSD : no     Hallucinations:  no     Delusions:  no     Substance Abuse History:  ETOH: no   Marijuana: yes- medical MJ card  Opiates: no  Other Drugs: no        Past Psychiatric History:  Prior Diagnosis:  depression  Psychiatrist:   Agency: [facility]- Last seen in May for injection and called [facility] d/t behaviors. Has not followed up for medications since May. Pt is still open and can be referred there for f/u.    Therapist:yes- [place] [facility]  Hospitalization: no  Hx of Suicidal Attempts: no  Hx of violence:  no  ECT: no    PAST MEDICAL/PSYCHIATRIC HISTORY:   Past Medical History:   Diagnosis Date    Anxiety and depression     Asthma     Atypical chest pain 5/31/2019    Chronic back pain     Diabetes mellitus ([facility] [address]     Headache     HIV exposure 1/6/2020    Irregular heart beat     Multiple sclerosis ([facility] [address]     Osteoarthritis     Post partum depression 4/2/2019    Pure hypercholesterolemia, unspecified 5/3/2018       FAMILY/SOCIAL HISTORY:  Family History   Problem Relation Age of Onset    Cancer Mother     Arthritis Mother     Diabetes Father     Heart Disease Father     Stroke Father     High Blood Pressure Father      Social History     Socioeconomic History    Marital status: Single     Spouse name: Not on file    Number of children: Not on file    Years of education: Not on file Highest education level: Not on file   Occupational History    Not on file   Tobacco Use    Smoking status: Former     Packs/day: 0.50     Years: 10.00     Pack years: 5.00     Types: Cigarettes     Quit date: 2017     Years since quittin.6    Smokeless tobacco: Never   Vaping Use    Vaping Use: Never used   Substance and Sexual Activity    Alcohol use: Yes     Comment: occas    Drug use: Yes     Frequency: 3.0 times per week     Types: Marijuana Charmayne Stai)    Sexual activity: Yes     Partners: Male   Other Topics Concern    Not on file   Social History Narrative    Not on file     Social Determinants of Health     Financial Resource Strain: Low Risk     Difficulty of Paying Living Expenses: Not hard at all   Food Insecurity: No Food Insecurity    Worried About Running Out of Food in the Last Year: Never true    Ran Out of Food in the Last Year: Never true   Transportation Needs: Not on file   Physical Activity: Not on file   Stress: Not on file   Social Connections: Not on file   Intimate Partner Violence: Not on file   Housing Stability: Not on file       MEDICATIONS:    Current Facility-Administered Medications:     lurasidone (LATUDA) tablet 20 mg, 20 mg, Oral, Dinner, Janak Wolff MD    acetaminophen (TYLENOL) tablet 650 mg, 650 mg, Oral, Q4H PRN, Janak Wolff MD, 650 mg at 22 1008    magnesium hydroxide (MILK OF MAGNESIA) 400 MG/5ML suspension 30 mL, 30 mL, Oral, Daily PRN, Janak Wolff MD    aluminum & magnesium hydroxide-simethicone (MAALOX) 200-200-20 MG/5ML suspension 30 mL, 30 mL, Oral, PRN, Janak Wolff MD, 30 mL at 22 0187    haloperidol (HALDOL) tablet 5 mg, 5 mg, Oral, Q6H PRN **OR** haloperidol lactate (HALDOL) injection 5 mg, 5 mg, IntraMUSCular, Q6H PRN, Janak Wolff MD    benztropine mesylate (COGENTIN) injection 2 mg, 2 mg, IntraMUSCular, BID PRN, Janak Wolff MD    traZODone (DESYREL) tablet 50 mg, 50 mg, Oral, Nightly PRN, Janak Wolff MD hydrOXYzine pamoate (VISTARIL) capsule 50 mg, 50 mg, Oral, Q6H PRN **OR** hydrOXYzine (VISTARIL) injection 50 mg, 50 mg, IntraMUSCular, Q6H PRN, Drea Calabrese MD    nitrofurantoin (macrocrystal-monohydrate) (MACROBID) capsule 100 mg, 100 mg, Oral, 2 times per day, Drea Calabrese MD, 100 mg at 09/25/22 2125    Examination:  BP (!) 154/112 Comment: RN notified  Pulse 68   Temp 98 °F (36.7 °C) (Oral)   Resp 18   Ht 5' 8\" (1.727 m)   Wt 251 lb (113.9 kg)   SpO2 99%   BMI 38.16 kg/m²   Gait - steady    HOSPITAL COURSE[de-identified]  Following admission to the hospital, patient had a complete physical exam and blood work up  Patient was monitored closely with suicide precaution  Patient was started on medication which she declined. Later on she agreed to take the meds as OP and work with her doctor  Was encouraged to participate in group and other milieu activity    Not seen any agitation or behavioral disturbances  Pt is clearly overwhelmed with her stressor    Mood better, with the score of 2/10 - bad  No AVH or paranoid thoughts  No Hopeless or worthless feeling  No active SI/HI  Appetite:  [x] Normal  [] Increased  [] Decreased    Sleep:       [x] Normal  [] Fair       [] Poor            Energy:    [x] Normal  [] Increased  [] Decreased     SI [] Present  [x] Absent  HI  []Present  [x] Absent   Aggression:  [] yes  [] no  Patient is [x] able  [] unable to CONTRACT FOR SAFETY   Medication side effects(SE):  [x] None(Psych.  Meds.) [] Other      Mental Status Examination on discharge:    Level of consciousness:  within normal limits   Appearance:  well-appearing  Behavior/Motor:  no abnormalities noted  Attitude toward examiner:  attentive and good eye contact  Speech:  spontaneous, normal rate and normal volume   Mood: euthymic  Affect:  mood congruent  Thought processes:  goal directed   Thought content:  Suicidal Ideation:  denies suicidal ideation  Cognition:  oriented to person, place, and time   Concentration intact  Memory intact  Insight good   Judgement fair   Fund of Knowledge adequate      ASSESSMENT:  Patient symptoms are:  [x] Well controlled  [x] Improving  [] Worsening  [] No change      Diagnosis:  Principal Problem:    Major depression, recurrent ([facility] [address]  Resolved Problems:    * No resolved hospital problems. *      LABS:    Recent Labs     09/23/22  1225   WBC 9.2   HGB 13.2        Recent Labs     09/23/22  1225      K 3.9      CO2 25   BUN 8   CREATININE 0.70   GLUCOSE 101*     Recent Labs     09/23/22  1225   BILITOT 0.4   ALKPHOS 151*   AST 22   ALT 11     Lab Results   Component Value Date/Time    LABAMPH Neg 09/23/2022 12:15 PM    BARBSCNU Neg 09/23/2022 12:15 PM    LABBENZ Neg 09/23/2022 12:15 PM    LABBENZ NotDTCD 12/09/2012 03:53 PM    LABMETH Neg 09/23/2022 12:15 PM    OPIATESCREENURINE Neg 09/23/2022 12:15 PM    PHENCYCLIDINESCREENURINE Neg 09/23/2022 12:15 PM    ETOH <10 09/23/2022 12:25 PM     Lab Results   Component Value Date/Time    TSH 0.993 09/23/2022 12:25 PM     No results found for: LITHIUM  No results found for: VALPROATE, CBMZ    RISK ASSESSMENT AT DISCHARGE: Low risk for suicide and homicide. Treatment Plan:  Reviewed current Medications with the patient. Education provided on the complaince with treatment. Risks, benefits, side effects, drug-to-drug interactions and alternatives to treatment were discussed. Encourage patient to attend outpatient follow up appointment and therapy. Patient was advised to call the outpatient provider, visit the nearest ED or call 911 if symptoms are not manageable. Patient's family member was contacted prior to the discharge.          Medication List        START taking these medications      lurasidone 20 MG Tabs tablet  Commonly known as: LATUDA  Take 1 tablet by mouth Daily with supper     nitrofurantoin (macrocrystal-monohydrate) 100 MG capsule  Commonly known as: MACROBID  Take 1 capsule by mouth every 12 hours for 5 doses            CONTINUE taking these medications      WesTab Plus 27-1 MG Tabs            STOP taking these medications      diazePAM 5 MG tablet  Commonly known as: Valium               Where to Get Your Medications        These medications were sent to [facility] [address] [facility] [address]      Phone: [phone]   lurasidone 20 MG Tabs tablet  nitrofurantoin (macrocrystal-monohydrate) 100 MG capsule           Reason for more than one antipsychotic:   [x] N/A  [] 3 failed monotherapy(drugs tried):  [] Cross over to a new antipsychotic  [] Taper to monotherapy from polypharmacy  [] Augmentation of Clozapine therapy due to treatment resistance to single therapy        TIME SPEND - 35 MINUTES TO COMPLETE THE EVALUATION, DISCHARGE SUMMARY, MEDICATION RECONCILIATION AND FOLLOW UP CARE     Signed:  Bernadette Kaur MD  9/26/2022  9:58 AM Detail Level: Zone Include Location In Plan?: Yes Hide Additional Notes?: No

## 2022-09-26 NOTE — PROGRESS NOTES
Pt. refused to attend the 0900 community meeting.  Electronically signed by Millie Uriarte on 9/26/2022 at 9:28 AM

## 2022-09-28 ENCOUNTER — HOSPITAL ENCOUNTER (OUTPATIENT)
Dept: MRI IMAGING | Age: 37
Discharge: HOME | End: 2022-09-30
Payer: COMMERCIAL

## 2022-09-28 ENCOUNTER — HOSPITAL ENCOUNTER (OUTPATIENT)
Dept: ULTRASOUND IMAGING | Age: 37
Discharge: HOME | End: 2022-09-30
Payer: COMMERCIAL

## 2022-09-28 DIAGNOSIS — G35 MULTIPLE SCLEROSIS (HCC): ICD-10-CM

## 2022-09-28 DIAGNOSIS — M79.9 SOFT TISSUE LESION: ICD-10-CM

## 2022-09-28 PROBLEM — F31.63 BIPOLAR 1 DISORDER, MIXED, SEVERE (HCC): Status: ACTIVE | Noted: 2022-09-28

## 2022-09-28 PROCEDURE — 76999 ECHO EXAMINATION PROCEDURE: CPT

## 2022-09-28 PROCEDURE — 70551 MRI BRAIN STEM W/O DYE: CPT

## 2022-10-07 ENCOUNTER — HOSPITAL ENCOUNTER (EMERGENCY)
Age: 37
Discharge: HOME | End: 2022-10-07
Payer: COMMERCIAL

## 2022-10-07 ENCOUNTER — APPOINTMENT (OUTPATIENT)
Dept: GENERAL RADIOLOGY | Age: 37
End: 2022-10-07
Payer: COMMERCIAL

## 2022-10-07 VITALS
WEIGHT: 253.38 LBS | RESPIRATION RATE: 17 BRPM | HEIGHT: 68 IN | DIASTOLIC BLOOD PRESSURE: 74 MMHG | SYSTOLIC BLOOD PRESSURE: 118 MMHG | TEMPERATURE: 98.5 F | BODY MASS INDEX: 38.4 KG/M2 | HEART RATE: 66 BPM | OXYGEN SATURATION: 98 %

## 2022-10-07 DIAGNOSIS — M25.512 LEFT SHOULDER PAIN, UNSPECIFIED CHRONICITY: Primary | ICD-10-CM

## 2022-10-07 PROCEDURE — 99284 EMERGENCY DEPT VISIT MOD MDM: CPT

## 2022-10-07 PROCEDURE — 6360000002 HC RX W HCPCS

## 2022-10-07 PROCEDURE — 73030 X-RAY EXAM OF SHOULDER: CPT

## 2022-10-07 PROCEDURE — 96372 THER/PROPH/DIAG INJ SC/IM: CPT

## 2022-10-07 RX ORDER — KETOROLAC TROMETHAMINE 30 MG/ML
30 INJECTION, SOLUTION INTRAMUSCULAR; INTRAVENOUS ONCE
Status: COMPLETED | OUTPATIENT
Start: 2022-10-07 | End: 2022-10-07

## 2022-10-07 RX ORDER — NAPROXEN 500 MG/1
500 TABLET ORAL 2 TIMES DAILY WITH MEALS
Qty: 60 TABLET | Refills: 0 | Status: SHIPPED | OUTPATIENT
Start: 2022-10-07

## 2022-10-07 RX ADMIN — KETOROLAC TROMETHAMINE 30 MG: 30 INJECTION, SOLUTION INTRAMUSCULAR; INTRAVENOUS at 12:02

## 2022-10-07 ASSESSMENT — PAIN DESCRIPTION - FREQUENCY: FREQUENCY: CONTINUOUS

## 2022-10-07 ASSESSMENT — ENCOUNTER SYMPTOMS
VOMITING: 0
PHOTOPHOBIA: 0
COUGH: 0
ABDOMINAL PAIN: 0
NAUSEA: 0
SHORTNESS OF BREATH: 0
DIARRHEA: 0

## 2022-10-07 ASSESSMENT — PAIN DESCRIPTION - PAIN TYPE: TYPE: ACUTE PAIN

## 2022-10-07 ASSESSMENT — PAIN SCALES - GENERAL
PAINLEVEL_OUTOF10: 6
PAINLEVEL_OUTOF10: 7

## 2022-10-07 ASSESSMENT — PAIN DESCRIPTION - ORIENTATION: ORIENTATION: LEFT

## 2022-10-07 ASSESSMENT — PAIN DESCRIPTION - DESCRIPTORS: DESCRIPTORS: ACHING

## 2022-10-07 ASSESSMENT — PAIN - FUNCTIONAL ASSESSMENT: PAIN_FUNCTIONAL_ASSESSMENT: 0-10

## 2022-10-07 ASSESSMENT — PAIN DESCRIPTION - LOCATION: LOCATION: SHOULDER
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
## 2022-11-02 DIAGNOSIS — F32.A DEPRESSIVE DISORDER: ICD-10-CM

## 2022-11-03 RX ORDER — VILAZODONE HYDROCHLORIDE 10 MG/1
10 TABLET ORAL DAILY
Qty: 90 TABLET | Refills: 1 | Status: SHIPPED | OUTPATIENT
Start: 2022-11-03 | End: 2022-11-21

## 2022-11-03 NOTE — TELEPHONE ENCOUNTER
Future Appointments    Encounter Information    Provider Department Appt Notes   11/21/2022 Leticia Villa PA-C [facility] Primary Care 2 month fu     Past Visits    Date Provider Specialty Visit Type Primary Dx   09/20/2022 Leticia Villa PA-C Family Medicine Office Visit Multiple sclerosis (HCC)   08/08/2022 Leticia Villa PA-C Family Medicine Office Visit Post partum depression   07/26/2022 Bibiana Ramirez, PhD Behavioral Health Office Visit Major depressive disorder, recurrent episode, moderate with anxious distress (HCC)   07/12/2022 Bibiana Ramirez, PhD Behavioral Health Office Visit Major depressive disorder, recurrent episode, moderate with anxious distress (HCC)   07/12/2022 Leticia Villa PA-C Family Medicine Office Visit Post partum depression       Last refill 07/28/22

## 2022-11-21 ENCOUNTER — OFFICE VISIT (OUTPATIENT)
Dept: FAMILY MEDICINE CLINIC | Age: 37
End: 2022-11-21
Payer: COMMERCIAL

## 2022-11-21 VITALS
TEMPERATURE: 98.2 F | RESPIRATION RATE: 18 BRPM | HEIGHT: 68 IN | OXYGEN SATURATION: 96 % | DIASTOLIC BLOOD PRESSURE: 80 MMHG | HEART RATE: 71 BPM | SYSTOLIC BLOOD PRESSURE: 122 MMHG | BODY MASS INDEX: 36.92 KG/M2 | WEIGHT: 243.6 LBS

## 2022-11-21 DIAGNOSIS — E11.43 TYPE 2 DIABETES MELLITUS WITH DIABETIC AUTONOMIC NEUROPATHY, WITHOUT LONG-TERM CURRENT USE OF INSULIN (HCC): ICD-10-CM

## 2022-11-21 DIAGNOSIS — F33.41 RECURRENT MAJOR DEPRESSIVE DISORDER, IN PARTIAL REMISSION (HCC): ICD-10-CM

## 2022-11-21 DIAGNOSIS — J06.9 URI WITH COUGH AND CONGESTION: Primary | ICD-10-CM

## 2022-11-21 DIAGNOSIS — F31.63 BIPOLAR 1 DISORDER, MIXED, SEVERE (HCC): ICD-10-CM

## 2022-11-21 DIAGNOSIS — J10.1 INFLUENZA A: Primary | ICD-10-CM

## 2022-11-21 DIAGNOSIS — J06.9 URI WITH COUGH AND CONGESTION: ICD-10-CM

## 2022-11-21 PROBLEM — R11.0 NAUSEA: Status: RESOLVED | Noted: 2018-08-06 | Resolved: 2022-11-21

## 2022-11-21 PROBLEM — M79.9 SOFT TISSUE LESION: Status: RESOLVED | Noted: 2020-09-28 | Resolved: 2022-11-21

## 2022-11-21 PROBLEM — S46.912A LEFT SHOULDER STRAIN, INITIAL ENCOUNTER: Status: RESOLVED | Noted: 2022-09-20 | Resolved: 2022-11-21

## 2022-11-21 LAB
ADENOVIRUS BY PCR: NOT DETECTED
BORDETELLA PARAPERTUSSIS BY PCR: NOT DETECTED
BORDETELLA PERTUSSIS BY PCR: NOT DETECTED
CHLAMYDOPHILIA PNEUMONIAE BY PCR: NOT DETECTED
CORONAVIRUS 229E BY PCR: NOT DETECTED
CORONAVIRUS HKU1 BY PCR: NOT DETECTED
CORONAVIRUS NL63 BY PCR: NOT DETECTED
CORONAVIRUS OC43 BY PCR: NOT DETECTED
HUMAN METAPNEUMOVIRUS BY PCR: NOT DETECTED
HUMAN RHINOVIRUS/ENTEROVIRUS BY PCR: NOT DETECTED
INFLUENZA A H1-2009 BY PCR: DETECTED
INFLUENZA B BY PCR: NOT DETECTED
MYCOPLASMA PNEUMONIAE BY PCR: NOT DETECTED
PARAINFLUENZA VIRUS 1 BY PCR: NOT DETECTED
PARAINFLUENZA VIRUS 2 BY PCR: NOT DETECTED
PARAINFLUENZA VIRUS 3 BY PCR: NOT DETECTED
PARAINFLUENZA VIRUS 4 BY PCR: NOT DETECTED
RESPIRATORY SYNCYTIAL VIRUS BY PCR: NOT DETECTED
SARS-COV-2, PCR: NOT DETECTED

## 2022-11-21 PROCEDURE — G8417 CALC BMI ABV UP PARAM F/U: HCPCS | Performed by: PHYSICIAN ASSISTANT

## 2022-11-21 PROCEDURE — 87804 INFLUENZA ASSAY W/OPTIC: CPT | Performed by: PHYSICIAN ASSISTANT

## 2022-11-21 PROCEDURE — 1036F TOBACCO NON-USER: CPT | Performed by: PHYSICIAN ASSISTANT

## 2022-11-21 PROCEDURE — G8484 FLU IMMUNIZE NO ADMIN: HCPCS | Performed by: PHYSICIAN ASSISTANT

## 2022-11-21 PROCEDURE — 3044F HG A1C LEVEL LT 7.0%: CPT | Performed by: PHYSICIAN ASSISTANT

## 2022-11-21 PROCEDURE — 99213 OFFICE O/P EST LOW 20 MIN: CPT | Performed by: PHYSICIAN ASSISTANT

## 2022-11-21 PROCEDURE — 2022F DILAT RTA XM EVC RTNOPTHY: CPT | Performed by: PHYSICIAN ASSISTANT

## 2022-11-21 PROCEDURE — G8427 DOCREV CUR MEDS BY ELIG CLIN: HCPCS | Performed by: PHYSICIAN ASSISTANT

## 2022-11-21 RX ORDER — OSELTAMIVIR PHOSPHATE 75 MG/1
75 CAPSULE ORAL 2 TIMES DAILY
Qty: 10 CAPSULE | Refills: 0 | Status: SHIPPED | OUTPATIENT
Start: 2022-11-21 | End: 2022-11-26

## 2022-11-21 RX ORDER — DEXTROMETHORPHAN HYDROBROMIDE AND PROMETHAZINE HYDROCHLORIDE 15; 6.25 MG/5ML; MG/5ML
5 SYRUP ORAL 4 TIMES DAILY PRN
Qty: 200 ML | Refills: 0 | Status: SHIPPED | OUTPATIENT
Start: 2022-11-21 | End: 2022-12-01

## 2022-11-21 ASSESSMENT — ENCOUNTER SYMPTOMS
BACK PAIN: 0
WHEEZING: 0
RHINORRHEA: 1
SINUS PRESSURE: 1
CHEST TIGHTNESS: 0
NAUSEA: 1
COUGH: 1
SHORTNESS OF BREATH: 0
STRIDOR: 0

## 2022-11-21 NOTE — PROGRESS NOTES
Subjective  Francisco Antoine, 40 y.o. female presents today with:  Chief Complaint   Patient presents with    Follow-up     2 month follow up    URI     Feels feverish, cough. Onset x2 days ago      HPI  In the office today for follow up. Last OV with me: 9/20/22    URI. X 4 days. Currently on amoxil for dental infection. No known fevers. Cough/congestion with body aches. Home COVID testing negative. Type 2 diabetes/insulin resistance. On no medications at this time. Continues to lose weight with exercise/dietary modification. Hospital follow up. History of mood disorder/MDD/anxiety/PP depression/anxiety. Has been struggling with her mental health due to external stressors related to her children. At this time, children (including her 8 m/o) are not currently living with patient due to mental health concerns. Children services is involved. Patient has been working on the steps to regain the rights to her children; having them back at home. At this time, on no medications. No intention of harm to self or others. Review of Systems   Constitutional:  Positive for appetite change. Negative for activity change, chills, diaphoresis, fatigue, fever and unexpected weight change. HENT:  Positive for congestion, postnasal drip, rhinorrhea and sinus pressure. Respiratory:  Positive for cough. Negative for chest tightness, shortness of breath, wheezing and stridor. Cardiovascular:  Negative for chest pain, palpitations and leg swelling. Gastrointestinal:  Positive for nausea. Genitourinary:  Negative for dysuria, menstrual problem, pelvic pain and vaginal bleeding. Musculoskeletal:  Positive for arthralgias and neck stiffness. Negative for back pain, myalgias and neck pain. Neurological:  Positive for syncope (?) and numbness (intermittent, sciatic). Negative for dizziness, weakness, light-headedness and headaches.    Psychiatric/Behavioral:  Negative for agitation, confusion, Partner Violence: Not on file   Housing Stability: Not on file     Family History   Problem Relation Age of Onset    Cancer Mother     Arthritis Mother     Diabetes Father     Heart Disease Father     Stroke Father     High Blood Pressure Father      Allergies   Allergen Reactions    Amphetamine     Penicillins Hives and Other (See Comments)    Phentermine Other (See Comments)     Anxiety with hospitalization    Topiramate Other (See Comments)     DIZZY     Current Outpatient Medications   Medication Sig Dispense Refill    promethazine-dextromethorphan (PROMETHAZINE-DM) 6.25-15 MG/5ML syrup Take 5 mLs by mouth 4 times daily as needed for Cough 200 mL 0     No current facility-administered medications for this visit. PMH, Surgical Hx, Family Hx, and Social Hx reviewed and updated. Health Maintenance reviewed. Objective  Vitals:    11/21/22 0909   BP: 122/80   Site: Right Upper Arm   Position: Sitting   Cuff Size: Large Adult   Pulse: 71   Resp: 18   Temp: 98.2 °F (36.8 °C)   TempSrc: Temporal   SpO2: 96%   Weight: 243 lb 9.6 oz (110.5 kg)   Height: 5' 8\" (1.727 m)     BP Readings from Last 3 Encounters:   11/21/22 122/80   10/07/22 118/74   09/20/22 110/80     Wt Readings from Last 3 Encounters:   11/21/22 243 lb 9.6 oz (110.5 kg)   10/07/22 253 lb 6 oz (114.9 kg)   09/20/22 254 lb (115.2 kg)     Physical Exam  Vitals reviewed. Constitutional:       Appearance: She is obese. HENT:      Head: Normocephalic and atraumatic. Right Ear: External ear normal.      Left Ear: External ear normal.      Nose: Nose normal.      Mouth/Throat:      Mouth: Mucous membranes are moist.   Eyes:      Conjunctiva/sclera: Conjunctivae normal.   Cardiovascular:      Rate and Rhythm: Normal rate and regular rhythm. Pulmonary:      Effort: Pulmonary effort is normal.      Breath sounds: Normal breath sounds.    Musculoskeletal:         General: Tenderness (ROM intact, pain with abduction past 90 degress at L shoulder joint, no swelling) present. Normal range of motion. Skin:     General: Skin is warm and dry. Findings: No bruising. Neurological:      General: No focal deficit present. Mental Status: She is alert. Psychiatric:         Attention and Perception: Attention normal.         Mood and Affect: Mood normal.         Speech: Speech normal.         Behavior: Behavior normal.         Thought Content: Thought content normal.      Comments: Going to therapy; seeing children weekly. Assessment & Plan   Bibiana was seen today for follow-up and uri. Diagnoses and all orders for this visit:    URI with cough and congestion  -     Cancel: Rsv Rapid Antigen; Future  -     Respiratory Virus PCR Panel; Future  -     POCT Influenza A/B  -     promethazine-dextromethorphan (PROMETHAZINE-DM) 6.25-15 MG/5ML syrup; Take 5 mLs by mouth 4 times daily as needed for Cough    Type 2 diabetes mellitus with diabetic autonomic neuropathy, without long-term current use of insulin ([facility])   -     Respiratory Virus PCR Panel; Future    Bipolar 1 disorder, mixed, severe ([facility])    Recurrent major depressive disorder, in partial [facility]  Flu negative. Home covid negative. Continue with symptom management at this time, remain on amoxil for dental.   6 week follow up with me.      Orders Placed This Encounter   Procedures    Respiratory Virus PCR Panel     Standing Status:   Future     Standing Expiration Date:   11/21/2023    POCT Influenza A/B     Orders Placed This Encounter   Medications    promethazine-dextromethorphan (PROMETHAZINE-DM) 6.25-15 MG/5ML syrup     Sig: Take 5 mLs by mouth 4 times daily as needed for Cough     Dispense:  200 mL     Refill:  0     Medications Discontinued During This Encounter   Medication Reason    vilazodone HCl (VIIBRYD) 10 MG TABS Patient Choice    naproxen (NAPROSYN) 500 MG tablet Patient Choice    lurasidone (LATUDA) 20 MG TABS tablet Patient Choice    Prenatal Vit-Fe Fumarate-FA [place] PLUS) 27-1 MG TABS Patient Choice     Return in about 6 weeks (around 1/2/2023) for follow up in office. Reviewed with the patient: current clinical status, medications, activities and diet. Side effects, adverse effects of the medication prescribed today, as well as treatment plan/ rationale and result expectations have been discussed with the patient who expresses understanding and desires to proceed. Close follow up to evaluate treatment results and for coordination of care. I have reviewed the patient's medical history in detail and updated the computerized patient record.     Leticia Villa PA-C

## 2022-12-28 ENCOUNTER — HOSPITAL ENCOUNTER (EMERGENCY)
Age: 37
Discharge: HOME | End: 2022-12-29
Payer: COMMERCIAL

## 2022-12-28 DIAGNOSIS — I88.0 MESENTERIC ADENITIS: Primary | ICD-10-CM

## 2022-12-28 LAB
BASOPHILS ABSOLUTE: 0 K/UL (ref 0–0.2)
BASOPHILS RELATIVE PERCENT: 0.3 %
BILIRUBIN URINE: NEGATIVE
BLOOD, URINE: NEGATIVE
CLARITY: CLEAR
COLOR: YELLOW
EOSINOPHILS ABSOLUTE: 0.1 K/UL (ref 0–0.7)
EOSINOPHILS RELATIVE PERCENT: 1.2 %
GLUCOSE URINE: NEGATIVE MG/DL
HCG, URINE, POC: NEGATIVE
HCT VFR BLD CALC: 41.5 % (ref 37–47)
HEMOGLOBIN: 14 G/DL (ref 12–16)
KETONES, URINE: ABNORMAL MG/DL
LEUKOCYTE ESTERASE, URINE: NEGATIVE
LYMPHOCYTES ABSOLUTE: 3.3 K/UL (ref 1–4.8)
LYMPHOCYTES RELATIVE PERCENT: 30.7 %
Lab: NORMAL
MCH RBC QN AUTO: 30.7 PG (ref 27–31.3)
MCHC RBC AUTO-ENTMCNC: 33.8 % (ref 33–37)
MCV RBC AUTO: 90.9 FL (ref 79.4–94.8)
MONOCYTES ABSOLUTE: 0.6 K/UL (ref 0.2–0.8)
MONOCYTES RELATIVE PERCENT: 5.6 %
NEGATIVE QC PASS/FAIL: NORMAL
NEUTROPHILS ABSOLUTE: 6.6 K/UL (ref 1.4–6.5)
NEUTROPHILS RELATIVE PERCENT: 62.2 %
NITRITE, URINE: NEGATIVE
PDW BLD-RTO: 14.3 % (ref 11.5–14.5)
PH UA: 5.5 (ref 5–9)
PLATELET # BLD: 279 K/UL (ref 130–400)
POSITIVE QC PASS/FAIL: NORMAL
PROTEIN UA: NEGATIVE MG/DL
RBC # BLD: 4.56 M/UL (ref 4.2–5.4)
SARS-COV-2, NAAT: NOT DETECTED
SPECIFIC GRAVITY UA: 1.02 (ref 1–1.03)
URINE REFLEX TO CULTURE: ABNORMAL
UROBILINOGEN, URINE: 0.2 E.U./DL
WBC # BLD: 10.6 K/UL (ref 4.8–10.8)

## 2022-12-28 PROCEDURE — 96374 THER/PROPH/DIAG INJ IV PUSH: CPT

## 2022-12-28 PROCEDURE — 6360000002 HC RX W HCPCS: Performed by: PHYSICIAN ASSISTANT

## 2022-12-28 PROCEDURE — 83605 ASSAY OF LACTIC ACID: CPT

## 2022-12-28 PROCEDURE — 87502 INFLUENZA DNA AMP PROBE: CPT

## 2022-12-28 PROCEDURE — 81003 URINALYSIS AUTO W/O SCOPE: CPT

## 2022-12-28 PROCEDURE — 85025 COMPLETE CBC W/AUTO DIFF WBC: CPT

## 2022-12-28 PROCEDURE — 80053 COMPREHEN METABOLIC PANEL: CPT

## 2022-12-28 PROCEDURE — 96375 TX/PRO/DX INJ NEW DRUG ADDON: CPT

## 2022-12-28 PROCEDURE — 36415 COLL VENOUS BLD VENIPUNCTURE: CPT

## 2022-12-28 PROCEDURE — A4216 STERILE WATER/SALINE, 10 ML: HCPCS | Performed by: PHYSICIAN ASSISTANT

## 2022-12-28 PROCEDURE — 83690 ASSAY OF LIPASE: CPT

## 2022-12-28 PROCEDURE — 99285 EMERGENCY DEPT VISIT HI MDM: CPT

## 2022-12-28 PROCEDURE — 2580000003 HC RX 258: Performed by: PHYSICIAN ASSISTANT

## 2022-12-28 PROCEDURE — 2500000003 HC RX 250 WO HCPCS: Performed by: PHYSICIAN ASSISTANT

## 2022-12-28 PROCEDURE — 87635 SARS-COV-2 COVID-19 AMP PRB: CPT

## 2022-12-28 RX ORDER — 0.9 % SODIUM CHLORIDE 0.9 %
1000 INTRAVENOUS SOLUTION INTRAVENOUS ONCE
Status: COMPLETED | OUTPATIENT
Start: 2022-12-28 | End: 2022-12-29

## 2022-12-28 RX ORDER — KETOROLAC TROMETHAMINE 30 MG/ML
30 INJECTION, SOLUTION INTRAMUSCULAR; INTRAVENOUS ONCE
Status: COMPLETED | OUTPATIENT
Start: 2022-12-28 | End: 2022-12-28

## 2022-12-28 RX ORDER — ONDANSETRON 2 MG/ML
4 INJECTION INTRAMUSCULAR; INTRAVENOUS ONCE
Status: COMPLETED | OUTPATIENT
Start: 2022-12-28 | End: 2022-12-28

## 2022-12-28 RX ADMIN — FAMOTIDINE 20 MG: 10 INJECTION, SOLUTION INTRAVENOUS at 23:36

## 2022-12-28 RX ADMIN — SODIUM CHLORIDE 1000 ML: 9 INJECTION, SOLUTION INTRAVENOUS at 23:36

## 2022-12-28 RX ADMIN — KETOROLAC TROMETHAMINE 30 MG: 30 INJECTION, SOLUTION INTRAMUSCULAR; INTRAVENOUS at 23:35

## 2022-12-28 RX ADMIN — ONDANSETRON 4 MG: 2 INJECTION INTRAMUSCULAR; INTRAVENOUS at 23:36

## 2022-12-28 ASSESSMENT — ENCOUNTER SYMPTOMS
COLOR CHANGE: 0
EYE PAIN: 0
APNEA: 0
SHORTNESS OF BREATH: 0
ABDOMINAL PAIN: 1
ALLERGIC/IMMUNOLOGIC NEGATIVE: 1
NAUSEA: 1
TROUBLE SWALLOWING: 0

## 2022-12-28 ASSESSMENT — PAIN SCALES - GENERAL
PAINLEVEL_OUTOF10: 8
PAINLEVEL_OUTOF10: 8

## 2022-12-28 ASSESSMENT — PAIN - FUNCTIONAL ASSESSMENT: PAIN_FUNCTIONAL_ASSESSMENT: 0-10

## 2022-12-28 ASSESSMENT — PAIN DESCRIPTION - LOCATION: LOCATION: ABDOMEN

## 2022-12-29 ENCOUNTER — APPOINTMENT (OUTPATIENT)
Dept: CT IMAGING | Age: 37
End: 2022-12-29
Payer: COMMERCIAL

## 2022-12-29 VITALS
WEIGHT: 245 LBS | RESPIRATION RATE: 19 BRPM | TEMPERATURE: 97.2 F | BODY MASS INDEX: 37.25 KG/M2 | HEART RATE: 88 BPM | OXYGEN SATURATION: 97 % | SYSTOLIC BLOOD PRESSURE: 118 MMHG | DIASTOLIC BLOOD PRESSURE: 74 MMHG

## 2022-12-29 LAB
ALBUMIN SERPL-MCNC: 3.9 G/DL (ref 3.5–4.6)
ALP BLD-CCNC: 144 U/L (ref 40–130)
ALT SERPL-CCNC: 10 U/L (ref 0–33)
ANION GAP SERPL CALCULATED.3IONS-SCNC: 11 MEQ/L (ref 9–15)
AST SERPL-CCNC: 18 U/L (ref 0–35)
BILIRUB SERPL-MCNC: <0.2 MG/DL (ref 0.2–0.7)
BUN BLDV-MCNC: 9 MG/DL (ref 6–20)
CALCIUM SERPL-MCNC: 8.9 MG/DL (ref 8.5–9.9)
CHLORIDE BLD-SCNC: 101 MEQ/L (ref 95–107)
CO2: 25 MEQ/L (ref 20–31)
CREAT SERPL-MCNC: 0.74 MG/DL (ref 0.5–0.9)
GFR SERPL CREATININE-BSD FRML MDRD: >60 ML/MIN/{1.73_M2}
GLOBULIN: 2.7 G/DL (ref 2.3–3.5)
GLUCOSE BLD-MCNC: 97 MG/DL (ref 70–99)
INFLUENZA A BY PCR: NEGATIVE
INFLUENZA B BY PCR: NEGATIVE
LACTIC ACID: 1.1 MMOL/L (ref 0.5–2.2)
LIPASE: 27 U/L (ref 12–95)
POTASSIUM SERPL-SCNC: 3.9 MEQ/L (ref 3.4–4.9)
SODIUM BLD-SCNC: 137 MEQ/L (ref 135–144)
TOTAL PROTEIN: 6.6 G/DL (ref 6.3–8)

## 2022-12-29 PROCEDURE — 6360000004 HC RX CONTRAST MEDICATION: Performed by: PHYSICIAN ASSISTANT

## 2022-12-29 PROCEDURE — 74177 CT ABD & PELVIS W/CONTRAST: CPT

## 2022-12-29 RX ORDER — ONDANSETRON 4 MG/1
4 TABLET, ORALLY DISINTEGRATING ORAL 3 TIMES DAILY PRN
Qty: 21 TABLET | Refills: 0 | Status: SHIPPED | OUTPATIENT
Start: 2022-12-29

## 2022-12-29 RX ORDER — NAPROXEN 500 MG/1
500 TABLET ORAL 2 TIMES DAILY WITH MEALS
Qty: 60 TABLET | Refills: 0 | Status: SHIPPED | OUTPATIENT
Start: 2022-12-29

## 2022-12-29 RX ORDER — ONDANSETRON 4 MG/1
4 TABLET, ORALLY DISINTEGRATING ORAL 3 TIMES DAILY PRN
Qty: 21 TABLET | Refills: 0 | Status: SHIPPED | OUTPATIENT
Start: 2022-12-29 | End: 2022-12-29 | Stop reason: SDUPTHER

## 2022-12-29 RX ORDER — FAMOTIDINE 20 MG/1
20 TABLET, FILM COATED ORAL 2 TIMES DAILY
Qty: 60 TABLET | Refills: 0 | Status: SHIPPED | OUTPATIENT
Start: 2022-12-29 | End: 2022-12-29 | Stop reason: SDUPTHER

## 2022-12-29 RX ORDER — DICYCLOMINE HCL 20 MG
20 TABLET ORAL 4 TIMES DAILY
Qty: 120 TABLET | Refills: 0 | Status: SHIPPED | OUTPATIENT
Start: 2022-12-29 | End: 2022-12-29 | Stop reason: SDUPTHER

## 2022-12-29 RX ORDER — NAPROXEN 500 MG/1
500 TABLET ORAL 2 TIMES DAILY WITH MEALS
Qty: 60 TABLET | Refills: 0 | Status: SHIPPED | OUTPATIENT
Start: 2022-12-29 | End: 2022-12-29 | Stop reason: SDUPTHER

## 2022-12-29 RX ORDER — DICYCLOMINE HCL 20 MG
20 TABLET ORAL 4 TIMES DAILY
Qty: 120 TABLET | Refills: 0 | Status: SHIPPED | OUTPATIENT
Start: 2022-12-29 | End: 2023-01-28

## 2022-12-29 RX ORDER — FAMOTIDINE 20 MG/1
20 TABLET, FILM COATED ORAL 2 TIMES DAILY
Qty: 60 TABLET | Refills: 0 | Status: SHIPPED | OUTPATIENT
Start: 2022-12-29

## 2022-12-29 RX ADMIN — IOPAMIDOL 50 ML: 612 INJECTION, SOLUTION INTRAVENOUS at 00:22

## 2022-12-29 NOTE — ED PROVIDER NOTES
[facility] ED  eMERGENCYdEPARTMENT eNCOUnter      Pt Name: Kd Garner  MRN: [id number]  Mirlande 1985  Date of evaluation: 12/28/2022  Provider:Phuc Casanova PA-C    CHIEF COMPLAINT       Chief Complaint   Patient presents with    Abdominal Pain     X5days; Loss of appetite. Denies vomiting or diarrhea. HISTORY OF PRESENT ILLNESS  (Location/Symptom, Timing/Onset, Context/Setting, Quality, Duration, Modifying Factors, Severity.)   Bibiana Keita is a 40 y.o. female who presents to the emergency department with complaints of 5 days of general abdominal pain with intermittent nausea, dry heaves. Patient states that everything makes the pain worse and nothing improves the pain. Patient states that the pain sometimes is in the lower abdomen followed by upper abdomen and with radiation into the back. Patient is tried to take Tums without improvement    HPI    Nursing Notes were reviewed and I agree. REVIEW OF SYSTEMS    (2-9 systems for level 4, 10 or more for level 5)     Review of Systems   Constitutional:  Negative for diaphoresis and fever. HENT:  Negative for hearing loss and trouble swallowing. Eyes:  Negative for pain. Respiratory:  Negative for apnea and shortness of breath. Cardiovascular:  Negative for chest pain. Gastrointestinal:  Positive for abdominal pain and nausea. Endocrine: Negative. Genitourinary:  Negative for hematuria. Musculoskeletal:  Negative for neck pain and neck stiffness. Skin:  Negative for color change. Allergic/Immunologic: Negative. Neurological:  Negative for dizziness and numbness. Hematological: Negative. Psychiatric/Behavioral: Negative. All other systems reviewed and are negative. Except as noted above the remainder of the review of systems was reviewed and negative.        PAST MEDICAL HISTORY     Past Medical History:   Diagnosis Date    Anxiety and depression     Asthma     Atypical chest pain 5/31/2019 Chronic back pain     Diabetes mellitus (HCC)     Headache     HIV exposure 2020    Irregular heart beat     Multiple sclerosis ([facility]     Osteoarthritis     Post partum depression 2019    Pure hypercholesterolemia, unspecified 5/3/2018         SURGICAL HISTORY       Past Surgical History:   Procedure Laterality Date    CHOLECYSTECTOMY      UPPER GASTROINTESTINAL ENDOSCOPY N/A 2019    EGD ESOPHAGOGASTRODUODENOSCOPY performed by Justyna Mares MD at [address] [facility]       Discharge Medication List as of 2022  3:16 AM          ALLERGIES     Amphetamine, Penicillins, Phentermine, and Topiramate    FAMILY HISTORY       Family History   Problem Relation Age of Onset    Cancer Mother     Arthritis Mother     Diabetes Father     Heart Disease Father     Stroke Father     High Blood Pressure Father           SOCIAL HISTORY       Social History     Socioeconomic History    Marital status: Single   Tobacco Use    Smoking status: Former     Packs/day: 0.50     Years: 10.00     Pack years: 5.00     Types: Cigarettes     Quit date: 2017     Years since quittin.9    Smokeless tobacco: Never   Vaping Use    Vaping Use: Never used   Substance and Sexual Activity    Alcohol use: Yes     Comment: occas    Drug use: Yes     Frequency: 3.0 times per week     Types: Marijuana Faye Ege)    Sexual activity: Yes     Partners: Male     Social Determinants of Health     Financial Resource Strain: Low Risk     Difficulty of Paying Living Expenses: Not hard at all   Food Insecurity: No Food Insecurity    Worried About Running Out of Food in the Last Year: Never true    Ran Out of Food in the Last Year: Never true       SCREENINGS    [place] Coma Scale  Eye Opening: Spontaneous  Best Verbal Response: Oriented  Best Motor Response: Obeys commands  [place] Coma Scale Score: 15      PHYSICAL EXAM    (up to 7 forlevel 4, 8 or more for level 5)     ED Triage Vitals   BP Temp Temp Source Heart Rate Resp SpO2 Height Weight   12/28/22 2158 12/28/22 2157 12/28/22 2157 12/28/22 2157 12/28/22 2157 12/28/22 2157 -- 12/28/22 2157   108/71 97 °F (36.1 °C) Temporal 90 18 96 %  245 lb (111.1 kg)       Physical Exam  Vitals and nursing note reviewed. Constitutional:       General: She is not in acute distress. Appearance: She is well-developed. She is not diaphoretic. HENT:      Head: Normocephalic and atraumatic. Mouth/Throat:      Mouth: Mucous membranes are moist.      Pharynx: No oropharyngeal exudate. Eyes:      General: No scleral icterus. Conjunctiva/sclera: Conjunctivae normal.      Pupils: Pupils are equal, round, and reactive to light. Neck:      Trachea: No tracheal deviation. Cardiovascular:      Rate and Rhythm: Normal rate. Heart sounds: Normal heart sounds. Pulmonary:      Effort: Pulmonary effort is normal. No respiratory distress. Breath sounds: Normal breath sounds. Abdominal:      General: Bowel sounds are normal. There is no distension. Palpations: Abdomen is soft. Tenderness: There is generalized abdominal tenderness. There is no guarding or rebound. Musculoskeletal:         General: Normal range of motion. Cervical back: Normal range of motion and neck supple. No rigidity or tenderness. Skin:     General: Skin is warm and dry. Findings: No erythema or rash. Neurological:      Mental Status: She is alert and oriented to person, place, and time. Cranial Nerves: No cranial nerve deficit. Motor: No abnormal muscle tone. Psychiatric:         Behavior: Behavior normal.         Thought Content:  Thought content normal.         Judgment: Judgment normal.         DIAGNOSTIC RESULTS     RADIOLOGY:   Non-plain film images such as CT, Ultrasound and MRI are read by the radiologist. Plain radiographic images are visualized and preliminarilyinterpreted by Filomena Caceres PA-C with the below findings:        Interpretation per the Radiologist below, if available at the time of this note:    CT ABDOMEN PELVIS W IV CONTRAST Additional Contrast? None   Final Result   Scattered mildly enlarged root of mesentery lymph nodes nonspecific may be   associated with reactive adenopathy from recent enteritis although mesenteric   adenitis is an additional consideration more conspicuous in number than size. No focal area of wall thickening or inflammatory process of bowel currently   evident             LABS:  Labs Reviewed   URINALYSIS WITH REFLEX TO CULTURE - Abnormal; Notable for the following components:       Result Value    Ketones, Urine TRACE (*)     All other components within normal limits   COMPREHENSIVE METABOLIC PANEL - Abnormal; Notable for the following components:    Alkaline Phosphatase 144 (*)     All other components within normal limits   CBC WITH AUTO DIFFERENTIAL - Abnormal; Notable for the following components:    Neutrophils Absolute 6.6 (*)     All other components within normal limits   COVID-19, RAPID   RAPID INFLUENZA A/B ANTIGENS   LACTIC ACID   LIPASE   POC PREGNANCY UR-QUAL   POCT CREATININE       All other labs were within normal range or not returnedas of this dictation. EMERGENCYDEPARTMENT COURSE and DIFFERENTIAL DIAGNOSIS/MDM:   Vitals:    Vitals:    12/28/22 2157 12/28/22 2158 12/29/22 0310   BP:  108/71 118/74   Pulse: 90  88   Resp: 18  19   Temp: 97 °F (36.1 °C)  97.2 °F (36.2 °C)   TempSrc: Temporal     SpO2: 96%  97%   Weight: 245 lb (111.1 kg)         REASSESSMENT        Patient presented the emergency department with a 5-day history of abdominal pain. CT scan reveals mesenteric adenitis but is otherwise unremarkable. Patient will be discharged home with supportive therapy and PCP follow-up    MDM      PROCEDURES:    Procedures      FINAL IMPRESSION      1.  Mesenteric adenitis          DISPOSITION/PLAN   DISPOSITION Decision To Discharge 12/29/2022 02:29:11 AM      PATIENT REFERRED TO:  Anh Hightower PA-C  [address]  [place] [address] [facility]    Schedule an appointment as soon as possible for a visit   As needed, If symptoms worsen    [facility]) ED  [address] [address] [phone]  Go to   As needed, If symptoms worsen    DISCHARGE MEDICATIONS:  Discharge Medication List as of 12/29/2022  3:16 AM          (Please note that portions of this note were completed with a voice recognition program.  Efforts were made to edit the dictations but occasionally words are mis-transcribed.)    TYLER Solis PA-C  12/29/22 1457

## 2023-01-26 ENCOUNTER — OFFICE VISIT (OUTPATIENT)
Dept: FAMILY MEDICINE CLINIC | Age: 38
End: 2023-01-26

## 2023-01-26 VITALS
OXYGEN SATURATION: 98 % | WEIGHT: 236 LBS | RESPIRATION RATE: 18 BRPM | HEART RATE: 80 BPM | HEIGHT: 68 IN | SYSTOLIC BLOOD PRESSURE: 100 MMHG | BODY MASS INDEX: 35.77 KG/M2 | DIASTOLIC BLOOD PRESSURE: 76 MMHG

## 2023-01-26 DIAGNOSIS — I88.0 MESENTERIC ADENITIS: Primary | ICD-10-CM

## 2023-01-26 DIAGNOSIS — Z20.2 STD EXPOSURE: ICD-10-CM

## 2023-01-26 DIAGNOSIS — N89.8 VAGINAL DISCHARGE: ICD-10-CM

## 2023-01-26 DIAGNOSIS — Z11.4 ENCOUNTER FOR SCREENING FOR HIV: ICD-10-CM

## 2023-01-26 DIAGNOSIS — F33.41 RECURRENT MAJOR DEPRESSIVE DISORDER, IN PARTIAL REMISSION (HCC): ICD-10-CM

## 2023-01-26 DIAGNOSIS — R19.5 LOOSE STOOLS: ICD-10-CM

## 2023-01-26 DIAGNOSIS — F31.63 BIPOLAR 1 DISORDER, MIXED, SEVERE (HCC): ICD-10-CM

## 2023-01-26 DIAGNOSIS — F41.1 GAD (GENERALIZED ANXIETY DISORDER): ICD-10-CM

## 2023-01-26 DIAGNOSIS — R10.84 GENERALIZED ABDOMINAL PAIN: ICD-10-CM

## 2023-01-26 DIAGNOSIS — R59.1 GENERALIZED ENLARGED LYMPH NODES: ICD-10-CM

## 2023-01-26 PROBLEM — Z86.19 HISTORY OF HERPES GENITALIS: Status: ACTIVE | Noted: 2022-07-22

## 2023-01-26 PROBLEM — J06.9 URI WITH COUGH AND CONGESTION: Status: RESOLVED | Noted: 2022-11-21 | Resolved: 2023-01-26

## 2023-01-26 RX ORDER — PNV,CALCIUM 72/IRON/FOLIC ACID 27 MG-1 MG
TABLET ORAL
Qty: 30 TABLET | Refills: 11 | Status: SHIPPED | OUTPATIENT
Start: 2023-01-26

## 2023-01-26 RX ORDER — METRONIDAZOLE 500 MG/1
500 TABLET ORAL 3 TIMES DAILY
Qty: 30 TABLET | Refills: 0 | Status: SHIPPED | OUTPATIENT
Start: 2023-01-26 | End: 2023-02-05

## 2023-01-26 ASSESSMENT — PATIENT HEALTH QUESTIONNAIRE - PHQ9
7. TROUBLE CONCENTRATING ON THINGS, SUCH AS READING THE NEWSPAPER OR WATCHING TELEVISION: 1
5. POOR APPETITE OR OVEREATING: 1
9. THOUGHTS THAT YOU WOULD BE BETTER OFF DEAD, OR OF HURTING YOURSELF: 0
8. MOVING OR SPEAKING SO SLOWLY THAT OTHER PEOPLE COULD HAVE NOTICED. OR THE OPPOSITE, BEING SO FIGETY OR RESTLESS THAT YOU HAVE BEEN MOVING AROUND A LOT MORE THAN USUAL: 0
10. IF YOU CHECKED OFF ANY PROBLEMS, HOW DIFFICULT HAVE THESE PROBLEMS MADE IT FOR YOU TO DO YOUR WORK, TAKE CARE OF THINGS AT HOME, OR GET ALONG WITH OTHER PEOPLE: 1
6. FEELING BAD ABOUT YOURSELF - OR THAT YOU ARE A FAILURE OR HAVE LET YOURSELF OR YOUR FAMILY DOWN: 0
1. LITTLE INTEREST OR PLEASURE IN DOING THINGS: 3
SUM OF ALL RESPONSES TO PHQ QUESTIONS 1-9: 13
4. FEELING TIRED OR HAVING LITTLE ENERGY: 2
SUM OF ALL RESPONSES TO PHQ QUESTIONS 1-9: 13
3. TROUBLE FALLING OR STAYING ASLEEP: 3
SUM OF ALL RESPONSES TO PHQ QUESTIONS 1-9: 13
SUM OF ALL RESPONSES TO PHQ9 QUESTIONS 1 & 2: 6
SUM OF ALL RESPONSES TO PHQ QUESTIONS 1-9: 13
2. FEELING DOWN, DEPRESSED OR HOPELESS: 3

## 2023-01-26 ASSESSMENT — ENCOUNTER SYMPTOMS
CONSTIPATION: 0
NAUSEA: 1
COUGH: 0
RHINORRHEA: 0
CHEST TIGHTNESS: 0
DIARRHEA: 0
STRIDOR: 0
ABDOMINAL DISTENTION: 0
ABDOMINAL PAIN: 1
BLOOD IN STOOL: 0
VOMITING: 0
WHEEZING: 0
SHORTNESS OF BREATH: 0
BACK PAIN: 0
SINUS PRESSURE: 0

## 2023-01-26 NOTE — PROGRESS NOTES
Subjective  Tessa Alley, 40 y.o. female presents today with:  Chief Complaint   Patient presents with    Follow-up     6 week follow up    Abdominal Pain     Still having abdominal/ stomach issues is taking medications for the pain with no relief     Discuss Labs     Would like blood work done for HIV and any other blood test for STD had testing done vaginally at [facility] is in the office today for 6 week follow up. Last OV with me: 11/21/22    Recently presented to [facility] [place] ED on 12/28/22 with generalized abdominal pain. Lab work-up was normal/negative for acute process. CT of abdomen/pelvis showed mesenteric adenitis without any evidence of bowel wall thickening/infection/inflammation. She was discharged home with Rx for pepcid and for bentyl. Taking medication PRN; still having generalized abdominal pain. No N/V/D. There is a family history of IBD (Brother). Has had weight loss as she has been eating less given abdominal pain. Would like STD testing today. Last was sexually active in October. Has had some nonspecific vaginal discharge. The man she was with was not trustworthy, patient would like testing. Continues to fight for the return of her children. They were initially removed from her home due to concern for the safety of her youngest daughter. Patient is working with CPS for the return. Initial concern was false. Case is still open. Mirtha Ruffin is working on her own mental health. Review of Systems   Constitutional:  Positive for appetite change and unexpected weight change. Negative for activity change, chills, diaphoresis, fatigue and fever. HENT:  Positive for hearing loss (chronic). Negative for congestion, postnasal drip, rhinorrhea and sinus pressure. Respiratory:  Negative for cough, chest tightness, shortness of breath, wheezing and stridor. Cardiovascular:  Negative for chest pain, palpitations and leg swelling.    Gastrointestinal: Positive for abdominal pain and nausea. Negative for abdominal distention, blood in stool, constipation, diarrhea and vomiting. Genitourinary:  Negative for dysuria, menstrual problem, pelvic pain and vaginal bleeding. Musculoskeletal:  Positive for arthralgias. Negative for back pain, myalgias, neck pain and neck stiffness. Neurological:  Negative for dizziness, syncope, weakness, light-headedness, numbness and headaches. Psychiatric/Behavioral:  Negative for agitation, confusion, decreased concentration, dysphoric mood, self-injury, sleep disturbance and suicidal ideas. The patient is not nervous/anxious and is not hyperactive.          Baseline; denies worsening symptoms     Past Medical History:   Diagnosis Date    Anxiety and depression     Asthma     Atypical chest pain 2019    Chronic back pain     Diabetes mellitus ([facility] [address]     Headache     HIV exposure 2020    Irregular heart beat     Multiple sclerosis ([facility] [address]     Osteoarthritis     Post partum depression 2019    Pure hypercholesterolemia, unspecified 5/3/2018     Past Surgical History:   Procedure Laterality Date    CHOLECYSTECTOMY      UPPER GASTROINTESTINAL ENDOSCOPY N/A 2019    EGD ESOPHAGOGASTRODUODENOSCOPY performed by Patrizia Alegria MD at [address] [place] [address] History    Marital status: Single     Spouse name: Not on file    Number of children: Not on file    Years of education: Not on file    Highest education level: Not on file   Occupational History    Not on file   Tobacco Use    Smoking status: Former     Packs/day: 0.50     Years: 10.00     Pack years: 5.00     Types: Cigarettes     Quit date: 2017     Years since quittin.0    Smokeless tobacco: Never   Vaping Use    Vaping Use: Never used   Substance and Sexual Activity    Alcohol use: Yes     Comment: occas    Drug use: Yes     Frequency: 3.0 times per week     Types: Marijuana Angie Amble)    Sexual activity: Yes     Partners: Male Other Topics Concern    Not on file   Social History Narrative    Not on file     Social Determinants of Health     Financial Resource Strain: Low Risk     Difficulty of Paying Living Expenses: Not hard at all   Food Insecurity: No Food Insecurity    Worried About Running Out of Food in the Last Year: Never true    Ran Out of Food in the Last Year: Never true   Transportation Needs: Not on file   Physical Activity: Not on file   Stress: Not on file   Social Connections: Not on file   Intimate Partner Violence: Not on file   Housing Stability: Not on file     Family History   Problem Relation Age of Onset    Cancer Mother     Arthritis Mother     Diabetes Father     Heart Disease Father     Stroke Father     High Blood Pressure Father      Allergies   Allergen Reactions    Amphetamine     Penicillins Hives and Other (See Comments)    Phentermine Other (See Comments)     Anxiety with hospitalization    Topiramate Other (See Comments)     DIZZY     Current Outpatient Medications   Medication Sig Dispense Refill    Prenatal Vit-Fe Fumarate-FA (WESTAB PLUS) 27-1 MG TABS Take 1 tablet by mouth daily. 30 tablet 11    metroNIDAZOLE (FLAGYL) 500 MG tablet Take 1 tablet by mouth 3 times daily for 10 days 30 tablet 0    dicyclomine (BENTYL) 20 MG tablet Take 1 tablet by mouth 4 times daily 120 tablet 0    famotidine (PEPCID) 20 MG tablet Take 1 tablet by mouth 2 times daily 60 tablet 0     No current facility-administered medications for this visit. PMH, Surgical Hx, Family Hx, and Social Hx reviewed and updated. Health Maintenance reviewed.     Objective  Vitals:    01/26/23 1033 01/26/23 1119   BP: (!) 130/90 100/76   Site: Left Upper Arm Left Upper Arm   Position: Sitting Sitting   Cuff Size: Large Adult Large Adult   Pulse: 80    Resp: 18    SpO2: 98%    Weight: 236 lb (107 kg)    Height: 5' 8\" (1.727 m)      BP Readings from Last 3 Encounters:   01/26/23 100/76   12/29/22 118/74   11/21/22 122/80     Wt Readings from Last 3 Encounters:   01/26/23 236 lb (107 kg)   12/28/22 245 lb (111.1 kg)   11/21/22 243 lb 9.6 oz (110.5 kg)     Physical Exam  Vitals reviewed. Constitutional:       Appearance: She is obese. HENT:      Head: Normocephalic and atraumatic. Right Ear: External ear normal.      Left Ear: External ear normal.      Nose: Nose normal.      Mouth/Throat:      Mouth: Mucous membranes are moist.   Eyes:      Conjunctiva/sclera: Conjunctivae normal.   Cardiovascular:      Rate and Rhythm: Normal rate and regular rhythm. Pulmonary:      Effort: Pulmonary effort is normal.      Breath sounds: Normal breath sounds. Abdominal:      Palpations: Abdomen is soft. Musculoskeletal:         General: Normal range of motion. Skin:     General: Skin is warm and dry. Findings: No bruising. Neurological:      General: No focal deficit present. Mental Status: She is alert. Psychiatric:         Attention and Perception: Attention normal.         Mood and Affect: Mood normal.         Speech: Speech normal.         Behavior: Behavior normal.         Thought Content: Thought content normal.      Comments: Going to therapy; seeing children weekly. Assessment & Plan   Bibiana was seen today for follow-up, abdominal pain and discuss labs. Diagnoses and all orders for this visit:    Mesenteric adenitis  -     metroNIDAZOLE (FLAGYL) 500 MG tablet; Take 1 tablet by mouth 3 times daily for 10 days  -     Calprotectin Stool; Future  -     Gastrointestinal Panel by DNA; Future  -     H. Pylori Antigen, Stool; Future    Generalized abdominal pain  -     Calprotectin Stool; Future  -     Gastrointestinal Panel by DNA; Future  -     H. Pylori Antigen, Stool; Future    Recurrent major depressive disorder, in partial remission (HCC)    Bipolar 1 disorder, mixed, severe (HCC)    CARMEN (generalized anxiety disorder)    Loose stools  -     Calprotectin Stool; Future  -     Gastrointestinal Panel by DNA;  Future  -     H. Pylori Antigen, Stool; Future    STD exposure  -     HIV Screen; Future  -     Rpr; Future  -     Trichomonas Vaginalis Rna, Qual Tma, Pap Via; Future  -     Hepatitis C Antibody; Future  -     C.trachomatis N.gonorrhoeae DNA; Future    Vaginal discharge  -     HIV Screen; Future  -     Rpr; Future  -     Trichomonas Vaginalis Rna, Qual Tma, Pap Via; Future  -     Hepatitis C Antibody; Future  -     C.trachomatis N.gonorrhoeae DNA; Future    Encounter for screening for HIV  -     HIV Screen; Future    Generalized enlarged lymph nodes   -     HIV Screen; Future    Other orders  -     Prenatal Vit-Fe Fumarate-FA (WESTAB PLUS) 27-1 MG TABS; Take 1 tablet by mouth daily. Concerns discussed today. Testing for STD ordered. Monitor abdominal pain, additional work-up concerning adenitis. 6 week follow up. Orders Placed This Encounter   Procedures    Gastrointestinal Panel by DNA     Standing Status:   Future     Standing Expiration Date:   1/26/2024    Trichomonas Vaginalis Rna, Qual Tma, Pap Via     Standing Status:   Future     Standing Expiration Date:   1/26/2024    C.trachomatis N.gonorrhoeae DNA     Standing Status:   Future     Standing Expiration Date:   1/26/2024    Calprotectin Stool     Standing Status:   Future     Standing Expiration Date:   1/26/2024    H. Pylori Antigen, Stool     Standing Status:   Future     Standing Expiration Date:   1/26/2024    HIV Screen     Standing Status:   Future     Standing Expiration Date:   1/26/2024    Rpr     Standing Status:   Future     Standing Expiration Date:   1/26/2024    Hepatitis C Antibody     Standing Status:   Future     Standing Expiration Date:   1/26/2024     Orders Placed This Encounter   Medications    Prenatal Vit-Fe Fumarate-FA (WESTAB PLUS) 27-1 MG TABS     Sig: Take 1 tablet by mouth daily.      Dispense:  30 tablet     Refill:  11    metroNIDAZOLE (FLAGYL) 500 MG tablet     Sig: Take 1 tablet by mouth 3 times daily for 10 days     Dispense: 30 tablet     Refill:  0     Medications Discontinued During This Encounter   Medication Reason    ondansetron (ZOFRAN-ODT) 4 MG disintegrating tablet LIST CLEANUP    naproxen (NAPROSYN) 500 MG tablet LIST CLEANUP     No follow-ups on file. Reviewed with the patient: current clinical status, medications, activities and diet. Side effects, adverse effects of the medication prescribed today, as well as treatment plan/ rationale and result expectations have been discussed with the patient who expresses understanding and desires to proceed. Close follow up to evaluate treatment results and for coordination of care. I have reviewed the patient's medical history in detail and updated the computerized patient record.     Leticia Villa PA-C

## 2023-01-27 DIAGNOSIS — I88.0 MESENTERIC ADENITIS: ICD-10-CM

## 2023-01-27 DIAGNOSIS — R10.84 GENERALIZED ABDOMINAL PAIN: ICD-10-CM

## 2023-01-27 DIAGNOSIS — R19.5 LOOSE STOOLS: ICD-10-CM

## 2023-01-27 LAB
ADENOVIRUS F 40 41 PCR: NOT DETECTED
ASTROVIRUS PCR: NOT DETECTED
CAMPYLOBACTER PCR: NOT DETECTED
CRYPTOSPORIDIUM PCR: NOT DETECTED
CYCLOSPORA CAYETANENSIS PCR: NOT DETECTED
E COLI ENTEROAGGREGATIVE PCR: NOT DETECTED
E COLI ENTEROPATHOGENIC PCR: NOT DETECTED
E COLI ENTEROTOXIGENIC PCR: NOT DETECTED
E COLI SHIGELLA/ENTEROINVASIVE PCR: NOT DETECTED
ENTAMOEBA HISTOLYTICA PCR: NOT DETECTED
GIARDIA LAMBLIA PCR: NOT DETECTED
HEPATITIS C ANTIBODY: NONREACTIVE
HIV AG/AB: NONREACTIVE
NOROVIRUS GI GII PCR: DETECTED
PLESIOMONAS SHIGELLOIDES PCR: NOT DETECTED
ROTAVIRUS A PCR: NOT DETECTED
RPR: NORMAL
SALMONELLA PCR: NOT DETECTED
SAPOVIRUS PCR: NOT DETECTED
SHIGA-LIKE TOXIN-PRODUCING E. COLI (STEC) STX1/STX2: NOT DETECTED
VIBRIO CHOLERAE PCR: NOT DETECTED
VIBRIO PCR: NOT DETECTED
YERSINIA ENTEROCOLITICA PCR: NOT DETECTED

## 2023-01-30 LAB
H PYLORI ANTIGEN STOOL: NEGATIVE
SPECIMEN SOURCE: NORMAL
T. VAGINALIS AMPLIFIED: NEGATIVE

## 2023-01-31 LAB
C. TRACHOMATIS DNA ,URINE: NEGATIVE
CALPROTECTIN, FECAL: 33 UG/G
N. GONORRHOEAE DNA, URINE: NEGATIVE

## 2023-02-10 ENCOUNTER — HOSPITAL ENCOUNTER (INPATIENT)
Age: 38
LOS: 4 days | Discharge: HOME | DRG: 885 | End: 2023-02-15
Attending: EMERGENCY MEDICINE | Admitting: PSYCHIATRY & NEUROLOGY
Payer: COMMERCIAL

## 2023-02-10 DIAGNOSIS — F31.30 BIPOLAR AFFECTIVE DISORDER, CURRENT EPISODE DEPRESSED, CURRENT EPISODE SEVERITY UNSPECIFIED (HCC): Primary | ICD-10-CM

## 2023-02-10 LAB
ACETAMINOPHEN LEVEL: <5 UG/ML (ref 10–30)
ALBUMIN SERPL-MCNC: 4 G/DL (ref 3.5–4.6)
ALP BLD-CCNC: 106 U/L (ref 40–130)
ALT SERPL-CCNC: 19 U/L (ref 0–33)
AMPHETAMINE SCREEN, URINE: ABNORMAL
ANION GAP SERPL CALCULATED.3IONS-SCNC: 12 MEQ/L (ref 9–15)
AST SERPL-CCNC: 34 U/L (ref 0–35)
BARBITURATE SCREEN URINE: ABNORMAL
BASOPHILS ABSOLUTE: 0 K/UL (ref 0–0.2)
BASOPHILS RELATIVE PERCENT: 0.2 %
BENZODIAZEPINE SCREEN, URINE: ABNORMAL
BILIRUB SERPL-MCNC: 0.3 MG/DL (ref 0.2–0.7)
BILIRUBIN URINE: ABNORMAL
BLOOD, URINE: NEGATIVE
BUN BLDV-MCNC: 8 MG/DL (ref 6–20)
CALCIUM SERPL-MCNC: 9.2 MG/DL (ref 8.5–9.9)
CANNABINOID SCREEN URINE: POSITIVE
CHLORIDE BLD-SCNC: 105 MEQ/L (ref 95–107)
CLARITY: ABNORMAL
CO2: 24 MEQ/L (ref 20–31)
COCAINE METABOLITE SCREEN URINE: ABNORMAL
COLOR: ABNORMAL
CREAT SERPL-MCNC: 0.88 MG/DL (ref 0.5–0.9)
EOSINOPHILS ABSOLUTE: 0.1 K/UL (ref 0–0.7)
EOSINOPHILS RELATIVE PERCENT: 0.7 %
ETHANOL PERCENT: NORMAL G/DL
ETHANOL: <10 MG/DL (ref 0–0.08)
FENTANYL SCREEN, URINE: ABNORMAL
GFR SERPL CREATININE-BSD FRML MDRD: >60 ML/MIN/{1.73_M2}
GLOBULIN: 2.9 G/DL (ref 2.3–3.5)
GLUCOSE BLD-MCNC: 237 MG/DL (ref 70–99)
GLUCOSE URINE: NEGATIVE MG/DL
HCG(URINE) PREGNANCY TEST: NEGATIVE
HCT VFR BLD CALC: 41.8 % (ref 37–47)
HEMOGLOBIN: 13.9 G/DL (ref 12–16)
KETONES, URINE: 15 MG/DL
LEUKOCYTE ESTERASE, URINE: ABNORMAL
LIPASE: 21 U/L (ref 12–95)
LYMPHOCYTES ABSOLUTE: 2.1 K/UL (ref 1–4.8)
LYMPHOCYTES RELATIVE PERCENT: 20.7 %
Lab: ABNORMAL
MAGNESIUM: 1.8 MG/DL (ref 1.7–2.4)
MCH RBC QN AUTO: 30.2 PG (ref 27–31.3)
MCHC RBC AUTO-ENTMCNC: 33.2 % (ref 33–37)
MCV RBC AUTO: 91 FL (ref 79.4–94.8)
METHADONE SCREEN, URINE: ABNORMAL
MONOCYTES ABSOLUTE: 0.5 K/UL (ref 0.2–0.8)
MONOCYTES RELATIVE PERCENT: 5.3 %
NEUTROPHILS ABSOLUTE: 7.6 K/UL (ref 1.4–6.5)
NEUTROPHILS RELATIVE PERCENT: 73.1 %
NITRITE, URINE: NEGATIVE
OPIATE SCREEN URINE: ABNORMAL
OXYCODONE URINE: ABNORMAL
PDW BLD-RTO: 14.5 % (ref 11.5–14.5)
PH UA: 5.5 (ref 5–9)
PHENCYCLIDINE SCREEN URINE: ABNORMAL
PLATELET # BLD: 309 K/UL (ref 130–400)
POTASSIUM SERPL-SCNC: 3.5 MEQ/L (ref 3.4–4.9)
PROPOXYPHENE SCREEN: ABNORMAL
PROTEIN UA: >=300 MG/DL
RBC # BLD: 4.59 M/UL (ref 4.2–5.4)
SALICYLATE, SERUM: <0.3 MG/DL (ref 15–30)
SARS-COV-2, NAAT: NOT DETECTED
SODIUM BLD-SCNC: 141 MEQ/L (ref 135–144)
SPECIFIC GRAVITY UA: 1.04 (ref 1–1.03)
TOTAL CK: 426 U/L (ref 0–170)
TOTAL PROTEIN: 6.9 G/DL (ref 6.3–8)
TROPONIN: <0.01 NG/ML (ref 0–0.01)
UROBILINOGEN, URINE: 1 E.U./DL
WBC # BLD: 10.4 K/UL (ref 4.8–10.8)

## 2023-02-10 PROCEDURE — 84703 CHORIONIC GONADOTROPIN ASSAY: CPT

## 2023-02-10 PROCEDURE — 83735 ASSAY OF MAGNESIUM: CPT

## 2023-02-10 PROCEDURE — 87635 SARS-COV-2 COVID-19 AMP PRB: CPT

## 2023-02-10 PROCEDURE — 84484 ASSAY OF TROPONIN QUANT: CPT

## 2023-02-10 PROCEDURE — 99285 EMERGENCY DEPT VISIT HI MDM: CPT

## 2023-02-10 PROCEDURE — 80143 DRUG ASSAY ACETAMINOPHEN: CPT

## 2023-02-10 PROCEDURE — 80179 DRUG ASSAY SALICYLATE: CPT

## 2023-02-10 PROCEDURE — 96372 THER/PROPH/DIAG INJ SC/IM: CPT

## 2023-02-10 PROCEDURE — 82077 ASSAY SPEC XCP UR&BREATH IA: CPT

## 2023-02-10 PROCEDURE — 83690 ASSAY OF LIPASE: CPT

## 2023-02-10 PROCEDURE — 81001 URINALYSIS AUTO W/SCOPE: CPT

## 2023-02-10 PROCEDURE — 36415 COLL VENOUS BLD VENIPUNCTURE: CPT

## 2023-02-10 PROCEDURE — 80053 COMPREHEN METABOLIC PANEL: CPT

## 2023-02-10 PROCEDURE — 80307 DRUG TEST PRSMV CHEM ANLYZR: CPT

## 2023-02-10 PROCEDURE — 6360000002 HC RX W HCPCS: Performed by: EMERGENCY MEDICINE

## 2023-02-10 PROCEDURE — 85025 COMPLETE CBC W/AUTO DIFF WBC: CPT

## 2023-02-10 PROCEDURE — 82550 ASSAY OF CK (CPK): CPT

## 2023-02-10 RX ORDER — DIPHENHYDRAMINE HYDROCHLORIDE 50 MG/ML
50 INJECTION INTRAMUSCULAR; INTRAVENOUS ONCE
Status: COMPLETED | OUTPATIENT
Start: 2023-02-10 | End: 2023-02-10

## 2023-02-10 RX ORDER — LORAZEPAM 2 MG/ML
2 INJECTION INTRAMUSCULAR ONCE
Status: COMPLETED | OUTPATIENT
Start: 2023-02-10 | End: 2023-02-10

## 2023-02-10 RX ORDER — HALOPERIDOL 5 MG/ML
10 INJECTION INTRAMUSCULAR ONCE
Status: COMPLETED | OUTPATIENT
Start: 2023-02-10 | End: 2023-02-10

## 2023-02-10 RX ADMIN — HALOPERIDOL LACTATE 10 MG: 5 INJECTION, SOLUTION INTRAMUSCULAR at 18:05

## 2023-02-10 RX ADMIN — DIPHENHYDRAMINE HYDROCHLORIDE 50 MG: 50 INJECTION, SOLUTION INTRAMUSCULAR; INTRAVENOUS at 18:08

## 2023-02-10 RX ADMIN — LORAZEPAM 2 MG: 2 INJECTION INTRAMUSCULAR; INTRAVENOUS at 18:04

## 2023-02-10 ASSESSMENT — ENCOUNTER SYMPTOMS
SORE THROAT: 0
COUGH: 0
ABDOMINAL PAIN: 0
SHORTNESS OF BREATH: 0
VOMITING: 0
BACK PAIN: 0
DIARRHEA: 0
NAUSEA: 0

## 2023-02-10 NOTE — ED NOTES
Patient arived to unit from ED with police and RN. Patient placed in bed 1, oriented to unit, provided with extra blanket. Patient is calm and cooperative at this time.       Bailey Lloyd RN  02/10/23 8986

## 2023-02-10 NOTE — ED TRIAGE NOTES
Pt presents to ER via EMS from home with aggressive behavior and was throwing things with potential harm to others and self. Pt states, \"the she does not have any thoughts of harming herself and others and no current use of drugs or alcohol\". Pt is currently pink slipped. Pt goes from stating how \"people are trying to take my kids away from me and will not listen to me, as well as I want you to listen how you are taking to me and pay attention to the signs and what I am showing you\".

## 2023-02-10 NOTE — ED NOTES
Patient resting in bed with eyes closed, even unlabored respirations. No distress noted at this time.       Bailey Lloyd RN  02/10/23 0725

## 2023-02-10 NOTE — ED PROVIDER NOTES
[facility] ED  eMERGENCYdEPARTMENT eNCOUnter      Pt Name: Marleen Stokes  MRN: [id number]  Izzygffortunato 1985  Date of evaluation: 2/10/2023  Noel Lopez MD    CHIEF COMPLAINT           HPI  Marleen Stokes is a 40 y.o. female per chart review has a h/o  DM II, MS, depression/anxiety presents to the ED with psychosis, agitation. Pt was standing out on her porch naked and yelling. Pt thinks that the police are hiding her children. Pt denies depression, SI/HI, AVH. Pt pink slipped by [facility]. ROS  Review of Systems   Constitutional:  Negative for activity change, chills and fever. HENT:  Negative for ear pain and sore throat. Eyes:  Negative for visual disturbance. Respiratory:  Negative for cough and shortness of breath. Cardiovascular:  Negative for chest pain, palpitations and leg swelling. Gastrointestinal:  Negative for abdominal pain, diarrhea, nausea and vomiting. Genitourinary:  Negative for dysuria. Musculoskeletal:  Negative for back pain. Skin:  Negative for rash. Neurological:  Negative for dizziness and weakness. Psychiatric/Behavioral:  Positive for agitation and behavioral problems. Except as noted above the remainder of the review of systems was reviewed and negative.        PAST MEDICAL HISTORY     Past Medical History:   Diagnosis Date    Anxiety and depression     Asthma     Atypical chest pain 5/31/2019    Chronic back pain     Diabetes mellitus ([address]     Headache     HIV exposure 1/6/2020    Irregular heart beat     Multiple sclerosis ([facility] [address]     Osteoarthritis     Post partum depression 4/2/2019    Pure hypercholesterolemia, unspecified 5/3/2018         SURGICAL HISTORY       Past Surgical History:   Procedure Laterality Date    CHOLECYSTECTOMY      UPPER GASTROINTESTINAL ENDOSCOPY N/A 8/13/2019    EGD ESOPHAGOGASTRODUODENOSCOPY performed by Trevon Teixeira MD at [address] [place] [address]       Previous [facility] DICYCLOMINE (BENTYL) 20 MG TABLET    Take 1 tablet by mouth 4 times daily    FAMOTIDINE (PEPCID) 20 MG TABLET    Take 1 tablet by mouth 2 times daily    PRENATAL VIT-FE FUMARATE-FA (WESTAB PLUS) 27-1 MG TABS    Take 1 tablet by mouth daily. ALLERGIES     Amphetamine, Penicillins, Phentermine, and Topiramate    FAMILY HISTORY       Family History   Problem Relation Age of Onset    Cancer Mother     Arthritis Mother     Diabetes Father     Heart Disease Father     Stroke Father     High Blood Pressure Father           SOCIAL HISTORY       Social History     Socioeconomic History    Marital status: Single     Spouse name: None    Number of children: None    Years of education: None    Highest education level: None   Tobacco Use    Smoking status: Former     Packs/day: 0.50     Years: 10.00     Pack years: 5.00     Types: Cigarettes     Quit date: 2017     Years since quittin.0    Smokeless tobacco: Never   Vaping Use    Vaping Use: Never used   Substance and Sexual Activity    Alcohol use: Yes     Comment: occas    Drug use: Yes     Frequency: 3.0 times per week     Types: Marijuana Berneta Sergio)    Sexual activity: Yes     Partners: Male     Social Determinants of Health     Financial Resource Strain: Low Risk     Difficulty of Paying Living Expenses: Not hard at all   Food Insecurity: No Food Insecurity    Worried About Running Out of Food in the Last Year: Never true    Ran Out of Food in the Last Year: Never true         PHYSICAL EXAM       ED Triage Vitals   BP Temp Temp src Pulse Resp SpO2 Height Weight   -- -- -- -- -- -- -- --       Physical Exam  Vitals and nursing note reviewed. Constitutional:       Appearance: She is well-developed. HENT:      Head: Normocephalic. Right Ear: External ear normal.      Left Ear: External ear normal.   Eyes:      Conjunctiva/sclera: Conjunctivae normal.      Pupils: Pupils are equal, round, and reactive to light.    Cardiovascular:      Rate and Rhythm: Normal rate and regular rhythm. Heart sounds: Normal heart sounds. Pulmonary:      Effort: Pulmonary effort is normal.      Breath sounds: Normal breath sounds. Abdominal:      General: Bowel sounds are normal. There is no distension. Palpations: Abdomen is soft. Tenderness: There is no abdominal tenderness. Musculoskeletal:         General: Normal range of motion. Cervical back: Normal range of motion and neck supple. Skin:     General: Skin is warm and dry. Neurological:      Mental Status: She is alert and oriented to person, place, and time. Psychiatric:      Comments: Labile affect, yelling, pressured speech         MDM  41 yo female presents to the ED with agitation, psychosis. Pt is pink slipped by [facility]. Given agitation, psychosis, pt given IM haldol, IM benadryl, IM ativan in the ED. Labs unremarkable. UA negative. Tox positive for THC. Pt is medically clear for psych evaluation. Case discussed with Dr. Itz Naik (psych) who recommended admission. Pt admitted to psych in stable condition. Critical care time:  30 min excluding procedures          FINAL IMPRESSION      1.  Bipolar affective disorder, current episode depressed, current episode severity unspecified [facility])          DISPOSITION/PLAN   DISPOSITION Decision To Admit 02/11/2023 08:56:31 AM        DISCHARGE MEDICATIONS:  [unfilled]         Gagan Cisneros MD(electronically signed)  Attending Emergency Physician            Gagan Cisneros MD  02/11/23 0935

## 2023-02-11 PROBLEM — F31.9 BIPOLAR 1 DISORDER (HCC): Status: ACTIVE | Noted: 2023-02-11

## 2023-02-11 LAB
BACTERIA: ABNORMAL /HPF
CRYSTALS, UA: ABNORMAL /HPF
EPITHELIAL CELLS, UA: ABNORMAL /HPF (ref 0–5)
GLUCOSE BLD-MCNC: 109 MG/DL (ref 70–99)
HYALINE CASTS: ABNORMAL /LPF (ref 0–5)
PERFORMED ON: ABNORMAL
RBC UA: ABNORMAL /HPF (ref 0–2)
WBC UA: ABNORMAL /HPF (ref 0–5)

## 2023-02-11 PROCEDURE — 6370000000 HC RX 637 (ALT 250 FOR IP): Performed by: PSYCHIATRY & NEUROLOGY

## 2023-02-11 PROCEDURE — 1240000000 HC EMOTIONAL WELLNESS R&B

## 2023-02-11 RX ORDER — HYDROXYZINE PAMOATE 50 MG/1
50 CAPSULE ORAL EVERY 6 HOURS PRN
Status: DISCONTINUED | OUTPATIENT
Start: 2023-02-11 | End: 2023-02-15 | Stop reason: HOSPADM

## 2023-02-11 RX ORDER — LORAZEPAM 1 MG/1
1 TABLET ORAL EVERY 4 HOURS PRN
Status: DISCONTINUED | OUTPATIENT
Start: 2023-02-11 | End: 2023-02-15 | Stop reason: HOSPADM

## 2023-02-11 RX ORDER — ACETAMINOPHEN 325 MG/1
650 TABLET ORAL EVERY 4 HOURS PRN
Status: DISCONTINUED | OUTPATIENT
Start: 2023-02-11 | End: 2023-02-15 | Stop reason: HOSPADM

## 2023-02-11 RX ORDER — LORAZEPAM 2 MG/ML
1 INJECTION INTRAMUSCULAR EVERY 4 HOURS PRN
Status: DISCONTINUED | OUTPATIENT
Start: 2023-02-11 | End: 2023-02-15 | Stop reason: HOSPADM

## 2023-02-11 RX ORDER — TRAZODONE HYDROCHLORIDE 50 MG/1
50 TABLET ORAL NIGHTLY PRN
Status: DISCONTINUED | OUTPATIENT
Start: 2023-02-11 | End: 2023-02-15 | Stop reason: HOSPADM

## 2023-02-11 RX ORDER — HALOPERIDOL 5 MG/1
5 TABLET ORAL EVERY 6 HOURS PRN
Status: DISCONTINUED | OUTPATIENT
Start: 2023-02-11 | End: 2023-02-15 | Stop reason: HOSPADM

## 2023-02-11 RX ORDER — HALOPERIDOL 5 MG/ML
5 INJECTION INTRAMUSCULAR EVERY 6 HOURS PRN
Status: DISCONTINUED | OUTPATIENT
Start: 2023-02-11 | End: 2023-02-15 | Stop reason: HOSPADM

## 2023-02-11 RX ORDER — NICOTINE 21 MG/24HR
1 PATCH, TRANSDERMAL 24 HOURS TRANSDERMAL DAILY
Status: DISCONTINUED | OUTPATIENT
Start: 2023-02-11 | End: 2023-02-13

## 2023-02-11 RX ORDER — LORAZEPAM 1 MG/1
1 TABLET ORAL ONCE
Status: DISCONTINUED | OUTPATIENT
Start: 2023-02-11 | End: 2023-02-11

## 2023-02-11 RX ORDER — BENZTROPINE MESYLATE 1 MG/ML
2 INJECTION INTRAMUSCULAR; INTRAVENOUS 2 TIMES DAILY PRN
Status: DISCONTINUED | OUTPATIENT
Start: 2023-02-11 | End: 2023-02-15 | Stop reason: HOSPADM

## 2023-02-11 RX ADMIN — HYDROXYZINE PAMOATE 50 MG: 50 CAPSULE ORAL at 12:19

## 2023-02-11 RX ADMIN — HALOPERIDOL 5 MG: 5 TABLET ORAL at 12:19

## 2023-02-11 ASSESSMENT — LIFESTYLE VARIABLES
HOW MANY STANDARD DRINKS CONTAINING ALCOHOL DO YOU HAVE ON A TYPICAL DAY: 1 OR 2
HOW OFTEN DO YOU HAVE A DRINK CONTAINING ALCOHOL: MONTHLY OR LESS

## 2023-02-11 NOTE — ED NOTES
Patient resting quietly respirations are even and unlabored no distress noted at this time.       Joni Burks RN  02/11/23 1655

## 2023-02-11 NOTE — ED NOTES
Spoke with Dr. Francesca Quick and updated her that patient has been sleeping soundly after administration of medications for agitation. She advised to let patient sleep until she is able to participate with assessment.       Romulo Keating RN  02/11/23 8439

## 2023-02-11 NOTE — ED NOTES
Call to [address] on-call number ([phone]). Informed them of Duty to Warn r/t patient specific threats to CPS and possibly her CPS . State they will page on call and have them call ED.      Cristina Heart RN  02/11/23 7779

## 2023-02-11 NOTE — ED NOTES
Patient remains irritable did allow accucheck to be completed. \" when am I getting out here? \". Explained to writer the disposition, still in progress at time of question. Alexander Paez.  [facility]  02/11/23 9106

## 2023-02-11 NOTE — ED NOTES
Patient continues to be resting with even and non-labored respirations. No distress noted.       Bladimir Knight RN  02/11/23 5860

## 2023-02-11 NOTE — PROGRESS NOTES
Patient came out of her room for lunch, talking loud with her raspy voice, then said \"now feed me\",   Patient got her tray, sat down , then went to her room, came back out got her tray, came up to the nurses station talking about she is \"not eating this shit\", smashed her hand in it, flipped us off. Saying she did not order this  SOS was called   PO vistaril and haldol given at 21 [phone]  Patient talking about poisons and toxins from the medication going in her body.  Patient put pills in mouth, still being loud, drank a cup a water and let it all flow down the front of her, then threw cup at us

## 2023-02-11 NOTE — CARE COORDINATION
2/11/23 5:50    Patient is irritable and unapproachable.  As a result, her BH assessment was deferred

## 2023-02-11 NOTE — PROGRESS NOTES
Pt. refused to attend the 1000 skills group, despite staff encouragement.   Electronically signed by Huseyin Gregory on 2/11/2023 at 12:00 PM

## 2023-02-11 NOTE — ED NOTES
Patient resting quietly respirations are even and unlabored no distress noted at this time.       Melida Pugh RN  02/11/23 9761

## 2023-02-11 NOTE — ED NOTES
Per Dr Mitzi Hill, admit to 3w. Bipolar DO. 3w PRNs. Patient updated ands made aware of admission, nodded \"yes\" that she understood. Dr Lamont Díaz given dispo.      Braeden Wise, RN  02/11/23 Renan Penny., RN  02/11/23 4363

## 2023-02-11 NOTE — CARE COORDINATION
Pt arrived on unit irritable and unapproachable. Skin check performed with Valentin Mascorro. No contraband found and skin intact.

## 2023-02-11 NOTE — ED NOTES
Patient resting quietly respirations are even and unlabored no distress noted at this time.       Gurvinder Barbosa RN  02/10/23 2364

## 2023-02-11 NOTE — CARE COORDINATION
Report taken from [facility] in the [facility]. DX:  Bipolar. Bibiana presented via EMS from her house on a PINK slip by the [address] [place] [address]. Per [facility] clinician assessment, crisis line was contacted due to pt being outside screaming at people, displaying agitated behavior, and making homicidal statements about killing people at child services. Upon arrival to the ED, the pt was aggressive and throwing items, which required medication to aid with agitation. Bibiana was minimally cooperative with this assessment. At times she displays disorganized thinking. Her mood is labile and irritable. Bibiana reports that her neighbors or her mother called the police or the [address] [place] [address] (she states she is not sure), only because her music was loud yesterday. She denies yelling, however when it is pointed out that she has little speaking voice left she does admit to yelling much of the day yesterday. She endores poor impulse control and blames this on \"CPS\". She has difficulty sitting for the duration of this assessment. She has poor insight and minimizes the events leading up to coming to the ED. When asked about the [facility] center coming to assess her at her house, she reported a believe that it was \"Sameera\" from child services coming to watch her. She then expressed vague assault/homicidal ideation stating \"I will do anything I have to do when I get out of here, give them everything I got\". When asked if she is referring to verbal or physical harm she states \"everything\". She reports her anger and lack of impulse control only comes from her 4 children being taken away from CPS. She denies suicidal ideation or hallucinations. She was unable to tolerate further assessment.    Duty to warn complete  Vitals stable  Covid -

## 2023-02-11 NOTE — CARE COORDINATION
Pt out for lunch and brought the tray up to the nurses counter and smashed her corn pudding with her hands and said, \"I'm not eating this shit. Do you think I'm a dog? \"  Pt threatened to throw it at us. SOS called for PRN.

## 2023-02-11 NOTE — ED NOTES
Patient resting quietly respirations are even and unlabored no distress noted at this time.       Syd Verdugo RN  02/10/23 2020

## 2023-02-11 NOTE — ED NOTES
Provisional Diagnosis:     Psychosis, unspecified    Psychosocial and Contextual Factors:     Bibiana reports living by herself and her 21year old son. She reports having 4 other children that were taken from her custody by CPS. She was last admitted to [address] [place] [address] from 9/23/2022 - 9/26/2022. C-SSRS Summary:    C-SSRS Suicide Screening -   1) Within the past month, have you wished you were dead or wished you could go to sleep and not wake up? : No    2) Have you actually had any thoughts of killing yourself? : No    6) Have you ever done anything, started to do anything, or prepared to do anything to end your life?: No    Risk of Suicide: No Risk     Patient: Disorganized, irritable, demanding and a poor historian. Family: No family at the bedside. Agency: Open with the [address] [place] [address] for psychotherapy; Not attending medication management appointments. Substance Abuse:  Patient denies, however toxicology was positive or THC. Present Suicidal Behavior:    Verbal: Patient denies. Attempt: Patient denies. Past Suicidal Behavior:   Patient denies. Self-Injurious/Self-Mutilation:  Patient denies. Violence Current or Past:  Patient has a history of verbal and physical aggression. She was brought to the ED initially for aggression, required medication to aid with aggression here in the ED. Trauma Identified:    Patient does not answer    Protective Factors:    Her children    Risk Factors:    Previous psychiatric diagnosis and treatment  Impulsivity    Clinical Summary:    Bibiana presented via EMS from her house on a pink slip by the [address] [place] [address]. Per [facility] clinician assessment, crisis line was contacted due to patient being outside screaming at people, displaying agitated behavior, and making homicidal statements about killing people at child services. Upon arrival to the ED, the patient was aggressive and throwing items, which required medication to aid with agitation.    Bibiana was minimally cooperative with this assessment. At times she displays disorganized thinking. Her mood is labile and irritable. Bibiana reports that her neighbors or her mother called the police or the [address] center (she states she is not sure), only because her music was loud yesterday. She denies yelling, however when it is pointed out that she has little speaking voice left she does admit to yelling much of the day yesterday. She endores poor impulse control and blames this on \"CPS\". She has difficulty sitting for the duration of this assessment. She has poor insight and minimizes the events leading up to coming to the ED. When asked about the [facility] center coming to assess her at her house, she reported a believe that it was \"Sameera\" from child services coming to watch her. She then expressed vague assault/homicidal ideation stating \"I will do anything I have to do when I get out of here, give them everything I got\". When asked if she is referring to verbal or physical harm she states \"everything\". She reports her anger and lack of impulse control only comes from her 4 children being taken away from CPS. She denies suicidal ideation or hallucinations. She was unable to tolerate further assessment.      Level of Care Disposition:    Pending per on call psychiatrist        Gonzalez Antonio RN  02/11/23 [address]  02/11/23 [id number]

## 2023-02-11 NOTE — ED NOTES
Police called for escort, reminded of counterfeit money on person, likely should not be taken to 3w to make sure it isn't returned to patient.     Tommy Nicolas RN  02/11/23 0825

## 2023-02-12 LAB
BACTERIA: ABNORMAL /HPF
BILIRUBIN URINE: NEGATIVE
BLOOD, URINE: NEGATIVE
CLARITY: CLEAR
COLOR: YELLOW
EPITHELIAL CELLS, UA: ABNORMAL /HPF (ref 0–5)
GLUCOSE BLD-MCNC: 102 MG/DL (ref 70–99)
GLUCOSE BLD-MCNC: 108 MG/DL (ref 70–99)
GLUCOSE BLD-MCNC: 116 MG/DL (ref 70–99)
GLUCOSE URINE: NEGATIVE MG/DL
HBA1C MFR BLD: 5.9 % (ref 4.8–5.9)
HYALINE CASTS: ABNORMAL /HPF (ref 0–5)
KETONES, URINE: ABNORMAL MG/DL
LEUKOCYTE ESTERASE, URINE: ABNORMAL
NITRITE, URINE: NEGATIVE
PERFORMED ON: ABNORMAL
PH UA: 6 (ref 5–9)
PROTEIN UA: ABNORMAL MG/DL
RBC UA: ABNORMAL /HPF (ref 0–5)
SPECIFIC GRAVITY UA: 1.03 (ref 1–1.03)
UROBILINOGEN, URINE: 0.2 E.U./DL
WBC UA: ABNORMAL /HPF (ref 0–5)

## 2023-02-12 PROCEDURE — 1240000000 HC EMOTIONAL WELLNESS R&B

## 2023-02-12 PROCEDURE — 83036 HEMOGLOBIN GLYCOSYLATED A1C: CPT

## 2023-02-12 PROCEDURE — 36415 COLL VENOUS BLD VENIPUNCTURE: CPT

## 2023-02-12 PROCEDURE — 6370000000 HC RX 637 (ALT 250 FOR IP): Performed by: PSYCHIATRY & NEUROLOGY

## 2023-02-12 PROCEDURE — 81001 URINALYSIS AUTO W/SCOPE: CPT

## 2023-02-12 PROCEDURE — 93005 ELECTROCARDIOGRAM TRACING: CPT | Performed by: PSYCHIATRY & NEUROLOGY

## 2023-02-12 RX ORDER — HALOPERIDOL 5 MG/1
5 TABLET ORAL 2 TIMES DAILY
Status: DISCONTINUED | OUTPATIENT
Start: 2023-02-12 | End: 2023-02-13

## 2023-02-12 RX ORDER — HYDROXYZINE PAMOATE 25 MG/1
25 CAPSULE ORAL 2 TIMES DAILY
Status: DISCONTINUED | OUTPATIENT
Start: 2023-02-12 | End: 2023-02-15 | Stop reason: HOSPADM

## 2023-02-12 RX ADMIN — Medication 1 LOZENGE: at 19:22

## 2023-02-12 RX ADMIN — Medication 1 LOZENGE: at 13:44

## 2023-02-12 RX ADMIN — ACETAMINOPHEN 325MG 650 MG: 325 TABLET ORAL at 19:22

## 2023-02-12 RX ADMIN — ACETAMINOPHEN 325MG 650 MG: 325 TABLET ORAL at 13:44

## 2023-02-12 RX ADMIN — ACETAMINOPHEN 325MG 650 MG: 325 TABLET ORAL at 09:09

## 2023-02-12 ASSESSMENT — SLEEP AND FATIGUE QUESTIONNAIRES
SLEEP PATTERN: DIFFICULTY FALLING ASLEEP;DISTURBED/INTERRUPTED SLEEP;RESTLESSNESS;INSOMNIA
AVERAGE NUMBER OF SLEEP HOURS: 2
DO YOU HAVE DIFFICULTY SLEEPING: YES
DO YOU USE A SLEEP AID: NO

## 2023-02-12 ASSESSMENT — PAIN SCALES - GENERAL
PAINLEVEL_OUTOF10: 6
PAINLEVEL_OUTOF10: 5
PAINLEVEL_OUTOF10: 3
PAINLEVEL_OUTOF10: 4

## 2023-02-12 ASSESSMENT — PAIN DESCRIPTION - DESCRIPTORS
DESCRIPTORS: ACHING
DESCRIPTORS: ACHING
DESCRIPTORS: ACHING;STABBING;TIGHTNESS

## 2023-02-12 ASSESSMENT — PAIN DESCRIPTION - LOCATION
LOCATION: HEAD;NECK
LOCATION: HEAD
LOCATION: HEAD;NECK

## 2023-02-12 ASSESSMENT — PAIN SCALES - WONG BAKER
WONGBAKER_NUMERICALRESPONSE: 2
WONGBAKER_NUMERICALRESPONSE: 2

## 2023-02-12 ASSESSMENT — PAIN - FUNCTIONAL ASSESSMENT: PAIN_FUNCTIONAL_ASSESSMENT: ACTIVITIES ARE NOT PREVENTED

## 2023-02-12 NOTE — PLAN OF CARE
Pt is resting in her bed during assessment. Pt denies [address], pt reports high anxiety and depression, pt stated she is having trouble with CPS telling her she is getting her children then they change their mind and don't let her see her children, pt stated she has a  Caleb Gayle [phone], who is helping her and has court on Tuesday. Pt is calm and cooperative at this time. Problem: Chronic Conditions and Co-morbidities  Goal: Patient's chronic conditions and co-morbidity symptoms are monitored and maintained or improved  2/12/2023 0926 by Puja Hilliard RN  Outcome: Progressing  2/12/2023 0553 by Araceli Carrera RN  Outcome: Progressing     Problem: Depression  Goal: Will be euthymic at discharge  Description: INTERVENTIONS:  1. Administer medication as ordered  2. Provide emotional support via 1:1 interaction with staff  3. Encourage involvement in milieu/groups/activities  4. Monitor for social isolation  2/12/2023 0926 by Puja Hilliard RN  Outcome: Progressing  2/12/2023 0553 by Araceli Carrera RN  Outcome: Progressing     Problem: Tana  Goal: Will exhibit normal sleep and speech and no impulsivity  Description: INTERVENTIONS:  1. Administer medication as ordered  2. Set limits on impulsive behavior  3. Make attempts to decrease external stimuli as possible  2/12/2023 0926 by Puja Hilliard RN  Outcome: Progressing  2/12/2023 0553 by Araceli Carrera RN  Outcome: Progressing     Problem: Psychosis  Goal: Will report no hallucinations or delusions  Description: INTERVENTIONS:  1. Administer medication as  ordered  2. Assist with reality testing to support increasing orientation  3.  Assess if patient's hallucinations or delusions are encouraging self harm or harm to others and intervene as appropriate  2/12/2023 0926 by Puja Hilliard RN  Outcome: Progressing  2/12/2023 0553 by Araceli Carrera RN  Outcome: Progressing     Problem: Anxiety  Goal: Will report anxiety at manageable levels  Description: INTERVENTIONS:  1. Administer medication as ordered  2. Teach and rehearse alternative coping skills  3.  Provide emotional support with 1:1 interaction with staff  2/12/2023 0926 by Guanakito Acosta RN  Outcome: Progressing  2/12/2023 0553 by Bia Puentes RN  Outcome: Progressing     Problem: Risk for Elopement  Goal: Patient will not exit the unit/facility without proper excort  Outcome: Progressing  Flowsheets (Taken 2/12/2023 0900)  Nursing Interventions for Elopement Risk: Reduce environmental triggers

## 2023-02-12 NOTE — H&P
Attending note: H and P  this is a 41 yo female, known to the staff at [facility] from previous two admissions. She is a mother of 11 and lives with her 21year old son. She  brought in a dangerously agitated state where she was threatening a child services social workers. Patient carries a dx of psychosis,  medication noncompliant. Patient was pink-slipped by [facility] after found outside screaming at people and making homicidal statements toward CPS  workers. Upon arrival, patient was throwing objects at staff and needed medications. Patient had been functioning on little to no sleep, with anger building toward the [place] workers as well as paternal grandmother. of the 6 old child. Patient  's 4 other children in CPS custody. Patient came on the unit swing, screaming profanities through her hoarse voice. Mood was agitated with same affect Police needed to be called. Patient is a poor historian not approachable for more detailed history of current episode. Previous Psychiatric hx: 2 admissions to [facility] Psychiatry in March and September of 2022. Psychiatric Review of Systems       Depression: yes     Tana or Hypomania:  yes      Panic Attacks:  yes      Phobias:  no     Obsessions and Compulsions:  no     PTSD : no     Hallucinations:  no     Delusions:  no      Past Psychiatric History:  Prior Diagnosis:  depression  Psychiatrist:   Agency: [facility]- Last seen in May for injection and called code white d/t behaviors. Has not followed up for medications since May. Pt is still open and can be referred there for f/u.    Therapist:yes- [facility]  Hospitalization: no  Hx of Suicidal Attempts: no  Hx of violence:  no  ECT: no    .  Past Medical History:   Diagnosis Date    Anxiety and depression     Asthma     Atypical chest pain 5/31/2019    Chronic back pain     Diabetes mellitus ([facility] [address]     Headache     HIV exposure 1/6/2020    Irregular heart beat     Multiple sclerosis (HCC)     Osteoarthritis     Post partum depression 4/2/2019    Pure hypercholesterolemia, unspecified 5/3/2018     Family History   Problem Relation Age of Onset    Cancer Mother     Arthritis Mother     Diabetes Father     Heart Disease Father     Stroke Father     High Blood Pressure Father      .  Lab Results   Component Value Date     02/10/2023    K 3.5 02/10/2023     02/10/2023    CO2 24 02/10/2023    BUN 8 02/10/2023    CREATININE 0.88 02/10/2023    GLUCOSE 237 (H) 02/10/2023    CALCIUM 9.2 02/10/2023    PROT 6.9 02/10/2023    LABALBU 4.0 02/10/2023    BILITOT 0.3 02/10/2023    ALKPHOS 106 02/10/2023    AST 34 02/10/2023    ALT 19 02/10/2023    LABGLOM >60.0 02/10/2023    GFRAA >60.0 09/23/2022    AGRATIO 1.3 03/09/2019    GLOB 2.9 02/10/2023       BP: 125/85    .  Lab Results   Component Value Date    WBC 10.4 02/10/2023    HGB 13.9 02/10/2023    HCT 41.8 02/10/2023    MCV 91.0 02/10/2023     02/10/2023    LYMPHOPCT 20.7 02/10/2023    RBC 4.59 02/10/2023    MCH 30.2 02/10/2023    MCHC 33.2 02/10/2023    RDW 14.5 02/10/2023         .  Lab Results   Component Value Date    TSH 0.993 09/23/2022    TSHREFLEX 1.150 02/19/2019     .  Lab Results   Component Value Date/Time    APPEARANCE CLEAR 07/25/2021 11:09 PM    COLORU DARK YELLOW 02/10/2023 05:45 PM    LABSPEC 1.017 07/25/2021 11:09 PM    LABPH 8.0 03/28/2019 08:06 PM    NITRU Negative 02/10/2023 05:45 PM    GLUCOSEU Negative 02/10/2023 05:45 PM    GLUCOSEU NEG 09/17/2011 11:27 AM    KETUA 15 02/10/2023 05:45 PM    UROBILINOGEN 1.0 02/10/2023 05:45 PM    BILIRUBINUR MODERATE 02/10/2023 05:45 PM    BILIRUBINUR NEGATIVE 07/25/2021 11:09 PM     .Mental Status Examination:    Alert and Oriented to self place situation and time  Poorly groomed  Psychomotor tone increased to threatening  Mood anxious dangerously angry  Affect tense  Speech fast, laced with profanities  Thought process disorganized   Thought content:  Denies suicidal ideations  Denies suicidal intent  Denies suicidal plan  Admits  to homicidal ideations toward specific people, endorsed intent  denies  command auditory hallucinations  No Paranoia ellicited, no delusions  Cognition on gross exam intact  Insight poor  Judgement poor  Memory intact  Impulse control poor    . Urban Geller Diagnosis:  Bipolar disorder manic w/psychotic features  Neurology if patient permits  Start haldol 5 mg po bid. in anticipation of Orquidea Nguyen MD

## 2023-02-12 NOTE — CARE COORDINATION
Approached pt to advise her of the order for an EKG, Haldol scheduled, and Vistaril scheduled. Pt polite and smiling, but does not believe it's the medications that are helping. Pt states it's because she's out of her environment where she is disrespected. Pt spoke a lot about feeling disrespected. Nurse encouraged pt to take the meds because they are most definitely helping her mood, but pt is resistant.

## 2023-02-12 NOTE — CARE COORDINATION
Brief Intervention and Referral to Treatment Summary    Patient was provided PHQ-9, AUDIT-C and DAST Screening:      PHQ-9 Score: 0  AUDIT-C Score:  2  DAST Score:  0  UDS positive for THC    Patients substance use is considered     Low Risk/Healthy X  Moderate Risk  Harmful  Dependent    Patients depression is considered:     Minimal X  Mild   Moderate  Moderately Severe  Severe    Brief Education Was Provided X    Patient was receptive  Patient was not receptive      Brief Intervention Is Provided (Only for AUDIT-C or DAST) N/A    Patient reports readiness to decrease and/or stop use and a plan was discussed   Patient denies readiness to decrease and/or stop use and a plan was not discussed  Patient denied drug and alcohol use. As a result, no recommendations or referrals were provided to the patient at this time.   UDS was positive for [facility]  Patient is linked with [facility]ie      Recommendations/Referrals for Brief and/or Specialized Treatment Provided to Patient

## 2023-02-12 NOTE — PROGRESS NOTES
Pt. presents pleasant. Discharge focused with wanting to get her children back. \"If they just give them back to me I would be so much better. I am a good mom. \"  Reports complying with whatever CS asks her to do, \"and when it gets close to getting them back, they pull the string back and say-nope, your'e not getting them. \" Tearful at times. Reports poor appetite and fair sleep. Poor concentration \"without my kids being with me, and my memory is awful. \" Low motivation. Denies AH/VH and has no si/hi. Drinks ETOH very seldom and smokes medical marijuana daily. Denies using any other drugs. Vague with any details of admission. Resource folder given and explained.   Stressors include  1.not having my kids with me  *dance, write/journal, draw  Electronically signed by Mira Cain on 2/12/2023 at 9:45 AM

## 2023-02-12 NOTE — PROGRESS NOTES
Patient came out for breakfast,  Pleasant and cooperative  Isolating back to her room  Denies all  Wanting to be discharged

## 2023-02-12 NOTE — GROUP NOTE
Group Therapy Note    Date: 2/12/2023    Group Start Time: 1600  Group End Time: 1700  Group Topic: Healthy Living/Wellness    [facility]    Winifred Soler LPN        Group Therapy Note    Attendees: 11/24       Patient's Goal:        learning new stress management and coping skills        Status After Intervention:  Unchanged    Participation Level:  Active Listener and Interactive    Participation Quality: Appropriate and Attentive      Speech:  normal      Thought Process/Content: Logical  Linear      Affective Functioning: Congruent      Mood: euthymic      Level of consciousness:  Alert, Oriented x4, and Attentive      Response to Learning: Able to verbalize current knowledge/experience and Able to verbalize/acknowledge new learning      Endings: None Reported    Modes of Intervention: Education and Support      Discipline Responsible: Licensed Practical Nurse      Signature:  Winifred Soler LPN

## 2023-02-12 NOTE — PLAN OF CARE
Pt seen intermittently in day room throughout evening but does not interact with peers and comes out on to speak with nursing staff. She remains guarded with irritable edge and continues to decline admission assessment, sleeping often. Most of HS assessment VINICIO. Problem: Chronic Conditions and Co-morbidities  Goal: Patient's chronic conditions and co-morbidity symptoms are monitored and maintained or improved  Outcome: Progressing     Problem: Depression  Goal: Will be euthymic at discharge  Description: INTERVENTIONS:  1. Administer medication as ordered  2. Provide emotional support via 1:1 interaction with staff  3. Encourage involvement in milieu/groups/activities  4. Monitor for social isolation  Outcome: Progressing     Problem: Tana  Goal: Will exhibit normal sleep and speech and no impulsivity  Description: INTERVENTIONS:  1. Administer medication as ordered  2. Set limits on impulsive behavior  3. Make attempts to decrease external stimuli as possible  Outcome: Progressing     Problem: Psychosis  Goal: Will report no hallucinations or delusions  Description: INTERVENTIONS:  1. Administer medication as  ordered  2. Assist with reality testing to support increasing orientation  3. Assess if patient's hallucinations or delusions are encouraging self harm or harm to others and intervene as appropriate  Outcome: Progressing     Problem: Anxiety  Goal: Will report anxiety at manageable levels  Description: INTERVENTIONS:  1. Administer medication as ordered  2. Teach and rehearse alternative coping skills  3.  Provide emotional support with 1:1 interaction with staff  Outcome: Progressing

## 2023-02-12 NOTE — GROUP NOTE
Group Therapy Note    Date: 2/12/2023    Group Start Time: 1100  Group End Time: 1200  Group Topic:  Group    [facility]    Maria Guadalupe Villar RN        Group Therapy Note    Attendees: 8/24       Patient's Goal:  Learning/building  socialization skills     Status After Intervention:  Unchanged    Participation Level: Minimal    Participation Quality: Resistant      Speech:  normal      Thought Process/Content: Logical      Affective Functioning: Congruent      Mood: anxious      Level of consciousness:  Oriented x4      Response to Learning: Able to retain information      Endings: None Reported    Modes of Intervention: Education and Socialization      Discipline Responsible: /Counselor      Signature:  Maria Guadalupe Villar RN Satisfactory

## 2023-02-12 NOTE — PROGRESS NOTES
Pt. refused to attend the 1000 skills group, despite staff encouragement.   Electronically signed by Flo Gillette on 2/12/2023 at 1:10 PM

## 2023-02-12 NOTE — PROGRESS NOTES
Pt. declined to attend the 0900 community meeting, despite staff encouragement.  GOAL : \" to go home\" Electronically signed by Cata Spears on 2/12/2023 at 1:09 PM

## 2023-02-12 NOTE — CONSULTS
MEDICAL HISTORY AND PHYSICAL             Date: 2/12/2023        Patient Name: Jo Simmons     YOB: 1985      Age:  40 y.o. Chief Complaint     Chief Complaint   Patient presents with    Aggressive Behavior      History Obtained From   patient    History of Present Illness   The patient is a 57-year-old female with a significant primary history of bipolar 1 disorder, MDD, CARMEN, asthma, DM type II, osteoarthritis, who received a pink slip from [address] [place] [address], and now, admitted for psychosis and aggressive behavior. Currently, patient denies suicidal and homicidal ideation as well as any presence of visual, auditory, and tactile hallucinations. She also denies headaches, dizziness, shortness of breath, chest pain, and N/V/D. Past Medical History     Past Medical History:   Diagnosis Date    Anxiety and depression     Asthma     Atypical chest pain 5/31/2019    Chronic back pain     Diabetes mellitus ([facility] [address]     Headache     HIV exposure 1/6/2020    Irregular heart beat     Multiple sclerosis ([facility] [address]     Osteoarthritis     Post partum depression 4/2/2019    Pure hypercholesterolemia, unspecified 5/3/2018        Past Surgical History     Past Surgical History:   Procedure Laterality Date    CHOLECYSTECTOMY      UPPER GASTROINTESTINAL ENDOSCOPY N/A 8/13/2019    EGD ESOPHAGOGASTRODUODENOSCOPY performed by Holly Waite MD at [facility]        Medications Prior to Admission     Prior to Admission medications    Medication Sig Start Date End Date Taking? Authorizing Provider   Prenatal Vit-Fe Fumarate-FA (WESTAB PLUS) 27-1 MG TABS Take 1 tablet by mouth daily.  1/26/23   Leticia Villa PA-C   dicyclomine (BENTYL) 20 MG tablet Take 1 tablet by mouth 4 times daily 12/29/22 1/28/23  JIM Lynch   famotidine (PEPCID) 20 MG tablet Take 1 tablet by mouth 2 times daily 12/29/22   JIM Lynch        Allergies   Amphetamine, Penicillins, Phentermine, and Topiramate    Social History     Social History       Tobacco History       Smoking Status  Former Quit Date  1/27/2017 Smoking Frequency  0.50 packs/day for 10.00 years (5.00 pk-yrs) Smoking Tobacco Type  Cigarettes quit in 1/27/2017      Smokeless Tobacco Use  Never              Alcohol History       Alcohol Use Status  Yes Comment  occas              Drug Use       Drug Use Status  Yes Types  Marijuana (Weed) Frequency   3 times/week              Sexual Activity       Sexually Active  Yes Partners  Male                    Family History     Family History   Problem Relation Age of Onset    Cancer Mother     Arthritis Mother     Diabetes Father     Heart Disease Father     Stroke Father     High Blood Pressure Father        Review of Systems   12 point review of systems reviewed with patient with pertinent positive listed in HPI, otherwise, negative.     Physical Exam   /82   Pulse 88   Temp 98.2 °F (36.8 °C) (Oral)   Resp 18   Ht 5' 8\" (1.727 m) Comment: stated  Wt 238 lb (108 kg) Comment: stated  LMP 02/07/2023   SpO2 99%   BMI 36.19 kg/m²     CONSTITUTIONAL:  awake, alert, cooperative, no apparent distress, and appears stated age  EYES:  sclera clear  ENT:  normocepalic, without obvious abnormality  NECK:  supple, symmetrical, trachea midline  BACK:  symmetric  LUNGS:  No increased work of breathing, good air exchange, clear to auscultation bilaterally, no crackles or wheezing  CARDIOVASCULAR:  Normal apical impulse, regular rate and rhythm, normal S1 and S2, no S3 or S4, and no murmur noted  ABDOMEN:  normal bowel sounds, non-distended, and non-tender  MUSCULOSKELETAL:  there is no redness, warmth, or swelling of the joints, full range of motion noted  NEUROLOGIC:  Mental Status Exam:  Level of Alertness:   awake  Orientation:   person, place, time  Attention/Concentration:  normal  PSYCHIATRIC/BEHAVIORAL:  Behavior: Appropriate  Affect: Appropriate  SKIN:  normal skin color, texture, turgor and no redness, warmth, or swelling    Labs Recent Results (from the past 24 hour(s))   EKG 12 Lead    Collection Time: 02/12/23  9:17 AM   Result Value Ref Range    Ventricular Rate 72 BPM    Atrial Rate 72 BPM    P-R Interval 136 ms    QRS Duration 82 ms    Q-T Interval 414 ms    QTc Calculation (Bazett) 453 ms    P Axis 13 degrees    R Axis 28 degrees    T Axis 6 degrees        Imaging/Diagnostics Last 24 Hours   No results found. Assessment      Hospital Problems             Last Modified POA    * (Principal) Bipolar 1 disorder ([facility] [address] 2/11/2023 Yes       Plan   Bipolar 1 disorder, psychosis, agitation -- Dr. Steen Knows managing. DM type II, non-insulin dependent - on Accu-Cheks ACHS. Latest blood sugar 109. HbA1c pending. Cannabis abuse - urine drug screen (02/10/23) positive for cannabinoid. Possible UTI - initial UA possibly contaminated, for repeat UA. Denies painful urination and abdominal pain. Thank you for consult. Hospital medicine managing acute needs. Patient will need to follow up with PCP for chronic disease management. Time spent evaluating and intervening patient, 35 minutes.     Consultations Ordered:  IP CONSULT TO HOSPITALIST    Electronically signed by KANDI Roberts CNP on 2/12/23 at 10:40 AM EST

## 2023-02-13 ENCOUNTER — TELEPHONE (OUTPATIENT)
Dept: FAMILY MEDICINE CLINIC | Age: 38
End: 2023-02-13

## 2023-02-13 LAB
EKG ATRIAL RATE: 72 BPM
EKG P AXIS: 13 DEGREES
EKG P-R INTERVAL: 136 MS
EKG Q-T INTERVAL: 414 MS
EKG QRS DURATION: 82 MS
EKG QTC CALCULATION (BAZETT): 453 MS
EKG R AXIS: 28 DEGREES
EKG T AXIS: 6 DEGREES
EKG VENTRICULAR RATE: 72 BPM
GLUCOSE BLD-MCNC: 119 MG/DL (ref 70–99)
GLUCOSE BLD-MCNC: 126 MG/DL (ref 70–99)
GLUCOSE BLD-MCNC: 127 MG/DL (ref 70–99)
GLUCOSE BLD-MCNC: 99 MG/DL (ref 70–99)
PERFORMED ON: ABNORMAL
PERFORMED ON: NORMAL

## 2023-02-13 PROCEDURE — 1240000000 HC EMOTIONAL WELLNESS R&B

## 2023-02-13 PROCEDURE — 6370000000 HC RX 637 (ALT 250 FOR IP): Performed by: PSYCHIATRY & NEUROLOGY

## 2023-02-13 RX ORDER — ARIPIPRAZOLE 10 MG/1
10 TABLET ORAL DAILY
Status: DISCONTINUED | OUTPATIENT
Start: 2023-02-13 | End: 2023-02-15 | Stop reason: HOSPADM

## 2023-02-13 RX ADMIN — ARIPIPRAZOLE 10 MG: 10 TABLET ORAL at 12:07

## 2023-02-13 RX ADMIN — ACETAMINOPHEN 325MG 650 MG: 325 TABLET ORAL at 08:27

## 2023-02-13 RX ADMIN — ACETAMINOPHEN 325MG 650 MG: 325 TABLET ORAL at 01:42

## 2023-02-13 ASSESSMENT — PAIN DESCRIPTION - ORIENTATION: ORIENTATION: MID

## 2023-02-13 ASSESSMENT — PAIN DESCRIPTION - LOCATION
LOCATION: HEAD
LOCATION: HEAD;NECK

## 2023-02-13 ASSESSMENT — PAIN SCALES - GENERAL
PAINLEVEL_OUTOF10: 8
PAINLEVEL_OUTOF10: 8

## 2023-02-13 ASSESSMENT — PAIN DESCRIPTION - DESCRIPTORS
DESCRIPTORS: THROBBING;ACHING
DESCRIPTORS: ACHING

## 2023-02-13 NOTE — CARE COORDINATION
Psychosocial Assessment    Current Level of Psychosocial Functioning     Independent X  Dependent    Minimal Assist     Comments:  Psychosis, unspecified   Reports living by herself and her 21year old son. She reports having 4 other children that were taken from her custody by CPS. She was last admitted to [address] [place] [address] from 9/23/2022 - 9/26/2022  Patient has a history of verbal and physical aggression. She was brought to the ED initially for aggression, required medication to aid with aggression here in the ED. Psychosocial High Risk Factors (check all that apply)    Unable to obtain meds   Chronic illness/pain    Substance abuse X  Lack of Family Support   Financial stress X  Isolation   Inadequate Community Resources  Suicide attempt(s)  Not taking medications   Victim of crime   Developmental Delay  Unable to manage personal needs    Age 72 or older   Homeless  No transportation   Readmission within 30 days  Unemployment X  Traumatic Event X 4 minor children in CPS custody    Family/Supports identified: mom is supportive    Sexual Orientation:  did not disclose    Patient Strengths: independent housing, connected to [employer]. Patient Barriers: CPS involved, not attending medication management appointments. Safety plan: completed    CMHC/MH history: previous psych admissions     Plan of Care:  medication management, group/individual therapies, family meetings, psycho -education, treatment team meetings to assist with stabilization    Initial Discharge Plan:  return to her home    Clinical Summary:    Patient presented via EMS from her house on a pink slip by the [address] [place] [address]. Per [facility] clinician assessment, crisis line was contacted due to patient being outside screaming at people, displaying agitated behavior, and making homicidal statements about killing people at child services.  Upon arrival to the ED, the patient was aggressive and throwing items, which required medication to aid with agitation. Patient endores poor impulse control and blames this on \"CPS\". She reports her anger and lack of impulse control only comes from her 4 children being taken away from CPS. She has court on Tuesday (2/14/23)   She is linked with [facility] for psychotherapy; Not attending medication management appointments. She plans to discharge back to her home.  will assist with discharge per doctor's orders.

## 2023-02-13 NOTE — PLAN OF CARE
Patient is alert and oriented x 4, she walks independently with a steady gait. Patient complains of headache related to open area on her forehead (Left), tylenol administered. Patient denies having any other physical problems at this time. Patient is out and non-social with peers. Patient makes her needs known to staff. Problem: Chronic Conditions and Co-morbidities  Goal: Patient's chronic conditions and co-morbidity symptoms are monitored and maintained or improved  2/13/2023 0955 by Carmen Holden RN  Outcome: Progressing  Flowsheets (Taken 2/12/2023 1106 by Deloris Nickerson RN)  Care Plan - Patient's Chronic Conditions and Co-Morbidity Symptoms are Monitored and Maintained or Improved: Monitor and assess patient's chronic conditions and comorbid symptoms for stability, deterioration, or improvement  2/12/2023 2345 by Yessica Peoples RN  Outcome: Progressing  Flowsheets (Taken 2/12/2023 1106 by Deloris Nickerson RN)  Care Plan - Patient's Chronic Conditions and Co-Morbidity Symptoms are Monitored and Maintained or Improved: Monitor and assess patient's chronic conditions and comorbid symptoms for stability, deterioration, or improvement     Problem: Depression  Goal: Will be euthymic at discharge  Description: INTERVENTIONS:  1. Administer medication as ordered  2. Provide emotional support via 1:1 interaction with staff  3. Encourage involvement in milieu/groups/activities  4. Monitor for social isolation  2/13/2023 0955 by Carmen Holden RN  Outcome: Progressing  2/12/2023 2345 by Yessica Peoples RN  Outcome: Progressing     Problem: Tana  Goal: Will exhibit normal sleep and speech and no impulsivity  Description: INTERVENTIONS:  1. Administer medication as ordered  2. Set limits on impulsive behavior  3.  Make attempts to decrease external stimuli as possible  2/13/2023 0955 by Carmen Holden RN  Outcome: Progressing  2/12/2023 2345 by Yessica Peoples RN  Outcome: Progressing     Problem: Psychosis  Goal: Will report no hallucinations or delusions  Description: INTERVENTIONS:  1. Administer medication as  ordered  2. Assist with reality testing to support increasing orientation  3. Assess if patient's hallucinations or delusions are encouraging self harm or harm to others and intervene as appropriate  2/13/2023 0955 by Diana Monterroso RN  Outcome: Progressing  2/12/2023 2345 by Douglas Alford RN  Outcome: Progressing     Problem: Anxiety  Goal: Will report anxiety at manageable levels  Description: INTERVENTIONS:  1. Administer medication as ordered  2. Teach and rehearse alternative coping skills  3.  Provide emotional support with 1:1 interaction with staff  2/13/2023 0955 by Dinaa Monterroso RN  Outcome: Progressing  Flowsheets (Taken 2/13/2023 4600)  Will report anxiety at manageable levels:   Teach and rehearse alternative coping skills   Provide emotional support with 1:1 interaction with staff   Administer medication as ordered  2/12/2023 2345 by Douglas Alford RN  Outcome: Progressing  Flowsheets (Taken 2/12/2023 1106 by Jessica Jaeger RN)  Will report anxiety at manageable levels: Provide emotional support with 1:1 interaction with staff     Problem: Risk for Elopement  Goal: Patient will not exit the unit/facility without proper excort  2/13/2023 0955 by Diana Monterroso RN  Outcome: Progressing  Flowsheets (Taken 2/13/2023 0952)  Nursing Interventions for Elopement Risk: Reduce environmental triggers  2/12/2023 2345 by Douglas Alford RN  Outcome: Progressing  Flowsheets (Taken 2/12/2023 1106 by Jessica Jaeger RN)  Nursing Interventions for Elopement Risk: Reduce environmental triggers

## 2023-02-13 NOTE — PROGRESS NOTES
[facility] FOLLOW-UP NOTE       2/13/2023     Patient was seen and examined in person, Chart reviewed   Patient's case discussed with staff/team    Chief Complaint: homicidal ideation, poor sleep     Interim History:     Constricted, flat; incongruent   Reports previously not medication complaint  Disheveled   Labile  Poor insight and judgement; states \"mental health would be fine if everyone would just stop pissing her off\"  Tangential  Hyperverbal  Poor sleep  States 5 children are with relatives and managed by CPS  States \"all case workers are lying on her\"    Appetite:   [x] Normal/Unchanged  [] Increased  [] Decreased      Sleep:       [] Normal/Unchanged  [x] Fair       [] Poor              Energy:    [x] Normal/Unchanged  [] Increased  [] Decreased        SI [] Present  [x] Absent    HI  []Present  [x] Absent     Aggression:  [x] yes  [] No (last episode upon admission)    Patient is [x] able  [] unable to CONTRACT FOR SAFETY     PAST MEDICAL/PSYCHIATRIC HISTORY:   Past Medical History:   Diagnosis Date    Anxiety and depression     Asthma     Atypical chest pain 5/31/2019    Chronic back pain     Diabetes mellitus ([facility]     Headache     HIV exposure 1/6/2020    Irregular heart beat     Multiple sclerosis ([facility]     Osteoarthritis     Post partum depression 4/2/2019    Pure hypercholesterolemia, unspecified 5/3/2018       FAMILY/SOCIAL HISTORY:  Family History   Problem Relation Age of Onset    Cancer Mother     Arthritis Mother     Diabetes Father     Heart Disease Father     Stroke Father     High Blood Pressure Father      Social History     Socioeconomic History    Marital status: Single     Spouse name: Not on file    Number of children: Not on file    Years of education: Not on file    Highest education level: Not on file   Occupational History    Not on file   Tobacco Use    Smoking status: Former     Packs/day: 0.50     Years: 10.00     Pack years: 5.00 Types: Cigarettes     Quit date: 2017     Years since quittin.0    Smokeless tobacco: Never   Vaping Use    Vaping Use: Never used   Substance and Sexual Activity    Alcohol use: Yes     Comment: occas    Drug use: Yes     Frequency: 3.0 times per week     Types: Marijuana [place])    Sexual activity: Yes     Partners: Male   Other Topics Concern    Not on file   Social History Narrative    Not on file     Social Determinants of Health     Financial Resource Strain: Low Risk     Difficulty of Paying Living Expenses: Not hard at all   Food Insecurity: No Food Insecurity    Worried About Running Out of Food in the Last Year: Never true    Ran Out of Food in the Last Year: Never true   Transportation Needs: Not on file   Physical Activity: Not on file   Stress: Not on file   Social Connections: Not on file   Intimate Partner Violence: Not on file   Housing Stability: Not on file           ROS:  [x] All negative/unchanged except if checked.  Explain positive(checked items) below:  [] Constitutional  [] Eyes  [] Ear/Nose/Mouth/Throat  [] Respiratory  [] CV  [] GI  []   [] Musculoskeletal  [] Skin/Breast  [] Neurological  [] Endocrine  [] Heme/Lymph  [] Allergic/Immunologic    Explanation:     MEDICATIONS:    Current Facility-Administered Medications:     ARIPiprazole (ABILIFY) tablet 10 mg, 10 mg, Oral, Daily, Shemar Lebron MD    hydrOXYzine pamoate (VISTARIL) capsule 25 mg, 25 mg, Oral, BID, Altagracia Kennedy MD    Benzocaine-Menthol (CEPACOL) 1 lozenge, 1 lozenge, Oral, Q2H PRN, Altagracia Kennedy MD, 1 lozenge at 23 1922    haloperidol (HALDOL) tablet 5 mg, 5 mg, Oral, Q6H PRN, 5 mg at 23 1219 **OR** haloperidol lactate (HALDOL) injection 5 mg, 5 mg, IntraMUSCular, Q6H PRN, Altagracia Kennedy MD    hydrOXYzine pamoate (VISTARIL) capsule 50 mg, 50 mg, Oral, Q6H PRN **OR** hydrOXYzine pamoate (VISTARIL) capsule 50 mg, 50 mg, Oral, Q6H PRN, Altagracia Kennedy MD, 50 mg at 02/11/23 1219    acetaminophen (TYLENOL) tablet 650 mg, 650 mg, Oral, Q4H PRN, Lindsay Kennedy MD, 650 mg at 02/13/23 0827    traZODone (DESYREL) tablet 50 mg, 50 mg, Oral, Nightly PRN, Altagracia Kennedy MD    benztropine mesylate (COGENTIN) injection 2 mg, 2 mg, IntraMUSCular, BID PRN, Altagracia Kennedy MD    magnesium hydroxide (MILK OF MAGNESIA) 400 MG/5ML suspension 30 mL, 30 mL, Oral, Daily PRN, Altagracia Kennedy MD    LORazepam (ATIVAN) tablet 1 mg, 1 mg, Oral, Q4H PRN **OR** LORazepam (ATIVAN) injection 1 mg, 1 mg, IntraMUSCular, Q4H PRN, Altagracia Kennedy MD      Examination:  /82   Pulse 62   Temp 97.5 °F (36.4 °C) (Oral)   Resp 18   Ht 5' 8\" (1.727 m) Comment: stated  Wt 238 lb (108 kg) Comment: stated  LMP 02/07/2023   SpO2 99%   BMI 36.19 kg/m²   Gait - steady  Medication side effects(SE): denies     Mental Status Examination:    Level of consciousness:  within normal limits   Appearance:  fair grooming and fair hygiene  Behavior/Motor:  no abnormalities noted  Attitude toward examiner:  cooperative and good eye contact  Speech:  spontaneous, normal volume, and hyperverbal   Mood: constricted, decreased range, and labile  Affect:  blunted and mood incongruent  Thought processes:  tangential and circumstantial   Thought content:  Suicidal Ideation:  denies suicidal ideation  Delusions:  no evidence of delusions  Perceptual Disturbance:  denies any perceptual disturbance  Cognition:  oriented to person, place, and time   Concentration distractible  Insight poor   Judgement poor     ASSESSMENT:   Patient symptoms are:  [] Well controlled  [x] Improving  [] Worsening  [] No change      Diagnosis:   Principal Problem:    Bipolar 1 disorder, mixed, severe ([facility]  Resolved Problems:    * No resolved hospital problems.  *      LABS:    Recent Labs     02/10/23  1745   WBC 10.4   HGB 13.9        Recent Labs     02/10/23  1745      K 3.5      CO2 24 BUN 8   CREATININE 0.88   GLUCOSE 237*     Recent Labs     02/10/23  1745   BILITOT 0.3   ALKPHOS 106   AST 34   ALT 19     Lab Results   Component Value Date/Time    LABAMPH Neg 02/10/2023 05:45 PM    BARBSCNU Neg 02/10/2023 05:45 PM    LABBENZ Neg 02/10/2023 05:45 PM    LABBENZ NotDTCD 12/09/2012 03:53 PM    LABMETH Neg 02/10/2023 05:45 PM    OPIATESCREENURINE Neg 02/10/2023 05:45 PM    PHENCYCLIDINESCREENURINE Neg 02/10/2023 05:45 PM    ETOH <10 02/10/2023 05:45 PM     Lab Results   Component Value Date/Time    TSH 0.993 09/23/2022 12:25 PM     No results found for: LITHIUM  No results found for: VALPROATE, CBMZ    RISK ASSESSMENT: impulsivity high, aggression high    Treatment Plan:  Reviewed current Medications with the patient. Initiate Abilify 10mg QD  Risks, benefits, side effects, drug-to-drug interactions and alternatives to treatment were discussed. Collateral information: see  notes   CD evaluation THC  Encourage patient to attend group and other milieu activities. Discharge planning discussed with the patient and treatment team.    PSYCHOTHERAPY/COUNSELING:  [x] Therapeutic interview  [x] Supportive  [] CBT  [] Ongoing  [] Other    [x] Patient continues to need, on a daily basis, active treatment furnished directly by or requiring the supervision of inpatient psychiatric personnel      Anticipated Length of stay: 3 days         COSIGN : yes    Electronically signed by KANDI Porras CNP on 2/13/2023 at 11:36 AM      Addendum:  Alva Benjamin seen with NP after attending the team meeting, discussing the patient with the nursing and social work staff. I participated during the interview process as well as the treatment plan and spent more than 70% of the time with the nurse practitioner helping me in documentation.   I agree with the above documentation of clinical finding and treatment plan    Physician Signature: Electronically signed by Parth Davila MD on 2/13/2023 at 5:34 PM

## 2023-02-13 NOTE — GROUP NOTE
Group Therapy Note    Date: 2/13/2023    Group Start Time: 1000  Group End Time: 1100  Group Topic: Psychoeducation    [facility] 3[facility] I    Mary Blackwell        Group Therapy Note    Attendees: 8/23       Patient's Goal:  \"To go home\"     Notes:  Patient attended the 1000 skills group. Patient was talkative shared openly with her peers and overall participation was good with much interests. Status After Intervention:  Improved    Participation Level:  Active Listener    Participation Quality: Appropriate      Speech:  normal      Thought Process/Content: Linear      Affective Functioning: Congruent      Mood:  calm      Level of consciousness:  Alert and Attentive      Response to Learning: Progressing to goal      Endings: None Reported    Modes of Intervention: Education, Socialization, and Activity      Discipline Responsible: Psychoeducational Specialist      Signature:  Mary Blackwell

## 2023-02-13 NOTE — PROGRESS NOTES
Pt reports depression level is #1/10, reports no anxiety @ this time. Pt denies SI/HI/AVH. Pt reports mind races constantly. Pt reports attending all groups. Pt reports showering today. , pt reports good appetite, Pt reports not sleeping good. ,will offer prn Trazodone @ .

## 2023-02-13 NOTE — GROUP NOTE
Group Therapy Note    Date: 2/13/2023    Group Start Time: 1400  Group End Time: 5086  Group Topic: Healthy Living/Wellness    [facility] I    Fallon Quiles RN; Sophie Gay RN        Group Therapy Note    Attendees: 6/20       Patient's Goal:  To discuss ways to engage in self care and the importance of it. Notes:  Pt actively participated in group. Status After Intervention:  Improved    Participation Level:  Active Listener and Interactive    Participation Quality: Appropriate      Speech:  normal      Thought Process/Content: Logical      Affective Functioning: Congruent      Mood:  WNL      Level of consciousness:  Alert and Oriented x4      Response to Learning: Progressing to goal      Endings: None Reported    Modes of Intervention: Education and Support      Discipline Responsible: Registered Nurse      Signature:  Fallon Quiles RN

## 2023-02-13 NOTE — FLOWSHEET NOTE
Patient states she does not need the haldol and visteril. Patient has been pleasant, calm and co-operative with staff. Patient is hard of hearing in left ear.

## 2023-02-13 NOTE — GROUP NOTE
Group Therapy Note    Date: 2/12/2023    Group Start Time: 1945  Group End Time: 2015  Group Topic: Wrap-Up    ML 3W GABE Sanchez RN    To attend wrap up group and state whether or not goal was met. Group Therapy Note    Attendees: 19/24         Pt did not attend wrap up group despite encouragement from staff.      Signature:  Magan Sanchez RN

## 2023-02-13 NOTE — PROGRESS NOTES
Pt out on unit, but not social with peers. Pt flat affect, slow to response, evasive, poor insight related to admission to [address] [place] [address]. Pt reports showering today. Pt reports good appetite. Pt reports poor sleep, due to trouble falling asleep, staying asleep, inability to elaborate. Pt denies anxiety, depression. Pt reports attending groups. Pt denies SI, HI and A/V hallucinations. Will continue to monitor.

## 2023-02-13 NOTE — PROGRESS NOTES
Morning Community Meeting Topics    Ursula Stein attended the morning community meeting on 2/13/23. Topics discussed today     [x] Introduction  Day of the week and date  Mask distribution  Current mask requirements  [x]Teams  Explanation of  Green and Blue team criteria  Nurses assigned to each team for today  Explanation about green and blue paper  Date  Patient's Name  Patient's Nurse  Goals  [x] Visitation  Announce the visiting hours for the day  Announce which team is allowed to have visitors for the day  Review any updated Covid 19 requirements for visitors during visitation  Vaccine Card or negative Covid test within 48 hours of visit  State Identification  Patients are reminded to alert the  at least 1 hour before visitation   [x] Unit Orientation  Coffee use  Phone location and etiquette  Shower locations  [place] and dryer location and process  Common area expectations  Staff rounds expectation  [x] Meals   Educate patient to the menu  The patient is encouraged to fill out the menu to get preferences at mealtime  The patient is educated that if they do not fill out the menu, they will get the standard tray  The coffee pot is decaf, patient encouraged to order regular coffee from menu.   Educate patient to the meal process  Patient encouraged to eat snacks provided twice daily  Snacks may stay in patient room     [x] Discharge Process  Discharge expectations  Fill out the survey after discharge   [x] Hygiene  Daily showers encouraged  Showers availability discussed   Daily dressing encouraged  Discussed wearing street clothing  Education provided on where to place linens and clothing  Linens in the hamper  personal clothing does not go into the linen hamper  [x] Group   Patient encouraged to attend group provided  Time of Group Meetings discussed  Gentle reminder that attendance is a Physician order  [x] Movement  Chair exercises completed  Stretching completed  Notes: Goal - \"To go home\" Electronically signed by Emir Galicia, [address] on 2/13/2023 at 10:00 AM

## 2023-02-13 NOTE — PROGRESS NOTES
Pt requested/ received PRN Tylenol as ordered for head and neck pain.  Pt rated pain 8/10 with 10 being the highest.

## 2023-02-13 NOTE — PROGRESS NOTES
.Department of Psychiatry  Attending Progress Note      SUBJECTIVE:  patient did not take any medications. She was calm, she said her anger is seated much deeper.  She is angry about the children but really not discussing how the rage got her in the hospital.  She did sleep, showed     OBJECTIVE  Out on the unit ;ess hostile refusing medications  Physical  VITALS:  /80   Pulse 62   Temp 98.2 °F (36.8 °C) (Oral)   Resp 18   Ht 5' 8\" (1.727 m) Comment: stated  Wt 238 lb (108 kg) Comment: stated  LMP 02/07/2023   SpO2 99%   BMI 36.19 kg/m²   CONSTITUTIONAL:  fatigued  Mental Status Examination:  Level of consciousness:  within normal limits  Appearance:  ill-appearing and street clothes  Behavior/Motor:  psychomotor agitation  Attitude toward examiner:  guarded and argumentative  Speech:  pressured  Mood:  irritable  Affect:  mood congruent  Thought processes:  rapid  Thought content:  Homocidal ideation denies but also does not answer  Suicidal Ideation:  denies suicidal ideation  Delusions:  no evidence of delusions  Perceptual Disturbance:  denies any perceptual disturbance  Cognition:  oriented to person, place, and time  Impulse control poor  Memory poor    Data  Labs:  CBC with Differential:    Lab Results   Component Value Date/Time    WBC 10.4 02/10/2023 05:45 PM    RBC 4.59 02/10/2023 05:45 PM    RBC 4.55 03/09/2019 07:18 PM    HGB 13.9 02/10/2023 05:45 PM    HCT 41.8 02/10/2023 05:45 PM     02/10/2023 05:45 PM    MCV 91.0 02/10/2023 05:45 PM    MCH 30.2 02/10/2023 05:45 PM    MCHC 33.2 02/10/2023 05:45 PM    RDW 14.5 02/10/2023 05:45 PM    NRBC 0.0 03/13/2022 03:42 AM    LYMPHOPCT 20.7 02/10/2023 05:45 PM    MONOPCT 5.3 02/10/2023 05:45 PM    BASOPCT 0.2 02/10/2023 05:45 PM    MONOSABS 0.5 02/10/2023 05:45 PM    LYMPHSABS 2.1 02/10/2023 05:45 PM    EOSABS 0.1 02/10/2023 05:45 PM    BASOSABS 0.0 02/10/2023 05:45 PM     CMP:    Lab Results   Component Value Date/Time     02/10/2023 05:45 PM    K 3.5 02/10/2023 05:45 PM     02/10/2023 05:45 PM    CO2 24 02/10/2023 05:45 PM    BUN 8 02/10/2023 05:45 PM    CREATININE 0.88 02/10/2023 05:45 PM    GFRAA >60.0 09/23/2022 12:25 PM    AGRATIO 1.3 03/09/2019 07:18 PM    LABGLOM >60.0 02/10/2023 05:45 PM    GLUCOSE 237 02/10/2023 05:45 PM    GLUCOSE 154 03/13/2022 03:42 AM    PROT 6.9 02/10/2023 05:45 PM    LABALBU 4.0 02/10/2023 05:45 PM    LABALBU 3.3 03/13/2022 03:42 AM    CALCIUM 9.2 02/10/2023 05:45 PM    BILITOT 0.3 02/10/2023 05:45 PM    ALKPHOS 106 02/10/2023 05:45 PM    AST 34 02/10/2023 05:45 PM    ALT 19 02/10/2023 05:45 PM       Medications  Current Facility-Administered Medications: haloperidol (HALDOL) tablet 5 mg, 5 mg, Oral, BID  hydrOXYzine pamoate (VISTARIL) capsule 25 mg, 25 mg, Oral, BID  Benzocaine-Menthol (CEPACOL) 1 lozenge, 1 lozenge, Oral, Q2H PRN  haloperidol (HALDOL) tablet 5 mg, 5 mg, Oral, Q6H PRN **OR** haloperidol lactate (HALDOL) injection 5 mg, 5 mg, IntraMUSCular, Q6H PRN  hydrOXYzine pamoate (VISTARIL) capsule 50 mg, 50 mg, Oral, Q6H PRN **OR** hydrOXYzine pamoate (VISTARIL) capsule 50 mg, 50 mg, Oral, Q6H PRN  acetaminophen (TYLENOL) tablet 650 mg, 650 mg, Oral, Q4H PRN  traZODone (DESYREL) tablet 50 mg, 50 mg, Oral, Nightly PRN  benztropine mesylate (COGENTIN) injection 2 mg, 2 mg, IntraMUSCular, BID PRN  magnesium hydroxide (MILK OF MAGNESIA) 400 MG/5ML suspension 30 mL, 30 mL, Oral, Daily PRN  nicotine (NICODERM CQ) 14 MG/24HR 1 patch, 1 patch, TransDERmal, Daily  LORazepam (ATIVAN) tablet 1 mg, 1 mg, Oral, Q4H PRN **OR** LORazepam (ATIVAN) injection 1 mg, 1 mg, IntraMUSCular, Q4H PRN    ASSESSMENT AND PLAN    Diagnosis:  bipolar disorder  Cont offering meds. despite patient Clifm Friendly, MD

## 2023-02-13 NOTE — PLAN OF CARE
Problem: Chronic Conditions and Co-morbidities  Goal: Patient's chronic conditions and co-morbidity symptoms are monitored and maintained or improved  Outcome: Progressing  Flowsheets (Taken 2/12/2023 1106 by Cuco Smith RN)  Care Plan - Patient's Chronic Conditions and Co-Morbidity Symptoms are Monitored and Maintained or Improved: Monitor and assess patient's chronic conditions and comorbid symptoms for stability, deterioration, or improvement     Problem: Depression  Goal: Will be euthymic at discharge  Description: INTERVENTIONS:  1. Administer medication as ordered  2. Provide emotional support via 1:1 interaction with staff  3. Encourage involvement in milieu/groups/activities  4. Monitor for social isolation  Outcome: Progressing     Problem: Tana  Goal: Will exhibit normal sleep and speech and no impulsivity  Description: INTERVENTIONS:  1. Administer medication as ordered  2. Set limits on impulsive behavior  3. Make attempts to decrease external stimuli as possible  Outcome: Progressing     Problem: Psychosis  Goal: Will report no hallucinations or delusions  Description: INTERVENTIONS:  1. Administer medication as  ordered  2. Assist with reality testing to support increasing orientation  3. Assess if patient's hallucinations or delusions are encouraging self harm or harm to others and intervene as appropriate  Outcome: Progressing     Problem: Anxiety  Goal: Will report anxiety at manageable levels  Description: INTERVENTIONS:  1. Administer medication as ordered  2. Teach and rehearse alternative coping skills  3.  Provide emotional support with 1:1 interaction with staff  Outcome: Progressing  Flowsheets (Taken 2/12/2023 1106 by Cuco Smith, KYLIE)  Will report anxiety at manageable levels: Provide emotional support with 1:1 interaction with staff     Problem: Risk for Elopement  Goal: Patient will not exit the unit/facility without proper excort  Outcome: Progressing  Flowsheets (Taken 2/12/2023 1106 by Maria Guadalupe Villar RN)  Nursing Interventions for Elopement Risk: Reduce environmental triggers    Pt assessment completed in pt room. Pt cooperative. Good eye contact. Pt denies anxiety and depression. Pt denies AVH. Pt stated, \" I am ready to go home to my babies. They won't be honest with me. \" This writer asked, \" Who is they? \" Pt stated, \" CPS, this  case has been going on since June costing me time, money and roadblocks. \"  Pt stated, \" I do not deserve this. \" Pt reports appetite is improving. Sleep rated fair. Pt attending groups. Pt reports hemorrhoids. LBM 2/12/2023.

## 2023-02-13 NOTE — FLOWSHEET NOTE
Patient given tylenol for headache of 6 out of 10 pain and patient requested cepacol for throat discomfort.

## 2023-02-13 NOTE — GROUP NOTE
Group Therapy Note    Date: 2/13/2023    Group Start Time: 2095  Group End Time: 8831  Group Topic: Healthy Living/Wellness    [facility]    Jhonatan Palma RN        Group Therapy Note    Attendees: 4/21       Patient's Goal:  To attend RN students group    Notes:  Good participation    Status After Intervention:  Unchanged    Participation Level:  Active Listener    Participation Quality: Appropriate      Speech:  normal      Thought Process/Content: Logical      Affective Functioning: Congruent      Mood: euthymic      Level of consciousness:  Alert      Response to Learning: Progressing to goal      Endings: None Reported    Modes of Intervention: Support      Discipline Responsible: Registered Nurse      Signature:  Jhonatan Palma RN

## 2023-02-13 NOTE — GROUP NOTE
Group Therapy Note    Date: 2/12/2023    Group Start Time: 2045  Group End Time: 2130  Group Topic: Recreational    [facility]    Ela Huffman RN    To watch the Super Bowl football game with peers. Group Therapy Note    Attendees: 19/24         Pt did not participate in recreational group with peers.      Signature:  Ela Huffman RN

## 2023-02-13 NOTE — TELEPHONE ENCOUNTER
Patients mother states patient was pink slipped on Friday and not doing well    We do not have consent to speak with mom.

## 2023-02-14 LAB
GLUCOSE BLD-MCNC: 101 MG/DL (ref 70–99)
GLUCOSE BLD-MCNC: 104 MG/DL (ref 70–99)
GLUCOSE BLD-MCNC: 111 MG/DL (ref 70–99)
GLUCOSE BLD-MCNC: 96 MG/DL (ref 70–99)
PERFORMED ON: ABNORMAL
PERFORMED ON: NORMAL

## 2023-02-14 PROCEDURE — 6370000000 HC RX 637 (ALT 250 FOR IP): Performed by: REGISTERED NURSE

## 2023-02-14 PROCEDURE — 6370000000 HC RX 637 (ALT 250 FOR IP): Performed by: PSYCHIATRY & NEUROLOGY

## 2023-02-14 PROCEDURE — 1240000000 HC EMOTIONAL WELLNESS R&B

## 2023-02-14 RX ORDER — BACITRACIN, NEOMYCIN, POLYMYXIN B 400; 3.5; 5 [USP'U]/G; MG/G; [USP'U]/G
OINTMENT TOPICAL 2 TIMES DAILY
Status: DISCONTINUED | OUTPATIENT
Start: 2023-02-14 | End: 2023-02-15 | Stop reason: HOSPADM

## 2023-02-14 RX ORDER — MAG HYDROX/ALUMINUM HYD/SIMETH 400-400-40
SUSPENSION, ORAL (FINAL DOSE FORM) ORAL PRN
Status: DISCONTINUED | OUTPATIENT
Start: 2023-02-14 | End: 2023-02-15 | Stop reason: HOSPADM

## 2023-02-14 RX ORDER — CALCIUM CARBONATE 200(500)MG
500 TABLET,CHEWABLE ORAL 3 TIMES DAILY PRN
Status: DISCONTINUED | OUTPATIENT
Start: 2023-02-14 | End: 2023-02-15 | Stop reason: HOSPADM

## 2023-02-14 RX ADMIN — BACITRACIN ZINC, NEOMYCIN, POLYMYXIN B 1 G: 400; 3.5; 5 OINTMENT TOPICAL at 20:55

## 2023-02-14 RX ADMIN — ARIPIPRAZOLE 10 MG: 10 TABLET ORAL at 08:46

## 2023-02-14 RX ADMIN — BACITRACIN ZINC, NEOMYCIN, POLYMYXIN B 1 G: 400; 3.5; 5 OINTMENT TOPICAL at 13:59

## 2023-02-14 RX ADMIN — ACETAMINOPHEN 325MG 650 MG: 325 TABLET ORAL at 06:54

## 2023-02-14 RX ADMIN — ANTACID TABLETS 500 MG: 500 TABLET, CHEWABLE ORAL at 21:20

## 2023-02-14 RX ADMIN — WITCH HAZEL: 50 CLOTH TOPICAL at 22:05

## 2023-02-14 ASSESSMENT — PAIN SCALES - GENERAL: PAINLEVEL_OUTOF10: 6

## 2023-02-14 ASSESSMENT — PAIN DESCRIPTION - LOCATION: LOCATION: BACK;NECK

## 2023-02-14 ASSESSMENT — PAIN DESCRIPTION - DESCRIPTORS: DESCRIPTORS: ACHING;THROBBING

## 2023-02-14 NOTE — GROUP NOTE
Group Therapy Note    Date: 2/14/2023    Group Start Time: 1400  Group End Time: 1430  Group Topic: Healthy Living/Wellness    [facility]    Nay Monge RN; Marquise Fox RN        Group Therapy Note    Attendees: 6/16       Patient's Goal:  To learn about sleep hygiene and participate in sleep hygiene. Notes:  Pt actively participated in group. Status After Intervention:  Improved    Participation Level:  Active Listener    Participation Quality: Attentive      Speech:  normal      Thought Process/Content: Logical      Affective Functioning: Congruent      Mood:  WNL      Level of consciousness:  Attentive      Response to Learning: Progressing to goal      Endings: None Reported    Modes of Intervention: Education, Support, and Activity      Discipline Responsible: Registered Nurse      Signature:  Nay Monge RN

## 2023-02-14 NOTE — PROGRESS NOTES
[facility]. FOLLOW-UP NOTE       2/14/2023     Patient was seen and examined in person, Chart reviewed   Patient's case discussed with staff/team    Chief Complaint: homicidal ideation, poor sleep     Interim History:     Reports previously not medication complaint  Insight and judgement improving  Preoccupied with CPS court hearing on 2/16 at 10:45  Denies SI HI AVH; no delusions reported  Increased care for self  Med comp; denies side effects  Endorse depression, states significant stressor is current custody case with CPS and patient would like her children back  No episode of aggression or impulsivity seen on unit   Pt states she should not have threatened CPS worker, however she feels she is being played with despite trying her best to meet the requirements to get her children back.    Pt reports slight upset stomach after taking abilify, declined moving to the evening; denied AUGUSTIN route    Appetite:   [x] Normal/Unchanged  [] Increased  [] Decreased      Sleep:       [] Normal/Unchanged  [x] Fair       [] Poor              Energy:    [x] Normal/Unchanged  [] Increased  [] Decreased        SI [] Present  [x] Absent    HI  []Present  [x] Absent     Aggression:  [x] yes  [] No (last episode upon admission)    Patient is [x] able  [] unable to CONTRACT FOR SAFETY     PAST MEDICAL/PSYCHIATRIC HISTORY:   Past Medical History:   Diagnosis Date    Anxiety and depression     Asthma     Atypical chest pain 5/31/2019    Chronic back pain     Diabetes mellitus ([facility] [address]     Headache     HIV exposure 1/6/2020    Irregular heart beat     Multiple sclerosis ([facility] [address]     Osteoarthritis     Post partum depression 4/2/2019    Pure hypercholesterolemia, unspecified 5/3/2018       FAMILY/SOCIAL HISTORY:  Family History   Problem Relation Age of Onset    Cancer Mother     Arthritis Mother     Diabetes Father     Heart Disease Father     Stroke Father     High Blood Pressure Father      Social History     Socioeconomic History    Marital status: Single     Spouse name: Not on file    Number of children: Not on file    Years of education: Not on file    Highest education level: Not on file   Occupational History    Not on file   Tobacco Use    Smoking status: Former     Packs/day: 0.50     Years: 10.00     Pack years: 5.00     Types: Cigarettes     Quit date: 2017     Years since quittin.0    Smokeless tobacco: Never   Vaping Use    Vaping Use: Never used   Substance and Sexual Activity    Alcohol use: Yes     Comment: occas    Drug use: Yes     Frequency: 3.0 times per week     Types: Marijuana Champ Cue)    Sexual activity: Yes     Partners: Male   Other Topics Concern    Not on file   Social History Narrative    Not on file     Social Determinants of Health     Financial Resource Strain: Low Risk     Difficulty of Paying Living Expenses: Not hard at all   Food Insecurity: No Food Insecurity    Worried About Running Out of Food in the Last Year: Never true    Ran Out of Food in the Last Year: Never true   Transportation Needs: Not on file   Physical Activity: Not on file   Stress: Not on file   Social Connections: Not on file   Intimate Partner Violence: Not on file   Housing Stability: Not on file           ROS:  [x] All negative/unchanged except if checked.  Explain positive(checked items) below:  [] Constitutional  [] Eyes  [] Ear/Nose/Mouth/Throat  [] Respiratory  [] CV  [] GI  []   [] Musculoskeletal  [] Skin/Breast  [] Neurological  [] Endocrine  [] Heme/Lymph  [] Allergic/Immunologic    Explanation:     MEDICATIONS:    Current Facility-Administered Medications:     neomycin-bacitracin-polymyxin (NEOSPORIN) ointment, , Topical, BID, KANDI Bains CNP    calcium carbonate (TUMS) chewable tablet 500 mg, 500 mg, Oral, TID PRN, KANDI Bains CNP    witch hazel (PREPARATION H) pad, , Topical, PRN, KANDI Bains CNP    ARIPiprazole (ABILIFY) tablet 10 mg, 10 mg, Oral, Daily, Kasi Gordon MD, 10 mg at 02/14/23 0846    hydrOXYzine pamoate (VISTARIL) capsule 25 mg, 25 mg, Oral, BID, Kaden Lopez MD    Benzocaine-Menthol (CEPACOL) 1 lozenge, 1 lozenge, Oral, Q2H PRN, Kaden Lopez MD, 1 lozenge at 02/12/23 1922    haloperidol (HALDOL) tablet 5 mg, 5 mg, Oral, Q6H PRN, 5 mg at 02/11/23 1219 **OR** haloperidol lactate (HALDOL) injection 5 mg, 5 mg, IntraMUSCular, Q6H PRN, Altagracia Kennedy MD    hydrOXYzine pamoate (VISTARIL) capsule 50 mg, 50 mg, Oral, Q6H PRN **OR** hydrOXYzine pamoate (VISTARIL) capsule 50 mg, 50 mg, Oral, Q6H PRN, Scott Kennedy MD, 50 mg at 02/11/23 1219    acetaminophen (TYLENOL) tablet 650 mg, 650 mg, Oral, Q4H PRN, Scott Kennedy MD, 650 mg at 02/14/23 0654    traZODone (DESYREL) tablet 50 mg, 50 mg, Oral, Nightly PRN, Kaden Lopez MD    benztropine mesylate (COGENTIN) injection 2 mg, 2 mg, IntraMUSCular, BID PRN, Altagracia Kennedy MD    magnesium hydroxide (MILK OF MAGNESIA) 400 MG/5ML suspension 30 mL, 30 mL, Oral, Daily PRN, Altagracia Kennedy MD    LORazepam (ATIVAN) tablet 1 mg, 1 mg, Oral, Q4H PRN **OR** LORazepam (ATIVAN) injection 1 mg, 1 mg, IntraMUSCular, Q4H PRN, Altagracia Kennedy MD      Examination:  BP (!) 131/95   Pulse 76   Temp 98 °F (36.7 °C) (Oral)   Resp 16   Ht 5' 8\" (1.727 m) Comment: stated  Wt 238 lb (108 kg) Comment: stated  LMP 02/07/2023   SpO2 100%   BMI 36.19 kg/m²   Gait - steady  Medication side effects(SE): denies     Mental Status Examination:    Level of consciousness:  within normal limits   Appearance:  fair grooming and fair hygiene  Behavior/Motor:  no abnormalities noted  Attitude toward examiner:  cooperative and good eye contact  Speech:  spontaneous, normal volume, and normal rate  Mood: sad  Affect:  blunted   Thought processes: circumstantial   Thought content:  Suicidal Ideation:  denies suicidal ideation  Delusions:  no evidence of delusions  Perceptual Disturbance:  denies any perceptual disturbance  Cognition:  oriented to person, place, and time   Concentration distractible  Insight fair   Judgement fair    ASSESSMENT:   Patient symptoms are:  [] Well controlled  [x] Improving  [] Worsening  [] No change      Diagnosis:   Principal Problem:    Bipolar 1 disorder, mixed, severe ([address]  Resolved Problems:    * No resolved hospital problems. *      LABS:    No results for input(s): WBC, HGB, PLT in the last 72 hours. No results for input(s): NA, K, CL, CO2, BUN, CREATININE, GLUCOSE in the last 72 hours. No results for input(s): BILITOT, ALKPHOS, AST, ALT in the last 72 hours. Lab Results   Component Value Date/Time    LABAMPH Neg 02/10/2023 05:45 PM    BARBSCNU Neg 02/10/2023 05:45 PM    LABBENZ Neg 02/10/2023 05:45 PM    LABBENZ NotDTCD 12/09/2012 03:53 PM    LABMETH Neg 02/10/2023 05:45 PM    OPIATESCREENURINE Neg 02/10/2023 05:45 PM    PHENCYCLIDINESCREENURINE Neg 02/10/2023 05:45 PM    ETOH <10 02/10/2023 05:45 PM     Lab Results   Component Value Date/Time    TSH 0.993 09/23/2022 12:25 PM     No results found for: LITHIUM  No results found for: VALPROATE, CBMZ    RISK ASSESSMENT: impulsivity high, aggression high    Treatment Plan:  Reviewed current Medications with the patient. Monitor efficacy and tolerability of med regime   Risks, benefits, side effects, drug-to-drug interactions and alternatives to treatment were discussed. Collateral information: see  notes   CD evaluation THC  Encourage patient to attend group and other milieu activities.   Discharge planning discussed with the patient and treatment team; tentative DC tomorrow pending stability     PSYCHOTHERAPY/COUNSELING:  [x] Therapeutic interview  [x] Supportive  [] CBT  [] Ongoing  [] Other    [x] Patient continues to need, on a daily basis, active treatment furnished directly by or requiring the supervision of inpatient psychiatric personnel Anticipated Length of stay: 1 days         COSIGN : yes    Electronically signed by KANDI Byrne CNP on 2/14/2023 at 12:38 PM      Addendum:  Bryon August seen with NP after attending the team meeting, discussing the patient with the nursing and social work staff. I participated during the interview process as well as the treatment plan and spent more than 70% of the time with the nurse practitioner helping me in documentation.   I agree with the above documentation of clinical finding and treatment plan  Patient was seen 1:1 for 20 minutes, other than E&M time spent, focusing on      - coping skills techniques     - Anxiety management techniques discussed including deep breathing exercise and PMR     - discussing patients strength and weaknes     - Focusing on negative cognition and maladaptive thoughts, which is feeding and maintaining the depression symptoms      Physician Signature: Electronically signed by Zi Myrick MD on 2/14/2023 at 4:54 PM

## 2023-02-14 NOTE — PROGRESS NOTES
Pt out on unit, social with select peers. Pt flat affect, voiced goal, \"to get my kids back. Pt minimizing admit to [facility], voiced, \"when I get angry I explode, I explode. \" Pt voiced, \"God's got me! \" Pt reports showering today. Pt reports good appetite. Pt reports poor sleep, due to trouble falling asleep, staying asleep. Pt denies anxiety, depression. Pt reports attending groups. Pt denies SI, HI and A/V hallucinations. Will continue to monitor.

## 2023-02-14 NOTE — GROUP NOTE
Group Therapy Note    Date: 2/14/2023    Group Start Time: 1600  Group End Time: 1640  Group Topic: Activity    [facility]    Arpita Hernandez        Group Therapy Note    Attendees: 6       Patient's Goal: To attend group    Notes:  Pt was attentive     Status After Intervention:  Unchanged    Participation Level: Interactive    Participation Quality: Appropriate, Attentive, and Sharing      Speech:  normal      Thought Process/Content: Logical      Affective Functioning: Congruent      Mood: euthymic      Level of consciousness:  Alert      Response to Learning: Progressing to goal      Endings: None Reported    Modes of Intervention: Activity      Discipline Responsible: Behavorial Health Tech      Signature:   Russell wOens
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
## 2023-02-14 NOTE — PLAN OF CARE
Patient is alert and oriented x 4, she walks independently with a steady gait. Patient is pleasant and cooperative, she makes her needs known. Patient denies having any physical problems at this time. She reports her head is feeling better today. Patient reports poor sleep and good appetite. Problem: Chronic Conditions and Co-morbidities  Goal: Patient's chronic conditions and co-morbidity symptoms are monitored and maintained or improved  Outcome: Progressing  Flowsheets (Taken 2/12/2023 1106 by Yarely Frost RN)  Care Plan - Patient's Chronic Conditions and Co-Morbidity Symptoms are Monitored and Maintained or Improved: Monitor and assess patient's chronic conditions and comorbid symptoms for stability, deterioration, or improvement     Problem: Depression  Goal: Will be euthymic at discharge  Description: INTERVENTIONS:  1. Administer medication as ordered  2. Provide emotional support via 1:1 interaction with staff  3. Encourage involvement in milieu/groups/activities  4. Monitor for social isolation  Outcome: Progressing     Problem: Tana  Goal: Will exhibit normal sleep and speech and no impulsivity  Description: INTERVENTIONS:  1. Administer medication as ordered  2. Set limits on impulsive behavior  3. Make attempts to decrease external stimuli as possible  Outcome: Progressing     Problem: Psychosis  Goal: Will report no hallucinations or delusions  Description: INTERVENTIONS:  1. Administer medication as  ordered  2. Assist with reality testing to support increasing orientation  3. Assess if patient's hallucinations or delusions are encouraging self harm or harm to others and intervene as appropriate  Outcome: Progressing     Problem: Anxiety  Goal: Will report anxiety at manageable levels  Description: INTERVENTIONS:  1. Administer medication as ordered  2. Teach and rehearse alternative coping skills  3.  Provide emotional support with 1:1 interaction with staff  Outcome: Venkat Washburn (Taken 2/14/2023 1029)  Will report anxiety at manageable levels:   Administer medication as ordered   Provide emotional support with 1:1 interaction with staff   Teach and rehearse alternative coping skills     Problem: Risk for Elopement  Goal: Patient will not exit the unit/facility without proper excort  Outcome: Progressing  Flowsheets (Taken 2/14/2023 1029)  Nursing Interventions for Elopement Risk: Reduce environmental triggers

## 2023-02-14 NOTE — PROGRESS NOTES
Pt requested/received PRN Tylenol as ordered for neck and back pain rated 6/10 with 10 being the highest.

## 2023-02-14 NOTE — GROUP NOTE
Group Therapy Note    Date: 2/14/2023    Group Start Time: 1000  Group End Time: 1055  Group Topic: Psychoeducation    [facility]    Cassy Schultz        Group Therapy Note    Attendees: 8/23       Patient's Goal:  \"To go home\"     Notes:  Patient attended the 1000 skills group. Patient was relax, she focused well on her task and overall participation was good.     Status After Intervention:  Improved    Participation Level: Good    Participation Quality: Appropriate and Attentive      Speech:  normal      Thought Process/Content: Linear      Affective Functioning: Congruent      Mood:  calm      Level of consciousness:  Alert and Attentive      Response to Learning: Progressing to goal      Endings: None Reported    Modes of Intervention: Education, Socialization, and Activity      Discipline Responsible: Psychoeducational Specialist      Signature:  Cassy Schultz
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
## 2023-02-14 NOTE — PLAN OF CARE
Problem: Chronic Conditions and Co-morbidities  Goal: Patient's chronic conditions and co-morbidity symptoms are monitored and maintained or improved  2/13/2023 2023 by Kareem Bridges RN  Outcome: Progressing  2/13/2023 0955 by Dangelo Chino RN  Outcome: Progressing  Flowsheets (Taken 2/12/2023 1106 by Kevin Adams RN)  Care Plan - Patient's Chronic Conditions and Co-Morbidity Symptoms are Monitored and Maintained or Improved: Monitor and assess patient's chronic conditions and comorbid symptoms for stability, deterioration, or improvement     Problem: Depression  Goal: Will be euthymic at discharge  Description: INTERVENTIONS:  1. Administer medication as ordered  2. Provide emotional support via 1:1 interaction with staff  3. Encourage involvement in milieu/groups/activities  4. Monitor for social isolation  2/13/2023 2023 by Kareem Bridges RN  Outcome: Progressing  2/13/2023 0955 by Dangelo Chino RN  Outcome: Progressing     Problem: Tana  Goal: Will exhibit normal sleep and speech and no impulsivity  Description: INTERVENTIONS:  1. Administer medication as ordered  2. Set limits on impulsive behavior  3. Make attempts to decrease external stimuli as possible  2/13/2023 2023 by Kareem Bridges RN  Outcome: Progressing  2/13/2023 0955 by Dangelo Chino RN  Outcome: Progressing     Problem: Psychosis  Goal: Will report no hallucinations or delusions  Description: INTERVENTIONS:  1. Administer medication as  ordered  2. Assist with reality testing to support increasing orientation  3. Assess if patient's hallucinations or delusions are encouraging self harm or harm to others and intervene as appropriate  2/13/2023 2023 by Kareem Bridges RN  Outcome: Progressing  2/13/2023 0955 by Dangelo Chino RN  Outcome: Progressing     Problem: Anxiety  Goal: Will report anxiety at manageable levels  Description: INTERVENTIONS:  1. Administer medication as ordered  2. Teach and rehearse alternative coping skills  3. Provide emotional support with 1:1 interaction with staff  2/13/2023 2023 by Sarita Herrera RN  Outcome: Progressing  2/13/2023 0955 by Yoni Pisano RN  Outcome: Progressing  Flowsheets (Taken 2/13/2023 7059)  Will report anxiety at manageable levels:   Teach and rehearse alternative coping skills   Provide emotional support with 1:1 interaction with staff   Administer medication as ordered     Problem: Risk for Elopement  Goal: Patient will not exit the unit/facility without proper excort  2/13/2023 2023 by Sarita Herrera RN  Outcome: Progressing  2/13/2023 0955 by Yoni Pisano RN  Outcome: Progressing  Flowsheets (Taken 2/13/2023 4576)  Nursing Interventions for Elopement Risk: Reduce environmental triggers

## 2023-02-14 NOTE — PROGRESS NOTES
Morning Community Meeting Topics    Ignacio Marino attended the morning community meeting on 2/14/23. Topics discussed today     [x] Introduction  Day of the week and date  Mask distribution  Current mask requirements  [x]Teams  Explanation of  Green and Blue team criteria  Nurses assigned to each team for today  Explanation about green and blue paper  Date  Patient's Name  Patient's Nurse  Goals  [x] Visitation  Announce the visiting hours for the day  Announce which team is allowed to have visitors for the day  Review any updated Covid 19 requirements for visitors during visitation  Vaccine Card or negative Covid test within 48 hours of visit  State Identification  Patients are reminded to alert the  at least 1 hour before visitation   [x] Unit Orientation  Coffee use  Phone location and etiquette  Shower locations  [place] and dryer location and process  Common area expectations  Staff rounds expectation  [x] Meals   Educate patient to the menu  The patient is encouraged to fill out the menu to get preferences at mealtime  The patient is educated that if they do not fill out the menu, they will get the standard tray  The coffee pot is decaf, patient encouraged to order regular coffee from menu.   Educate patient to the meal process  Patient encouraged to eat snacks provided twice daily  Snacks may stay in patient room     [x] Discharge Process  Discharge expectations  Fill out the survey after discharge   [x] Hygiene  Daily showers encouraged  Showers availability discussed   Daily dressing encouraged  Discussed wearing street clothing  Education provided on where to place linens and clothing  Linens in the hamper  personal clothing does not go into the linen hamper  [x] Group   Patient encouraged to attend group provided  Time of Group Meetings discussed  Gentle reminder that attendance is a Physician order  [x] Movement  Chair exercises completed  Stretching completed  Notes:  Goal - \"To go home\" Electronically signed by Julee Valencia, [address] on 2/14/2023 at 9:59 AM

## 2023-02-15 VITALS
TEMPERATURE: 97.9 F | DIASTOLIC BLOOD PRESSURE: 88 MMHG | BODY MASS INDEX: 36.07 KG/M2 | RESPIRATION RATE: 18 BRPM | HEART RATE: 76 BPM | OXYGEN SATURATION: 96 % | SYSTOLIC BLOOD PRESSURE: 125 MMHG | HEIGHT: 68 IN | WEIGHT: 238 LBS

## 2023-02-15 LAB
GLUCOSE BLD-MCNC: 108 MG/DL (ref 70–99)
PERFORMED ON: ABNORMAL

## 2023-02-15 PROCEDURE — 6370000000 HC RX 637 (ALT 250 FOR IP): Performed by: PSYCHIATRY & NEUROLOGY

## 2023-02-15 PROCEDURE — 6370000000 HC RX 637 (ALT 250 FOR IP): Performed by: REGISTERED NURSE

## 2023-02-15 RX ORDER — ARIPIPRAZOLE 10 MG/1
10 TABLET ORAL DAILY
Qty: 15 TABLET | Refills: 3 | Status: SHIPPED | OUTPATIENT
Start: 2023-02-16

## 2023-02-15 RX ADMIN — WITCH HAZEL: 50 CLOTH TOPICAL at 09:12

## 2023-02-15 RX ADMIN — ANTACID TABLETS 500 MG: 500 TABLET, CHEWABLE ORAL at 08:53

## 2023-02-15 RX ADMIN — ARIPIPRAZOLE 10 MG: 10 TABLET ORAL at 08:53

## 2023-02-15 RX ADMIN — ACETAMINOPHEN 325MG 650 MG: 325 TABLET ORAL at 08:53

## 2023-02-15 ASSESSMENT — PAIN SCALES - GENERAL
PAINLEVEL_OUTOF10: 3
PAINLEVEL_OUTOF10: 5

## 2023-02-15 ASSESSMENT — PAIN DESCRIPTION - DESCRIPTORS: DESCRIPTORS: ACHING;TIGHTNESS

## 2023-02-15 ASSESSMENT — PAIN DESCRIPTION - LOCATION: LOCATION: NECK

## 2023-02-15 ASSESSMENT — PAIN SCALES - WONG BAKER: WONGBAKER_NUMERICALRESPONSE: 2

## 2023-02-15 NOTE — DISCHARGE SUMMARY
DISCHARGE SUMMARY      Patient ID:  Hi Razo  [id number]  43 y.o.  1985      Admit date: 2/10/2023    Discharge date and time: 2/15/2023    Admitting Physician: Shreya Guerra MD     Discharge Physician: Dr Mary Rivera MD    Admission Diagnoses: Bipolar affective disorder, current episode depressed, current episode severity unspecified ([facility] [F31.30]  Bipolar 1 disorder ([facility] [F31.9]    Admission Condition: poor    Discharged Condition: stable    Admission Circumstance: this is a 41 yo female, known to the staff at [facility] from previous two admissions. She is a mother of 11 and lives with her 21year old son. She  brought in a dangerously agitated state where she was threatening a child services social workers. Patient carries a dx of psychosis,  medication noncompliant. Patient was pink-slipped by [place] after found outside screaming at people and making homicidal statements toward CPS  workers. Upon arrival, patient was throwing objects at staff and needed medications. Patient had been functioning on little to no sleep, with anger building toward the [place] workers as well as paternal grandmother. of the 6 old child. Patient  's 4 other children in CPS custody. Patient came on the unit swing, screaming profanities through her hoarse voice. Mood was agitated with same affect Police needed to be called. Patient is a poor historian not approachable for more detailed history of current episode. Previous Psychiatric hx: 2 admissions to [facility] in March and September of 2022. Psychiatric Review of Systems       Depression: yes     Tana or Hypomania:  yes      Panic Attacks:  yes      Phobias:  no     Obsessions and Compulsions:  no     PTSD : no     Hallucinations:  no     Delusions:  no      Past Psychiatric History:  Prior Diagnosis:  depression  Psychiatrist:   Agency: [facility]- Last seen in May for injection and called code white d/t behaviors. Has not followed up for medications since May. Pt is still open and can be referred there for f/u.    Therapist:yes- DANIELA choudhury  Hospitalization: no  Hx of Suicidal Attempts: no  Hx of violence:  no  ECT: no      PAST MEDICAL/PSYCHIATRIC HISTORY:   Past Medical History:   Diagnosis Date    Anxiety and depression     Asthma     Atypical chest pain 2019    Chronic back pain     Diabetes mellitus ([facility]     Headache     HIV exposure 2020    Irregular heart beat     Multiple sclerosis ([facility]     Osteoarthritis     Post partum depression 2019    Pure hypercholesterolemia, unspecified 5/3/2018       FAMILY/SOCIAL HISTORY:  Family History   Problem Relation Age of Onset    Cancer Mother     Arthritis Mother     Diabetes Father     Heart Disease Father     Stroke Father     High Blood Pressure Father      Social History     Socioeconomic History    Marital status: Single     Spouse name: Not on file    Number of children: Not on file    Years of education: Not on file    Highest education level: Not on file   Occupational History    Not on file   Tobacco Use    Smoking status: Former     Packs/day: 0.50     Years: 10.00     Pack years: 5.00     Types: Cigarettes     Quit date: 2017     Years since quittin.0    Smokeless tobacco: Never   Vaping Use    Vaping Use: Never used   Substance and Sexual Activity    Alcohol use: Yes     Comment: occas    Drug use: Yes     Frequency: 3.0 times per week     Types: Marijuana Yojana Ricky)    Sexual activity: Yes     Partners: Male   Other Topics Concern    Not on file   Social History Narrative    Not on file     Social Determinants of Health     Financial Resource Strain: Low Risk     Difficulty of Paying Living Expenses: Not hard at all   Food Insecurity: No Food Insecurity    Worried About Running Out of Food in the Last Year: Never true    Ran Out of Food in the Last Year: Never true   Transportation Needs: Not on file   Physical Activity: Not on file   Stress: Not on file   Social Connections: Not on file Intimate Partner Violence: Not on file   Housing Stability: Not on file       MEDICATIONS:    Current Facility-Administered Medications:     neomycin-bacitracin-polymyxin (NEOSPORIN) ointment, , Topical, BID, Brant Connelly APRN - CNP, 1 g at 02/14/23 2055    calcium carbonate (TUMS) chewable tablet 500 mg, 500 mg, Oral, TID PRN, Sandre Maricel, APRN - CNP, 500 mg at 02/15/23 3677    witch hazel (PREPARATION H) pad, , Topical, PRN, Sandre Maricel, APRN - CNP, Given at 02/15/23 0912    ARIPiprazole (ABILIFY) tablet 10 mg, 10 mg, Oral, Daily, Danyell Rayo MD, 10 mg at 02/15/23 0853    hydrOXYzine pamoate (VISTARIL) capsule 25 mg, 25 mg, Oral, BID, Altagracia Kennedy MD    Benzocaine-Menthol (CEPACOL) 1 lozenge, 1 lozenge, Oral, Q2H PRN, Shreya Guerra MD, 1 lozenge at 02/12/23 1922    haloperidol (HALDOL) tablet 5 mg, 5 mg, Oral, Q6H PRN, 5 mg at 02/11/23 1219 **OR** haloperidol lactate (HALDOL) injection 5 mg, 5 mg, IntraMUSCular, Q6H PRN, Altagracia Kennedy MD    hydrOXYzine pamoate (VISTARIL) capsule 50 mg, 50 mg, Oral, Q6H PRN **OR** hydrOXYzine pamoate (VISTARIL) capsule 50 mg, 50 mg, Oral, Q6H PRN, Robb Kennedy MD, 50 mg at 02/11/23 1219    acetaminophen (TYLENOL) tablet 650 mg, 650 mg, Oral, Q4H PRN, Robb Kennedy MD, 650 mg at 02/15/23 0853    traZODone (DESYREL) tablet 50 mg, 50 mg, Oral, Nightly PRN, Altagracia Kennedy MD    benztropine mesylate (COGENTIN) injection 2 mg, 2 mg, IntraMUSCular, BID PRN, Altagracia Kennedy MD    magnesium hydroxide (MILK OF MAGNESIA) 400 MG/5ML suspension 30 mL, 30 mL, Oral, Daily PRN, Altagracia Kennedy MD    LORazepam (ATIVAN) tablet 1 mg, 1 mg, Oral, Q4H PRN **OR** LORazepam (ATIVAN) injection 1 mg, 1 mg, IntraMUSCular, Q4H PRN, Altagracia Kennedy MD    Examination:  /88   Pulse 76   Temp 97.9 °F (36.6 °C) (Oral)   Resp 18   Ht 5' 8\" (1.727 m) Comment: stated  Wt 238 lb (108 kg) Comment: stated  LMP 02/07/2023   SpO2 96%   BMI 36.19 kg/m²   Gait - steady    HOSPITAL COURSE[de-identified]  Following admission to the hospital, patient had a complete physical exam and blood work up, which was unremarkable   Patient was monitored closely with suicide precaution  Patient was started on see medication list below   Was encouraged to participate in group and other milieu activity  Patient started to feel better with this combination of treatment. Significant progress in the symptoms since admission; tolerated med regime well without notable side effects     Mood better, with the score of 0/10 - bad  Denies AVH or paranoid thoughts  Denies Hopeless or worthless feeling  No active SI/HI  Appetite:  [x] Normal  [] Increased  [] Decreased    Sleep:       [x] Normal  [] Fair       [] Poor            Energy:    [x] Normal  [] Increased  [] Decreased     SI [] Present  [x] Absent  HI  []Present  [x] Absent   Aggression:  [] yes  [] no  Patient is [x] able  [] unable to CONTRACT FOR SAFETY   Medication side effects(SE):  [x] None(Psych.  Meds.) [] Other      Mental Status Examination on discharge:    Level of consciousness:  within normal limits   Appearance:  well-appearing  Behavior/Motor:  no abnormalities noted  Attitude toward examiner:  attentive and good eye contact  Speech:  spontaneous, normal rate and normal volume   Mood: euthymic  Affect:  flat  Thought processes:  linear and goal directed   Thought content:  Suicidal Ideation:  denies suicidal ideation  Delusions:  no evidence of delusions  Perceptual Disturbance:  denies any perceptual disturbance  Cognition:  oriented to person, place, and time   Concentration intact  Memory intact  Insight good   Judgement fair   Fund of Knowledge adequate      ASSESSMENT:  Patient symptoms are:  [x] Well controlled  [x] Improving  [] Worsening  [] No change      Diagnosis:  Principal Problem:    Bipolar 1 disorder, mixed, severe ([facility] [address]  Resolved Problems:    * No resolved hospital problems. *      LABS:    No results for input(s): WBC, HGB, PLT in the last 72 hours. No results for input(s): NA, K, CL, CO2, BUN, CREATININE, GLUCOSE in the last 72 hours. No results for input(s): BILITOT, ALKPHOS, AST, ALT in the last 72 hours. Lab Results   Component Value Date/Time    LABAMPH Neg 02/10/2023 05:45 PM    BARBSCNU Neg 02/10/2023 05:45 PM    LABBENZ Neg 02/10/2023 05:45 PM    LABBENZ NotDTCD 12/09/2012 03:53 PM    LABMETH Neg 02/10/2023 05:45 PM    OPIATESCREENURINE Neg 02/10/2023 05:45 PM    PHENCYCLIDINESCREENURINE Neg 02/10/2023 05:45 PM    ETOH <10 02/10/2023 05:45 PM     Lab Results   Component Value Date/Time    TSH 0.993 09/23/2022 12:25 PM     No results found for: LITHIUM  No results found for: VALPROATE, CBMZ    RISK ASSESSMENT AT DISCHARGE: Low risk for suicide and homicide. Treatment Plan:  Reviewed current Medications with the patient. Education provided on the complaince with treatment. Risks, benefits, side effects, drug-to-drug interactions and alternatives to treatment were discussed. Encourage patient to attend outpatient follow up appointment and therapy. Patient was advised to call the outpatient provider, visit the nearest ED or call 911 if symptoms are not manageable. Patient's family member was contacted prior to the discharge.          Medication List        START taking these medications      ARIPiprazole 10 MG tablet  Commonly known as: ABILIFY  Take 1 tablet by mouth daily  Start taking on: February 16, 2023            STOP taking these medications      dicyclomine 20 MG tablet  Commonly known as: BENTYL     famotidine 20 MG tablet  Commonly known as: Pepcid     WesTab Plus 27-1 MG Tabs               Where to Get Your Medications        These medications were sent to [facility] [address] [address]      Phone: [phone]   ARIPiprazole 10 MG tablet Reason for more than one antipsychotic:   [x] N/A  [] 3 failed monotherapy(drugs tried):  [] Cross over to a new antipsychotic  [] Taper to monotherapy from polypharmacy  [] Augmentation of Clozapine therapy due to treatment resistance to single therapy        TIME SPEND - 35 MINUTES TO COMPLETE THE EVALUATION, DISCHARGE SUMMARY, MEDICATION RECONCILIATION AND FOLLOW UP CARE     Signed:  KANDI Cisneros - CNP  2/15/2023  9:18 AM    Addendum:  Padma Robles seen with NP after attending the team meeting, discussing the patient with the nursing and social work staff. I participated during the interview process as well as the treatment plan and spent more than 70% of the time with the nurse practitioner helping me in documentation.   I agree with the above documentation of clinical finding and treatment plan    Physician Signature: Electronically signed by Mari Wren MD on 2/15/2023 at 9:53 AM

## 2023-02-15 NOTE — DISCHARGE INSTRUCTIONS
Keep all follow up appointments, take medications as ordered, utilize positive supports, abstain from use of alcohol and drugs. If symptoms return or you feel at risk to yourself or others, please call 911, return the nearest emergency room, or call your local crisis hotline:  [facility]: [phone]) [phone]: 1(866) [address]: [phone]     Due to the [address] [facility] is not currently available. For assistance with quitting smoking please go to https://smokefree.gov. A prescription for an FDA-approved tobacco cessation medication was offered at discharge and the patient refused. Someone from [address] [place] [address] will be calling you tomorrow to follow up on your care. If you don't hear from us, give us a call! [phone].      Pt declined a flu vaccine

## 2023-02-15 NOTE — PROGRESS NOTES
Pt reports depression level is #1/10,reports no anxiety @ this time. Pt refuses scheduled Vistaril @ hs. Denies SI/HI/AVH. Pt reports attending most groups. Pt reports good appetite. Pt is visible on unit with select peers, pt reports poor sleep. Pts' goal is to get kids back.

## 2023-02-15 NOTE — PROGRESS NOTES
Morning Community Meeting Topics    Jo Simmons attended the morning community meeting on 2/15/23. Topics discussed today     [x] Introduction  Day of the week and date  Mask distribution  Current mask requirements  [x]Teams  Explanation of  Green and Blue team criteria  Nurses assigned to each team for today  Explanation about green and blue paper  Date  Patient's Name  Patient's Nurse  Goals  [x] Visitation  Announce the visiting hours for the day  Announce which team is allowed to have visitors for the day  Review any updated Covid 19 requirements for visitors during visitation  Vaccine Card or negative Covid test within 48 hours of visit  State Identification  Patients are reminded to alert the  at least 1 hour before visitation   [x] Unit Orientation  Coffee use  Phone location and etiquette  Shower locations  [place] and dryer location and process  Common area expectations  Staff rounds expectation  [x] Meals   Educate patient to the menu  The patient is encouraged to fill out the menu to get preferences at mealtime  The patient is educated that if they do not fill out the menu, they will get the standard tray  The coffee pot is decaf, patient encouraged to order regular coffee from menu.   Educate patient to the meal process  Patient encouraged to eat snacks provided twice daily  Snacks may stay in patient room     [x] Discharge Process  Discharge expectations  Fill out the survey after discharge   [x] Hygiene  Daily showers encouraged  Showers availability discussed   Daily dressing encouraged  Discussed wearing street clothing  Education provided on where to place linens and clothing  Linens in the hamper  personal clothing does not go into the linen hamper  [x] Group   Patient encouraged to attend group provided  Time of Group Meetings discussed  Gentle reminder that attendance is a Physician order  [x] Movement  Chair exercises completed  Stretching completed  Notes:   GOAL : \" to go home\" Electronically signed by Cata Spears on 2/15/2023 at 12:07 PM

## 2023-02-15 NOTE — DISCHARGE INSTR - DIET

## 2023-02-15 NOTE — FLOWSHEET NOTE
Reviewed discharge instructions with patient. Pt. Verbalized understanding. Denies SI/HI/AVH. Ambulatory off unit to friend.

## 2023-02-15 NOTE — GROUP NOTE
Group Therapy Note    Date: 2/15/2023    Group Start Time: 1000  Group End Time: 1100  Group Topic: Psychoeducation    MLO[facility] 3W BHGABE Rodriguez, CTRS        Group Therapy Note    Attendees: 6       Patient's Goal:  \"to go home\"    Notes:  Pt. attended the 1000 skill group. Pt. worked on project with interest. Good attention to details. Feels better and was happy to be going home. Status After Intervention:  Improved    Participation Level:  Active Listener and Interactive    Participation Quality: Appropriate, Attentive, and Sharing      Speech:  normal      Thought Process/Content: Logical      Affective Functioning: Congruent      Mood:  calm, happy      Level of consciousness:  Alert, Oriented x4, and Attentive      Response to Learning: Progressing to goal      Endings: None Reported    Modes of Intervention: Education, Support, Socialization, and Activity      Discipline Responsible: Psychoeducational Specialist      Signature:  Coy Heart

## 2023-04-27 ENCOUNTER — OFFICE VISIT (OUTPATIENT)
Dept: FAMILY MEDICINE CLINIC | Age: 38
End: 2023-04-27
Payer: COMMERCIAL

## 2023-04-27 VITALS
HEIGHT: 68 IN | OXYGEN SATURATION: 97 % | RESPIRATION RATE: 18 BRPM | DIASTOLIC BLOOD PRESSURE: 88 MMHG | BODY MASS INDEX: 34.25 KG/M2 | WEIGHT: 226 LBS | SYSTOLIC BLOOD PRESSURE: 120 MMHG | HEART RATE: 70 BPM

## 2023-04-27 DIAGNOSIS — L73.9 ACUTE FOLLICULITIS: ICD-10-CM

## 2023-04-27 DIAGNOSIS — F33.41 RECURRENT MAJOR DEPRESSIVE DISORDER, IN PARTIAL REMISSION (HCC): ICD-10-CM

## 2023-04-27 DIAGNOSIS — F31.63 BIPOLAR 1 DISORDER, MIXED, SEVERE (HCC): Primary | ICD-10-CM

## 2023-04-27 DIAGNOSIS — F41.1 GAD (GENERALIZED ANXIETY DISORDER): ICD-10-CM

## 2023-04-27 DIAGNOSIS — F43.0 ACUTE STRESS REACTION: ICD-10-CM

## 2023-04-27 PROBLEM — I88.0 MESENTERIC ADENITIS: Status: RESOLVED | Noted: 2023-01-26 | Resolved: 2023-04-27

## 2023-04-27 PROBLEM — F31.9 BIPOLAR 1 DISORDER (HCC): Status: RESOLVED | Noted: 2023-02-11 | Resolved: 2023-04-27

## 2023-04-27 PROCEDURE — 99214 OFFICE O/P EST MOD 30 MIN: CPT | Performed by: PHYSICIAN ASSISTANT

## 2023-04-27 PROCEDURE — G8427 DOCREV CUR MEDS BY ELIG CLIN: HCPCS | Performed by: PHYSICIAN ASSISTANT

## 2023-04-27 PROCEDURE — 1036F TOBACCO NON-USER: CPT | Performed by: PHYSICIAN ASSISTANT

## 2023-04-27 PROCEDURE — G8417 CALC BMI ABV UP PARAM F/U: HCPCS | Performed by: PHYSICIAN ASSISTANT

## 2023-04-27 RX ORDER — DOXYCYCLINE HYCLATE 100 MG
100 TABLET ORAL 2 TIMES DAILY
Qty: 20 TABLET | Refills: 0 | Status: SHIPPED | OUTPATIENT
Start: 2023-04-27 | End: 2023-05-07

## 2023-04-27 SDOH — ECONOMIC STABILITY: FOOD INSECURITY: WITHIN THE PAST 12 MONTHS, THE FOOD YOU BOUGHT JUST DIDN'T LAST AND YOU DIDN'T HAVE MONEY TO GET MORE.: NEVER TRUE

## 2023-04-27 SDOH — ECONOMIC STABILITY: HOUSING INSECURITY
IN THE LAST 12 MONTHS, WAS THERE A TIME WHEN YOU DID NOT HAVE A STEADY PLACE TO SLEEP OR SLEPT IN A SHELTER (INCLUDING NOW)?: NO

## 2023-04-27 SDOH — ECONOMIC STABILITY: FOOD INSECURITY: WITHIN THE PAST 12 MONTHS, YOU WORRIED THAT YOUR FOOD WOULD RUN OUT BEFORE YOU GOT MONEY TO BUY MORE.: NEVER TRUE

## 2023-04-27 SDOH — ECONOMIC STABILITY: INCOME INSECURITY: HOW HARD IS IT FOR YOU TO PAY FOR THE VERY BASICS LIKE FOOD, HOUSING, MEDICAL CARE, AND HEATING?: NOT HARD AT ALL

## 2023-04-27 ASSESSMENT — ENCOUNTER SYMPTOMS
SHORTNESS OF BREATH: 0
COUGH: 0
NAUSEA: 0
CHEST TIGHTNESS: 0
BACK PAIN: 0

## 2023-04-27 NOTE — PROGRESS NOTES
Subjective  Landon Dunn, 40 y.o. female presents today with:  Chief Complaint   Patient presents with    Follow-up     3 month follow up     Rash     Discuss rash on body      HPI  In the office today for follow up. Last OV with me: 1/26/23    Was hospitalized 2/10/23 for mental health crisis. She was pink slipped by [facility] after become acutely agitated and threatening towards a CPS staff member. Patient has been very acutely stressed with the current and ongoing situation with her children. Her 4 younger children are in custody with family members as patient has not been deemed mental suitable to care for and raise her children full time. She has weekly visitation with her 4 children, for 2 hours. Patient feels extremely isolated, worried about her kids. Has a  that has been working with her. Her 14y/o and 3year old daughter are currently with her 15 y/o daughter's paternal grandmother. She is a known trigger for patient. Feels extremely on edge and anxious with every visit. She has been following with Dr. Sowmya Conner for her mental health at [facility]. She has been on and failed multiple medications. Denies auditory or visual hallucinations. Known mood disorder with mood swings, irritability. Past medications that she has tried and failed:  Abilify, wellbutrin, klonopin, valum, cymbalta, gabapentin, vistaril, ativan, latuda, lyrica, zoloft, trazodone, viibryd. Has a rash of her upper legs. There are small pustules with some mild itching. It is associated with shaving. No fever, chills. No known exposure. Review of Systems   Constitutional:  Positive for appetite change. Negative for activity change, chills, diaphoresis, fatigue, fever and unexpected weight change. Respiratory:  Negative for cough, chest tightness and shortness of breath. Cardiovascular:  Negative for chest pain, palpitations and leg swelling. Gastrointestinal:  Negative for nausea.

## 2023-06-08 ENCOUNTER — OFFICE VISIT (OUTPATIENT)
Dept: FAMILY MEDICINE CLINIC | Age: 38
End: 2023-06-08
Payer: MEDICARE

## 2023-06-08 VITALS
DIASTOLIC BLOOD PRESSURE: 82 MMHG | OXYGEN SATURATION: 98 % | BODY MASS INDEX: 33.34 KG/M2 | SYSTOLIC BLOOD PRESSURE: 120 MMHG | WEIGHT: 220 LBS | RESPIRATION RATE: 16 BRPM | HEIGHT: 68 IN | HEART RATE: 72 BPM

## 2023-06-08 DIAGNOSIS — F33.41 RECURRENT MAJOR DEPRESSIVE DISORDER, IN PARTIAL REMISSION (HCC): ICD-10-CM

## 2023-06-08 DIAGNOSIS — F31.63 BIPOLAR 1 DISORDER, MIXED, SEVERE (HCC): Primary | ICD-10-CM

## 2023-06-08 DIAGNOSIS — F41.1 GAD (GENERALIZED ANXIETY DISORDER): ICD-10-CM

## 2023-06-08 PROBLEM — L73.9 ACUTE FOLLICULITIS: Status: RESOLVED | Noted: 2023-04-27 | Resolved: 2023-06-08

## 2023-06-08 PROBLEM — M54.32 SCIATICA, LEFT SIDE: Status: RESOLVED | Noted: 2019-02-19 | Resolved: 2023-06-08

## 2023-06-08 PROCEDURE — 1036F TOBACCO NON-USER: CPT | Performed by: PHYSICIAN ASSISTANT

## 2023-06-08 PROCEDURE — G8417 CALC BMI ABV UP PARAM F/U: HCPCS | Performed by: PHYSICIAN ASSISTANT

## 2023-06-08 PROCEDURE — 99213 OFFICE O/P EST LOW 20 MIN: CPT | Performed by: PHYSICIAN ASSISTANT

## 2023-06-08 PROCEDURE — G8427 DOCREV CUR MEDS BY ELIG CLIN: HCPCS | Performed by: PHYSICIAN ASSISTANT

## 2023-06-08 RX ORDER — VILAZODONE HYDROCHLORIDE 10 MG/1
TABLET ORAL
COMMUNITY
Start: 2023-06-07

## 2023-06-08 RX ORDER — PNV,CALCIUM 72/IRON/FOLIC ACID 27 MG-1 MG
1 TABLET ORAL DAILY
Qty: 30 TABLET | Refills: 11 | Status: SHIPPED | OUTPATIENT
Start: 2023-06-08

## 2023-06-08 ASSESSMENT — ENCOUNTER SYMPTOMS
BACK PAIN: 0
CHEST TIGHTNESS: 0
NAUSEA: 0
COUGH: 0
SHORTNESS OF BREATH: 0

## 2023-06-08 NOTE — PROGRESS NOTES
Subjective  Francisco Antoine, 40 y.o. female presents today with:  Chief Complaint   Patient presents with    Follow-up       HPI  Last OV with me::4/17/23.     6 week follow up today. Would like prenatal refilled. Has been working out, eating healthier. Still fighting for custody and the return of her children. Feels hopeful, was just appointed a new . Has a court hearing next week. Not in a relationship, has decided to focus on herself and her kids. She is taking viibryd for depression/mood. Feels better. No acute medical concerns. Review of Systems   Constitutional:  Negative for activity change, appetite change, chills, diaphoresis, fatigue, fever and unexpected weight change. Respiratory:  Negative for cough, chest tightness and shortness of breath. Cardiovascular:  Negative for chest pain, palpitations and leg swelling. Gastrointestinal:  Negative for nausea. Genitourinary:  Negative for dysuria, menstrual problem, pelvic pain and vaginal bleeding. Musculoskeletal:  Positive for neck pain and neck stiffness. Negative for back pain and myalgias. Neurological:  Positive for numbness (intermittent, sciatic). Negative for dizziness, weakness, light-headedness and headaches. Psychiatric/Behavioral:  Negative for agitation, confusion, decreased concentration, dysphoric mood, self-injury, sleep disturbance and suicidal ideas. The patient is not nervous/anxious and is not hyperactive.          Baseline; denies worsening symptoms     Past Medical History:   Diagnosis Date    Anxiety and depression     Asthma     Atypical chest pain 5/31/2019    Chronic back pain     Diabetes mellitus ([address]     Headache     HIV exposure 1/6/2020    Irregular heart beat     Multiple sclerosis ([address]     Osteoarthritis     Post partum depression 4/2/2019    Pure hypercholesterolemia, unspecified 5/3/2018     Past Surgical History:   Procedure Laterality Date    CHOLECYSTECTOMY

## 2023-07-25 ENCOUNTER — HOSPITAL ENCOUNTER (EMERGENCY)
Age: 38
Discharge: HOME | End: 2023-07-25
Payer: MEDICARE

## 2023-07-25 VITALS
OXYGEN SATURATION: 97 % | SYSTOLIC BLOOD PRESSURE: 134 MMHG | RESPIRATION RATE: 18 BRPM | WEIGHT: 230 LBS | HEART RATE: 81 BPM | DIASTOLIC BLOOD PRESSURE: 75 MMHG | HEIGHT: 68 IN | TEMPERATURE: 98.4 F | BODY MASS INDEX: 34.86 KG/M2

## 2023-07-25 DIAGNOSIS — G56.02 LEFT CARPAL TUNNEL SYNDROME: Primary | ICD-10-CM

## 2023-07-25 PROCEDURE — 6370000000 HC RX 637 (ALT 250 FOR IP): Performed by: PERSONAL EMERGENCY RESPONSE ATTENDANT

## 2023-07-25 PROCEDURE — 99283 EMERGENCY DEPT VISIT LOW MDM: CPT

## 2023-07-25 RX ORDER — HYDROCODONE BITARTRATE AND ACETAMINOPHEN 5; 325 MG/1; MG/1
1 TABLET ORAL EVERY 6 HOURS PRN
Qty: 10 TABLET | Refills: 0 | Status: SHIPPED | OUTPATIENT
Start: 2023-07-25 | End: 2023-07-28

## 2023-07-25 RX ORDER — HYDROCODONE BITARTRATE AND ACETAMINOPHEN 5; 325 MG/1; MG/1
1 TABLET ORAL ONCE
Status: COMPLETED | OUTPATIENT
Start: 2023-07-25 | End: 2023-07-25

## 2023-07-25 RX ADMIN — HYDROCODONE BITARTRATE AND ACETAMINOPHEN 1 TABLET: 5; 325 TABLET ORAL at 02:11

## 2023-07-25 ASSESSMENT — ENCOUNTER SYMPTOMS
VOMITING: 0
DIARRHEA: 0
BLOOD IN STOOL: 0
NAUSEA: 0
SHORTNESS OF BREATH: 0
SORE THROAT: 0
COLOR CHANGE: 0
ABDOMINAL PAIN: 0
COUGH: 0
RHINORRHEA: 0

## 2023-07-25 ASSESSMENT — PAIN DESCRIPTION - DESCRIPTORS: DESCRIPTORS: BURNING

## 2023-07-25 ASSESSMENT — PAIN DESCRIPTION - ONSET: ONSET: ON-GOING

## 2023-07-25 ASSESSMENT — PAIN SCALES - GENERAL
PAINLEVEL_OUTOF10: 10
PAINLEVEL_OUTOF10: 10

## 2023-07-25 ASSESSMENT — PAIN DESCRIPTION - LOCATION
LOCATION: HAND
LOCATION: HAND

## 2023-07-25 ASSESSMENT — PAIN DESCRIPTION - ORIENTATION
ORIENTATION: LEFT
ORIENTATION: LEFT

## 2023-07-25 ASSESSMENT — PAIN DESCRIPTION - FREQUENCY: FREQUENCY: CONTINUOUS

## 2023-07-25 NOTE — ED PROVIDER NOTES
[facility] ED  eMERGENCY dEPARTMENT eNCOUnter      Pt Name: Violeta Peace  MRN: [id number]  [address] [facility] 1985  Date of evaluation: 7/25/2023  Provider: JIM Jiménez      HISTORY OF PRESENT ILLNESS    Violeta Peace is a 40 y.o. female with PMHx of anxiety and depression, asthma, atypical chest pain, chronic back pain, diabetes, MS presents to the emergency department with left hand pain. Patient states tonight after work she started with left wrist pain, radiating throughout palmar aspect of left hand and she also thinks dorsal aspect to all fingers. She did not take anything for the pain. Feel the hand is swollen as well with slightly weak/painful grasp. No injuries. HPI    Nursing Notes were reviewed. REVIEW OF SYSTEMS       Review of Systems   Constitutional:  Negative for appetite change, chills and fever. HENT:  Negative for congestion, rhinorrhea and sore throat. Respiratory:  Negative for cough and shortness of breath. Cardiovascular:  Negative for chest pain. Gastrointestinal:  Negative for abdominal pain, blood in stool, diarrhea, nausea and vomiting. Genitourinary:  Negative for difficulty urinating. Musculoskeletal:  Positive for arthralgias and myalgias. Negative for neck stiffness. Skin:  Negative for color change and rash. Neurological:  Negative for dizziness, syncope, weakness, light-headedness, numbness and headaches. All other systems reviewed and are negative.           PAST MEDICAL HISTORY     Past Medical History:   Diagnosis Date    Anxiety and depression     Asthma     Atypical chest pain 5/31/2019    Chronic back pain     Diabetes mellitus ([address])     Headache     HIV exposure 1/6/2020    Irregular heart beat     Multiple sclerosis ([address])     Osteoarthritis     Post partum depression 4/2/2019    Pure hypercholesterolemia, unspecified 5/3/2018         SURGICAL HISTORY       Past Surgical History:   Procedure Laterality Date    CHOLECYSTECTOMY

## 2023-08-01 ENCOUNTER — OFFICE VISIT (OUTPATIENT)
Dept: FAMILY MEDICINE CLINIC | Age: 38
End: 2023-08-01
Payer: MEDICARE

## 2023-08-01 VITALS
SYSTOLIC BLOOD PRESSURE: 120 MMHG | DIASTOLIC BLOOD PRESSURE: 78 MMHG | HEIGHT: 68 IN | WEIGHT: 215 LBS | HEART RATE: 58 BPM | BODY MASS INDEX: 32.58 KG/M2 | OXYGEN SATURATION: 97 % | RESPIRATION RATE: 16 BRPM

## 2023-08-01 DIAGNOSIS — Z20.2 STD EXPOSURE: ICD-10-CM

## 2023-08-01 DIAGNOSIS — F33.41 RECURRENT MAJOR DEPRESSIVE DISORDER, IN PARTIAL REMISSION (HCC): ICD-10-CM

## 2023-08-01 DIAGNOSIS — R53.83 OTHER FATIGUE: ICD-10-CM

## 2023-08-01 DIAGNOSIS — R73.09 ABNORMAL GLUCOSE: ICD-10-CM

## 2023-08-01 DIAGNOSIS — G35 MULTIPLE SCLEROSIS (HCC): ICD-10-CM

## 2023-08-01 DIAGNOSIS — R79.9 ABNORMAL FINDING OF BLOOD CHEMISTRY, UNSPECIFIED: ICD-10-CM

## 2023-08-01 DIAGNOSIS — Z86.39 HISTORY OF DIABETES MELLITUS: ICD-10-CM

## 2023-08-01 DIAGNOSIS — F31.63 BIPOLAR 1 DISORDER, MIXED, SEVERE (HCC): ICD-10-CM

## 2023-08-01 DIAGNOSIS — Z20.2 STD EXPOSURE: Primary | ICD-10-CM

## 2023-08-01 LAB
ALBUMIN SERPL-MCNC: 4.2 G/DL (ref 3.5–4.6)
ALP SERPL-CCNC: 135 U/L (ref 40–130)
ALT SERPL-CCNC: 11 U/L (ref 0–33)
ANION GAP SERPL CALCULATED.3IONS-SCNC: 9 MEQ/L (ref 9–15)
AST SERPL-CCNC: 23 U/L (ref 0–35)
BILIRUB SERPL-MCNC: <0.2 MG/DL (ref 0.2–0.7)
BUN SERPL-MCNC: 8 MG/DL (ref 6–20)
CALCIUM SERPL-MCNC: 9.4 MG/DL (ref 8.5–9.9)
CHLORIDE SERPL-SCNC: 101 MEQ/L (ref 95–107)
CHOLEST SERPL-MCNC: 178 MG/DL (ref 0–199)
CO2 SERPL-SCNC: 29 MEQ/L (ref 20–31)
CREAT SERPL-MCNC: 0.74 MG/DL (ref 0.5–0.9)
CREAT UR-MCNC: 321.8 MG/DL
ERYTHROCYTE [DISTWIDTH] IN BLOOD BY AUTOMATED COUNT: 14.2 % (ref 11.5–14.5)
GLOBULIN SER CALC-MCNC: 2.7 G/DL (ref 2.3–3.5)
GLUCOSE SERPL-MCNC: 97 MG/DL (ref 70–99)
HBA1C MFR BLD: 5.7 % (ref 4.8–5.9)
HCT VFR BLD AUTO: 41.4 % (ref 37–47)
HDLC SERPL-MCNC: 59 MG/DL (ref 40–59)
HGB BLD-MCNC: 13.9 G/DL (ref 12–16)
HIV AG/AB: NONREACTIVE
LDL CHOLESTEROL CALCULATED: 104 MG/DL (ref 0–129)
MCH RBC QN AUTO: 30.7 PG (ref 27–31.3)
MCHC RBC AUTO-ENTMCNC: 33.5 % (ref 33–37)
MCV RBC AUTO: 91.6 FL (ref 79.4–94.8)
MICROALBUMIN UR-MCNC: <1.2 MG/DL
MICROALBUMIN/CREAT UR-RTO: NORMAL MG/G (ref 0–30)
PLATELET # BLD AUTO: 257 K/UL (ref 130–400)
POTASSIUM SERPL-SCNC: 3.8 MEQ/L (ref 3.4–4.9)
PROT SERPL-MCNC: 6.9 G/DL (ref 6.3–8)
RBC # BLD AUTO: 4.51 M/UL (ref 4.2–5.4)
SODIUM SERPL-SCNC: 139 MEQ/L (ref 135–144)
TRIGLYCERIDE, FASTING: 77 MG/DL (ref 0–150)
WBC # BLD AUTO: 9.7 K/UL (ref 4.8–10.8)

## 2023-08-01 PROCEDURE — 99214 OFFICE O/P EST MOD 30 MIN: CPT | Performed by: PHYSICIAN ASSISTANT

## 2023-08-01 PROCEDURE — 1036F TOBACCO NON-USER: CPT | Performed by: PHYSICIAN ASSISTANT

## 2023-08-01 PROCEDURE — G8427 DOCREV CUR MEDS BY ELIG CLIN: HCPCS | Performed by: PHYSICIAN ASSISTANT

## 2023-08-01 PROCEDURE — G8417 CALC BMI ABV UP PARAM F/U: HCPCS | Performed by: PHYSICIAN ASSISTANT

## 2023-08-01 ASSESSMENT — ENCOUNTER SYMPTOMS
COLOR CHANGE: 0
COUGH: 0
SHORTNESS OF BREATH: 0
BACK PAIN: 0
CHEST TIGHTNESS: 0
NAUSEA: 0

## 2023-08-01 NOTE — PROGRESS NOTES
Subjective  Deya Nicolas, 40 y.o. female presents today with:  Chief Complaint   Patient presents with    Follow-up     6 week follow up     Other     Would like to have more blood work done for STDs       HPI  Last OV with me: 6/8/23. Still fighting for custody and the return of her children. Feels hopeful, working well with her . Bipolar is stable. Denies mood swings, worsening depression or irritability. She is taking viibryd for depression/mood. Feels better. Would like testing for STD/exposure. In a new relationship. No new MS flares. Review of Systems   Constitutional:  Negative for activity change, appetite change, chills, diaphoresis, fatigue, fever and unexpected weight change. Respiratory:  Negative for cough, chest tightness and shortness of breath. Cardiovascular:  Negative for chest pain, palpitations and leg swelling. Gastrointestinal:  Negative for nausea. Genitourinary:  Negative for dysuria, menstrual problem, pelvic pain and vaginal bleeding. Musculoskeletal:  Negative for back pain, myalgias, neck pain and neck stiffness. Skin:  Negative for color change. Neurological:  Positive for numbness (intermittent, sciatic). Negative for dizziness, weakness, light-headedness and headaches. Psychiatric/Behavioral:  Negative for agitation, confusion, decreased concentration, dysphoric mood, self-injury, sleep disturbance and suicidal ideas. The patient is not nervous/anxious and is not hyperactive.          Baseline; denies worsening symptoms     Past Medical History:   Diagnosis Date    Anxiety and depression     Asthma     Atypical chest pain 5/31/2019    Chronic back pain     Diabetes mellitus ([address])     Headache     HIV exposure 1/6/2020    Irregular heart beat     Multiple sclerosis ([address])     Osteoarthritis     Post partum depression 4/2/2019    Pure hypercholesterolemia, unspecified 5/3/2018     Past Surgical History:   Procedure Laterality

## 2023-08-02 LAB — RPR SER QL: NORMAL

## 2023-08-03 LAB
SPECIMEN SOURCE: NORMAL
T VAGINALIS RRNA SPEC QL NAA+PROBE: NEGATIVE

## 2023-08-06 LAB
C TRACH DNA UR QL NAA+PROBE: NEGATIVE
N GONORRHOEA DNA UR QL NAA+PROBE: NEGATIVE

## 2023-08-14 ENCOUNTER — TELEPHONE (OUTPATIENT)
Dept: FAMILY MEDICINE CLINIC | Age: 38
End: 2023-08-14

## 2023-08-14 RX ORDER — FLUCONAZOLE 150 MG/1
TABLET ORAL
Qty: 2 TABLET | Refills: 1 | Status: SHIPPED | OUTPATIENT
Start: 2023-08-14

## 2023-08-14 NOTE — TELEPHONE ENCOUNTER
Pt called and stated that she just finished an antibiotic for a dog bite and would like to know if José Miguel Lucas would call her in something for a yeast infection.     Pharmacy is [facility] on [address] in [place]

## 2023-09-28 ENCOUNTER — TELEPHONE (OUTPATIENT)
Dept: FAMILY MEDICINE CLINIC | Age: 38
End: 2023-09-28

## 2023-09-28 NOTE — TELEPHONE ENCOUNTER
I spoke with patient and her . Patients  states that she is trying to reach you and doesn't have access to fax so she will mail some stuff to the office.

## 2023-10-05 ENCOUNTER — OFFICE VISIT (OUTPATIENT)
Dept: FAMILY MEDICINE CLINIC | Age: 38
End: 2023-10-05
Payer: MEDICARE

## 2023-10-05 VITALS
HEIGHT: 68 IN | OXYGEN SATURATION: 98 % | DIASTOLIC BLOOD PRESSURE: 70 MMHG | RESPIRATION RATE: 16 BRPM | WEIGHT: 207 LBS | BODY MASS INDEX: 31.37 KG/M2 | HEART RATE: 78 BPM | SYSTOLIC BLOOD PRESSURE: 100 MMHG

## 2023-10-05 DIAGNOSIS — F31.63 BIPOLAR 1 DISORDER, MIXED, SEVERE (HCC): Primary | ICD-10-CM

## 2023-10-05 DIAGNOSIS — F33.41 RECURRENT MAJOR DEPRESSIVE DISORDER, IN PARTIAL REMISSION (HCC): ICD-10-CM

## 2023-10-05 PROBLEM — Z20.2 STD EXPOSURE: Status: RESOLVED | Noted: 2023-08-01 | Resolved: 2023-10-05

## 2023-10-05 PROCEDURE — G8427 DOCREV CUR MEDS BY ELIG CLIN: HCPCS | Performed by: PHYSICIAN ASSISTANT

## 2023-10-05 PROCEDURE — 99213 OFFICE O/P EST LOW 20 MIN: CPT | Performed by: PHYSICIAN ASSISTANT

## 2023-10-05 PROCEDURE — G8417 CALC BMI ABV UP PARAM F/U: HCPCS | Performed by: PHYSICIAN ASSISTANT

## 2023-10-05 PROCEDURE — G8484 FLU IMMUNIZE NO ADMIN: HCPCS | Performed by: PHYSICIAN ASSISTANT

## 2023-10-05 PROCEDURE — 1036F TOBACCO NON-USER: CPT | Performed by: PHYSICIAN ASSISTANT

## 2023-10-05 ASSESSMENT — ENCOUNTER SYMPTOMS
BACK PAIN: 0
COUGH: 0
SHORTNESS OF BREATH: 0
COLOR CHANGE: 0
CHEST TIGHTNESS: 0
NAUSEA: 0

## 2023-11-21 ENCOUNTER — TELEPHONE (OUTPATIENT)
Dept: FAMILY MEDICINE CLINIC | Age: 38
End: 2023-11-21

## 2023-11-21 NOTE — TELEPHONE ENCOUNTER
[facility] patients  is requesting to speak with before the patients trial on 11/28/2023. Pisek states that you are supposed to testify.     # [phone]; ok to leave VM

## 2023-11-30 ENCOUNTER — OFFICE VISIT (OUTPATIENT)
Dept: FAMILY MEDICINE CLINIC | Age: 38
End: 2023-11-30
Payer: MEDICARE

## 2023-11-30 VITALS
DIASTOLIC BLOOD PRESSURE: 80 MMHG | WEIGHT: 206 LBS | BODY MASS INDEX: 31.22 KG/M2 | HEIGHT: 68 IN | RESPIRATION RATE: 16 BRPM | OXYGEN SATURATION: 96 % | HEART RATE: 80 BPM | SYSTOLIC BLOOD PRESSURE: 128 MMHG

## 2023-11-30 DIAGNOSIS — F43.0 ACUTE STRESS REACTION: ICD-10-CM

## 2023-11-30 DIAGNOSIS — F33.41 RECURRENT MAJOR DEPRESSIVE DISORDER, IN PARTIAL REMISSION (HCC): ICD-10-CM

## 2023-11-30 DIAGNOSIS — L03.032 PARONYCHIA OF FIFTH TOE, LEFT: Primary | ICD-10-CM

## 2023-11-30 DIAGNOSIS — F41.1 GAD (GENERALIZED ANXIETY DISORDER): ICD-10-CM

## 2023-11-30 DIAGNOSIS — G43.719 INTRACTABLE CHRONIC MIGRAINE WITHOUT AURA AND WITHOUT STATUS MIGRAINOSUS: ICD-10-CM

## 2023-11-30 PROCEDURE — G8484 FLU IMMUNIZE NO ADMIN: HCPCS | Performed by: PHYSICIAN ASSISTANT

## 2023-11-30 PROCEDURE — G8427 DOCREV CUR MEDS BY ELIG CLIN: HCPCS | Performed by: PHYSICIAN ASSISTANT

## 2023-11-30 PROCEDURE — G8417 CALC BMI ABV UP PARAM F/U: HCPCS | Performed by: PHYSICIAN ASSISTANT

## 2023-11-30 PROCEDURE — 99214 OFFICE O/P EST MOD 30 MIN: CPT | Performed by: PHYSICIAN ASSISTANT

## 2023-11-30 PROCEDURE — 1036F TOBACCO NON-USER: CPT | Performed by: PHYSICIAN ASSISTANT

## 2023-11-30 RX ORDER — SULFAMETHOXAZOLE AND TRIMETHOPRIM 800; 160 MG/1; MG/1
1 TABLET ORAL 2 TIMES DAILY
Qty: 20 TABLET | Refills: 0 | Status: SHIPPED | OUTPATIENT
Start: 2023-11-30 | End: 2023-12-10

## 2023-11-30 RX ORDER — RIMEGEPANT SULFATE 75 MG/75MG
75 TABLET, ORALLY DISINTEGRATING ORAL ONCE
Qty: 2 TABLET | Refills: 0 | COMMUNITY
Start: 2023-11-30 | End: 2023-11-30

## 2023-11-30 ASSESSMENT — ENCOUNTER SYMPTOMS
COLOR CHANGE: 1
SHORTNESS OF BREATH: 0
BACK PAIN: 0
COUGH: 0
NAUSEA: 0
CHEST TIGHTNESS: 0

## 2023-11-30 NOTE — PROGRESS NOTES
Medications    sulfamethoxazole-trimethoprim (BACTRIM DS) 800-160 MG per tablet     Sig: Take 1 tablet by mouth 2 times daily for 10 days     Dispense:  20 tablet     Refill:  0    Rimegepant Sulfate (NURTEC) 75 MG TBDP     Sig: Take 75 mg by mouth once for 1 dose LOT [id number]  Exp 9/2025  X1   [facility]     Dispense:  2 tablet     Refill:  0     There are no discontinued medications. Return in about 6 weeks (around 1/11/2024) for follow up in office. Reviewed with the patient: current clinical status, medications, activities and diet. Side effects, adverse effects of the medication prescribed today, as well as treatment plan/ rationale and result expectations have been discussed with the patient who expresses understanding and desires to proceed. Close follow up to evaluate treatment results and for coordination of care. I have reviewed the patient's medical history in detail and updated the computerized patient record.     Leticia Villa PA-C

## 2023-12-05 ENCOUNTER — PATIENT MESSAGE (OUTPATIENT)
Dept: FAMILY MEDICINE CLINIC | Age: 38
End: 2023-12-05

## 2023-12-05 RX ORDER — FLUCONAZOLE 150 MG/1
TABLET ORAL
Qty: 2 TABLET | Refills: 0 | Status: SHIPPED | OUTPATIENT
Start: 2023-12-05

## 2023-12-05 NOTE — TELEPHONE ENCOUNTER
From: Holly Galo  To: Marisol Villa  Sent: 12/5/2023 7:13 AM EST  Subject: Medicine    Hi as I am finishing up my antibiotics, I am starting to get yeast. Could I please have a yeast infection medicine called in, so I can start taking it immediately after the completion of my antibiotics.    Thank You For Everything!!!

## 2023-12-08 ENCOUNTER — TELEPHONE (OUTPATIENT)
Dept: FAMILY MEDICINE CLINIC | Age: 38
End: 2023-12-08

## 2023-12-08 NOTE — TELEPHONE ENCOUNTER
FYI Patient's mother states patient was admitted to hospital (did not know which one) in [place]. They think she may have had a stroke.

## 2023-12-08 NOTE — TELEPHONE ENCOUNTER
----- Message from Ni Bowser sent at 12/8/2023 11:46 AM EST -----  Subject: Message to Provider    QUESTIONS  Information for Provider? patient's mother called to update JIM Villa on   patient's condition, mother Robyn Segura called to inform that patient has acute   chronic lesion in brain, not a stroke, patient still has little numbness   of face  ---------------------------------------------------------------------------  --------------  Heriberto Hallman INFO  [phone]; OK to leave message on voicemail  ---------------------------------------------------------------------------  --------------  SCRIPT ANSWERS  undefined

## 2023-12-11 ENCOUNTER — TELEPHONE (OUTPATIENT)
Dept: FAMILY MEDICINE CLINIC | Age: 38
End: 2023-12-11

## 2023-12-11 NOTE — TELEPHONE ENCOUNTER
----- Message from Valerialphonse Martini sent at 12/8/2023 11:46 AM EST -----  Subject: Message to Provider    QUESTIONS  Information for Provider? patient's mother called to update JIM Villa on   patient's condition, mother Catina Hence called to inform that patient has acute   chronic lesion in brain, not a stroke, patient still has little numbness   of face  ---------------------------------------------------------------------------  --------------  Alfonso [facility] INFO  [phone]; OK to leave message on voicemail  ---------------------------------------------------------------------------  --------------  SCRIPT ANSWERS  undefined

## 2024-01-02 ENCOUNTER — TELEPHONE (OUTPATIENT)
Dept: NEUROLOGY | Age: 39
End: 2024-01-02

## 2024-01-02 NOTE — TELEPHONE ENCOUNTER
Patients  Ayesha Lora called and wants to know if you will testify via zoom about Ambers emotional state because you told her it was situational. It would be on either January 10th or 11th ( what ever day you could do ) . If you can't do it she understands and knows that you are busy. Patient was last seen 1/26/2022    Also if you wont do the zoom would you do a phone call for about 15 min with her      Ayesha can be reached at [phone]

## 2024-01-03 NOTE — TELEPHONE ENCOUNTER
returned call to office and I advised that the only information that we could give would be to have Bibiana sign a record release.

## 2024-01-12 ENCOUNTER — OFFICE VISIT (OUTPATIENT)
Dept: FAMILY MEDICINE CLINIC | Age: 39
End: 2024-01-12
Payer: MEDICARE

## 2024-01-12 VITALS
DIASTOLIC BLOOD PRESSURE: 82 MMHG | RESPIRATION RATE: 18 BRPM | BODY MASS INDEX: 32.74 KG/M2 | WEIGHT: 216 LBS | OXYGEN SATURATION: 99 % | RESPIRATION RATE: 16 BRPM | BODY MASS INDEX: 32.74 KG/M2 | OXYGEN SATURATION: 99 % | DIASTOLIC BLOOD PRESSURE: 82 MMHG | WEIGHT: 216 LBS | HEIGHT: 68 IN | HEIGHT: 68 IN | HEART RATE: 86 BPM | SYSTOLIC BLOOD PRESSURE: 120 MMHG | SYSTOLIC BLOOD PRESSURE: 120 MMHG | HEART RATE: 86 BPM

## 2024-01-12 DIAGNOSIS — G35 MULTIPLE SCLEROSIS (HCC): ICD-10-CM

## 2024-01-12 DIAGNOSIS — I67.850 CADASIL: ICD-10-CM

## 2024-01-12 DIAGNOSIS — M79.676 PAIN OF FIFTH TOE: ICD-10-CM

## 2024-01-12 DIAGNOSIS — F31.63 BIPOLAR 1 DISORDER, MIXED, SEVERE (HCC): Primary | ICD-10-CM

## 2024-01-12 DIAGNOSIS — L03.032 PARONYCHIA OF FIFTH TOE OF LEFT FOOT: ICD-10-CM

## 2024-01-12 DIAGNOSIS — Z00.00 MEDICARE ANNUAL WELLNESS VISIT, SUBSEQUENT: Primary | ICD-10-CM

## 2024-01-12 PROCEDURE — 99214 OFFICE O/P EST MOD 30 MIN: CPT | Performed by: PHYSICIAN ASSISTANT

## 2024-01-12 PROCEDURE — G8484 FLU IMMUNIZE NO ADMIN: HCPCS | Performed by: PHYSICIAN ASSISTANT

## 2024-01-12 PROCEDURE — G0439 PPPS, SUBSEQ VISIT: HCPCS | Performed by: PHYSICIAN ASSISTANT

## 2024-01-12 RX ORDER — VILAZODONE HYDROCHLORIDE 10 MG/1
10 TABLET ORAL DAILY
Qty: 30 TABLET | Refills: 11 | Status: SHIPPED | OUTPATIENT
Start: 2024-01-12

## 2024-01-12 ASSESSMENT — PATIENT HEALTH QUESTIONNAIRE - PHQ9
7. TROUBLE CONCENTRATING ON THINGS, SUCH AS READING THE NEWSPAPER OR WATCHING TELEVISION: 0
2. FEELING DOWN, DEPRESSED OR HOPELESS: 0
10. IF YOU CHECKED OFF ANY PROBLEMS, HOW DIFFICULT HAVE THESE PROBLEMS MADE IT FOR YOU TO DO YOUR WORK, TAKE CARE OF THINGS AT HOME, OR GET ALONG WITH OTHER PEOPLE: 0
SUM OF ALL RESPONSES TO PHQ QUESTIONS 1-9: 0
6. FEELING BAD ABOUT YOURSELF - OR THAT YOU ARE A FAILURE OR HAVE LET YOURSELF OR YOUR FAMILY DOWN: 0
SUM OF ALL RESPONSES TO PHQ QUESTIONS 1-9: 0
SUM OF ALL RESPONSES TO PHQ QUESTIONS 1-9: 0
3. TROUBLE FALLING OR STAYING ASLEEP: 0
SUM OF ALL RESPONSES TO PHQ9 QUESTIONS 1 & 2: 0
1. LITTLE INTEREST OR PLEASURE IN DOING THINGS: 0
5. POOR APPETITE OR OVEREATING: 0
SUM OF ALL RESPONSES TO PHQ QUESTIONS 1-9: 0
8. MOVING OR SPEAKING SO SLOWLY THAT OTHER PEOPLE COULD HAVE NOTICED. OR THE OPPOSITE, BEING SO FIGETY OR RESTLESS THAT YOU HAVE BEEN MOVING AROUND A LOT MORE THAN USUAL: 0
4. FEELING TIRED OR HAVING LITTLE ENERGY: 0
9. THOUGHTS THAT YOU WOULD BE BETTER OFF DEAD, OR OF HURTING YOURSELF: 0

## 2024-01-12 ASSESSMENT — ENCOUNTER SYMPTOMS
CHEST TIGHTNESS: 0
COLOR CHANGE: 1
NAUSEA: 0
SHORTNESS OF BREATH: 0
BACK PAIN: 0
COUGH: 0

## 2024-01-12 ASSESSMENT — LIFESTYLE VARIABLES
HOW OFTEN DO YOU HAVE A DRINK CONTAINING ALCOHOL: NEVER
HOW MANY STANDARD DRINKS CONTAINING ALCOHOL DO YOU HAVE ON A TYPICAL DAY: PATIENT DOES NOT DRINK

## 2024-01-12 NOTE — PATIENT INSTRUCTIONS
hearing loss.  When should you call for help?  Watch closely for changes in your health, and be sure to contact your doctor if:    You think your hearing is getting worse.     You have new symptoms, such as dizziness or nausea.   Where can you learn more?  Go to https://[email or web address].net/patientEd and enter R798 to learn more about \"Hearing Loss: Care Instructions.\"  Current as of: February 28, 2023               Content Version: 13.9  © 2006-2023 [employer].   Care instructions adapted under license by [employer]. If you have questions about a medical condition or this instruction, always ask your healthcare professional. [employer] disclaims any warranty or liability for your use of this information.           Learning About Vision Tests  What are vision tests?     The four most common vision tests are visual acuity tests, refraction, visual field tests, and color vision tests.  Visual acuity (sharpness) tests  These tests are used:  To see if you need glasses or contact lenses.  To monitor an eye problem.  To check an eye injury.  Visual acuity tests are done as part of routine exams. You may also have this test when you get your 's license or apply for some types of jobs.  Visual field tests  These tests are used:  To check for vision loss in any area of your range of vision.  To screen for certain eye diseases.  To look for nerve damage after a stroke, head injury, or other problem that could reduce blood flow to the brain.  Refraction and color tests  A refraction test is done to find the right prescription for glasses and contact lenses.  A color vision test is done to check for color blindness.  Color vision is often tested as part of a routine exam. You may also have this test when you apply for a job where recognizing different colors is important, such as , electronics, or the .  How are vision tests done?  Visual acuity test   You cover one eye at

## 2024-01-12 NOTE — PROGRESS NOTES
Subjective  Bibiana Moses, 38 y.o. female presents today with:  Chief Complaint   Patient presents with    Follow-up     6 week follow up        HPI  Last OV with me: 11/30/23.     Has been complaint with her viibryd.  Taking daily.  States that its over 200$ to fill her prescription.   Feels medication is helping with her symptoms.   Continues to see therapist.   Continues to have acute stress and depression related to the custody of her children.  Has 5 children. She is attending appointments, going to therapy.  Has visitation rights for the baby only at this time. Wants to get all her kids back and full custody of her children.  Denies SI/HI.      C/o L 5th little toe pain. Thinks she has an ingrown toenail.    Reports intermittent tingling/numbness in her toes   Has follow up with podiatry in 2 weeks.   Denies injury/trauma.    Review of Systems   Constitutional:  Negative for activity change, appetite change, chills, diaphoresis, fatigue and fever. Unexpected weight change: loss.  HENT:  Negative for congestion.    Respiratory:  Negative for cough, chest tightness and shortness of breath.    Cardiovascular:  Negative for chest pain, palpitations and leg swelling.   Gastrointestinal:  Negative for nausea.   Genitourinary:  Negative for dysuria, menstrual problem, pelvic pain and vaginal bleeding.   Musculoskeletal:  Negative for back pain, myalgias, neck pain and neck stiffness.   Skin:  Positive for color change (L 5th toe mild erythema noted,).   Neurological:  Positive for numbness (intermittent in L toes). Negative for dizziness, weakness and light-headedness. Headaches: intermittent.  Psychiatric/Behavioral:  Negative for agitation, confusion, decreased concentration, dysphoric mood, self-injury and suicidal ideas. The patient is not hyperactive.         Baseline; denies worsening symptoms       Past Medical History:   Diagnosis Date    Anxiety and depression     Asthma     Atypical chest pain 
  Cuff Size: Large Adult   Pulse: 86   Resp: 16   SpO2: 99%   Weight: 98 kg (216 lb)   Height: 1.727 m (5' 8\")       BP Readings from Last 3 Encounters:   01/12/24 120/82   01/12/24 120/82   11/30/23 128/80     Wt Readings from Last 3 Encounters:   01/12/24 98 kg (216 lb)   01/12/24 98 kg (216 lb)   11/30/23 93.4 kg (206 lb)     Physical Exam  Vitals reviewed.   Constitutional:       General: She is not in acute distress.     Appearance: Normal appearance. She is not ill-appearing.   HENT:      Head: Normocephalic and atraumatic.      Right Ear: External ear normal.      Left Ear: External ear normal.      Nose: Nose normal.      Mouth/Throat:      Mouth: Mucous membranes are moist.   Cardiovascular:      Rate and Rhythm: Normal rate and regular rhythm.   Pulmonary:      Effort: Pulmonary effort is normal.      Breath sounds: Normal breath sounds.   Musculoskeletal:         General: Tenderness (paronychia of 5th little toenail, erythema of skin) present.   Skin:     General: Skin is warm.   Neurological:      General: No focal deficit present.      Mental Status: She is alert and oriented to person, place, and time.   Psychiatric:         Attention and Perception: Attention normal.         Mood and Affect: Affect is tearful.         Speech: Speech normal.         Behavior: Behavior normal.         Thought Content: Thought content normal.         Cognition and Memory: Cognition normal.         Judgment: Judgment normal.      Comments: Mood is stable.    Improved mood noted today.  More hopeful.        Assessment & Plan    Diagnosis Orders   1. Bipolar 1 disorder, mixed, severe (HCC)  vilazodone HCl (VIIBRYD) 10 MG TABS      2. BMI 31.0-31.9,adult        3. Pain of fifth toe  ciclopirox (PENLAC) 8 % solution      4. Paronychia of fifth toe of left foot  ciclopirox (PENLAC) 8 % solution      5. Multiple sclerosis (HCC)        6. CADASIL        Reviewed recent notes with [facility] neurology, ?MS? Vs. CADASIL.  Patient is

## 2024-01-12 NOTE — PROGRESS NOTES
Medicare Annual Wellness Visit    Bibiana Moses is here for No chief complaint on file.    Assessment & Plan   Medicare annual wellness visit, subsequent  Recommendations for Preventive Services Due: see orders and patient instructions/AVS.  Recommended screening schedule for the next 5-10 years is provided to the patient in written form: see Patient Instructions/AVS.     No follow-ups on file.     Subjective     Patient's complete Health Risk Assessment and screening values have been reviewed and are found in Flowsheets. The following problems were reviewed today and where indicated follow up appointments were made and/or referrals ordered.    Positive Risk Factor Screenings with Interventions:          Drug Use:   Substance and Sexual Activity   Drug Use Yes    Frequency: 3.0 times per week    Types: Marijuana (Weed)     Interventions:  Patient advised to follow up in the office for further evaluation and treatment, encouraged to decrease/stop smoking.         General HRA Questions:  Select all that apply: (!) Stress, Anger, New or Increased Pain    Pain Interventions:  Patient declined any further interventions or treatment    Stress Interventions:  Patient declined any further interventions or treatment    Anger Interventions:  Patient declined any further interventions or treatment      Activity, Diet, and Weight:  On average, how many days per week do you engage in moderate to strenuous exercise (like a brisk walk)?: 0 days  On average, how many minutes do you engage in exercise at this level?: 0 min    Do you eat balanced/healthy meals regularly?: Yes    Body mass index is 32.84 kg/m². (!) Abnormal      Inactivity Interventions:  Encouraged to keep active.   Obesity Interventions:  Patient declines any further evaluation or treatment    Dentist Screen:  Have you seen the dentist within the past year?: (!) No    Intervention:  Advised to schedule with their dentist    Hearing Screen:  Do you or your family

## 2024-01-24 ENCOUNTER — INITIAL CONSULT (OUTPATIENT)
Dept: PODIATRY | Age: 39
End: 2024-01-24
Payer: MEDICARE

## 2024-01-24 VITALS — WEIGHT: 218 LBS | BODY MASS INDEX: 33.04 KG/M2 | TEMPERATURE: 97.4 F | HEIGHT: 68 IN

## 2024-01-24 DIAGNOSIS — L84 CORN: ICD-10-CM

## 2024-01-24 DIAGNOSIS — M79.675 PAIN IN TOE OF LEFT FOOT: Primary | ICD-10-CM

## 2024-01-24 DIAGNOSIS — M65.872 OTHER SYNOVITIS AND TENOSYNOVITIS, LEFT ANKLE AND FOOT: ICD-10-CM

## 2024-01-24 DIAGNOSIS — M20.5X1 ADDUCTOVARUS ROTATION OF TOE, ACQUIRED, RIGHT: ICD-10-CM

## 2024-01-24 DIAGNOSIS — M21.621 TAILOR'S BUNIONETTE, BILATERAL: ICD-10-CM

## 2024-01-24 DIAGNOSIS — M20.5X2 ADDUCTOVARUS ROTATION OF TOE, ACQUIRED, LEFT: ICD-10-CM

## 2024-01-24 DIAGNOSIS — M21.622 TAILOR'S BUNIONETTE, BILATERAL: ICD-10-CM

## 2024-01-24 PROCEDURE — G8417 CALC BMI ABV UP PARAM F/U: HCPCS | Performed by: PODIATRIST

## 2024-01-24 PROCEDURE — 1036F TOBACCO NON-USER: CPT | Performed by: PODIATRIST

## 2024-01-24 PROCEDURE — G8427 DOCREV CUR MEDS BY ELIG CLIN: HCPCS | Performed by: PODIATRIST

## 2024-01-24 PROCEDURE — G8484 FLU IMMUNIZE NO ADMIN: HCPCS | Performed by: PODIATRIST

## 2024-01-24 PROCEDURE — 99203 OFFICE O/P NEW LOW 30 MIN: CPT | Performed by: PODIATRIST

## 2024-01-24 ASSESSMENT — ENCOUNTER SYMPTOMS
SHORTNESS OF BREATH: 0
BACK PAIN: 0
VOMITING: 0
NAUSEA: 0

## 2024-01-24 NOTE — PROGRESS NOTES
[facility] [place] [facility], [facility]  [address] [address]  Dept: [phone]  Loc: [phone]       Bibiana Moses  (1985)    1/24/24    Subjective     Bibiana Moses is 38 y.o. female who complains today of:    Chief Complaint   Patient presents with    Toe Pain     Left 5th toe and Right big toe nail     Bibiana Moses is seen in consultation at the request of Leticia Villa PA-C for evaluation of left fifth toe pain.     Toe Pain   Pertinent negatives include no numbness.     HPI: Patient presents for evaluation of her left fifth toe.  Patient states that she had an infection in November.  Patient was prescribed oral Bactrim.  Patient states that she has persistent redness to the toe as well as persistent pain.  Patient describes the pain as dull, achy, stabbing, and throbbing.  Patient rates her pain at 8/10.  Patient states she began having pain about 5 months ago and states that the pain level has been stable.  Prior treatments include medication.  Patient states that anything touching the toe exacerbates the pain, rest and avoiding touching the toe decreases the symptoms.  Patient denies recent trauma to the toe but does report prior episodes of stubbing the toe in the past.  Patient states that she is about to leave for a trip out of state and is concerned about increased pain due to prolonged standing and walking    Patient also complains of pain and sensitivity to the right big toenail, she points to the medial nail groove.  Patient states that this is a chronic/recurring problem.  Patient usually relieves her symptoms by trimming the nail plate of the nail groove.  Patient has not observed signs of infection.  Patient reports chronic discoloration of the nail plate.  Patient reports that she usually files down her bilateral fifth toenail because the nail plates are deformity she allows them to grow out.    Review

## 2024-02-23 ENCOUNTER — OFFICE VISIT (OUTPATIENT)
Dept: FAMILY MEDICINE CLINIC | Age: 39
End: 2024-02-23
Payer: MEDICARE

## 2024-02-23 VITALS
HEART RATE: 68 BPM | RESPIRATION RATE: 16 BRPM | SYSTOLIC BLOOD PRESSURE: 118 MMHG | DIASTOLIC BLOOD PRESSURE: 82 MMHG | WEIGHT: 211 LBS | BODY MASS INDEX: 31.98 KG/M2 | HEIGHT: 68 IN | OXYGEN SATURATION: 98 %

## 2024-02-23 DIAGNOSIS — G44.209 TENSION HEADACHE: Primary | ICD-10-CM

## 2024-02-23 DIAGNOSIS — F43.0 ACUTE STRESS REACTION: ICD-10-CM

## 2024-02-23 DIAGNOSIS — F33.41 RECURRENT MAJOR DEPRESSIVE DISORDER, IN PARTIAL REMISSION (HCC): ICD-10-CM

## 2024-02-23 DIAGNOSIS — F31.63 BIPOLAR 1 DISORDER, MIXED, SEVERE (HCC): ICD-10-CM

## 2024-02-23 PROCEDURE — G8484 FLU IMMUNIZE NO ADMIN: HCPCS | Performed by: PHYSICIAN ASSISTANT

## 2024-02-23 PROCEDURE — G8427 DOCREV CUR MEDS BY ELIG CLIN: HCPCS | Performed by: PHYSICIAN ASSISTANT

## 2024-02-23 PROCEDURE — G8417 CALC BMI ABV UP PARAM F/U: HCPCS | Performed by: PHYSICIAN ASSISTANT

## 2024-02-23 PROCEDURE — 1036F TOBACCO NON-USER: CPT | Performed by: PHYSICIAN ASSISTANT

## 2024-02-23 PROCEDURE — 99213 OFFICE O/P EST LOW 20 MIN: CPT | Performed by: PHYSICIAN ASSISTANT

## 2024-02-23 RX ORDER — TIZANIDINE 2 MG/1
2 TABLET ORAL NIGHTLY PRN
Qty: 10 TABLET | Refills: 0 | Status: SHIPPED | OUTPATIENT
Start: 2024-02-23

## 2024-02-23 RX ORDER — VILAZODONE HYDROCHLORIDE 10 MG/1
10 TABLET ORAL DAILY
Qty: 30 TABLET | Refills: 11 | Status: SHIPPED | OUTPATIENT
Start: 2024-02-23

## 2024-02-23 RX ORDER — IBUPROFEN 800 MG/1
800 TABLET ORAL 2 TIMES DAILY PRN
Qty: 60 TABLET | Refills: 0 | Status: SHIPPED | OUTPATIENT
Start: 2024-02-23

## 2024-02-23 ASSESSMENT — ENCOUNTER SYMPTOMS
BACK PAIN: 0
COLOR CHANGE: 0
COUGH: 0
SHORTNESS OF BREATH: 0
CHEST TIGHTNESS: 0
NAUSEA: 0

## 2024-02-23 NOTE — PROGRESS NOTES
Subjective  Bibiana Moses, 38 y.o. female presents today with:  Chief Complaint   Patient presents with    Follow-up     6 week f/u states she is having another migraine with pressure in her head mainly on the right side of her head        HPI  6 week follow up with me.  Last OV with me: 1/12/24.     Overall, has been doing OK.  Compliant with her viibryd.  Taking daily.   Still in the process of trying to get her children back from CPS.    There has been a lot of stress but she is trying to take care of herself.    Handling things well.  No risky behaviors, no mood changes/swings.     Has been having intermittent tension-type HAs.  Will start at the base of her neck and travel/radiate upwards.  Feels like 'pressure'.  No vision changes.   No blurry vision.  Has not taken anything.      Review of Systems   Constitutional:  Negative for activity change, appetite change, chills, diaphoresis, fatigue, fever and unexpected weight change.   HENT:  Negative for congestion.    Respiratory:  Negative for cough, chest tightness and shortness of breath.    Cardiovascular:  Negative for chest pain, palpitations and leg swelling.   Gastrointestinal:  Negative for nausea.   Genitourinary:  Negative for dysuria, menstrual problem, pelvic pain and vaginal bleeding.   Musculoskeletal:  Negative for back pain, myalgias, neck pain and neck stiffness.   Skin:  Negative for color change.   Neurological:  Positive for numbness (intermittent, sciatic) and headaches (intermittent). Negative for dizziness, weakness and light-headedness.   Psychiatric/Behavioral:  Negative for agitation, confusion, decreased concentration, dysphoric mood, self-injury, sleep disturbance and suicidal ideas. The patient is not nervous/anxious and is not hyperactive.         Baseline; denies worsening symptoms       Past Medical History:   Diagnosis Date    Anxiety and depression     Asthma     Atypical chest pain 5/31/2019    Chronic back pain

## 2024-02-27 ENCOUNTER — TELEPHONE (OUTPATIENT)
Dept: FAMILY MEDICINE CLINIC | Age: 39
End: 2024-02-27

## 2024-02-27 NOTE — TELEPHONE ENCOUNTER
Patients mom is requesting that you call the patient on 2/28/2024 any time that you have a free moment.    Patients mom states the patient lost the baby and was a complete mess last night. Patient broke out with large hives last night, benadryl helped some.     Patient also has court on 2/28/2024 at 12:00 pm to sign papers.    Mom is very worried about the patient.     Please Advise.    # [phone]

## 2024-03-02 RX ORDER — TIZANIDINE 2 MG/1
TABLET ORAL
Qty: 10 TABLET | Refills: 0 | OUTPATIENT
Start: 2024-03-02

## 2024-03-25 ENCOUNTER — OFFICE VISIT (OUTPATIENT)
Dept: FAMILY MEDICINE CLINIC | Age: 39
End: 2024-03-25
Payer: MEDICARE

## 2024-03-25 VITALS
OXYGEN SATURATION: 100 % | HEART RATE: 58 BPM | RESPIRATION RATE: 16 BRPM | BODY MASS INDEX: 33.81 KG/M2 | WEIGHT: 223.11 LBS | HEIGHT: 68 IN | TEMPERATURE: 96.7 F

## 2024-03-25 DIAGNOSIS — F33.41 RECURRENT MAJOR DEPRESSIVE DISORDER, IN PARTIAL REMISSION (HCC): Primary | ICD-10-CM

## 2024-03-25 DIAGNOSIS — F43.0 ACUTE STRESS REACTION: ICD-10-CM

## 2024-03-25 DIAGNOSIS — F31.63 BIPOLAR 1 DISORDER, MIXED, SEVERE (HCC): ICD-10-CM

## 2024-03-25 PROCEDURE — G8427 DOCREV CUR MEDS BY ELIG CLIN: HCPCS | Performed by: PHYSICIAN ASSISTANT

## 2024-03-25 PROCEDURE — G8417 CALC BMI ABV UP PARAM F/U: HCPCS | Performed by: PHYSICIAN ASSISTANT

## 2024-03-25 PROCEDURE — 99213 OFFICE O/P EST LOW 20 MIN: CPT | Performed by: PHYSICIAN ASSISTANT

## 2024-03-25 PROCEDURE — G8484 FLU IMMUNIZE NO ADMIN: HCPCS | Performed by: PHYSICIAN ASSISTANT

## 2024-03-25 PROCEDURE — 1036F TOBACCO NON-USER: CPT | Performed by: PHYSICIAN ASSISTANT

## 2024-03-25 ASSESSMENT — PATIENT HEALTH QUESTIONNAIRE - PHQ9
SUM OF ALL RESPONSES TO PHQ QUESTIONS 1-9: 0
SUM OF ALL RESPONSES TO PHQ9 QUESTIONS 1 & 2: 0
5. POOR APPETITE OR OVEREATING: NOT AT ALL
4. FEELING TIRED OR HAVING LITTLE ENERGY: NOT AT ALL
10. IF YOU CHECKED OFF ANY PROBLEMS, HOW DIFFICULT HAVE THESE PROBLEMS MADE IT FOR YOU TO DO YOUR WORK, TAKE CARE OF THINGS AT HOME, OR GET ALONG WITH OTHER PEOPLE: NOT DIFFICULT AT ALL
SUM OF ALL RESPONSES TO PHQ QUESTIONS 1-9: 0
SUM OF ALL RESPONSES TO PHQ QUESTIONS 1-9: 0
9. THOUGHTS THAT YOU WOULD BE BETTER OFF DEAD, OR OF HURTING YOURSELF: NOT AT ALL
SUM OF ALL RESPONSES TO PHQ QUESTIONS 1-9: 0
2. FEELING DOWN, DEPRESSED OR HOPELESS: NOT AT ALL
1. LITTLE INTEREST OR PLEASURE IN DOING THINGS: NOT AT ALL
6. FEELING BAD ABOUT YOURSELF - OR THAT YOU ARE A FAILURE OR HAVE LET YOURSELF OR YOUR FAMILY DOWN: NOT AT ALL
7. TROUBLE CONCENTRATING ON THINGS, SUCH AS READING THE NEWSPAPER OR WATCHING TELEVISION: NOT AT ALL
3. TROUBLE FALLING OR STAYING ASLEEP: NOT AT ALL
8. MOVING OR SPEAKING SO SLOWLY THAT OTHER PEOPLE COULD HAVE NOTICED. OR THE OPPOSITE, BEING SO FIGETY OR RESTLESS THAT YOU HAVE BEEN MOVING AROUND A LOT MORE THAN USUAL: NOT AT ALL

## 2024-03-25 ASSESSMENT — ENCOUNTER SYMPTOMS
SHORTNESS OF BREATH: 0
BACK PAIN: 0
COLOR CHANGE: 0
COUGH: 0
NAUSEA: 0
CHEST TIGHTNESS: 0

## 2024-03-25 NOTE — PROGRESS NOTES
time.   Psychiatric:         Attention and Perception: Attention normal.         Mood and Affect: Affect is tearful.         Speech: Speech normal.         Behavior: Behavior normal.         Thought Content: Thought content normal.         Cognition and Memory: Cognition normal.         Judgment: Judgment normal.      Comments: Mood is stable.    Improved mood noted today.  More hopeful.        Assessment & Plan    Diagnosis Orders   1. Recurrent major depressive disorder, in partial remission (HCC)        2. Bipolar 1 disorder, mixed, severe (HCC)        No change to medications.  Continue current treatment plan.  Follow up in 4-6 weeks per patient's request.     No orders of the defined types were placed in this encounter.    No orders of the defined types were placed in this encounter.    Medications Discontinued During This Encounter   Medication Reason    tiZANidine (ZANAFLEX) 2 MG tablet LIST CLEANUP     No follow-ups on file.      Reviewed with the patient: current clinical status, medications, activities and diet.     Side effects, adverse effects of the medication prescribed today, as well as treatment plan/ rationale and result expectations have been discussed with the patient who expresses understanding and desires to proceed.    Close follow up to evaluate treatment results and for coordination of care.  I have reviewed the patient's medical history in detail and updated the computerized patient record.    Leticia Villa PA-C

## 2024-04-21 ENCOUNTER — APPOINTMENT (OUTPATIENT)
Dept: CT IMAGING | Age: 39
End: 2024-04-21
Payer: MEDICARE

## 2024-04-21 ENCOUNTER — HOSPITAL ENCOUNTER (EMERGENCY)
Age: 39
Discharge: HOME | End: 2024-04-21
Payer: MEDICARE

## 2024-04-21 VITALS
TEMPERATURE: 97.4 F | HEART RATE: 63 BPM | BODY MASS INDEX: 37.15 KG/M2 | WEIGHT: 223 LBS | OXYGEN SATURATION: 98 % | DIASTOLIC BLOOD PRESSURE: 81 MMHG | RESPIRATION RATE: 18 BRPM | SYSTOLIC BLOOD PRESSURE: 125 MMHG | HEIGHT: 65 IN

## 2024-04-21 DIAGNOSIS — R07.89 ATYPICAL CHEST PAIN: ICD-10-CM

## 2024-04-21 DIAGNOSIS — R11.2 NAUSEA AND VOMITING, UNSPECIFIED VOMITING TYPE: Primary | ICD-10-CM

## 2024-04-21 LAB
ALBUMIN SERPL-MCNC: 4.4 G/DL (ref 3.5–4.6)
ALP SERPL-CCNC: 131 U/L (ref 40–130)
ALT SERPL-CCNC: 9 U/L (ref 0–33)
AMPHET UR QL SCN: ABNORMAL
ANION GAP SERPL CALCULATED.3IONS-SCNC: 14 MEQ/L (ref 9–15)
AST SERPL-CCNC: 19 U/L (ref 0–35)
BARBITURATES UR QL SCN: ABNORMAL
BASOPHILS # BLD: 0 K/UL (ref 0–0.2)
BASOPHILS NFR BLD: 0.2 %
BENZODIAZ UR QL SCN: ABNORMAL
BILIRUB SERPL-MCNC: 0.3 MG/DL (ref 0.2–0.7)
BILIRUB UR QL STRIP: NEGATIVE
BUN SERPL-MCNC: 7 MG/DL (ref 6–20)
CALCIUM SERPL-MCNC: 9.5 MG/DL (ref 8.5–9.9)
CANNABINOIDS UR QL SCN: POSITIVE
CHLORIDE SERPL-SCNC: 101 MEQ/L (ref 95–107)
CLARITY UR: CLEAR
CO2 SERPL-SCNC: 25 MEQ/L (ref 20–31)
COCAINE UR QL SCN: ABNORMAL
COLOR UR: YELLOW
CREAT SERPL-MCNC: 0.74 MG/DL (ref 0.5–0.9)
DRUG SCREEN COMMENT UR-IMP: ABNORMAL
EOSINOPHIL # BLD: 0 K/UL (ref 0–0.7)
EOSINOPHIL NFR BLD: 0.3 %
ERYTHROCYTE [DISTWIDTH] IN BLOOD BY AUTOMATED COUNT: 12.7 % (ref 11.5–14.5)
ETHANOL PERCENT: NORMAL G/DL
ETHANOLAMINE SERPL-MCNC: <10 MG/DL (ref 0–0.08)
FENTANYL SCREEN, URINE: ABNORMAL
GLOBULIN SER CALC-MCNC: 3.1 G/DL (ref 2.3–3.5)
GLUCOSE SERPL-MCNC: 120 MG/DL (ref 70–99)
GLUCOSE UR STRIP-MCNC: NEGATIVE MG/DL
HCT VFR BLD AUTO: 43.8 % (ref 37–47)
HGB BLD-MCNC: 14.6 G/DL (ref 12–16)
HGB UR QL STRIP: NEGATIVE
KETONES UR STRIP-MCNC: NEGATIVE MG/DL
LACTATE BLDV-SCNC: 2.4 MMOL/L (ref 0.5–2.2)
LEUKOCYTE ESTERASE UR QL STRIP: NEGATIVE
LIPASE SERPL-CCNC: 15 U/L (ref 12–95)
LYMPHOCYTES # BLD: 1.6 K/UL (ref 1–4.8)
LYMPHOCYTES NFR BLD: 12.2 %
MCH RBC QN AUTO: 30.5 PG (ref 27–31.3)
MCHC RBC AUTO-ENTMCNC: 33.3 % (ref 33–37)
MCV RBC AUTO: 91.4 FL (ref 79.4–94.8)
METHADONE UR QL SCN: ABNORMAL
MONOCYTES # BLD: 0.2 K/UL (ref 0.2–0.8)
MONOCYTES NFR BLD: 1.8 %
NEUTROPHILS # BLD: 10.8 K/UL (ref 1.4–6.5)
NEUTS SEG NFR BLD: 85.2 %
NITRITE UR QL STRIP: NEGATIVE
OPIATES UR QL SCN: ABNORMAL
OXYCODONE UR QL SCN: ABNORMAL
PCP UR QL SCN: ABNORMAL
PH UR STRIP: >=9 [PH] (ref 5–9)
PLATELET # BLD AUTO: 310 K/UL (ref 130–400)
POTASSIUM SERPL-SCNC: 4.4 MEQ/L (ref 3.4–4.9)
PROPOXYPH UR QL SCN: ABNORMAL
PROT SERPL-MCNC: 7.5 G/DL (ref 6.3–8)
PROT UR STRIP-MCNC: ABNORMAL MG/DL
RBC # BLD AUTO: 4.79 M/UL (ref 4.2–5.4)
SODIUM SERPL-SCNC: 140 MEQ/L (ref 135–144)
SP GR UR STRIP: 1.02 (ref 1–1.03)
TROPONIN, HIGH SENSITIVITY: <6 NG/L (ref 0–19)
URINE REFLEX TO CULTURE: ABNORMAL
UROBILINOGEN UR STRIP-ACNC: 0.2 E.U./DL
WBC # BLD AUTO: 12.5 K/UL (ref 4.8–10.8)

## 2024-04-21 PROCEDURE — 74177 CT ABD & PELVIS W/CONTRAST: CPT

## 2024-04-21 PROCEDURE — 71275 CT ANGIOGRAPHY CHEST: CPT

## 2024-04-21 PROCEDURE — 36415 COLL VENOUS BLD VENIPUNCTURE: CPT

## 2024-04-21 PROCEDURE — A4216 STERILE WATER/SALINE, 10 ML: HCPCS | Performed by: PHYSICIAN ASSISTANT

## 2024-04-21 PROCEDURE — 83605 ASSAY OF LACTIC ACID: CPT

## 2024-04-21 PROCEDURE — 80307 DRUG TEST PRSMV CHEM ANLYZR: CPT

## 2024-04-21 PROCEDURE — 83690 ASSAY OF LIPASE: CPT

## 2024-04-21 PROCEDURE — 96361 HYDRATE IV INFUSION ADD-ON: CPT

## 2024-04-21 PROCEDURE — 85025 COMPLETE CBC W/AUTO DIFF WBC: CPT

## 2024-04-21 PROCEDURE — 99285 EMERGENCY DEPT VISIT HI MDM: CPT

## 2024-04-21 PROCEDURE — 6360000004 HC RX CONTRAST MEDICATION: Performed by: PHYSICIAN ASSISTANT

## 2024-04-21 PROCEDURE — 96374 THER/PROPH/DIAG INJ IV PUSH: CPT

## 2024-04-21 PROCEDURE — 2580000003 HC RX 258: Performed by: PHYSICIAN ASSISTANT

## 2024-04-21 PROCEDURE — 96372 THER/PROPH/DIAG INJ SC/IM: CPT

## 2024-04-21 PROCEDURE — 6360000002 HC RX W HCPCS: Performed by: PHYSICIAN ASSISTANT

## 2024-04-21 PROCEDURE — 80053 COMPREHEN METABOLIC PANEL: CPT

## 2024-04-21 PROCEDURE — 81003 URINALYSIS AUTO W/O SCOPE: CPT

## 2024-04-21 PROCEDURE — 84484 ASSAY OF TROPONIN QUANT: CPT

## 2024-04-21 PROCEDURE — 93005 ELECTROCARDIOGRAM TRACING: CPT | Performed by: PHYSICIAN ASSISTANT

## 2024-04-21 PROCEDURE — 96375 TX/PRO/DX INJ NEW DRUG ADDON: CPT

## 2024-04-21 PROCEDURE — 82077 ASSAY SPEC XCP UR&BREATH IA: CPT

## 2024-04-21 PROCEDURE — 2500000003 HC RX 250 WO HCPCS: Performed by: PHYSICIAN ASSISTANT

## 2024-04-21 RX ORDER — METOCLOPRAMIDE 10 MG/1
10 TABLET ORAL 4 TIMES DAILY PRN
Qty: 20 TABLET | Refills: 0 | Status: SHIPPED | OUTPATIENT
Start: 2024-04-21

## 2024-04-21 RX ORDER — DICYCLOMINE HCL 20 MG
20 TABLET ORAL EVERY 6 HOURS PRN
Qty: 20 TABLET | Refills: 0 | Status: SHIPPED | OUTPATIENT
Start: 2024-04-21

## 2024-04-21 RX ORDER — ONDANSETRON 2 MG/ML
4 INJECTION INTRAMUSCULAR; INTRAVENOUS ONCE
Status: COMPLETED | OUTPATIENT
Start: 2024-04-21 | End: 2024-04-21

## 2024-04-21 RX ORDER — 0.9 % SODIUM CHLORIDE 0.9 %
2000 INTRAVENOUS SOLUTION INTRAVENOUS ONCE
Status: COMPLETED | OUTPATIENT
Start: 2024-04-21 | End: 2024-04-21

## 2024-04-21 RX ORDER — DICYCLOMINE HYDROCHLORIDE 10 MG/ML
20 INJECTION INTRAMUSCULAR ONCE
Status: COMPLETED | OUTPATIENT
Start: 2024-04-21 | End: 2024-04-21

## 2024-04-21 RX ORDER — HALOPERIDOL 5 MG/ML
5 INJECTION INTRAMUSCULAR ONCE
Status: COMPLETED | OUTPATIENT
Start: 2024-04-21 | End: 2024-04-21

## 2024-04-21 RX ADMIN — DICYCLOMINE HYDROCHLORIDE 20 MG: 10 INJECTION, SOLUTION INTRAMUSCULAR at 17:33

## 2024-04-21 RX ADMIN — IOPAMIDOL 75 ML: 755 INJECTION, SOLUTION INTRAVENOUS at 19:02

## 2024-04-21 RX ADMIN — SODIUM CHLORIDE 2000 ML: 9 INJECTION, SOLUTION INTRAVENOUS at 17:38

## 2024-04-21 RX ADMIN — HALOPERIDOL LACTATE 5 MG: 5 INJECTION, SOLUTION INTRAMUSCULAR at 18:16

## 2024-04-21 RX ADMIN — FAMOTIDINE 20 MG: 10 INJECTION, SOLUTION INTRAVENOUS at 17:33

## 2024-04-21 RX ADMIN — ONDANSETRON 4 MG: 2 INJECTION INTRAMUSCULAR; INTRAVENOUS at 17:34

## 2024-04-21 ASSESSMENT — PAIN SCALES - GENERAL: PAINLEVEL_OUTOF10: 10

## 2024-04-21 ASSESSMENT — LIFESTYLE VARIABLES
HOW MANY STANDARD DRINKS CONTAINING ALCOHOL DO YOU HAVE ON A TYPICAL DAY: 3 OR 4
HOW OFTEN DO YOU HAVE A DRINK CONTAINING ALCOHOL: 2-4 TIMES A MONTH

## 2024-04-21 ASSESSMENT — PAIN - FUNCTIONAL ASSESSMENT: PAIN_FUNCTIONAL_ASSESSMENT: 0-10

## 2024-04-21 ASSESSMENT — PAIN DESCRIPTION - LOCATION: LOCATION: ABDOMEN

## 2024-04-21 NOTE — ED NOTES
Pt in bed with eyes closed, iv started, meds given, fluids running, labs sent. Call light in reach.

## 2024-04-21 NOTE — ED PROVIDER NOTES
(4/27/2023)    Housing Stability Vital Sign     Unstable Housing in the Last Year: No       SCREENINGS    [place] Coma Scale  Eye Opening: Spontaneous  Best Verbal Response: Oriented  Best Motor Response: Obeys commands  [place] Coma Scale Score: 15      PHYSICAL EXAM    (up to 7 forlevel 4, 8 or more for level 5)     ED Triage Vitals [04/21/24 1646]   BP Temp Temp Source Pulse Respirations SpO2 Height Weight   132/82 97.4 °F (36.3 °C) Temporal 58 18 100 % -- --       Physical Exam  Vitals and nursing note reviewed.   Constitutional:       General: She is not in acute distress.     Appearance: She is not diaphoretic.   HENT:      Head: Normocephalic and atraumatic.      Mouth/Throat:      Mouth: Mucous membranes are moist.   Eyes:      Conjunctiva/sclera: Conjunctivae normal.   Cardiovascular:      Rate and Rhythm: Normal rate.   Pulmonary:      Effort: Pulmonary effort is normal.   Musculoskeletal:         General: Normal range of motion.   Skin:     General: Skin is warm and dry.   Neurological:      Mental Status: She is alert and oriented to person, place, and time.           DIAGNOSTIC RESULTS     EKG:     EKG interpreted by myself shows a sinus bradycardia with a rate of 55 bpm, there is no acute ST elevation or ischemic changes      RADIOLOGY:   Non-plain film images such as CT, Ultrasound and MRI are read by the radiologist. Plain radiographic images are visualized and preliminarilyinterpreted by Phuc Delgado PA-C with the below findings:  Read By Myself:    CT chest and CT abdomen pelvis interpreted myself as negative    Interpretation per the Radiologist below, if available at the time of this note:    CT ABDOMEN PELVIS W IV CONTRAST Additional Contrast? None   Final Result   No acute process in the abdomen or pelvis.         CTA CHEST W WO CONTRAST   Final Result   1. No pulmonary arterial embolism.   2. No airspace opacity or pleural effusion.             LABS:  Labs Reviewed   CBC WITH AUTO

## 2024-04-22 LAB
EKG ATRIAL RATE: 55 BPM
EKG P AXIS: 48 DEGREES
EKG P-R INTERVAL: 130 MS
EKG Q-T INTERVAL: 470 MS
EKG QRS DURATION: 72 MS
EKG QTC CALCULATION (BAZETT): 449 MS
EKG R AXIS: 28 DEGREES
EKG T AXIS: 19 DEGREES
EKG VENTRICULAR RATE: 55 BPM

## 2025-02-13 ENCOUNTER — OFFICE VISIT (OUTPATIENT)
Age: 40
End: 2025-02-13
Payer: MEDICARE

## 2025-02-13 VITALS
OXYGEN SATURATION: 98 % | HEART RATE: 65 BPM | DIASTOLIC BLOOD PRESSURE: 88 MMHG | SYSTOLIC BLOOD PRESSURE: 122 MMHG | RESPIRATION RATE: 16 BRPM | WEIGHT: 241 LBS | BODY MASS INDEX: 40.15 KG/M2 | HEIGHT: 65 IN

## 2025-02-13 DIAGNOSIS — Z00.00 MEDICARE ANNUAL WELLNESS VISIT, SUBSEQUENT: Primary | ICD-10-CM

## 2025-02-13 DIAGNOSIS — F31.63 BIPOLAR 1 DISORDER, MIXED, SEVERE (HCC): ICD-10-CM

## 2025-02-13 DIAGNOSIS — N64.4 BREAST PAIN, RIGHT: ICD-10-CM

## 2025-02-13 DIAGNOSIS — G35 MULTIPLE SCLEROSIS (HCC): ICD-10-CM

## 2025-02-13 PROCEDURE — G0439 PPPS, SUBSEQ VISIT: HCPCS | Performed by: PHYSICIAN ASSISTANT

## 2025-02-13 SDOH — ECONOMIC STABILITY: FOOD INSECURITY: WITHIN THE PAST 12 MONTHS, THE FOOD YOU BOUGHT JUST DIDN'T LAST AND YOU DIDN'T HAVE MONEY TO GET MORE.: NEVER TRUE

## 2025-02-13 SDOH — ECONOMIC STABILITY: FOOD INSECURITY: WITHIN THE PAST 12 MONTHS, YOU WORRIED THAT YOUR FOOD WOULD RUN OUT BEFORE YOU GOT MONEY TO BUY MORE.: NEVER TRUE

## 2025-02-13 ASSESSMENT — PATIENT HEALTH QUESTIONNAIRE - PHQ9
8. MOVING OR SPEAKING SO SLOWLY THAT OTHER PEOPLE COULD HAVE NOTICED. OR THE OPPOSITE, BEING SO FIGETY OR RESTLESS THAT YOU HAVE BEEN MOVING AROUND A LOT MORE THAN USUAL: NOT AT ALL
10. IF YOU CHECKED OFF ANY PROBLEMS, HOW DIFFICULT HAVE THESE PROBLEMS MADE IT FOR YOU TO DO YOUR WORK, TAKE CARE OF THINGS AT HOME, OR GET ALONG WITH OTHER PEOPLE: NOT DIFFICULT AT ALL
9. THOUGHTS THAT YOU WOULD BE BETTER OFF DEAD, OR OF HURTING YOURSELF: NOT AT ALL
2. FEELING DOWN, DEPRESSED OR HOPELESS: SEVERAL DAYS
3. TROUBLE FALLING OR STAYING ASLEEP: NOT AT ALL
4. FEELING TIRED OR HAVING LITTLE ENERGY: NOT AT ALL
6. FEELING BAD ABOUT YOURSELF - OR THAT YOU ARE A FAILURE OR HAVE LET YOURSELF OR YOUR FAMILY DOWN: NOT AT ALL
1. LITTLE INTEREST OR PLEASURE IN DOING THINGS: NOT AT ALL
7. TROUBLE CONCENTRATING ON THINGS, SUCH AS READING THE NEWSPAPER OR WATCHING TELEVISION: NOT AT ALL
SUM OF ALL RESPONSES TO PHQ QUESTIONS 1-9: 1
SUM OF ALL RESPONSES TO PHQ9 QUESTIONS 1 & 2: 1
SUM OF ALL RESPONSES TO PHQ QUESTIONS 1-9: 1
5. POOR APPETITE OR OVEREATING: NOT AT ALL
SUM OF ALL RESPONSES TO PHQ QUESTIONS 1-9: 1
SUM OF ALL RESPONSES TO PHQ QUESTIONS 1-9: 1

## 2025-02-13 ASSESSMENT — LIFESTYLE VARIABLES
HOW MANY STANDARD DRINKS CONTAINING ALCOHOL DO YOU HAVE ON A TYPICAL DAY: PATIENT DOES NOT DRINK
HOW OFTEN DO YOU HAVE A DRINK CONTAINING ALCOHOL: NEVER

## 2025-02-13 NOTE — PROGRESS NOTES
nodules, no cervical lymphadenopathy  Pulmonary/Chest: clear to auscultation bilaterally- no wheezes, rales or rhonchi, normal air movement, no respiratory distress  Cardiovascular: normal rate, regular rhythm, normal S1 and S2, no murmurs, rubs, clicks, or gallops, distal pulses intact, no carotid bruits  Extremities: no cyanosis, clubbing or edema  Musculoskeletal: normal range of motion, no joint swelling, deformity or tenderness            Allergies   Allergen Reactions    Amphetamine     Penicillins Hives and Other (See Comments)    Phentermine Other (See Comments)     Anxiety with hospitalization    Topiramate Other (See Comments)     DIZZY     Prior to Visit Medications    Not on File       CareTeam (Including outside providers/suppliers regularly involved in providing care):   Patient Care Team:  Leticia Villa PA-C as PCP - General (Family Medicine)  Leticia Villa PA-C as PCP - Empaneled Provider  Tim Marquez MD as Consulting Physician (Neurology)     Recommendations for Preventive Services Due: see orders and patient instructions/AVS.  Recommended screening schedule for the next 5-10 years is provided to the patient in written form: see Patient Instructions/AVS.     Reviewed and updated this visit:  Allergies  Meds  Problems  Med Hx

## 2025-02-13 NOTE — PATIENT INSTRUCTIONS
olive, peanut, or canola oil when you cook. Bake, broil, and steam foods instead of frying them.     Eat a variety of fruit and vegetables every day. Dark green, deep orange, red, or yellow fruits and vegetables are especially good for you. Examples include spinach, carrots, peaches, and berries.     Foods high in fiber can reduce your cholesterol and provide important vitamins and minerals. High-fiber foods include whole-grain cereals and breads, oatmeal, beans, brown rice, citrus fruits, and apples.     Eat lean proteins. Heart-healthy proteins include seafood, lean meats and poultry, eggs, beans, peas, nuts, seeds, and soy products.     Limit drinks and foods with added sugar. These include candy, desserts, and soda pop.   Heart-healthy lifestyle    If your doctor recommends it, get more exercise. For many people, walking is a good choice. Or you may want to swim, bike, or do other activities. Bit by bit, increase the time you're active every day. Try for at least 30 minutes on most days of the week.     Try to quit or cut back on using tobacco and other nicotine products. This includes smoking and vaping. If you need help quitting, talk to your doctor about stop-smoking programs and medicines. These can increase your chances of quitting for good. Quitting is one of the most important things you can do to protect your heart. It is never too late to quit. Try to avoid secondhand smoke too.     Stay at a weight that's healthy for you. Talk to your doctor if you need help losing weight.     Try to get 7 to 9 hours of sleep each night.     Limit alcohol to 2 drinks a day for men and 1 drink a day for women. Too much alcohol can cause health problems.     Manage other health problems such as diabetes, high blood pressure, and high cholesterol. If you think you may have a problem with alcohol or drug use, talk to your doctor.   Medicines    Take your medicines exactly as prescribed. Call your doctor if you think you

## 2025-03-12 ENCOUNTER — TELEPHONE (OUTPATIENT)
Age: 40
End: 2025-03-12

## 2025-03-12 DIAGNOSIS — N64.4 BREAST PAIN, RIGHT: Primary | ICD-10-CM

## 2025-04-03 ENCOUNTER — HOSPITAL ENCOUNTER (OUTPATIENT)
Dept: ULTRASOUND IMAGING | Age: 40
Discharge: HOME | End: 2025-04-05
Payer: MEDICARE

## 2025-04-03 ENCOUNTER — HOSPITAL ENCOUNTER (OUTPATIENT)
Dept: WOMENS IMAGING | Age: 40
Discharge: HOME | End: 2025-04-05
Payer: MEDICARE

## 2025-04-03 DIAGNOSIS — N64.4 BREAST PAIN, RIGHT: ICD-10-CM

## 2025-04-03 PROCEDURE — 76642 ULTRASOUND BREAST LIMITED: CPT

## 2025-04-03 PROCEDURE — G0279 TOMOSYNTHESIS, MAMMO: HCPCS

## 2025-04-04 ENCOUNTER — RESULTS FOLLOW-UP (OUTPATIENT)
Age: 40
End: 2025-04-04

## 2025-05-21 ENCOUNTER — OFFICE VISIT (OUTPATIENT)
Age: 40
End: 2025-05-21
Payer: MEDICARE

## 2025-05-21 ENCOUNTER — ANCILLARY PROCEDURE (OUTPATIENT)
Age: 40
End: 2025-05-21
Payer: MEDICARE

## 2025-05-21 VITALS
HEART RATE: 62 BPM | SYSTOLIC BLOOD PRESSURE: 120 MMHG | HEIGHT: 65 IN | BODY MASS INDEX: 40.1 KG/M2 | DIASTOLIC BLOOD PRESSURE: 80 MMHG | OXYGEN SATURATION: 98 %

## 2025-05-21 DIAGNOSIS — A64 STD (FEMALE): ICD-10-CM

## 2025-05-21 DIAGNOSIS — M24.811 INTERNAL DERANGEMENT OF SHOULDER, RIGHT: ICD-10-CM

## 2025-05-21 DIAGNOSIS — M12.811 ROTATOR CUFF ARTHROPATHY OF RIGHT SHOULDER: ICD-10-CM

## 2025-05-21 DIAGNOSIS — M24.811 INTERNAL DERANGEMENT OF SHOULDER, RIGHT: Primary | ICD-10-CM

## 2025-05-21 PROCEDURE — G8427 DOCREV CUR MEDS BY ELIG CLIN: HCPCS | Performed by: PHYSICIAN ASSISTANT

## 2025-05-21 PROCEDURE — 99214 OFFICE O/P EST MOD 30 MIN: CPT | Performed by: PHYSICIAN ASSISTANT

## 2025-05-21 PROCEDURE — 73030 X-RAY EXAM OF SHOULDER: CPT | Performed by: RADIOLOGY

## 2025-05-21 PROCEDURE — G8417 CALC BMI ABV UP PARAM F/U: HCPCS | Performed by: PHYSICIAN ASSISTANT

## 2025-05-21 PROCEDURE — 1036F TOBACCO NON-USER: CPT | Performed by: PHYSICIAN ASSISTANT

## 2025-05-21 RX ORDER — NAPROXEN 500 MG/1
500 TABLET ORAL 2 TIMES DAILY WITH MEALS
Qty: 60 TABLET | Refills: 0 | Status: SHIPPED | OUTPATIENT
Start: 2025-05-21

## 2025-05-21 ASSESSMENT — ENCOUNTER SYMPTOMS
ABDOMINAL PAIN: 0
BACK PAIN: 0
DIARRHEA: 0
COUGH: 0
RESPIRATORY NEGATIVE: 1
SHORTNESS OF BREATH: 0
SORE THROAT: 0
SINUS PAIN: 0
VOMITING: 0
SINUS PRESSURE: 0
NAUSEA: 0
CHEST TIGHTNESS: 0

## 2025-05-21 ASSESSMENT — VISUAL ACUITY: OU: 1

## 2025-05-21 NOTE — PROGRESS NOTES
Date:   5/21/2026    Trichomonas By Eia     Standing Status:   Future     Number of Occurrences:   1     Expected Date:   5/21/2025     Expiration Date:   5/21/2026    Ambulatory referral to Orthopedic Surgery     Referral Priority:   Routine     Referral Type:   Surgical     Referral Reason:   Specialty Services Required     Requested Specialty:   Orthopaedic Surgery     Number of Visits Requested:   1    [facility]     Referral Priority:   Routine     Referral Type:   Physical Therapy     Referral Reason:   Specialty Services Required     Requested Specialty:   Physical Therapy     Number of Visits Requested:   1     Orders Placed This Encounter   Medications    naproxen (NAPROSYN) 500 MG tablet     Sig: Take 1 tablet by mouth 2 times daily (with meals)     Dispense:  60 tablet     Refill:  0     There are no discontinued medications.  No follow-ups on file.        Reviewed with the patient: current clinical status, medications, activities and diet.     Side effects, adverse effects of the medication prescribed today, as well as treatment plan/ rationale and result expectations have been discussed with the patient who expresses understanding and desires to proceed.    Close follow up to evaluate treatment results and for coordination of care.  I have reviewed the patient's medical history in detail and updated the computerized patient record.    The patient (or guardian, if applicable) and other individuals in attendance with the patient were advised that Artificial Intelligence will be utilized during this visit to record, process the conversation to generate a clinical note, and support improvement of the AI technology. The patient (or guardian, if applicable) and other individuals in attendance at the appointment consented to the use of AI, including the recording.      JIM Vinson

## 2025-05-22 ENCOUNTER — RESULTS FOLLOW-UP (OUTPATIENT)
Age: 40
End: 2025-05-22

## 2025-05-22 LAB
HIV AG/AB: NONREACTIVE
RPR SER QL: NON REACTIVE
SPECIMEN SOURCE: NORMAL
TRICHOMONAS VAGINALIS, MOLECULAR: NEGATIVE

## 2025-05-23 LAB
C TRACH DNA UR QL NAA+PROBE: NEGATIVE
N GONORRHOEA DNA UR QL NAA+PROBE: NEGATIVE

## 2025-05-29 ENCOUNTER — OFFICE VISIT (OUTPATIENT)
Age: 40
End: 2025-05-29
Payer: MEDICARE

## 2025-05-29 VITALS
WEIGHT: 241 LBS | DIASTOLIC BLOOD PRESSURE: 78 MMHG | BODY MASS INDEX: 40.15 KG/M2 | HEART RATE: 80 BPM | SYSTOLIC BLOOD PRESSURE: 120 MMHG | HEIGHT: 65 IN | TEMPERATURE: 98.4 F | OXYGEN SATURATION: 98 %

## 2025-05-29 DIAGNOSIS — M75.51 SUBACROMIAL BURSITIS OF RIGHT SHOULDER JOINT: ICD-10-CM

## 2025-05-29 DIAGNOSIS — M75.81 RIGHT ROTATOR CUFF TENDINITIS: Primary | ICD-10-CM

## 2025-05-29 DIAGNOSIS — M75.21 BICIPITAL TENDINITIS OF RIGHT SHOULDER: ICD-10-CM

## 2025-05-29 DIAGNOSIS — M25.511 PAIN IN RIGHT ACROMIOCLAVICULAR JOINT: ICD-10-CM

## 2025-05-29 PROCEDURE — 99203 OFFICE O/P NEW LOW 30 MIN: CPT | Performed by: FAMILY MEDICINE

## 2025-05-29 PROCEDURE — 20611 DRAIN/INJ JOINT/BURSA W/US: CPT | Performed by: FAMILY MEDICINE

## 2025-05-29 RX ORDER — TRIAMCINOLONE ACETONIDE 40 MG/ML
80 INJECTION, SUSPENSION INTRA-ARTICULAR; INTRAMUSCULAR ONCE
Status: COMPLETED | OUTPATIENT
Start: 2025-05-29 | End: 2025-05-29

## 2025-05-29 RX ORDER — LIDOCAINE HYDROCHLORIDE 10 MG/ML
3 INJECTION, SOLUTION INFILTRATION; PERINEURAL ONCE
Status: COMPLETED | OUTPATIENT
Start: 2025-05-29 | End: 2025-05-29

## 2025-05-29 RX ADMIN — TRIAMCINOLONE ACETONIDE 80 MG: 400 INJECTION, SUSPENSION INTRA-ARTICULAR; INTRAMUSCULAR at 11:37

## 2025-05-29 RX ADMIN — LIDOCAINE HYDROCHLORIDE 3 ML: 10 INJECTION, SOLUTION INFILTRATION; PERINEURAL at 11:38

## 2025-05-29 ASSESSMENT — ENCOUNTER SYMPTOMS: BACK PAIN: 0
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
## 2025-06-09 ENCOUNTER — HOSPITAL ENCOUNTER (OUTPATIENT)
Dept: PHYSICAL THERAPY | Age: 40
Setting detail: THERAPIES SERIES
Discharge: HOME | End: 2025-06-09
Payer: MEDICARE

## 2025-06-09 PROCEDURE — 97162 PT EVAL MOD COMPLEX 30 MIN: CPT

## 2025-06-09 NOTE — PLAN OF CARE
questions or concerns, please don't hesitate to call.  Thank you for your referral.    I have reviewed this plan of care and certify a need for medically necessary rehabilitation services.    Physician Signature:__________________________________________________________  Date:  Please sign and return

## 2025-06-10 ENCOUNTER — HOSPITAL ENCOUNTER (OUTPATIENT)
Dept: PHYSICAL THERAPY | Age: 40
Setting detail: THERAPIES SERIES
Discharge: HOME | End: 2025-06-10
Payer: MEDICARE

## 2025-06-10 PROCEDURE — 97110 THERAPEUTIC EXERCISES: CPT

## 2025-06-10 PROCEDURE — G0283 ELEC STIM OTHER THAN WOUND: HCPCS

## 2025-06-10 ASSESSMENT — PAIN DESCRIPTION - LOCATION: LOCATION: SHOULDER

## 2025-06-10 ASSESSMENT — PAIN DESCRIPTION - DESCRIPTORS: DESCRIPTORS: BURNING

## 2025-06-10 ASSESSMENT — PAIN DESCRIPTION - ORIENTATION: ORIENTATION: RIGHT

## 2025-06-10 ASSESSMENT — PAIN SCALES - GENERAL: PAINLEVEL_OUTOF10: 7

## 2025-06-10 NOTE — PROGRESS NOTES
In progress     Long Term Goals Completed by 6 weeks Goal Status   LTG 1 Issue/update HEP to progress towards self- management of symptoms upon D/C In progress   LTG 2 Pt demo improved overall function by reporting greater than 80% per functional survey score In progress   LTG 3 Pain-free R shoulder AROM to WNL allowing an increase in ADL tolerance. In progress   LTG 4 Improve R shoulder strength >/=4/5 to allow patient to reach overhead for ADL's and grooming. In progress          Plan:  Frequency/Duration:  Plan  Plan Frequency: 2  Plan weeks: 6  Current Treatment Recommendations: Strengthening, ROM, Home exercise program, Manual, Neuromuscular re-education, Modalities, Group Therapy  Modalities: Heat/Cold, Ultrasound, E-stim - unattended  Pt to continue current HEP.  See objective section for any therapeutic exercise changes, additions or modifications this date.    Therapy Time:      PT Individual Minutes  Time In: 1120  Time Out: 1212  Minutes: 52  Timed Code Treatment Minutes: 42 Minutes  Procedure Minutes:10 IFC/CP  Timed Activity Minutes Units   Ther Ex 37 3   Manual  5 0     Electronically signed by Hina Yanez PTA on 6/10/25 at 12:11 PM EDT

## 2025-06-16 ENCOUNTER — HOSPITAL ENCOUNTER (OUTPATIENT)
Dept: PHYSICAL THERAPY | Age: 40
Setting detail: THERAPIES SERIES
Discharge: HOME | End: 2025-06-16
Payer: MEDICARE

## 2025-06-16 PROCEDURE — 97140 MANUAL THERAPY 1/> REGIONS: CPT

## 2025-06-16 PROCEDURE — 97110 THERAPEUTIC EXERCISES: CPT

## 2025-06-16 ASSESSMENT — PAIN DESCRIPTION - DESCRIPTORS: DESCRIPTORS: BURNING

## 2025-06-16 ASSESSMENT — PAIN DESCRIPTION - LOCATION: LOCATION: SHOULDER

## 2025-06-16 ASSESSMENT — PAIN DESCRIPTION - ORIENTATION: ORIENTATION: RIGHT

## 2025-06-16 ASSESSMENT — PAIN SCALES - GENERAL: PAINLEVEL_OUTOF10: 8

## 2025-06-16 NOTE — PROGRESS NOTES
[facility]  Outpatient Physical Therapy   Treatment Note        Date: 2025  Patient: Bibiana Moses  : 1985   Confirmed: Yes  MRN: [id number]  Referring Provider: Karissa Marquez PA      Medical Diagnosis: Internal derangement of shoulder, right [M24.811]  Rotator cuff arthropathy of right shoulder [M12.811]      Treatment Diagnosis: R shoulder pain with decreased ROM/strength affecting overall functional tolerance.    Visit Information:  Insurance: Payor: MEDICARE / Plan: MEDICARE PART A AND B / Product Type: *No Product type* /   PT Visit Information  Onset Date: 24  Total # of Visits Approved:  (BMN, medicare guidelines)  Total # of Visits to Date: 3  Plan of Care/Certification Expiration Date: 25  No Show: 0  Progress Note Due Date: 25  Canceled Appointment: 0  Progress Note Counter: 3/8 PN (due 25)    Subjective Information:  Subjective: Pt reports was sore after last appt. Pt 7 min late for session.  HEP Compliance:  [x] Good [] Fair [] Poor [] Reports not doing due to:    Pain Screening  Patient Currently in Pain: Yes  Pain Assessment: 0-10  Pain Level: 8  Pain Location: Shoulder  Pain Orientation: Right  Pain Descriptors: Burning    Treatment:  Exercises:  Exercises  Exercise 1: Pulleys flexion x 2' 1' Scaption  Exercise 2: finger ladder 2x flexion with increased burning  Exercise 3: shoulder harry, 6 way 5\" x 5 ea with ball  Exercise 5: scap retraction 3-5 secs x 10  Exercise 6: R UT/levator  stretch 30sec x 3  Exercise 7: Wand flexion with limited range,       Manual:   Manual Therapy  Soft Tissue Mobilizaton: Rt shoulder and bicep gentle shoulder post glide grade 1  Treatment Reasoning  Limitations addressed: Tissue extensibility, Painful spasm, Joint motion  Functional ability(s) targeted: Reaching overhead, Tolerance to age appropriate activities    *Indicates exercise, modality, or manual techniques to be initiated when appropriate    Objective Measures:

## 2025-06-19 ENCOUNTER — HOSPITAL ENCOUNTER (OUTPATIENT)
Dept: PHYSICAL THERAPY | Age: 40
Setting detail: THERAPIES SERIES
Discharge: HOME | End: 2025-06-19
Payer: MEDICARE

## 2025-06-19 PROCEDURE — 97110 THERAPEUTIC EXERCISES: CPT

## 2025-06-19 ASSESSMENT — PAIN DESCRIPTION - DESCRIPTORS: DESCRIPTORS: BURNING;TIGHTNESS

## 2025-06-19 ASSESSMENT — PAIN DESCRIPTION - LOCATION: LOCATION: SHOULDER

## 2025-06-19 ASSESSMENT — PAIN SCALES - GENERAL: PAINLEVEL_OUTOF10: 8

## 2025-06-19 ASSESSMENT — PAIN DESCRIPTION - ORIENTATION: ORIENTATION: RIGHT

## 2025-06-19 NOTE — PROGRESS NOTES
[facility]  Outpatient Physical Therapy    Treatment Note        Date: 2025  Patient: Bibiana Moses  : 1985   Confirmed: Yes  MRN: [id number]  Referring Provider: Karissa Marquez PA    Medical Diagnosis: Internal derangement of shoulder, right [M24.811]  Rotator cuff arthropathy of right shoulder [M12.811]       Treatment Diagnosis: R shoulder pain with decreased ROM/strength affecting overall functional tolerance.    Visit Information:  Insurance: Payor: MEDICARE / Plan: MEDICARE PART A AND B / Product Type: *No Product type* /   PT Visit Information  Onset Date: 24  Total # of Visits Approved:  (BMN, medicare guidelines)  Total # of Visits to Date: 4  Plan of Care/Certification Expiration Date: 25  No Show: 0  Progress Note Due Date: 25  Canceled Appointment: 0  Progress Note Counter: /8 PN (due 25)    Subjective Information:  Subjective: Pt states pain wise it hasn't gotten any better or worse  HEP Compliance:  [x] Good [x] Fair [] Poor [] Re    Pain Screening  Patient Currently in Pain: Yes  Pain Assessment: 0-10  Pain Level: 8  Pain Location: Shoulder  Pain Orientation: Right  Pain Descriptors: Burning, Tightness    Treatment:  Exercises:  Exercises  Exercise 1: Pulleys flexion x 2' 2' Scaption  Exercise 2: finger ladder 3x flexion , scaption x 3  Exercise 3: shoulder harry, 5 way 5\" x 5 ea with ball  Exercise 4: ball stab on table  x 10,  4 way  Exercise 5: scap retraction 3-5 secs x 10  Exercise 6: R UT/levator  stretch 30sec x 3  Exercise 7: Wand flexion with limited range x 10, chest press and ER x 5-10  Exercise 8: Corner stretch 10secs x 4reps, reduce to tolerance as pain increased  Exercise 20: HEP: cont current  Treatment Reasoning  Limitations addressed: Strength, Flexibility, Posture, Pain modulation    Manual:   Manual Therapy  Soft Tissue Mobilizaton: Rt shoulder and axilla gentle shoulder post glide grade 1       Modalities:  Cryotherapy (CPT

## 2025-06-23 ENCOUNTER — APPOINTMENT (OUTPATIENT)
Dept: OBSTETRICS AND GYNECOLOGY | Facility: CLINIC | Age: 40
End: 2025-06-23

## 2025-06-23 ENCOUNTER — HOSPITAL ENCOUNTER (OUTPATIENT)
Dept: PHYSICAL THERAPY | Age: 40
Setting detail: THERAPIES SERIES
Discharge: HOME | End: 2025-06-23
Payer: MEDICARE

## 2025-06-23 PROCEDURE — 97110 THERAPEUTIC EXERCISES: CPT

## 2025-06-23 ASSESSMENT — PAIN SCALES - GENERAL: PAINLEVEL_OUTOF10: 8

## 2025-06-23 ASSESSMENT — PAIN DESCRIPTION - LOCATION: LOCATION: SHOULDER

## 2025-06-23 ASSESSMENT — PAIN DESCRIPTION - ORIENTATION: ORIENTATION: RIGHT;OUTER

## 2025-06-23 NOTE — PROGRESS NOTES
[facility]  Outpatient Physical Therapy    Treatment Note        Date: 2025  Patient: Bibiana Moses  : 1985   Confirmed: Yes  MRN: [id number]  Referring Provider: Karissa Marquez PA    Medical Diagnosis: Internal derangement of shoulder, right [M24.811]  Rotator cuff arthropathy of right shoulder [M12.811]       Treatment Diagnosis: R shoulder pain with decreased ROM/strength affecting overall functional tolerance.    Visit Information:  Insurance: Payor: MEDICARE / Plan: MEDICARE PART A AND B / Product Type: *No Product type* /   PT Visit Information  Onset Date: 24  Total # of Visits Approved:  (BMN, medicare guidelines)  Total # of Visits to Date: 5  Plan of Care/Certification Expiration Date: 25  No Show: 0  Progress Note Due Date: 25  Canceled Appointment: 0  Progress Note Counter: /8 PN (due 25)    Subjective Information:  Subjective: Pt states sometimes she has unexpected pain with sudden movement  HEP Compliance:  [x] Good [] Fair [] Poor [] Reports not doing due to:      Pain Screening  Patient Currently in Pain: Yes  Pain Assessment: 0-10  Pain Level: 8  Pain Location: Shoulder  Pain Orientation: Right, Outer  Pain Descriptors:  (' like a knot\")    Treatment:  Exercises:  Exercises  Exercise 1: Pulleys flexion x 2' 2' Scaption  Exercise 2: UE ranger ab/add fl/ext x 10  Exercise 5: scap retraction 3-5 secs x 10  Exercise 6: R UT/levator  stretch 30sec x 3  Exercise 7: Seated Wand flexion with limited range x 10, chest press and ER x 5-10, abd x 3\" x10  Exercise 8: Rt Open book 3\" x 10  Exercise 9: UBE Fwd x 3', Retro x 1' with inc;d pain  Exercise 10: bicep curls with SC x 10  Exercise 11: Rt sleeper stretch 3\" x 5     Modalities:  Cryotherapy (CPT 69859)  Patient Position: Seated  Number Minutes Cryotherapy: 10  Cryotherapy location: Right, Shoulder  Cryotherapy specified location: Rt shoulder bicep  Post treatment skin assessment: Intact  Post treatment skin

## 2025-06-24 DIAGNOSIS — M24.811 INTERNAL DERANGEMENT OF SHOULDER, RIGHT: ICD-10-CM

## 2025-06-24 RX ORDER — NAPROXEN 500 MG/1
500 TABLET ORAL 2 TIMES DAILY WITH MEALS
Qty: 60 TABLET | Refills: 0 | Status: SHIPPED | OUTPATIENT
Start: 2025-06-24

## 2025-06-24 NOTE — TELEPHONE ENCOUNTER
Comments:     Last Office Visit (last PCP visit):   5/21/2025    Next Visit Date:  Future Appointments   Date Time Provider Department Center   6/26/2025 10:40 AM Jacquelyn Maloney, PT [facility] [place]   6/30/2025  9:30 AM Grabiel Vickers MD [facility] [place]   6/30/2025 11:20 AM Hina Yanez PTA [facility] [place]   7/3/2025 10:40 AM Hina Yanez PTA [facility] [place]       **If hasn't been seen in over a year OR hasn't followed up according to last diabetes/ADHD visit, make appointment for patient before sending refill to provider.    Rx requested:  Requested Prescriptions     Pending Prescriptions Disp Refills    naproxen (NAPROSYN) 500 MG tablet [Pharmacy Med Name: NAPROXEN 500MG TABLETS] 60 tablet 0     Sig: TAKE 1 TABLET BY MOUTH TWICE DAILY WITH MEALS

## 2025-06-26 ENCOUNTER — HOSPITAL ENCOUNTER (OUTPATIENT)
Dept: PHYSICAL THERAPY | Age: 40
Setting detail: THERAPIES SERIES
Discharge: HOME | End: 2025-06-26
Payer: MEDICARE

## 2025-06-26 NOTE — PROGRESS NOTES
Therapy                            Cancellation/No-show Note    Date: 2025  Patient: Bibiana Moses (39 y.o. female)  : 1985  MRN:  [id number]  Referring Physician: Karissa Marquez PA    Medical Diagnosis: Internal derangement of shoulder, right [M24.811]  Rotator cuff arthropathy of right shoulder [M12.811]      Visit Information:  Insurance: Payor: MEDICARE / Plan: MEDICARE PART A AND B / Product Type: *No Product type* /   Visits to Date: 5   No Show/Cancelled Appts: 0 /       For today's appointment patient:  [x]  Cancelled  []  Rescheduled appointment  []  No-show   []  Called pt to remind of next appointment     Reason given by patient:  []  Patient ill  [x]  Conflicting appointment  []  No transportation    []  Conflict with work  []  No reason given  []  Other:      [] Pt has future appointments scheduled, no follow up needed  [] Pt requests to be on hold.    Reason:   If > 2 weeks please discuss with therapist.  [] Therapist to call pt for follow up  [] [place] to call pt to reschedule   Comments:       Signature: Electronically signed by Hina Yanez PTA on 25 at 10:50 AM EDT

## 2025-06-30 ENCOUNTER — OFFICE VISIT (OUTPATIENT)
Age: 40
End: 2025-06-30
Payer: MEDICARE

## 2025-06-30 VITALS — BODY MASS INDEX: 39.99 KG/M2 | OXYGEN SATURATION: 97 % | HEIGHT: 65 IN | WEIGHT: 240 LBS | HEART RATE: 79 BPM

## 2025-06-30 DIAGNOSIS — S46.001D INJURY OF RIGHT ROTATOR CUFF, SUBSEQUENT ENCOUNTER: Primary | ICD-10-CM

## 2025-06-30 DIAGNOSIS — M75.21 BICIPITAL TENDINITIS OF RIGHT SHOULDER: ICD-10-CM

## 2025-06-30 DIAGNOSIS — F41.9 ANXIETY DUE TO INVASIVE PROCEDURE: ICD-10-CM

## 2025-06-30 DIAGNOSIS — M25.511 PAIN IN RIGHT ACROMIOCLAVICULAR JOINT: ICD-10-CM

## 2025-06-30 PROCEDURE — 99214 OFFICE O/P EST MOD 30 MIN: CPT | Performed by: FAMILY MEDICINE

## 2025-06-30 PROCEDURE — 1036F TOBACCO NON-USER: CPT | Performed by: FAMILY MEDICINE

## 2025-06-30 PROCEDURE — G8427 DOCREV CUR MEDS BY ELIG CLIN: HCPCS | Performed by: FAMILY MEDICINE

## 2025-06-30 PROCEDURE — 99213 OFFICE O/P EST LOW 20 MIN: CPT | Performed by: FAMILY MEDICINE

## 2025-06-30 PROCEDURE — G8417 CALC BMI ABV UP PARAM F/U: HCPCS | Performed by: FAMILY MEDICINE

## 2025-06-30 RX ORDER — DIAZEPAM 2 MG/1
2 TABLET ORAL PRN
Qty: 2 TABLET | Refills: 0 | Status: SHIPPED | OUTPATIENT
Start: 2025-06-30 | End: 2025-07-16

## 2025-06-30 RX ORDER — MELOXICAM 15 MG/1
15 TABLET ORAL DAILY
Qty: 30 TABLET | Refills: 3 | Status: SHIPPED | OUTPATIENT
Start: 2025-06-30

## 2025-06-30 ASSESSMENT — ENCOUNTER SYMPTOMS: BACK PAIN: 0

## 2025-06-30 NOTE — PROGRESS NOTES
[facility] PHYSICIANS [place] [facility], [facility] ORTHOPEDICS  [address] [place] [address]  [address] [place] [address]  Dept: [phone]  Dept Fax: [phone]  Loc: [phone]     6/30/2025    Visit type: Follow up    Reason for Visit: Follow-up (Pt states PT helps though she thinks her pain made it worsen, range of motion has not increased, cortico inj provided minimal relief in Rt shoulder.)         Patient: Bibiana Moses is a 39 y.o. female     HPI: 39-year-old female presents for follow-up visit pertaining to right shoulder pain.  Patient received subacromial cortisone injection at the previous encounter, patient reports some relief was provided.  Patient continues to have significant pain in other parts of the shoulder, PT helps with range of motion though worsens pain at this time.    ASSESSMENT/PLAN   Injury of right rotator cuff, subsequent encounter  -     meloxicam (MOBIC) 15 MG tablet; Take 1 tablet by mouth daily, Disp-30 tablet, R-3Normal  -     MRI SHOULDER RIGHT WO CONTRAST; Future  Bicipital tendinitis of right shoulder  -     meloxicam (MOBIC) 15 MG tablet; Take 1 tablet by mouth daily, Disp-30 tablet, R-3Normal  -     MRI SHOULDER RIGHT WO CONTRAST; Future  Pain in right acromioclavicular joint  -     meloxicam (MOBIC) 15 MG tablet; Take 1 tablet by mouth daily, Disp-30 tablet, R-3Normal  -     MRI SHOULDER RIGHT WO CONTRAST; Future  Anxiety due to invasive procedure  -     diazePAM (VALIUM) 2 MG tablet; Take 1 tablet by mouth as needed for Anxiety (procedural anxiety) for up to 2 doses. Take one pill 30-60 minutes prior to procedure, take second pill if needed at time of procedure., Disp-2 tablet, R-0Print     -Pertinent exam/Discussion: Tenderness to palpation of the AC joint, supraspinatus insertion, bicipital tendon through the bicipital groove.  Pain and mild weakness associated with resistance to abduction and external rotation.  Neer's test positive, Eagle test

## 2025-07-03 ENCOUNTER — HOSPITAL ENCOUNTER (OUTPATIENT)
Dept: PHYSICAL THERAPY | Age: 40
Setting detail: THERAPIES SERIES
Discharge: HOME | End: 2025-07-03
Payer: MEDICARE

## 2025-07-03 PROCEDURE — G0283 ELEC STIM OTHER THAN WOUND: HCPCS

## 2025-07-03 PROCEDURE — 97110 THERAPEUTIC EXERCISES: CPT

## 2025-07-03 ASSESSMENT — PAIN DESCRIPTION - LOCATION: LOCATION: SHOULDER;ARM

## 2025-07-03 ASSESSMENT — PAIN SCALES - GENERAL: PAINLEVEL_OUTOF10: 8

## 2025-07-03 ASSESSMENT — PAIN DESCRIPTION - ORIENTATION: ORIENTATION: RIGHT

## 2025-07-03 ASSESSMENT — PAIN DESCRIPTION - DESCRIPTORS: DESCRIPTORS: BURNING;TIGHTNESS;PRESSURE

## 2025-07-03 NOTE — PROGRESS NOTES
[facility]  PHYSICAL THERAPY PLAN OF CARE                                    [facility] [place] [address]     Ph: [phone]  Fax: [phone]    [] Certification  [] Recertification []  Plan of Care  [x] Progress Note [] Discharge      Referring Provider: Karissa Marquez PA    From:  Jacquelyn Maloney PT  Patient: Bibiana Moses (39 y.o. female) : 1985 Date: 2025   Medical Diagnosis: Internal derangement of shoulder, right [M24.811]  Rotator cuff arthropathy of right shoulder [M12.811]    Treatment Diagnosis: R shoulder pain with decreased ROM/strength affecting overall functional tolerance.    Plan of Care/Certification Expiration Date: 25   Progress Report Period from: 2025  to 7/3/2025    Visits to Date: 6 No Show: 1 Cancelled Appts: 1    OBJECTIVE:   Short Term Goals - Time Frame for Short Term Goals: 2 weeks    Goals Current/Discharge status  Status   Short Term Goal 1: The pt will demonstrate improved postural awareness requiring <25% VC's during exercises     Improved during TE In progress   Short Term Goal 2: Decrease L shoulder pain to 0-1/10 to enable functional improvements in ADL's.  STG 2 Current Status:: 7/3: pain remains at 7-8/10, flexion feels better   In progress   Long Term Goals - Time Frame for Long Term Goals : 6 weeks  Goals Current/ Discharge status Status   Long Term Goal 1: Issue/update HEP to progress towards self- management of symptoms upon D/C LTG 1 Current Status:: 7/3: Compliant 1x /day   In progress   Long Term Goal 2: Pt demo improved overall function by reporting greater than 80% per functional survey score LTG 2 Current Status:: 7/3: 64% functional   In progress   Long Term Goal 3: Pain-free R shoulder AROM to WNL allowing an increase in ADL tolerance.      AROM RUE (degrees)  RUE General AROM: [id number], (Abd93 deg, ER c8, IR T10, Ext 74* all with pain)     In progress   Long Term Goal 4: Improve R shoulder strength >/=4/5 to allow patient

## 2025-07-03 NOTE — PROGRESS NOTES
[facility]  Outpatient Physical Therapy    Treatment Note        Date: 7/3/2025  Patient: Bibiana Moses  : 1985   Confirmed: Yes  MRN: [id number]  Referring Provider: Karissa Marquez PA    Medical Diagnosis: Internal derangement of shoulder, right [M24.811]  Rotator cuff arthropathy of right shoulder [M12.811]       Treatment Diagnosis: R shoulder pain with decreased ROM/strength affecting overall functional tolerance.    Visit Information:  Insurance: Payor: MEDICARE / Plan: MEDICARE PART A AND B / Product Type: *No Product type* /   PT Visit Information  Onset Date: 24  Total # of Visits Approved:  (BMN, medicare guidelines)  Total # of Visits to Date: 6  Plan of Care/Certification Expiration Date: 25  No Show: 1  Progress Note Due Date: 25  Canceled Appointment: 1  Progress Note Counter:  PN (due 25)    Subjective Information:  Subjective: Pt states sometimes she cannot do the sleeper stretch at all. MRI scheduled for 25  HEP Compliance:  [x] Good [] Fair [] Poor [] Reports not doing due to:      Pain Screening  Patient Currently in Pain: Yes  Pain Assessment: 0-10  Pain Level: 8  Pain Location: Shoulder, Arm  Pain Orientation: Right  Pain Descriptors: Burning, Tightness, Pressure    Treatment:  Exercises:  Exercises  Exercise 1: Pulleys flexion x 4'  Exercise 2: UE ranger ab/add flex/ext x 10  Exercise 7: Seated Wand flexion with limited range x 10, chest press and ER with 1.5# wt( discomfort only) x 5-10, abd x 3\" x10  Exercise 9: UBE Fwd x 2'  Exercise 10: bicep curls/ ext with eccentric control with 1# x 10       *Indicates exercise, modality, or manual techniques to be initiated when appropriate    Objective Measures:      Strength: [] NT  [x] MMT completed:        Strength RUE  Comment: R flex=4=/5, abd=4-/5, ER/IR=4/5, MT/LT 4-/5 with pain, elbow flex 4-/5, extension 4/5     ROM: [] NT  [x] ROM measurements:         AROM RUE (degrees)  RUE General AROM:

## 2025-07-07 ENCOUNTER — HOSPITAL ENCOUNTER (OUTPATIENT)
Dept: MRI IMAGING | Age: 40
Discharge: HOME | End: 2025-07-09
Payer: MEDICARE

## 2025-07-07 DIAGNOSIS — M25.511 PAIN IN RIGHT ACROMIOCLAVICULAR JOINT: ICD-10-CM

## 2025-07-07 DIAGNOSIS — M75.21 BICIPITAL TENDINITIS OF RIGHT SHOULDER: ICD-10-CM

## 2025-07-07 DIAGNOSIS — S46.001D INJURY OF RIGHT ROTATOR CUFF, SUBSEQUENT ENCOUNTER: ICD-10-CM

## 2025-07-07 PROCEDURE — 73221 MRI JOINT UPR EXTREM W/O DYE: CPT

## 2025-07-08 ENCOUNTER — TELEPHONE (OUTPATIENT)
Age: 40
End: 2025-07-08

## 2025-07-08 ENCOUNTER — RESULTS FOLLOW-UP (OUTPATIENT)
Age: 40
End: 2025-07-08

## 2025-07-09 ENCOUNTER — OFFICE VISIT (OUTPATIENT)
Age: 40
End: 2025-07-09
Payer: MEDICARE

## 2025-07-09 VITALS
OXYGEN SATURATION: 97 % | TEMPERATURE: 97 F | SYSTOLIC BLOOD PRESSURE: 122 MMHG | DIASTOLIC BLOOD PRESSURE: 60 MMHG | HEIGHT: 65 IN | WEIGHT: 108.9 LBS | HEART RATE: 77 BPM | BODY MASS INDEX: 18.15 KG/M2

## 2025-07-09 DIAGNOSIS — S46.011D TRAUMATIC INCOMPLETE TEAR OF RIGHT ROTATOR CUFF, SUBSEQUENT ENCOUNTER: Primary | ICD-10-CM

## 2025-07-09 DIAGNOSIS — M19.011 ARTHRITIS OF RIGHT ACROMIOCLAVICULAR JOINT: ICD-10-CM

## 2025-07-09 PROCEDURE — G8427 DOCREV CUR MEDS BY ELIG CLIN: HCPCS | Performed by: FAMILY MEDICINE

## 2025-07-09 PROCEDURE — 99213 OFFICE O/P EST LOW 20 MIN: CPT | Performed by: FAMILY MEDICINE

## 2025-07-09 PROCEDURE — 99212 OFFICE O/P EST SF 10 MIN: CPT | Performed by: FAMILY MEDICINE

## 2025-07-09 PROCEDURE — G8418 CALC BMI BLW LOW PARAM F/U: HCPCS | Performed by: FAMILY MEDICINE

## 2025-07-09 PROCEDURE — 1036F TOBACCO NON-USER: CPT | Performed by: FAMILY MEDICINE

## 2025-07-09 ASSESSMENT — ENCOUNTER SYMPTOMS: BACK PAIN: 0

## 2025-07-09 NOTE — PROGRESS NOTES
continuing PT at this time  -Imaging: MRI of the right shoulder reveals moderate tendinosis of the supraspinatus number spinatus with moderate grade tearing of the interstitial fibers of the supraspinatus.  Subacromial bursitis is noted, AC joint osteoarthrosis is noted.  -Procedure/injection/brace/splint: Discussed consideration for intra-articular cortisone injection of the AC joint, patient would prefer to hold off at this time  -Follow-up: Discussed consideration for surgical consult versus continuing conservative measures, patient will continue conservative measures at this time.  Patient will continue with physical therapy and oral medications/topical medications over the next 4 to 6 weeks and I will reevaluate at that time      Orders Placed This Encounter   Medications    diclofenac sodium (VOLTAREN) 1 % GEL     Sig: Apply 2 g topically 4 times daily     Dispense:  150 g     Refill:  1        Subjective      Review of Systems   Musculoskeletal:  Positive for arthralgias and myalgias. Negative for back pain, gait problem, joint swelling and neck pain.        Right shoulder pain   Skin:  Negative for wound.   Neurological:  Negative for dizziness, weakness and numbness.      Allergies   Allergen Reactions    Amphetamine     Penicillins Hives and Other (See Comments)    Phentermine Other (See Comments)     Anxiety with hospitalization    Topiramate Other (See Comments)     DIZZY       Current Outpatient Medications:     diclofenac sodium (VOLTAREN) 1 % GEL, Apply 2 g topically 4 times daily, Disp: 150 g, Rfl: 1    meloxicam (MOBIC) 15 MG tablet, Take 1 tablet by mouth daily (Patient not taking: Reported on 7/9/2025), Disp: 30 tablet, Rfl: 3   Past Medical History:   Diagnosis Date    Anxiety and depression     Asthma     Atypical chest pain 05/31/2019    Chronic back pain     Diabetes mellitus (HCC)     Headache     HIV exposure 01/06/2020    Irregular heart beat     Multiple sclerosis (HCC)     Osteoarthritis

## 2025-07-11 ENCOUNTER — HOSPITAL ENCOUNTER (OUTPATIENT)
Dept: PHYSICAL THERAPY | Age: 40
Setting detail: THERAPIES SERIES
Discharge: HOME | End: 2025-07-11
Payer: MEDICARE

## 2025-07-11 PROCEDURE — 97110 THERAPEUTIC EXERCISES: CPT

## 2025-07-11 ASSESSMENT — PAIN DESCRIPTION - LOCATION: LOCATION: SHOULDER;ARM

## 2025-07-11 ASSESSMENT — PAIN DESCRIPTION - ORIENTATION: ORIENTATION: RIGHT

## 2025-07-11 ASSESSMENT — PAIN SCALES - GENERAL: PAINLEVEL_OUTOF10: 9

## 2025-07-11 ASSESSMENT — PAIN DESCRIPTION - DESCRIPTORS: DESCRIPTORS: BURNING

## 2025-07-11 NOTE — PROGRESS NOTES
[facility]  Outpatient Physical Therapy   Treatment Note        Date: 2025  Patient: Bibiana Moses  : 1985   Confirmed: Yes  MRN: [id number]  Referring Provider: Karissa Marquez PA      Medical Diagnosis: Internal derangement of shoulder, right [M24.811]  Rotator cuff arthropathy of right shoulder [M12.811]      Treatment Diagnosis: R shoulder pain with decreased ROM/strength affecting overall functional tolerance.    Visit Information:  Insurance: Payor: MEDICARE / Plan: MEDICARE PART A AND B / Product Type: *No Product type* /   PT Visit Information  Onset Date: 24  Total # of Visits Approved:  (BMN, medicare guidelines)  Total # of Visits to Date: 7  Plan of Care/Certification Expiration Date: 25  No Show: 2  Progress Note Due Date: 25  Canceled Appointment: 1  Progress Note Counter:     Subjective Information:  Subjective: Patient reports she is supposed to do another month of PT.  Patient is going to follow up in August.  HEP Compliance:  [x] Good [] Fair [] Poor [] Reports not doing due to:             Pain Screening  Patient Currently in Pain: Yes  Pain Assessment: 0-10  Pain Level: 9  Pain Location: Shoulder, Arm  Pain Orientation: Right  Pain Descriptors: Burning    Treatment:  Exercises:  Exercises  Exercise 1: Pulleys flexion x 4'  Exercise 2: UE ranger ab/add flex/ext x 10  Exercise 5: scap retraction 3-5 secs x 10  Exercise 7: Seated Wand flexion with limited range x 10, abduction x 10  Exercise 10: bicep curls/ ext with eccentric control with 1# x 10  Exercise 20: HEP: cont current       Modalities:  Cryotherapy (CPT 29228)  Patient Position: Seated  Number Minutes Cryotherapy: 10 minutes  Cryotherapy location: Right, Shoulder  Cryotherapy specified location: right shoulder  Post treatment skin assessment: Intact  Post treatment skin assessment comments: intact  Limitations addressed: Pain modulation, Tissue extensibility, Edema, Joint mobility  Ultrasound

## 2025-07-24 ENCOUNTER — HOSPITAL ENCOUNTER (OUTPATIENT)
Dept: PHYSICAL THERAPY | Age: 40
Setting detail: THERAPIES SERIES
Discharge: HOME | End: 2025-07-24
Payer: MEDICARE

## 2025-07-24 NOTE — PROGRESS NOTES
Therapy                            Cancellation/No-show Note    Date: 2025  Patient: Bibiana Moses (39 y.o. female)  : 1985  MRN:  [id number]  Referring Physician: Karissa Marquez PA    Medical Diagnosis: Internal derangement of shoulder, right [M24.811]  Rotator cuff arthropathy of right shoulder [M12.811]      Visit Information:  Insurance: Payor: MEDICARE / Plan: MEDICARE PART A AND B / Product Type: *No Product type* /   Visits to Date: 7   No Show/Cancelled Appts:       For today's appointment patient:  []  Cancelled  []  Rescheduled appointment  [x]  No-show   [x]  Called pt to remind of next appointment     Reason given by patient:  []  Patient ill  []  Conflicting appointment  []  No transportation    []  Conflict with work  []  No reason given  [x]  Other:  just got out of correction & didn't remember appts    [] Pt has future appointments scheduled, no follow up needed  [] Pt requests to be on hold.    Reason:   If > 2 weeks please discuss with therapist.  [] Therapist to call pt for follow up  []  to call pt to reschedule   Comments:   Patient rescheduled for next week.    Signature: Electronically signed by Audelia Lebron PT on 25 at 9:03 AM EDT

## 2025-08-01 ENCOUNTER — HOSPITAL ENCOUNTER (OUTPATIENT)
Dept: PHYSICAL THERAPY | Age: 40
Setting detail: THERAPIES SERIES
Discharge: HOME | End: 2025-08-01
Payer: MEDICARE

## 2025-08-01 PROCEDURE — 97110 THERAPEUTIC EXERCISES: CPT

## 2025-08-01 NOTE — PLAN OF CARE
[facility]  PHYSICAL THERAPY PLAN OF CARE                                    [facility] [place] [address]     Ph: [phone]  Fax: [phone]      [] Certification  [] Recertification []  Plan of Care  [x] Progress Note [] Discharge      Referring Provider: Karissa Marquez PA     From:  Jacquelyn Maloney PT, DPT   Patient: Bibiana Moses (39 y.o. female) : 1985 Date: 2025  Medical Diagnosis: Internal derangement of shoulder, right [M24.811]  Rotator cuff arthropathy of right shoulder [M12.811]       Treatment Diagnosis: R shoulder pain with decreased ROM/strength affecting overall functional tolerance.    Plan of Care/Certification Expiration Date: : 25   Progress Report Period from: 2025 to 2025    Visits to Date: 8 No Show: 5 Cancelled Appts: 1    OBJECTIVE:   Short Term Goals - Time Frame for Short Term Goals: 2 weeks    Goals Current/Discharge status  Status   Short Term Goal 1: The pt will demonstrate improved postural awareness requiring <25% VC's during exercises  Patient requires cues >25% of the time for postural awareness  In progress   Short Term Goal 2: Decrease L shoulder pain to 0-1/10 to enable functional improvements in ADL's.  Patient reports pain level varies from 0-10/10.  In progress     Long Term Goals - Time Frame for Long Term Goals : 6 weeks  Goals Current/ Discharge status Status   Long Term Goal 1: Issue/update HEP to progress towards self- management of symptoms upon D/C Patient independent with HEP; progressions ongoing  In progress   Long Term Goal 2: Pt demo improved overall function by reporting greater than 80% per functional survey score 48% per UEFI  In progress   Long Term Goal 3: Pain-free R shoulder AROM to WNL allowing an increase in ADL tolerance.   AROM RUE (degrees)  RUE General AROM: flexion 160 degrees, 150 degrees, ER T2, IR L3  In progress   Long Term Goal 4: Improve R shoulder strength >/=4/5 to allow patient to reach overhead

## 2025-08-01 NOTE — PROGRESS NOTES
[facility]  Outpatient Physical Therapy    Treatment Note        Date: 2025  Patient: Bibiana Moses  : 1985   Confirmed: Yes  MRN: [id number]  Referring Provider: Karissa Marquez PA    Medical Diagnosis: Internal derangement of shoulder, right [M24.811]  Rotator cuff arthropathy of right shoulder [M12.811]       Treatment Diagnosis: R shoulder pain with decreased ROM/strength affecting overall functional tolerance.    Visit Information:  Insurance: Payor: MEDICARE / Plan: MEDICARE PART A AND B / Product Type: *No Product type* /   PT Visit Information  Onset Date: 24  Total # of Visits Approved:  (BMN, medicare guidelines)  Total # of Visits to Date: 8  Plan of Care/Certification Expiration Date: 25  No Show: 5  Progress Note Due Date: 25  Canceled Appointment: 1  Progress Note Counter:     Subjective Information:  Subjective: Patient arrives to appt following 2 week lapse. Reports pain comes and goes and is 10/10 at the worst. States she is unable to throw a ball. Has a follow up with ortho doctor on 2025  HEP Compliance:  [x] Good [] Fair [] Poor [] Reports not doing due to:    Pain Screening  Patient Currently in Pain: Denies    Treatment:  Exercises:  Exercises  Exercise 1: UBE level 1.5 2/2 forward/retro  Exercise 2: standing shoulder flexion abduction YTB x10  Exercise 3: shoulder IR/ER YTB x10  Exercise 4: chest pulls YTB x10 2 way  Exercise 5: scap retraction 3-5 secs x 15  Exercise 6: bicep curls 3# 3 way x10 ea  Exercise 20: HEP: cont current    Objective Measures:     Strength: [] NT  [x] MMT completed:    Strength RUE  Comment: R flex=4/5, abd=4/5, ER/IR=4+/5, MT/LT 4-/5 with pain, elbow flex 4-/5, extension 4/5    ROM: [] NT  [] ROM measurements:    AROM RUE (degrees)  RUE General AROM: flexion 160 degrees, 150 degrees, ER T2, IR L3     Assessment:   Body Structures, Functions, Activity Limitations Requiring Skilled Therapeutic Intervention: Decreased

## 2025-08-06 ENCOUNTER — OFFICE VISIT (OUTPATIENT)
Age: 40
End: 2025-08-06
Payer: MEDICARE

## 2025-08-06 VITALS
BODY MASS INDEX: 39.15 KG/M2 | HEART RATE: 78 BPM | WEIGHT: 235 LBS | HEIGHT: 65 IN | OXYGEN SATURATION: 96 % | DIASTOLIC BLOOD PRESSURE: 64 MMHG | SYSTOLIC BLOOD PRESSURE: 104 MMHG

## 2025-08-06 DIAGNOSIS — M75.81 RIGHT ROTATOR CUFF TENDINITIS: ICD-10-CM

## 2025-08-06 DIAGNOSIS — M54.12 CERVICAL RADICULITIS: Primary | ICD-10-CM

## 2025-08-06 DIAGNOSIS — M75.82 ROTATOR CUFF TENDINITIS, LEFT: ICD-10-CM

## 2025-08-06 DIAGNOSIS — M77.11 RIGHT LATERAL EPICONDYLITIS: ICD-10-CM

## 2025-08-06 PROCEDURE — 99214 OFFICE O/P EST MOD 30 MIN: CPT | Performed by: FAMILY MEDICINE

## 2025-08-06 PROCEDURE — G8417 CALC BMI ABV UP PARAM F/U: HCPCS | Performed by: FAMILY MEDICINE

## 2025-08-06 PROCEDURE — 1036F TOBACCO NON-USER: CPT | Performed by: FAMILY MEDICINE

## 2025-08-06 PROCEDURE — G8427 DOCREV CUR MEDS BY ELIG CLIN: HCPCS | Performed by: FAMILY MEDICINE

## 2025-08-06 RX ORDER — DICLOFENAC SODIUM 75 MG/1
75 TABLET, DELAYED RELEASE ORAL 2 TIMES DAILY PRN
Qty: 60 TABLET | Refills: 1 | Status: SHIPPED | OUTPATIENT
Start: 2025-08-06

## 2025-08-06 ASSESSMENT — ENCOUNTER SYMPTOMS: BACK PAIN: 0

## 2025-08-12 ENCOUNTER — APPOINTMENT (OUTPATIENT)
Dept: OBSTETRICS AND GYNECOLOGY | Facility: CLINIC | Age: 40
End: 2025-08-12

## 2025-08-12 VITALS
HEIGHT: 68 IN | DIASTOLIC BLOOD PRESSURE: 82 MMHG | BODY MASS INDEX: 35.94 KG/M2 | SYSTOLIC BLOOD PRESSURE: 124 MMHG | WEIGHT: 237.1 LBS

## 2025-08-12 DIAGNOSIS — Z00.00 NORMAL GENITAL EXAM: Primary | ICD-10-CM

## 2025-08-12 DIAGNOSIS — N89.8 VAGINAL ITCHING: ICD-10-CM

## 2025-08-12 PROCEDURE — 1036F TOBACCO NON-USER: CPT | Performed by: ADVANCED PRACTICE MIDWIFE

## 2025-08-12 PROCEDURE — 99212 OFFICE O/P EST SF 10 MIN: CPT | Performed by: ADVANCED PRACTICE MIDWIFE

## 2025-08-12 PROCEDURE — 3008F BODY MASS INDEX DOCD: CPT | Performed by: ADVANCED PRACTICE MIDWIFE

## 2025-08-12 ASSESSMENT — PATIENT HEALTH QUESTIONNAIRE - PHQ9
SUM OF ALL RESPONSES TO PHQ9 QUESTIONS 1 & 2: 0
1. LITTLE INTEREST OR PLEASURE IN DOING THINGS: NOT AT ALL
2. FEELING DOWN, DEPRESSED OR HOPELESS: NOT AT ALL

## 2025-08-20 ENCOUNTER — HOSPITAL ENCOUNTER (OUTPATIENT)
Dept: PHYSICAL THERAPY | Age: 40
Setting detail: THERAPIES SERIES
Discharge: HOME | End: 2025-08-20
Attending: FAMILY MEDICINE
Payer: MEDICARE

## 2025-08-20 PROCEDURE — 97162 PT EVAL MOD COMPLEX 30 MIN: CPT

## 2025-08-21 ENCOUNTER — OFFICE VISIT (OUTPATIENT)
Age: 40
End: 2025-08-21

## 2025-08-21 VITALS
HEIGHT: 65 IN | SYSTOLIC BLOOD PRESSURE: 102 MMHG | OXYGEN SATURATION: 99 % | WEIGHT: 237.4 LBS | BODY MASS INDEX: 39.55 KG/M2 | RESPIRATION RATE: 16 BRPM | DIASTOLIC BLOOD PRESSURE: 60 MMHG | HEART RATE: 63 BPM

## 2025-08-21 DIAGNOSIS — G43.719 INTRACTABLE CHRONIC MIGRAINE WITHOUT AURA AND WITHOUT STATUS MIGRAINOSUS: ICD-10-CM

## 2025-08-21 DIAGNOSIS — I67.850 CADASIL (CEREBRAL AUTOSOMAL DOMINANT ARTERIOPATHY WITH SUBCORTICAL INFARCTS AND LEUKOENCEPHALOPATHY): Chronic | ICD-10-CM

## 2025-08-21 DIAGNOSIS — Z09 HOSPITAL DISCHARGE FOLLOW-UP: Primary | ICD-10-CM

## 2025-08-21 RX ORDER — RIMEGEPANT SULFATE 75 MG/75MG
1 TABLET, ORALLY DISINTEGRATING ORAL DAILY PRN
Qty: 15 TABLET | Refills: 1 | Status: SHIPPED | OUTPATIENT
Start: 2025-08-21

## 2025-08-21 RX ORDER — ASPIRIN 81 MG/1
81 TABLET, CHEWABLE ORAL DAILY
COMMUNITY
Start: 2025-08-19 | End: 2025-09-18

## 2025-08-22 ENCOUNTER — HOSPITAL ENCOUNTER (OUTPATIENT)
Dept: GENERAL RADIOLOGY | Age: 40
Discharge: HOME | End: 2025-08-24
Attending: FAMILY MEDICINE
Payer: MEDICARE

## 2025-08-22 DIAGNOSIS — M54.12 CERVICAL RADICULITIS: ICD-10-CM

## 2025-08-22 PROCEDURE — 72040 X-RAY EXAM NECK SPINE 2-3 VW: CPT

## 2025-08-29 ENCOUNTER — HOSPITAL ENCOUNTER (OUTPATIENT)
Dept: PHYSICAL THERAPY | Age: 40
Setting detail: THERAPIES SERIES
Discharge: HOME | End: 2025-08-29
Payer: MEDICARE

## 2025-08-29 PROCEDURE — 97110 THERAPEUTIC EXERCISES: CPT

## 2025-08-29 ASSESSMENT — PAIN DESCRIPTION - LOCATION: LOCATION: SHOULDER;NECK

## 2025-08-29 ASSESSMENT — PAIN DESCRIPTION - ORIENTATION: ORIENTATION: RIGHT;LEFT

## 2025-08-29 ASSESSMENT — PAIN SCALES - GENERAL: PAINLEVEL_OUTOF10: 8

## 2025-08-29 ASSESSMENT — PAIN DESCRIPTION - DESCRIPTORS: DESCRIPTORS: ACHING

## (undated) DEVICE — Device: Brand: ENDO SMARTCAP

## (undated) DEVICE — TUBING, SUCTION, 1/4" X 10', STRAIGHT: Brand: MEDLINE

## (undated) DEVICE — BRUSH ENDO CLN L90.5IN SHTH DIA1.7MM BRIST DIA5-7MM 2-6MM

## (undated) DEVICE — CONMED SCOPE SAVER BITE BLOCK, 20X27 MM: Brand: SCOPE SAVER

## (undated) DEVICE — ENDO CARRY-ON PROCEDURE KIT: Brand: ENDO CARRY-ON PROCEDURE KIT

## (undated) DEVICE — TUBE SET 96 MM 64 MM H2O PERISTALTIC STD AUX CHANNEL

## (undated) DEVICE — ADAPTER FLSH PMP FLD MGMT GI IRRIG OFP 2 DISPOSABLE

## (undated) DEVICE — SINGLE PORT MANIFOLD: Brand: NEPTUNE 2

## (undated) DEVICE — GLOVE ORANGE PI 8 1/2   MSG9085